# Patient Record
Sex: MALE | Race: WHITE | NOT HISPANIC OR LATINO | Employment: OTHER | URBAN - NONMETROPOLITAN AREA
[De-identification: names, ages, dates, MRNs, and addresses within clinical notes are randomized per-mention and may not be internally consistent; named-entity substitution may affect disease eponyms.]

---

## 2017-01-06 ENCOUNTER — TRANSFERRED RECORDS (OUTPATIENT)
Dept: HEALTH INFORMATION MANAGEMENT | Facility: HOSPITAL | Age: 42
End: 2017-01-06

## 2017-01-17 ENCOUNTER — OFFICE VISIT (OUTPATIENT)
Dept: FAMILY MEDICINE | Facility: OTHER | Age: 42
End: 2017-01-17
Attending: FAMILY MEDICINE
Payer: COMMERCIAL

## 2017-01-17 VITALS
TEMPERATURE: 98.8 F | RESPIRATION RATE: 16 BRPM | WEIGHT: 165 LBS | DIASTOLIC BLOOD PRESSURE: 66 MMHG | HEART RATE: 118 BPM | BODY MASS INDEX: 23.1 KG/M2 | OXYGEN SATURATION: 98 % | HEIGHT: 71 IN | SYSTOLIC BLOOD PRESSURE: 126 MMHG

## 2017-01-17 DIAGNOSIS — Z71.89 ACP (ADVANCE CARE PLANNING): Chronic | ICD-10-CM

## 2017-01-17 DIAGNOSIS — M50.30 DDD (DEGENERATIVE DISC DISEASE), CERVICAL: ICD-10-CM

## 2017-01-17 DIAGNOSIS — M50.90 CERVICAL DISC DISEASE: ICD-10-CM

## 2017-01-17 DIAGNOSIS — J20.9 ACUTE BRONCHITIS, UNSPECIFIED ORGANISM: Primary | ICD-10-CM

## 2017-01-17 PROCEDURE — 99214 OFFICE O/P EST MOD 30 MIN: CPT | Performed by: FAMILY MEDICINE

## 2017-01-17 PROCEDURE — 99212 OFFICE O/P EST SF 10 MIN: CPT

## 2017-01-17 RX ORDER — PREDNISONE 20 MG/1
TABLET ORAL
Qty: 20 TABLET | Refills: 0 | Status: SHIPPED
Start: 2017-01-17 | End: 2017-03-14

## 2017-01-17 RX ORDER — OXYCODONE AND ACETAMINOPHEN 10; 325 MG/1; MG/1
TABLET ORAL
Qty: 120 TABLET | Refills: 0 | Status: SHIPPED | OUTPATIENT
Start: 2017-01-17 | End: 2017-02-14

## 2017-01-17 ASSESSMENT — PAIN SCALES - GENERAL: PAINLEVEL: SEVERE PAIN (7)

## 2017-01-17 NOTE — MR AVS SNAPSHOT
"              After Visit Summary   2017    Micah Rosenthal    MRN: [id number]           Patient Information     Date Of Birth          1975        Visit Information        Provider Department      2017 2:00 PM Dariel Tesfaye MD [facility]        Today's Diagnoses     ACP (advance care planning)    -  1     Acute bronchitis, unspecified organism           Care Instructions    F/u with ongoing concerns.         Follow-ups after your visit        Who to contact     If you have questions or need follow up information about today's clinic visit or your schedule please contact [facility] directly at [phone].  Normal or non-critical lab and imaging results will be communicated to you by [employer], letter or phone within 4 business days after the clinic has received the results. If you do not hear from us within 7 days, please contact the clinic through [employer] or phone. If you have a critical or abnormal lab result, we will notify you by phone as soon as possible.  Submit refill requests through Gather or call your pharmacy and they will forward the refill request to us. Please allow 3 business days for your refill to be completed.          Additional Information About Your Visit        MyChart Information     Gather lets you send messages to your doctor, view your test results, renew your prescriptions, schedule appointments and more. To sign up, go to [email or web address] . Click on \"Log in\" on the left side of the screen, which will take you to the Welcome page. Then click on \"Sign up Now\" on the right side of the page.     You will be asked to enter the access code listed below, as well as some personal information. Please follow the directions to create your username and password.     Your access code is: [email or web address]  Expires: 2017  8:31 AM     Your access code will  in 90 days. If you need help or a new code, please call your [facility] or " "[phone].        Care EveryWhere ID     This is your Care EveryWhere ID. This could be used by other organizations to access your [place] medical records  [id number]        Your Vitals Were     Pulse Temperature Respirations Height BMI (Body Mass Index) Pulse Oximetry    118 98.8  F (37.1  C) (Tympanic) 16 5' 10.75\" (1.797 m) 23.18 kg/m2 98%       Blood Pressure from Last 3 Encounters:   01/17/17 126/66   10/31/16 98/60   03/30/16 88/72    Weight from Last 3 Encounters:   01/17/17 165 lb (74.844 kg)   10/31/16 168 lb (76.204 kg)   03/30/16 175 lb (79.379 kg)              Today, you had the following     No orders found for display         Today's Medication Changes          These changes are accurate as of: 1/17/17  2:40 PM.  If you have any questions, ask your nurse or doctor.               Start taking these medicines.        Dose/Directions    amoxicillin-clavulanate 875-125 MG per tablet   Commonly known as:  AUGMENTIN   Used for:  Acute bronchitis, unspecified organism   Started by:  Dariel Tesfaye MD        Dose:  1 tablet   Take 1 tablet by mouth 2 times daily   Quantity:  20 tablet   Refills:  0            Where to get your medicines      These medications were sent to [facility] - [address]  [address] [place] [address]     Phone:  [phone]    - amoxicillin-clavulanate 875-125 MG per tablet  - predniSONE 20 MG tablet             Primary Care Provider Office Phone # Fax #    Dariel Tesfaye [phone]       [facility] [address]  [facility] [address]        Thank you!     Thank you for choosing [facility]  for your care. Our goal is always to provide you with excellent care. Hearing back from our patients is one way we can continue to improve our services. Please take a few minutes to complete the written survey that you may receive in the mail after your visit with us. Thank you!             Your Updated " Medication List - Protect others around you: Learn how to safely use, store and throw away your medicines at www.disposemymeds.org.          This list is accurate as of: 1/17/17  2:40 PM.  Always use your most recent med list.                   Brand Name Dispense Instructions for use    albuterol 108 (90 BASE) MCG/ACT Inhaler    PROAIR HFA/PROVENTIL HFA/VENTOLIN HFA    1 Inhaler    Inhale 2 puffs into the lungs every 6 hours as needed for shortness of breath / dyspnea       amoxicillin-clavulanate 875-125 MG per tablet    AUGMENTIN    20 tablet    Take 1 tablet by mouth 2 times daily       cyclobenzaprine 10 MG tablet    FLEXERIL    90 tablet    Take 0.5-1 tablets (5-10 mg) by mouth 3 times daily as needed for muscle spasms       oxyCODONE-acetaminophen  MG per tablet    PERCOCET    120 tablet    TAKE 1 TABLET BY MOUTH EVERY 6 HOURS AS NEEDED FOR MODERATE TO SEVEREPAIN       predniSONE 20 MG tablet    DELTASONE    20 tablet    Take 3 tabs (60 mg) orally daily for 3 days, 2 tabs (40 mg) orally daily for 3 days, 1 tab (20 mg) orally daily for 3 days, then 1/2 tab (10 mg) orally for 3 days

## 2017-01-17 NOTE — PROGRESS NOTES
Micah CARTER Galo    January 17, 2017    Chief Complaint   Patient presents with     Cold Symptoms     cough for 2 days and congestion for a week - has a history of bronchitis      *_* Health Care Directive *_*     declined info       SUBJECTIVE:  Here for bronchitis.  Just getting it.  Continues tobacco and he knows he should quit.  We discussed and he wants meds on hand.      Past Medical History   Diagnosis Date     Cervical disc disease        Past Surgical History   Procedure Laterality Date     Other surgical history  03/2013     Cervical Fusion     Ir caudal epidural injection single  12/2012     [place] teeth extracted       Back surgery       fusion of C5 and C6       Current Outpatient Prescriptions   Medication Sig Dispense Refill     predniSONE (DELTASONE) 20 MG tablet Take 3 tabs (60 mg) orally daily for 3 days, 2 tabs (40 mg) orally daily for 3 days, 1 tab (20 mg) orally daily for 3 days, then 1/2 tab (10 mg) orally for 3 days 20 tablet 0     amoxicillin-clavulanate (AUGMENTIN) 875-125 MG per tablet Take 1 tablet by mouth 2 times daily 20 tablet 0     oxyCODONE-acetaminophen (PERCOCET)  MG per tablet TAKE 1 TABLET BY MOUTH EVERY 6 HOURS AS NEEDED FOR MODERATE TO SEVEREPAIN 120 tablet 0     cyclobenzaprine (FLEXERIL) 10 MG tablet Take 0.5-1 tablets (5-10 mg) by mouth 3 times daily as needed for muscle spasms 90 tablet 1     albuterol (PROAIR HFA, PROVENTIL HFA, VENTOLIN HFA) 108 (90 BASE) MCG/ACT inhaler Inhale 2 puffs into the lungs every 6 hours as needed for shortness of breath / dyspnea 1 Inhaler 1       Allergies   Allergen Reactions     Cranberry      Strawberry      Doxycycline Nausea and Vomiting     Zithromax [Azithromycin Dihydrate] Rash       Family History   Problem Relation Age of Onset     CANCER Maternal Uncle      throat ca     CANCER Maternal Uncle      lung ca     Cardiovascular Maternal Grandfather      Cardiovascular Paternal Grandfather      Unknown/Adopted Father        Social  History     Social History     Marital Status:      Spouse Name: N/A     Number of Children: N/A     Years of Education: N/A     Occupational History           Full-time      Social History Main Topics     Smoking status: Current Every Day Smoker -- 1.00 packs/day for 15 years     Types: Cigarettes     Smokeless tobacco: Never Used      Comment: Tried to Quit: Yes; Passive Exposure: Yes; Longest Tobacco free: 8 years     Alcohol Use: No     Drug Use: No     Sexual Activity:     Partners: Female     Other Topics Concern      Service No     Blood Transfusions Yes     Caffeine Concern Yes     Soda, up to 5 cans per day      Occupational Exposure No     Hobby Hazards No     Sleep Concern Yes     Sleeps only 3-4 hours every night     Stress Concern No     Weight Concern No     Special Diet No     Back Care Yes     [facility]     Seat Belt Yes     Parent/Sibling W/ Cabg, Mi Or Angioplasty Before 65f 55m? No     Social History Narrative       5 point ROS negative except as noted above in HPI, including Gen., Resp., CV, GI &  system review.     OBJECTIVE:  B/P: 126/66, T: 98.8, P: 118, R: 16    GENERAL APPEARANCE: Alert, no acute distress  CV: regular rate and rhythm, no murmur, rub or gallop  RESP: lungs clear to auscultation bilaterally with end exp wheeze.    ABDOMEN: normal bowel sounds, soft, nontender, no hepatosplenomegaly or other masses  SKIN: no suspicious lesions or rashes to visualized skin  NEURO: Alert, oriented x 3, speech and mentation normal    ASSESSMENT and PLAN:      (J20.9) Acute bronchitis, unspecified organism  Comment: early.   Plan: predniSONE (DELTASONE) 20 MG tablet,         amoxicillin-clavulanate (AUGMENTIN) 875-125 MG         per tablet        meds on hand as sent above.  If he needs them, which he often does, he will take.  Only slight wheeze today and he might not end up needing them.        (M50.30) DDD (degenerative disc disease), cervical  Comment:  chronic pain discussed.    Plan: oxyCODONE-acetaminophen (PERCOCET)  MG         per tablet        He would like to go to [facility] long acting oxycodone.  I am not terribly comfortable with this.  We talked about long term goals for the neck.  I am constantly talking to him about being off opiates; he just can't do it.  Wife present for conversation.  Continue same for now.      25 minutes spent with patient over 50% in counseling regarding chronic pain mgmt, meds, pitfalls, opiates, etc.  Also the bronchitis.

## 2017-02-14 DIAGNOSIS — M50.30 DDD (DEGENERATIVE DISC DISEASE), CERVICAL: ICD-10-CM

## 2017-02-14 RX ORDER — OXYCODONE AND ACETAMINOPHEN 10; 325 MG/1; MG/1
TABLET ORAL
Qty: 120 TABLET | Refills: 0 | Status: SHIPPED | OUTPATIENT
Start: 2017-02-20 | End: 2017-03-14

## 2017-02-14 NOTE — TELEPHONE ENCOUNTER
oxyCODONE-acetaminophen (PERCOCET)  MG per tablet    Last Written Prescription Date: 01/21/2017 (fill date per pharmacy)  Last Fill Quantity: 120,  # refills: 0   Last Office Visit with [facility], [facility] or [facility] prescribing provider: 01/17/2017

## 2017-03-14 ENCOUNTER — OFFICE VISIT (OUTPATIENT)
Dept: FAMILY MEDICINE | Facility: OTHER | Age: 42
End: 2017-03-14
Attending: FAMILY MEDICINE
Payer: COMMERCIAL

## 2017-03-14 VITALS
BODY MASS INDEX: 22.79 KG/M2 | HEART RATE: 135 BPM | RESPIRATION RATE: 22 BRPM | SYSTOLIC BLOOD PRESSURE: 124 MMHG | WEIGHT: 162.8 LBS | DIASTOLIC BLOOD PRESSURE: 70 MMHG | HEIGHT: 71 IN | TEMPERATURE: 98.5 F | OXYGEN SATURATION: 98 %

## 2017-03-14 DIAGNOSIS — M50.30 DDD (DEGENERATIVE DISC DISEASE), CERVICAL: ICD-10-CM

## 2017-03-14 DIAGNOSIS — M50.90 CERVICAL DISC DISEASE: ICD-10-CM

## 2017-03-14 DIAGNOSIS — Z02.89 PAIN MANAGEMENT CONTRACT SIGNED: Primary | ICD-10-CM

## 2017-03-14 PROCEDURE — 99212 OFFICE O/P EST SF 10 MIN: CPT

## 2017-03-14 PROCEDURE — 99213 OFFICE O/P EST LOW 20 MIN: CPT | Performed by: FAMILY MEDICINE

## 2017-03-14 RX ORDER — OXYCODONE AND ACETAMINOPHEN 10; 325 MG/1; MG/1
TABLET ORAL
Qty: 120 TABLET | Refills: 0 | Status: SHIPPED | OUTPATIENT
Start: 2017-03-14 | End: 2017-04-11

## 2017-03-14 ASSESSMENT — PAIN SCALES - GENERAL: PAINLEVEL: SEVERE PAIN (7)

## 2017-03-14 NOTE — NURSING NOTE
"Chief Complaint   Patient presents with     RECHECK     brochoitis     Pain     fu medications for neck pain       Initial /70 (BP Location: Right arm, Patient Position: Chair, Cuff Size: Adult Regular)  Pulse 135  Resp 22  Ht 5' 10.75\" (1.797 m)  Wt 162 lb 12.8 oz (73.8 kg)  SpO2 98%  BMI 22.87 kg/m2 Estimated body mass index is 22.87 kg/(m^2) as calculated from the following:    Height as of this encounter: 5' 10.75\" (1.797 m).    Weight as of this encounter: 162 lb 12.8 oz (73.8 kg).  Medication Reconciliation: complete   Chief Complaint   Patient presents with     RECHECK     brochoitis     Pain     fu medications for neck pain       Initial /70 (BP Location: Right arm, Patient Position: Chair, Cuff Size: Adult Regular)  Pulse 135  Resp 22  Ht 5' 10.75\" (1.797 m)  Wt 162 lb 12.8 oz (73.8 kg)  SpO2 98%  BMI 22.87 kg/m2 Estimated body mass index is 22.87 kg/(m^2) as calculated from the following:    Height as of this encounter: 5' 10.75\" (1.797 m).    Weight as of this encounter: 162 lb 12.8 oz (73.8 kg).  Medication Reconciliation: complete    Alejandra Pathak      "

## 2017-03-14 NOTE — PROGRESS NOTES
Micah Rosenthal    March 14, 2017    Chief Complaint   Patient presents with     RECHECK     bronchoitis fu     Pain     fu medications for neck pain       SUBJECTIVE:  Here for f/u.  Doing ok with the pills and the pain.  Always in pain.  I always try and encourage attempt at ween.  He is looking at seeing [place] neursurg before we look at that.  No drug use other than the meds.      Past Medical History   Diagnosis Date     Cervical disc disease        Past Surgical History   Procedure Laterality Date     Other surgical history  03/2013     Cervical Fusion     Ir caudal epidural injection single  12/2012     [place] teeth extracted       Back surgery       fusion of C5 and C6       Current Outpatient Prescriptions   Medication Sig Dispense Refill     oxyCODONE-acetaminophen (PERCOCET)  MG per tablet TAKE 1 TABLET BY MOUTH EVERY 6 HOURS AS NEEDED FOR MODERATE TO SEVEREPAIN 120 tablet 0     cyclobenzaprine (FLEXERIL) 10 MG tablet Take 0.5-1 tablets (5-10 mg) by mouth 3 times daily as needed for muscle spasms 90 tablet 1     albuterol (PROAIR HFA, PROVENTIL HFA, VENTOLIN HFA) 108 (90 BASE) MCG/ACT inhaler Inhale 2 puffs into the lungs every 6 hours as needed for shortness of breath / dyspnea 1 Inhaler 1       Allergies   Allergen Reactions     Cranberry      Strawberry      Doxycycline Nausea and Vomiting     Zithromax [Azithromycin Dihydrate] Rash       Family History   Problem Relation Age of Onset     Unknown/Adopted Father      CANCER Maternal Uncle      throat ca     CANCER Maternal Uncle      lung ca     Cardiovascular Maternal Grandfather      Cardiovascular Paternal Grandfather        Social History     Social History     Marital status:      Spouse name: N/A     Number of children: N/A     Years of education: N/A     Occupational History           Full-time      Social History Main Topics     Smoking status: Current Every Day Smoker     Packs/day: 1.00     Years: 15.00     Types: Cigarettes      Smokeless tobacco: Never Used      Comment: Tried to Quit: Yes; Passive Exposure: Yes; Longest Tobacco free: 8 years     Alcohol use No     Drug use: No     Sexual activity: Yes     Partners: Female     Other Topics Concern      Service No     Blood Transfusions Yes     Caffeine Concern Yes     Soda, up to 5 cans per day      Occupational Exposure No     Hobby Hazards No     Sleep Concern Yes     Sleeps only 3-4 hours every night     Stress Concern No     Weight Concern No     Special Diet No     Back Care Yes     [facility]     Seat Belt Yes     Parent/Sibling W/ Cabg, Mi Or Angioplasty Before 65f 55m? No     Social History Narrative       5 point ROS negative except as noted above in HPI, including Gen., Resp., CV, GI &  system review.     OBJECTIVE:  B/P: 124/70, T: 98.5, P: 135, R: 22    GENERAL APPEARANCE: Alert, no acute distress  SKIN: no suspicious lesions or rashes to visualized skin  NEURO: Alert, oriented x 3, speech and mentation normal    ASSESSMENT and PLAN:  (Z02.89) Pain management contract signed  (primary encounter diagnosis)  Comment: doing well.   Plan: no change in cares.  Printed script today.  Follow 3 months.  Overall stable.     (M50.90) Cervical disc disease  Comment: as above.    Plan: as above.      (M50.30) DDD (degenerative disc disease), cervical  Comment: as above.    Plan: oxyCODONE-acetaminophen (PERCOCET)  MG         per tablet        As above.

## 2017-03-14 NOTE — MR AVS SNAPSHOT
"              After Visit Summary   3/14/2017    Micah Rosenthal    MRN: [id number]           Patient Information     Date Of Birth          1975        Visit Information        Provider Department      3/14/2017 11:15 AM Dariel Tesfaye MD [facility]        Today's Diagnoses     Pain management contract signed    -  1    Cervical disc disease        DDD (degenerative disc disease), cervical          Care Instructions    F/u with ongoing concerns.         Follow-ups after your visit        Your next 10 appointments already scheduled     Jun 12, 2017  1:15 PM CDT   (Arrive by 1:00 PM)   SHORT with Dariel Tesfaye MD   [facility] ([facility])    [address]  [facility] [address]   [phone]              Who to contact     If you have questions or need follow up information about today's clinic visit or your schedule please contact [facility] directly at [phone].  Normal or non-critical lab and imaging results will be communicated to you by MyChart, letter or phone within 4 business days after the clinic has received the results. If you do not hear from us within 7 days, please contact the clinic through [employer] or phone. If you have a critical or abnormal lab result, we will notify you by phone as soon as possible.  Submit refill requests through [employer] or call your pharmacy and they will forward the refill request to us. Please allow 3 business days for your refill to be completed.          Additional Information About Your Visit        [employer] Information     [employer] lets you send messages to your doctor, view your test results, renew your prescriptions, schedule appointments and more. To sign up, go to [email or web address] . Click on \"Log in\" on the left side of the screen, which will take you to the Welcome page. Then click on \"Sign up Now\" on the right side of the page.     You will be asked to enter the access code listed below, as well " "as some personal information. Please follow the directions to create your username and password.     Your access code is: [email or web address]  Expires: 2017 12:44 PM     Your access code will  in 90 days. If you need help or a new code, please call your [facility] or [phone].        Care EveryWhere ID     This is your [facility] EveryWhere ID. This could be used by other organizations to access your [place] medical records  [id number]        Your Vitals Were     Pulse Temperature Respirations Height Pulse Oximetry BMI (Body Mass Index)    135 98.5  F (36.9  C) (Tympanic) 22 5' 10.75\" (1.797 m) 98% 22.87 kg/m2       Blood Pressure from Last 3 Encounters:   17 124/70   17 126/66   10/31/16 98/60    Weight from Last 3 Encounters:   17 162 lb 12.8 oz (73.8 kg)   17 165 lb (74.8 kg)   10/31/16 168 lb (76.2 kg)              Today, you had the following     No orders found for display         Today's Medication Changes          These changes are accurate as of: 3/14/17 12:44 PM.  If you have any questions, ask your nurse or doctor.               These medicines have changed or have updated prescriptions.        Dose/Directions    oxyCODONE-acetaminophen  MG per tablet   Commonly known as:  PERCOCET   This may have changed:  See the new instructions.   Used for:  DDD (degenerative disc disease), cervical   Changed by:  Dariel Tesfaye MD        TAKE 1 TABLET BY MOUTH EVERY 6 HOURS AS NEEDED FOR MODERATE TO SEVEREPAIN   Quantity:  120 tablet   Refills:  0            Where to get your medicines      Some of these will need a paper prescription and others can be bought over the counter.  Ask your nurse if you have questions.     Bring a paper prescription for each of these medications     oxyCODONE-acetaminophen  MG per tablet                Primary Care Provider Office Phone # Fax #    Dariel Tesfaye [phone]       [address] [place]  [facility] [address]        Thank you!     Thank you for choosing [facility]  for your care. Our goal is always to provide you with excellent care. Hearing back from our patients is one way we can continue to improve our services. Please take a few minutes to complete the written survey that you may receive in the mail after your visit with us. Thank you!             Your Updated Medication List - Protect others around you: Learn how to safely use, store and throw away your medicines at www.disposemymeds.org.          This list is accurate as of: 3/14/17 12:44 PM.  Always use your most recent med list.                   Brand Name Dispense Instructions for use    albuterol 108 (90 BASE) MCG/ACT Inhaler    PROAIR HFA/PROVENTIL HFA/VENTOLIN HFA    1 Inhaler    Inhale 2 puffs into the lungs every 6 hours as needed for shortness of breath / dyspnea       cyclobenzaprine 10 MG tablet    FLEXERIL    90 tablet    Take 0.5-1 tablets (5-10 mg) by mouth 3 times daily as needed for muscle spasms       oxyCODONE-acetaminophen  MG per tablet    PERCOCET    120 tablet    TAKE 1 TABLET BY MOUTH EVERY 6 HOURS AS NEEDED FOR MODERATE TO SEVEREPAIN

## 2017-04-11 DIAGNOSIS — M50.30 DDD (DEGENERATIVE DISC DISEASE), CERVICAL: ICD-10-CM

## 2017-04-12 RX ORDER — OXYCODONE AND ACETAMINOPHEN 10; 325 MG/1; MG/1
TABLET ORAL
Qty: 120 TABLET | Refills: 0 | Status: SHIPPED | OUTPATIENT
Start: 2017-04-12 | End: 2017-05-08

## 2017-05-08 DIAGNOSIS — M50.30 DDD (DEGENERATIVE DISC DISEASE), CERVICAL: ICD-10-CM

## 2017-05-10 RX ORDER — OXYCODONE AND ACETAMINOPHEN 10; 325 MG/1; MG/1
TABLET ORAL
Qty: 120 TABLET | Refills: 0 | Status: SHIPPED | OUTPATIENT
Start: 2017-05-10 | End: 2017-06-06

## 2017-05-10 NOTE — TELEPHONE ENCOUNTER
percocet      Last Written Prescription Date: 4/12/17  Last Fill Quantity: 120,  # refills: 0   Last Office Visit with [facility], [facility] or [facility] prescribing provider: 3/14/17                                         Next 5 appointments (look out 90 days)     Jun 12, 2017  1:15 PM CDT   (Arrive by 1:00 PM)   SHORT with Dariel Tesfaye MD   [facility] ([facility])    [address] [place] [address] [facility] [address]   [phone]

## 2017-06-06 ENCOUNTER — OFFICE VISIT (OUTPATIENT)
Dept: FAMILY MEDICINE | Facility: OTHER | Age: 42
End: 2017-06-06
Attending: FAMILY MEDICINE
Payer: COMMERCIAL

## 2017-06-06 VITALS
BODY MASS INDEX: 22.61 KG/M2 | RESPIRATION RATE: 17 BRPM | DIASTOLIC BLOOD PRESSURE: 70 MMHG | SYSTOLIC BLOOD PRESSURE: 121 MMHG | WEIGHT: 161 LBS | OXYGEN SATURATION: 98 % | TEMPERATURE: 96.5 F | HEART RATE: 99 BPM

## 2017-06-06 DIAGNOSIS — M50.30 DDD (DEGENERATIVE DISC DISEASE), CERVICAL: ICD-10-CM

## 2017-06-06 DIAGNOSIS — M50.90 CERVICAL DISC DISEASE: Primary | ICD-10-CM

## 2017-06-06 PROCEDURE — 99212 OFFICE O/P EST SF 10 MIN: CPT

## 2017-06-06 PROCEDURE — 99213 OFFICE O/P EST LOW 20 MIN: CPT | Performed by: FAMILY MEDICINE

## 2017-06-06 RX ORDER — CYCLOBENZAPRINE HCL 10 MG
5-10 TABLET ORAL 3 TIMES DAILY PRN
Qty: 90 TABLET | Refills: 1 | Status: SHIPPED | OUTPATIENT
Start: 2017-06-06 | End: 2017-08-04

## 2017-06-06 RX ORDER — OXYCODONE AND ACETAMINOPHEN 10; 325 MG/1; MG/1
TABLET ORAL
Qty: 120 TABLET | Refills: 0 | Status: SHIPPED | OUTPATIENT
Start: 2017-06-06 | End: 2017-06-29

## 2017-06-06 ASSESSMENT — PAIN SCALES - GENERAL: PAINLEVEL: SEVERE PAIN (6)

## 2017-06-06 NOTE — MR AVS SNAPSHOT
"              After Visit Summary   2017    Micah Rosenthal    MRN: [id number]           Patient Information     Date Of Birth          1975        Visit Information        Provider Department      2017 11:15 AM Dariel Tesfaye MD [facility]        Today's Diagnoses     Cervical disc disease    -  1    DDD (degenerative disc disease), cervical          Care Instructions    F/u with ongoing concerns.            Follow-ups after your visit        Who to contact     If you have questions or need follow up information about today's clinic visit or your schedule please contact [facility] directly at [phone].  Normal or non-critical lab and imaging results will be communicated to you by Idea Villagehart, letter or phone within 4 business days after the clinic has received the results. If you do not hear from us within 7 days, please contact the clinic through Idea Villagehart or phone. If you have a critical or abnormal lab result, we will notify you by phone as soon as possible.  Submit refill requests through Myngle or call your pharmacy and they will forward the refill request to us. Please allow 3 business days for your refill to be completed.          Additional Information About Your Visit        MyChart Information     Myngle lets you send messages to your doctor, view your test results, renew your prescriptions, schedule appointments and more. To sign up, go to [email or web address] . Click on \"Log in\" on the left side of the screen, which will take you to the Welcome page. Then click on \"Sign up Now\" on the right side of the page.     You will be asked to enter the access code listed below, as well as some personal information. Please follow the directions to create your username and password.     Your access code is: [email or web address]  Expires: 2017 12:44 PM     Your access code will  in 90 days. If you need help or a new code, please call your [facility] or " [phone].        Care EveryWhere ID     This is your Care EveryWhere ID. This could be used by other organizations to access your [place] medical records  [id number]        Your Vitals Were     Pulse Temperature Respirations Pulse Oximetry BMI (Body Mass Index)       99 96.5  F (35.8  C) 17 98% 22.61 kg/m2        Blood Pressure from Last 3 Encounters:   06/06/17 121/70   03/14/17 124/70   01/17/17 126/66    Weight from Last 3 Encounters:   06/06/17 161 lb (73 kg)   03/14/17 162 lb 12.8 oz (73.8 kg)   01/17/17 165 lb (74.8 kg)              Today, you had the following     No orders found for display         Today's Medication Changes          These changes are accurate as of: 6/6/17  1:01 PM.  If you have any questions, ask your nurse or doctor.               These medicines have changed or have updated prescriptions.        Dose/Directions    oxyCODONE-acetaminophen  MG per tablet   Commonly known as:  PERCOCET   This may have changed:  See the new instructions.   Used for:  DDD (degenerative disc disease), cervical   Changed by:  Dariel Tesfaye MD        TAKE 1 TABLET BY MOUTH EVERY 6 HOURS AS NEEDED FOR MODERATE TO SEVEREPAIN   Quantity:  120 tablet   Refills:  0            Where to get your medicines      These medications were sent to [facility] - [address]  [address] [place] [address]     Phone:  [phone]     cyclobenzaprine 10 MG tablet         Some of these will need a paper prescription and others can be bought over the counter.  Ask your nurse if you have questions.     Bring a paper prescription for each of these medications     oxyCODONE-acetaminophen  MG per tablet                Primary Care Provider Office Phone # Fax #    Dariel Tesfaye [phone]       [address] [address]        Thank you!     Thank you for choosing [facility]  for your care. Our goal is always  to provide you with excellent care. Hearing back from our patients is one way we can continue to improve our services. Please take a few minutes to complete the written survey that you may receive in the mail after your visit with us. Thank you!             Your Updated Medication List - Protect others around you: Learn how to safely use, store and throw away your medicines at www.disposemymeds.org.          This list is accurate as of: 6/6/17  1:01 PM.  Always use your most recent med list.                   Brand Name Dispense Instructions for use    albuterol 108 (90 BASE) MCG/ACT Inhaler    PROAIR HFA/PROVENTIL HFA/VENTOLIN HFA    1 Inhaler    Inhale 2 puffs into the lungs every 6 hours as needed for shortness of breath / dyspnea       cyclobenzaprine 10 MG tablet    FLEXERIL    90 tablet    Take 0.5-1 tablets (5-10 mg) by mouth 3 times daily as needed for muscle spasms       oxyCODONE-acetaminophen  MG per tablet    PERCOCET    120 tablet    TAKE 1 TABLET BY MOUTH EVERY 6 HOURS AS NEEDED FOR MODERATE TO SEVEREPAIN

## 2017-06-06 NOTE — NURSING NOTE
"Chief Complaint   Patient presents with     Pain     3 month follow up, cervical disc disease       Initial /70  Pulse 99  Temp 96.5  F (35.8  C)  Resp 17  Wt 161 lb (73 kg)  SpO2 98%  BMI 22.61 kg/m2 Estimated body mass index is 22.61 kg/(m^2) as calculated from the following:    Height as of 3/14/17: 5' 10.75\" (1.797 m).    Weight as of this encounter: 161 lb (73 kg).  Medication Reconciliation: complete  "

## 2017-06-06 NOTE — PROGRESS NOTES
Micah EMMA Rosenthal    June 6, 2017    Chief Complaint   Patient presents with     Pain     3 month follow up, cervical disc disease       SUBJECTIVE:  Here for f/u.  Doing ok with the medicines.  No side effects.  They make it livable, but he still has a lot of pain.  Planning to go to the [employer].      Past Medical History:   Diagnosis Date     Cervical disc disease        Past Surgical History:   Procedure Laterality Date     BACK SURGERY      fusion of C5 and C6     IR CAUDAL EPIDURAL INJECTION SINGLE  12/2012     OTHER SURGICAL HISTORY  03/2013    Cervical Fusion     wisdom teeth extracted         Current Outpatient Prescriptions   Medication Sig Dispense Refill     oxyCODONE-acetaminophen (PERCOCET)  MG per tablet TAKE 1 TABLET BY MOUTH EVERY 6 HOURS AS NEEDED FOR MODERATE TO SEVEREPAIN 120 tablet 0     cyclobenzaprine (FLEXERIL) 10 MG tablet Take 0.5-1 tablets (5-10 mg) by mouth 3 times daily as needed for muscle spasms 90 tablet 1     albuterol (PROAIR HFA, PROVENTIL HFA, VENTOLIN HFA) 108 (90 BASE) MCG/ACT inhaler Inhale 2 puffs into the lungs every 6 hours as needed for shortness of breath / dyspnea 1 Inhaler 1       Allergies   Allergen Reactions     Cranberry      Strawberry      Doxycycline Nausea and Vomiting     Zithromax [Azithromycin Dihydrate] Rash       Family History   Problem Relation Age of Onset     Unknown/Adopted Father      CANCER Maternal Uncle      throat ca     CANCER Maternal Uncle      lung ca     Cardiovascular Maternal Grandfather      Cardiovascular Paternal Grandfather        Social History     Social History     Marital status:      Spouse name: N/A     Number of children: N/A     Years of education: N/A     Occupational History           Full-time      Social History Main Topics     Smoking status: Current Every Day Smoker     Packs/day: 1.00     Years: 15.00     Types: Cigarettes     Smokeless tobacco: Never Used      Comment: Tried to Quit: Yes; Passive Exposure:  Yes; Longest Tobacco free: 8 years     Alcohol use No     Drug use: No     Sexual activity: Yes     Partners: Female     Other Topics Concern      Service No     Blood Transfusions Yes     Caffeine Concern Yes     Soda, up to 5 cans per day      Occupational Exposure No     Hobby Hazards No     Sleep Concern Yes     Sleeps only 3-4 hours every night     Stress Concern No     Weight Concern No     Special Diet No     Back Care Yes     [facility]     Seat Belt Yes     Parent/Sibling W/ Cabg, Mi Or Angioplasty Before 65f 55m? No     Social History Narrative       5 point ROS negative except as noted above in HPI, including Gen., Resp., CV, GI &  system review.     OBJECTIVE:  B/P: 121/70, T: 96.5, P: 99, R: 17    GENERAL APPEARANCE: Alert, no acute distress  MSK:  Won't really rotate head at all.  Stable .    SKIN: no suspicious lesions or rashes to visualized skin  NEURO: Alert, oriented x 3, speech and mentation normal    ASSESSMENT and PLAN:  (M50.90) Cervical disc disease  (primary encounter diagnosis)  Comment: ongoing, stable.   Plan: cyclobenzaprine (FLEXERIL) 10 MG tablet        No change in cares.   Continue same.  Fair results with the pain medicines.  Still has pain.  No real side effects.  He has been much more reliable with no requests for early fills in the last several months, along with an appropriate UDS.      (M50.30) DDD (degenerative disc disease), cervical  Comment: as above.   Plan: oxyCODONE-acetaminophen (PERCOCET)  MG         per tablet        As above.

## 2017-06-29 DIAGNOSIS — M50.30 DDD (DEGENERATIVE DISC DISEASE), CERVICAL: ICD-10-CM

## 2017-06-30 RX ORDER — OXYCODONE AND ACETAMINOPHEN 10; 325 MG/1; MG/1
TABLET ORAL
Qty: 120 TABLET | Refills: 0 | Status: SHIPPED | OUTPATIENT
Start: 2017-07-06 | End: 2017-07-31

## 2017-06-30 NOTE — TELEPHONE ENCOUNTER
Percocet      Last Written Prescription Date:  6/6/17  Last Fill Quantity: 120,   # refills: 0  Last Office Visit with [facility] or  Health prescribing provider: 6/6/17  Future Office visit:       Routing refill request to provider for review/approval because:  Drug not on the [facility] or  Health refill protocol or controlled substance

## 2017-07-31 DIAGNOSIS — M50.30 DDD (DEGENERATIVE DISC DISEASE), CERVICAL: ICD-10-CM

## 2017-08-02 RX ORDER — OXYCODONE AND ACETAMINOPHEN 10; 325 MG/1; MG/1
TABLET ORAL
Qty: 120 TABLET | Refills: 0 | Status: SHIPPED | OUTPATIENT
Start: 2017-08-02 | End: 2017-08-28

## 2017-08-02 NOTE — TELEPHONE ENCOUNTER
Percocet      Last Written Prescription Date: 7/6/17  Last Fill Quantity: 120,  # refills: 0   Last Office Visit with [facility], [facility] or [facility] prescribing provider: 6/6/17

## 2017-08-04 DIAGNOSIS — M50.90 CERVICAL DISC DISEASE: ICD-10-CM

## 2017-08-04 RX ORDER — CYCLOBENZAPRINE HCL 10 MG
TABLET ORAL
Qty: 90 TABLET | Refills: 0 | Status: SHIPPED | OUTPATIENT
Start: 2017-08-04 | End: 2017-11-22

## 2017-08-04 NOTE — TELEPHONE ENCOUNTER
Flexeril      Last Written Prescription Date: 6/6/17  Last Fill Quantity: 90,  # refills: 1   Last Office Visit with [facility] or [facility] prescribing provider: 6/6/17

## 2017-08-08 ENCOUNTER — TELEPHONE (OUTPATIENT)
Dept: FAMILY MEDICINE | Facility: OTHER | Age: 42
End: 2017-08-08

## 2017-08-08 DIAGNOSIS — G89.29 OTHER CHRONIC PAIN: Primary | ICD-10-CM

## 2017-08-09 NOTE — TELEPHONE ENCOUNTER
Pt did not come in today for a UDS. I called the pt and notified this will have to be done tomorrow am right away or his meds will be stopped. Pt stated he was not able to come due to flat tire and lack of someone to watch his kids.

## 2017-08-10 DIAGNOSIS — G89.29 OTHER CHRONIC PAIN: ICD-10-CM

## 2017-08-10 PROCEDURE — 80307 DRUG TEST PRSMV CHEM ANLYZR: CPT | Mod: ZL | Performed by: FAMILY MEDICINE

## 2017-08-10 PROCEDURE — 99000 SPECIMEN HANDLING OFFICE-LAB: CPT | Mod: ZL | Performed by: FAMILY MEDICINE

## 2017-08-17 LAB — PAIN DRUG SCR UR W RPTD MEDS: NORMAL

## 2017-08-28 ENCOUNTER — TELEPHONE (OUTPATIENT)
Dept: FAMILY MEDICINE | Facility: OTHER | Age: 42
End: 2017-08-28

## 2017-08-28 ENCOUNTER — OFFICE VISIT (OUTPATIENT)
Dept: FAMILY MEDICINE | Facility: OTHER | Age: 42
End: 2017-08-28
Attending: FAMILY MEDICINE
Payer: COMMERCIAL

## 2017-08-28 VITALS
WEIGHT: 169 LBS | RESPIRATION RATE: 16 BRPM | HEIGHT: 71 IN | DIASTOLIC BLOOD PRESSURE: 65 MMHG | TEMPERATURE: 97.9 F | OXYGEN SATURATION: 99 % | SYSTOLIC BLOOD PRESSURE: 118 MMHG | BODY MASS INDEX: 23.66 KG/M2 | HEART RATE: 102 BPM

## 2017-08-28 DIAGNOSIS — M50.30 DDD (DEGENERATIVE DISC DISEASE), CERVICAL: ICD-10-CM

## 2017-08-28 DIAGNOSIS — J40 BRONCHITIS: Primary | ICD-10-CM

## 2017-08-28 DIAGNOSIS — M50.90 CERVICAL DISC DISEASE: ICD-10-CM

## 2017-08-28 PROCEDURE — 99212 OFFICE O/P EST SF 10 MIN: CPT

## 2017-08-28 PROCEDURE — 99214 OFFICE O/P EST MOD 30 MIN: CPT | Performed by: FAMILY MEDICINE

## 2017-08-28 RX ORDER — ALBUTEROL SULFATE 90 UG/1
2 AEROSOL, METERED RESPIRATORY (INHALATION) EVERY 6 HOURS PRN
Qty: 1 INHALER | Refills: 1 | Status: SHIPPED | OUTPATIENT
Start: 2017-08-28 | End: 2020-07-23

## 2017-08-28 RX ORDER — PREDNISONE 20 MG/1
TABLET ORAL
Qty: 20 TABLET | Refills: 0 | Status: SHIPPED | OUTPATIENT
Start: 2017-08-28 | End: 2017-09-25

## 2017-08-28 RX ORDER — DOXYCYCLINE 100 MG/1
100 CAPSULE ORAL 2 TIMES DAILY
Qty: 28 CAPSULE | Refills: 0 | Status: SHIPPED | OUTPATIENT
Start: 2017-08-28 | End: 2017-09-25

## 2017-08-28 RX ORDER — OXYCODONE AND ACETAMINOPHEN 10; 325 MG/1; MG/1
TABLET ORAL
Qty: 120 TABLET | Refills: 0 | Status: SHIPPED | OUTPATIENT
Start: 2017-08-28 | End: 2017-09-25

## 2017-08-28 ASSESSMENT — PAIN SCALES - GENERAL: PAINLEVEL: SEVERE PAIN (7)

## 2017-08-28 NOTE — LETTER
RANGE [place]berta    08/28/17    Patient: Micah Rosenthal  YOB: 1975  Medical Record Number: [id number]                                                                  Controlled Substance Agreement  I understand that my care provider has prescribed controlled substances (narcotics, tranquilizers, and/or stimulants) to help manage my condition(s).  I am taking this medicine to help me function or work.  I know that this is strong medicine.  It could have serious side effects and even cause a dependency on the drug.  If I stop these medicines suddenly, I could have severe withdrawal symptoms.    The risks, benefits, and side effects of these medication(s) were explained to me.  I agree that:  1. I will take part in other treatments as advised by my provider.  This may be psychiatry or counseling, physical therapy, behavioral therapy, group treatment, or a referral to a pain clinic.  I will reduce or stop my medicine when my provider tells me to do so.   2. I will take my medicines as prescribed.  I will not change the dose or schedule unless my provider tells me to.  There will be no refills if I  run out early.   I may be contacted at any time without warning and asked to complete a drug test or pill count.   3. I will keep all my appointments at the clinic.  If I miss appointments or fail to follow instructions, my provider may stop my medicine.  4. I will not ask other providers to prescribe controlled substances. And I will not accept controlled substances from other people. If I need another prescribed controlled substance for a new reason, I will notify my provider within one business day.  5. If I enroll in the [place] Medical Marijuana program, I will tell my provider.  I will also sign an agreement to share my medical records with my provider.  6. I will use one pharmacy to fill all of my controlled substance prescriptions.  If my prescription is mailed to my pharmacy, it may take 5 to 7 days for  my medicine to be ready.  7. I understand that my provider, clinic care team, and pharmacy can track controlled substance prescriptions from other providers through a central database (prescription monitoring program).  8. I will bring in my list of medications (or my medicine bottles) each time I come to the clinic.  REV-  04/2016                                                                                                                                            Page 1 of 2      [facility]    08/28/17    Patient: Micah Rosenthal  YOB: 1975  Medical Record Number: [id number]    9. Refills of controlled substances will be made only during office hours.  It is up to me to make sure that I do not run out of my medicines on weekends or holidays.    10. I am responsible for my prescriptions.  If the medicine is lost or stolen, it will not be replaced.   I also agree not to share these medicines with anyone.  11. I agree to not use ANY illegal or recreational drugs.  This includes marijuana, cocaine, bath salts or other drugs.  I agree not to use alcohol unless my provider says I may.  I agree to give urine samples whenever asked.  If I fail to give a urine sample, the provider may stop my medicine.     12. I will tell my nurse or provider right away if I become pregnant or have a new medical problem treated outside of [facility].  13. I understand that this medicine can affect my thinking and judgment.  It may be unsafe for me to drive, use machinery and do dangerous tasks.  I will not do any of these things until I know how the medicine affects me.  If my dose changes, I will wait to see how it affects me.  I will contact my provider if I have concerns about medicine side effects.  I understand that if I do not follow any of the conditions above, my prescriptions or treatment may be stopped.    I agree that my provider, clinic care team, and pharmacy may work with any city, state or federal law  enforcement agency that investigates the misuse, sale, or other diversion of my controlled medicine. I will allow my provider to discuss my care with or share a copy of this agreement with any other treating provider, pharmacy or emergency room where I receive care.  I agree to give up (waive) any right of privacy or confidentiality with respect to these authorizations.   I have read this agreement and have asked questions about anything I did not understand.   ___________________________________    ___________________________  Patient Signature                                                           Date and Time  ___________________________________     ____________________________  Witness                                                                            Date and Time  ___________________________________  Dariel Tesfaye MD  REV-  04/2016                                                                                                                                                                 Page 2 of 2

## 2017-08-28 NOTE — MR AVS SNAPSHOT
"              After Visit Summary   8/28/2017    Micah Rosenthal    MRN: [id number]           Patient Information     Date Of Birth          1975        Visit Information        Provider Department      8/28/2017 2:15 PM Dariel Tesfaye MD [facility]        Today's Diagnoses     Bronchitis    -  1    Cervical disc disease        DDD (degenerative disc disease), cervical          Care Instructions    F/u with ongoing concerns.           Follow-ups after your visit        Your next 10 appointments already scheduled     Sep 25, 2017  9:45 AM CDT   (Arrive by 9:30 AM)   SHORT with Dariel Tesfaye MD   [facility] ([facility] )    [address]  [facility] [address]   [phone]              Who to contact     If you have questions or need follow up information about today's clinic visit or your schedule please contact [facility] directly at [phone].  Normal or non-critical lab and imaging results will be communicated to you by MyChart, letter or phone within 4 business days after the clinic has received the results. If you do not hear from us within 7 days, please contact the clinic through Kickplayhart or phone. If you have a critical or abnormal lab result, we will notify you by phone as soon as possible.  Submit refill requests through WealthyLife or call your pharmacy and they will forward the refill request to us. Please allow 3 business days for your refill to be completed.          Additional Information About Your Visit        KickplayharPet Ready Information     WealthyLife lets you send messages to your doctor, view your test results, renew your prescriptions, schedule appointments and more. To sign up, go to [email or web address] . Click on \"Log in\" on the left side of the screen, which will take you to the Welcome page. Then click on \"Sign up Now\" on the right side of the page.     You will be asked to enter the access code listed below, as well as " "some personal information. Please follow the directions to create your username and password.     Your access code is: [email or web address]  Expires: 2017  3:36 PM     Your access code will  in 90 days. If you need help or a new code, please call your [place] clinic or [phone].        Care EveryWhere ID     This is your Care EveryWhere ID. This could be used by other organizations to access your [place] medical records  [id number]        Your Vitals Were     Pulse Temperature Respirations Height Pulse Oximetry BMI (Body Mass Index)    102 97.9  F (36.6  C) (Tympanic) 16 5' 10.75\" (1.797 m) 99% 23.74 kg/m2       Blood Pressure from Last 3 Encounters:   17 118/65   17 121/70   17 124/70    Weight from Last 3 Encounters:   17 169 lb (76.7 kg)   17 161 lb (73 kg)   17 162 lb 12.8 oz (73.8 kg)              Today, you had the following     No orders found for display         Today's Medication Changes          These changes are accurate as of: 17  3:36 PM.  If you have any questions, ask your nurse or doctor.               Start taking these medicines.        Dose/Directions    amoxicillin-clavulanate 875-125 MG per tablet   Commonly known as:  AUGMENTIN   Used for:  Bronchitis   Started by:  Dariel Tesfaye MD        Dose:  1 tablet   Take 1 tablet by mouth 2 times daily   Quantity:  20 tablet   Refills:  0       doxycycline 100 MG capsule   Commonly known as:  VIBRAMYCIN   Used for:  Bronchitis   Started by:  Dariel Tesfaye MD        Dose:  100 mg   Take 1 capsule (100 mg) by mouth 2 times daily   Quantity:  28 capsule   Refills:  0       predniSONE 20 MG tablet   Commonly known as:  DELTASONE   Used for:  Bronchitis   Started by:  Dariel Tesfaye MD        Take 3 tabs (60 mg) by mouth daily x 3 days, 2 tabs (40 mg) daily x 3 days, 1 tab (20 mg) daily x 3 days, then 1/2 tab (10 mg) x 3 days.   Quantity:  20 tablet   Refills:  0         These medicines have " changed or have updated prescriptions.        Dose/Directions    oxyCODONE-acetaminophen  MG per tablet   Commonly known as:  PERCOCET   This may have changed:  See the new instructions.   Used for:  DDD (degenerative disc disease), cervical   Changed by:  Dariel Tesfaye MD        TAKE 1 TABLET BY MOUTH EVERY 6 HOURS AS NEEDED FOR MODERATE TO SEVEREPAIN   Quantity:  120 tablet   Refills:  0            Where to get your medicines      These medications were sent to [facility] [place] - [address]  [address] [facility] [place] [address]     Phone:  [phone]     albuterol 108 (90 BASE) MCG/ACT Inhaler    amoxicillin-clavulanate 875-125 MG per tablet    doxycycline 100 MG capsule    predniSONE 20 MG tablet         Some of these will need a paper prescription and others can be bought over the counter.  Ask your nurse if you have questions.     Bring a paper prescription for each of these medications     oxyCODONE-acetaminophen  MG per tablet                Primary Care Provider Office Phone # Fax #    Dariel Tesfaye -[phone]       [facility] [address]  [facility] [address]        Equal Access to Services     [facility] AH: Hadii aad ku hadasho Soomaali, waaxda luqadaha, qaybta kaalmada adeegyada, waxay idiin hayaan adeeg placidoaraousmane wright . So [facility] [phone].    ATENCIÓN: Si habla español, tiene a samaniego disposición servicios gratuitos de asistencia lingüística. Llame al [phone].    We comply with applicable federal civil rights laws and [place] laws. We do not discriminate on the basis of race, color, national origin, age, disability sex, sexual orientation or gender identity.            Thank you!     Thank you for choosing [facility]  for your care. Our goal is always to provide you with excellent care. Hearing back from our patients is one way we can continue to improve our services. Please take a few minutes to complete  the written survey that you may receive in the mail after your visit with us. Thank you!             Your Updated Medication List - Protect others around you: Learn how to safely use, store and throw away your medicines at www.eZWayemBrightQubeeds.org.          This list is accurate as of: 8/28/17  3:36 PM.  Always use your most recent med list.                   Brand Name Dispense Instructions for use Diagnosis    albuterol 108 (90 BASE) MCG/ACT Inhaler    PROAIR HFA/PROVENTIL HFA/VENTOLIN HFA    1 Inhaler    Inhale 2 puffs into the lungs every 6 hours as needed for shortness of breath / dyspnea    Bronchitis       amoxicillin-clavulanate 875-125 MG per tablet    AUGMENTIN    20 tablet    Take 1 tablet by mouth 2 times daily    Bronchitis       cyclobenzaprine 10 MG tablet    FLEXERIL    90 tablet    TAKE 1/2 TO 1 TABLET BY MOUTH 3 TIMES DAILY AS NEEDED FOR MUSCLE SPASMS    Cervical disc disease       doxycycline 100 MG capsule    VIBRAMYCIN    28 capsule    Take 1 capsule (100 mg) by mouth 2 times daily    Bronchitis       oxyCODONE-acetaminophen  MG per tablet    PERCOCET    120 tablet    TAKE 1 TABLET BY MOUTH EVERY 6 HOURS AS NEEDED FOR MODERATE TO SEVEREPAIN    DDD (degenerative disc disease), cervical       predniSONE 20 MG tablet    DELTASONE    20 tablet    Take 3 tabs (60 mg) by mouth daily x 3 days, 2 tabs (40 mg) daily x 3 days, 1 tab (20 mg) daily x 3 days, then 1/2 tab (10 mg) x 3 days.    Bronchitis

## 2017-08-28 NOTE — TELEPHONE ENCOUNTER
Fax received from [place] [facility] pharmacy they have Doxycycline listed as an allergy for Micah. Do you want to continue with this order or change to something else?

## 2017-08-28 NOTE — NURSING NOTE
"Chief Complaint   Patient presents with     URI     Pt has a cough, wheezing, intermittent SOB and chest congestion for 2-3 days. Pt has not had a fever.       Initial /65 (BP Location: Right arm, Patient Position: Chair, Cuff Size: Adult Regular)  Pulse 102  Temp 97.9  F (36.6  C) (Tympanic)  Resp 16  Ht 5' 10.75\" (1.797 m)  Wt 169 lb (76.7 kg)  SpO2 99%  BMI 23.74 kg/m2 Estimated body mass index is 23.74 kg/(m^2) as calculated from the following:    Height as of this encounter: 5' 10.75\" (1.797 m).    Weight as of this encounter: 169 lb (76.7 kg).  Medication Reconciliation: complete   Charis Bernal    "

## 2017-08-28 NOTE — PROGRESS NOTES
Micah Rosenthal    August 28, 2017    Chief Complaint   Patient presents with     URI     Pt has a cough, wheezing, intermittent SOB and chest congestion for 2-3 days. Pt has not had a fever.       SUBJECTIVE:  Here for bronchitis.  About a week.  Continues to smoke.  Wants to get off the opiates.  We talked a long time.      Past Medical History:   Diagnosis Date     Cervical disc disease        Past Surgical History:   Procedure Laterality Date     BACK SURGERY      fusion of C5 and C6     IR CAUDAL EPIDURAL INJECTION SINGLE  12/2012     OTHER SURGICAL HISTORY  03/2013    Cervical Fusion     wisdom teeth extracted         Current Outpatient Prescriptions   Medication Sig Dispense Refill     albuterol (PROAIR HFA/PROVENTIL HFA/VENTOLIN HFA) 108 (90 BASE) MCG/ACT Inhaler Inhale 2 puffs into the lungs every 6 hours as needed for shortness of breath / dyspnea 1 Inhaler 1     predniSONE (DELTASONE) 20 MG tablet Take 3 tabs (60 mg) by mouth daily x 3 days, 2 tabs (40 mg) daily x 3 days, 1 tab (20 mg) daily x 3 days, then 1/2 tab (10 mg) x 3 days. 20 tablet 0     doxycycline (VIBRAMYCIN) 100 MG capsule Take 1 capsule (100 mg) by mouth 2 times daily 28 capsule 0     oxyCODONE-acetaminophen (PERCOCET)  MG per tablet TAKE 1 TABLET BY MOUTH EVERY 6 HOURS AS NEEDED FOR MODERATE TO SEVEREPAIN 120 tablet 0     cyclobenzaprine (FLEXERIL) 10 MG tablet TAKE 1/2 TO 1 TABLET BY MOUTH 3 TIMES DAILY AS NEEDED FOR MUSCLE SPASMS 90 tablet 0     [DISCONTINUED] albuterol (PROAIR HFA, PROVENTIL HFA, VENTOLIN HFA) 108 (90 BASE) MCG/ACT inhaler Inhale 2 puffs into the lungs every 6 hours as needed for shortness of breath / dyspnea 1 Inhaler 1       Allergies   Allergen Reactions     Cranberry      Strawberry      Doxycycline Nausea and Vomiting     Zithromax [Azithromycin Dihydrate] Rash       Family History   Problem Relation Age of Onset     Unknown/Adopted Father      CANCER Maternal Uncle      throat ca     CANCER Maternal Uncle       lung ca     Cardiovascular Maternal Grandfather      Cardiovascular Paternal Grandfather        Social History     Social History     Marital status:      Spouse name: N/A     Number of children: N/A     Years of education: N/A     Occupational History           Full-time      Social History Main Topics     Smoking status: Current Every Day Smoker     Packs/day: 1.00     Years: 15.00     Types: Cigarettes     Smokeless tobacco: Never Used      Comment: Tried to Quit: Yes; Passive Exposure: Yes; Longest Tobacco free: 8 years     Alcohol use No     Drug use: No     Sexual activity: Yes     Partners: Female     Other Topics Concern      Service No     Blood Transfusions Yes     Caffeine Concern Yes     Soda, up to 5 cans per day      Occupational Exposure No     Hobby Hazards No     Sleep Concern Yes     Sleeps only 3-4 hours every night     Stress Concern No     Weight Concern No     Special Diet No     Back Care Yes     [facility]     Seat Belt Yes     Parent/Sibling W/ Cabg, Mi Or Angioplasty Before 65f 55m? No     Social History Narrative       5 point ROS negative except as noted above in HPI, including Gen., Resp., CV, GI &  system review.     OBJECTIVE:  B/P: 118/65, T: 97.9, P: 102, R: 16    GENERAL APPEARANCE: Alert, no acute distress  CV: regular rate and rhythm, no murmur, rub or gallop  RESP: lungs clear to auscultation bilaterally with diffuse expiratory wheeze.    ABDOMEN: normal bowel sounds, soft, nontender, no hepatosplenomegaly or other masses  SKIN: no suspicious lesions or rashes to visualized skin  NEURO: Alert, oriented x 3, speech and mentation normal    ASSESSMENT and PLAN:  (J40) Bronchitis  (primary encounter diagnosis)  Comment: in a tobacco smoker.   Plan: albuterol (PROAIR HFA/PROVENTIL HFA/VENTOLIN         HFA) 108 (90 BASE) MCG/ACT Inhaler, predniSONE         (DELTASONE) 20 MG tablet, doxycycline         (VIBRAMYCIN) 100 MG capsule        Reviewed.   Steroid, inhaler, and doxy.     (M50.90) Cervical disc disease  Comment: wanting to ween the opiates.   Plan: we talked a long time about this.  Going to self ween this month if able, and continue to push the issue and see him monthly and slowly ween over the next 4-8 months.      (M50.30) DDD (degenerative disc disease), cervical  Comment: as above  Plan: oxyCODONE-acetaminophen (PERCOCET)  MG         per tablet         As above.     25 minutes spent with patient and girlfriend.  Over 50% in counseling about opiate ween and discussion there.

## 2017-08-28 NOTE — TELEPHONE ENCOUNTER
Change to augmentin and thanks.  He had doxy in Jan 2016 and I believe he tolerated but let's change to augmentin which I know he can tolerate.  Thanks.me

## 2017-09-25 ENCOUNTER — OFFICE VISIT (OUTPATIENT)
Dept: FAMILY MEDICINE | Facility: OTHER | Age: 42
End: 2017-09-25
Attending: FAMILY MEDICINE
Payer: COMMERCIAL

## 2017-09-25 VITALS
WEIGHT: 171 LBS | TEMPERATURE: 98.3 F | DIASTOLIC BLOOD PRESSURE: 60 MMHG | BODY MASS INDEX: 23.94 KG/M2 | HEIGHT: 71 IN | SYSTOLIC BLOOD PRESSURE: 116 MMHG | HEART RATE: 76 BPM

## 2017-09-25 DIAGNOSIS — M50.90 CERVICAL DISC DISEASE: ICD-10-CM

## 2017-09-25 DIAGNOSIS — Z72.0 TOBACCO ABUSE: ICD-10-CM

## 2017-09-25 DIAGNOSIS — G89.29 OTHER CHRONIC PAIN: Primary | ICD-10-CM

## 2017-09-25 DIAGNOSIS — Z71.6 TOBACCO ABUSE COUNSELING: ICD-10-CM

## 2017-09-25 DIAGNOSIS — M50.30 DDD (DEGENERATIVE DISC DISEASE), CERVICAL: ICD-10-CM

## 2017-09-25 PROCEDURE — 99213 OFFICE O/P EST LOW 20 MIN: CPT | Performed by: FAMILY MEDICINE

## 2017-09-25 PROCEDURE — 99212 OFFICE O/P EST SF 10 MIN: CPT

## 2017-09-25 RX ORDER — OXYCODONE AND ACETAMINOPHEN 10; 325 MG/1; MG/1
TABLET ORAL
Qty: 120 TABLET | Refills: 0 | Status: SHIPPED | OUTPATIENT
Start: 2017-09-25 | End: 2017-10-25

## 2017-09-25 ASSESSMENT — ANXIETY QUESTIONNAIRES
GAD7 TOTAL SCORE: 7
4. TROUBLE RELAXING: SEVERAL DAYS
5. BEING SO RESTLESS THAT IT IS HARD TO SIT STILL: SEVERAL DAYS
2. NOT BEING ABLE TO STOP OR CONTROL WORRYING: SEVERAL DAYS
6. BECOMING EASILY ANNOYED OR IRRITABLE: SEVERAL DAYS
1. FEELING NERVOUS, ANXIOUS, OR ON EDGE: SEVERAL DAYS
3. WORRYING TOO MUCH ABOUT DIFFERENT THINGS: SEVERAL DAYS
7. FEELING AFRAID AS IF SOMETHING AWFUL MIGHT HAPPEN: SEVERAL DAYS

## 2017-09-25 ASSESSMENT — PAIN SCALES - GENERAL: PAINLEVEL: SEVERE PAIN (6)

## 2017-09-25 ASSESSMENT — PATIENT HEALTH QUESTIONNAIRE - PHQ9: SUM OF ALL RESPONSES TO PHQ QUESTIONS 1-9: 9

## 2017-09-25 NOTE — PROGRESS NOTES
Micah Rosenthal    September 25, 2017    Chief Complaint   Patient presents with     Recheck Medication     Discuss decreasing percocet       SUBJECTIVE:  Here for f/u.  Had a lot going on last month.  We talked about the nature of these meds and getting off them.  We agreed on 1-2 months per pill.  See below.  He is doing ok.      Past Medical History:   Diagnosis Date     Cervical disc disease        Past Surgical History:   Procedure Laterality Date     BACK SURGERY      fusion of C5 and C6     IR CAUDAL EPIDURAL INJECTION SINGLE  12/2012     OTHER SURGICAL HISTORY  03/2013    Cervical Fusion     wisdom teeth extracted         Current Outpatient Prescriptions   Medication Sig Dispense Refill     oxyCODONE-acetaminophen (PERCOCET)  MG per tablet TAKE 1 TABLET BY MOUTH EVERY 6 HOURS AS NEEDED FOR MODERATE TO SEVEREPAIN 120 tablet 0     albuterol (PROAIR HFA/PROVENTIL HFA/VENTOLIN HFA) 108 (90 BASE) MCG/ACT Inhaler Inhale 2 puffs into the lungs every 6 hours as needed for shortness of breath / dyspnea 1 Inhaler 1     amoxicillin-clavulanate (AUGMENTIN) 875-125 MG per tablet Take 1 tablet by mouth 2 times daily 20 tablet 0     cyclobenzaprine (FLEXERIL) 10 MG tablet TAKE 1/2 TO 1 TABLET BY MOUTH 3 TIMES DAILY AS NEEDED FOR MUSCLE SPASMS 90 tablet 0       Allergies   Allergen Reactions     Cranberry      Strawberry      Doxycycline Nausea and Vomiting     Zithromax [Azithromycin Dihydrate] Rash       Family History   Problem Relation Age of Onset     Unknown/Adopted Father      CANCER Maternal Uncle      throat ca     CANCER Maternal Uncle      lung ca     Cardiovascular Maternal Grandfather      Cardiovascular Paternal Grandfather        Social History     Social History     Marital status:      Spouse name: N/A     Number of children: N/A     Years of education: N/A     Occupational History           Full-time      Social History Main Topics     Smoking status: Current Every Day Smoker      Packs/day: 1.00     Years: 15.00     Types: Cigarettes     Smokeless tobacco: Never Used      Comment: Tried to Quit: Yes; Passive Exposure: Yes; Longest Tobacco free: 8 years     Alcohol use No     Drug use: No     Sexual activity: Yes     Partners: Female     Other Topics Concern      Service No     Blood Transfusions Yes     Caffeine Concern Yes     Soda, up to 5 cans per day      Occupational Exposure No     Hobby Hazards No     Sleep Concern Yes     Sleeps only 3-4 hours every night     Stress Concern No     Weight Concern No     Special Diet No     Back Care Yes     [facility]     Seat Belt Yes     Parent/Sibling W/ Cabg, Mi Or Angioplasty Before 65f 55m? No     Social History Narrative       5 point ROS negative except as noted above in HPI, including Gen., Resp., CV, GI &  system review.     OBJECTIVE:  B/P: 116/60, T: 98.3, P: 76, R: Data Unavailable    GENERAL APPEARANCE: Alert, no acute distress  SKIN: no suspicious lesions or rashes to visualized skin  NEURO: Alert, oriented x 3, speech and mentation normal    ASSESSMENT and PLAN:  (G89.29) Other chronic pain  (primary encounter diagnosis)  Comment: reviewed.   Plan: going to do another month of the 4/day.   He was unable to self ween and wants one more shot.  We discussed this at some length again today.  Sticking with that 120 pills, and seeing him back in a month.  I gave him Yoli's number as well for additional assistance.      (Z72.0) Tobacco abuse  Comment: ongoing  Plan: Tobacco Cessation - for Health Maintenance        Advise cessation.     (Z71.6) Tobacco abuse counseling  Comment: as above.   Plan: as above.

## 2017-09-25 NOTE — PATIENT INSTRUCTIONS

## 2017-09-25 NOTE — NURSING NOTE
"Chief Complaint   Patient presents with     Recheck Medication     Discuss decreasing percocet       Initial /60  Pulse 76  Temp 98.3  F (36.8  C)  Ht 5' 10.75\" (1.797 m)  Wt 171 lb (77.6 kg)  BMI 24.02 kg/m2 Estimated body mass index is 24.02 kg/(m^2) as calculated from the following:    Height as of this encounter: 5' 10.75\" (1.797 m).    Weight as of this encounter: 171 lb (77.6 kg).  Medication Reconciliation: complete   Ning Murphy    "

## 2017-09-25 NOTE — MR AVS SNAPSHOT
After Visit Summary   9/25/2017    Micah Rosenthal    MRN: [id number]           Patient Information     Date Of Birth          1975        Visit Information        Provider Department      9/25/2017 9:45 AM Dariel Tesfaye MD [facility]        Today's Diagnoses     Other chronic pain    -  1    Tobacco abuse        Tobacco abuse counseling        Cervical disc disease        DDD (degenerative disc disease), cervical          Care Instructions      HOW TO QUIT SMOKING  Smoking is one of the hardest habits to break. About half of all those who have ever smoked have been able to quit, and most of those (about 70%) who still smoke want to quit. Here are some of the best ways to stop smoking.     KEEP TRYING:  It takes most smokers about 8 tries before they are finally able to fully quit. So, the more often you try and fail, the better your chance of quitting the next time! So, don't give up!    GO COLD TURKEY:  Most ex-smokers quit cold turkey. Trying to cut back gradually doesn't seem to work as well, perhaps because it continues the smoking habit. Also, it is possible to fool yourself by inhaling more while smoking fewer cigarettes. This results in the same amount of nicotine in your body!    GET SUPPORT:  Support programs can make an important difference, especially for the heavy smoker. These groups offer lectures, methods to change your behavior and peer support. Call the free national Quitline for more information. 800-QUIT-NOW (500-066-3079). Low-cost or free programs are offered by many hospitals, local chapters of the American Lung Association (680-103-5241) and the American Cancer Society (876-904-1477). Support at home is important too. Non-smokers can help by offering praise and encouragement. If the smoker fails to quit, encourage them to try again!    OVER-THE-COUNTER MEDICINES:  For those who can't quit on their own, Nicotine Replacement Therapy (NRT) may make quitting  much easier. Certain aids such as the nicotine patch, gum and lozenge are available without a prescription. However, it is best to use these under the guidance of your doctor. The skin patch provides a steady supply of nicotine to the body. Nicotine gum and lozenge gives temporary bursts of low levels of nicotine. Both methods take the edge off the craving for cigarettes. WARNING: If you feel symptoms of nicotine overdose, such as nausea, vomiting, dizziness, weakness, or fast heartbeat, stop using these and see your doctor.    PRESCRIPTION MEDICINES:  After evaluating your smoking patterns and prior attempts at quitting, your doctor may offer a prescription medicine such as bupropion (Zyban, Wellbutrin), varenicline (Chantix, Champix), a niocotine inhaler or nasal spray. Each has its unique advantage and side effects which your doctor can review with you.    HEALTH BENEFITS OF QUITTING:  The benefits of quitting start right away and keep improving the longer you go without smokin minutes: blood pressure and pulse return to normal  8 hours: oxygen levels return to normal  2 days: ability to smell and taste begins to improve as damaged nerves start to regrow  2-3 weeks: circulation and lung function improves  1-9 months: decreased cough, congestion and shortness of breath; less tired  1 year: risk of heart attack decreases by half  5 years: risk of lung cancer decreases by half; risk of stroke becomes the same as a non-smoker  For information about how to quit smoking, visit the following links:  National Cancer [place] ,   Clearing the Air, Quit Smoking Today   - an online booklet. http://www.smokefree.gov/pubs/clearing_the_air.pdf  Smokefree.gov http://smokefree.gov/  QuitNet http://www.quitnet.com/    1999-8767 Mannie Alcantara, [address] [place] [address], [place] [address]. All rights reserved. This information is not intended as a substitute for professional medical care. Always follow your healthcare  professional's instructions.    The Benefits of Living Smoke Free  What do you want to gain from quitting? Check off some reasons to quit.  Health Benefits  ___ Reduce my risk of lung cancer, heart disease, chronic lung disease  ___ Have fewer wrinkles and softer skin  ___ Improve my sense of taste and smell  ___ For pregnant women--reduce the risk of having a miscarriage, stillbirth, premature birth, or low-birth-weight baby  Personal Benefits  ___ Feel more in control of my life  ___ Have better-smelling hair, breath, clothes, home, and car  ___ Save time by not having to take smoke breaks, buy cigarettes, or hunt for a light  ___ Have whiter teeth  Family Benefits  ___ Reduce my children s respiratory tract infections  ___ Set a good example for my children  ___ Reduce my family s cancer risk  Financial Benefits  ___ Save hundreds of dollars each year that would be spent on cigarettes  ___ Save money on medical bills  ___ Save on life, health, and car insurance premiums    Those Dollars Add Up!  Cigarettes are expensive, and getting more expensive all the time. Do you realize how much money you are spending on cigarettes per year? What is the average amount you spend on a pack of cigarettes? What is the average number of packs that you smoke per day? Using your answers to these questions, fill in this formula to help you find out:  ($ _____ per pack) ×  ( _____ number of packs per day) × (365 days) =  $ _____ yearly cost of smoking  Besides tobacco, there are other costs, including extra cleaning bills and replacement costs for clothing and furniture; medical expenses for smoking-related illnesses; and higher health, life, and car insurance premiums.    Cigars and Pipes Count Too!  Cigars and pipes are also dangerous. So are smokeless (chewing) tobacco and snuff. All of these products contain nicotine, a highly addictive substance that has harmful effects on your body. Quitting smoking means giving up all tobacco  "products.      [phone] [facility], [address] [place] [address], [place] [address]. All rights reserved. This information is not intended as a substitute for professional medical care. Always follow your healthcare professional's instructions.          Follow-ups after your visit        Your next 10 appointments already scheduled     Oct 25, 2017  9:45 AM CDT   (Arrive by 9:30 AM)   SHORT with Dariel Tesfaye MD   [facility] ([facility] )    [address] [place], [place] [address] [facility] [address]   [phone]              Who to contact     If you have questions or need follow up information about today's clinic visit or your schedule please contact [facility] directly at [phone].  Normal or non-critical lab and imaging results will be communicated to you by MyChart, letter or phone within 4 business days after the clinic has received the results. If you do not hear from us within 7 days, please contact the clinic through MyChart or phone. If you have a critical or abnormal lab result, we will notify you by phone as soon as possible.  Submit refill requests through [facility] or call your pharmacy and they will forward the refill request to us. Please allow 3 business days for your refill to be completed.          Additional Information About Your Visit        Care EveryWhere ID     This is your Care EveryWhere ID. This could be used by other organizations to access your [place] medical records  [id number]        Your Vitals Were     Pulse Temperature Height BMI (Body Mass Index)          76 98.3  F (36.8  C) 5' 10.75\" (1.797 m) 24.02 kg/m2         Blood Pressure from Last 3 Encounters:   09/25/17 116/60   08/28/17 118/65   06/06/17 121/70    Weight from Last 3 Encounters:   09/25/17 171 lb (77.6 kg)   08/28/17 169 lb (76.7 kg)   06/06/17 161 lb (73 kg)              We Performed the Following     Tobacco Cessation - for Health Maintenance          Where to get your " medicines      Some of these will need a paper prescription and others can be bought over the counter.  Ask your nurse if you have questions.     Bring a paper prescription for each of these medications     oxyCODONE-acetaminophen  MG per tablet          Primary Care Provider Office Phone # Fax #    Dariel Tesfaye [phone]       [facility] [address]  [facility] [place] [address]        Equal Access to Services     SKY CHAUDHARI : Hadii aad ku hadasho Soomaali, waaxda luqadaha, qaybta kaalmada adeegyada, waxay idiin hayaan adeeg kharash la'aan . So [facility] [phone].    ATENCIÓN: Si habla español, tiene a samaniego disposición servicios gratuitos de asistencia lingüística. Llame al [phone].    We comply with applicable federal civil rights laws and [place] laws. We do not discriminate on the basis of race, color, national origin, age, disability sex, sexual orientation or gender identity.            Thank you!     Thank you for choosing [facility]  for your care. Our goal is always to provide you with excellent care. Hearing back from our patients is one way we can continue to improve our services. Please take a few minutes to complete the written survey that you may receive in the mail after your visit with us. Thank you!             Your Updated Medication List - Protect others around you: Learn how to safely use, store and throw away your medicines at www.disposemymeds.org.          This list is accurate as of: 9/25/17  9:55 AM.  Always use your most recent med list.                   Brand Name Dispense Instructions for use Diagnosis    albuterol 108 (90 BASE) MCG/ACT Inhaler    PROAIR HFA/PROVENTIL HFA/VENTOLIN HFA    1 Inhaler    Inhale 2 puffs into the lungs every 6 hours as needed for shortness of breath / dyspnea    Bronchitis       amoxicillin-clavulanate 875-125 MG per tablet    AUGMENTIN    20 tablet    Take 1 tablet by mouth 2 times daily    Bronchitis        cyclobenzaprine 10 MG tablet    FLEXERIL    90 tablet    TAKE 1/2 TO 1 TABLET BY MOUTH 3 TIMES DAILY AS NEEDED FOR MUSCLE SPASMS    Cervical disc disease       oxyCODONE-acetaminophen  MG per tablet    PERCOCET    120 tablet    TAKE 1 TABLET BY MOUTH EVERY 6 HOURS AS NEEDED FOR MODERATE TO SEVEREPAIN    DDD (degenerative disc disease), cervical

## 2017-09-26 ASSESSMENT — ANXIETY QUESTIONNAIRES: GAD7 TOTAL SCORE: 7

## 2017-10-25 ENCOUNTER — OFFICE VISIT (OUTPATIENT)
Dept: FAMILY MEDICINE | Facility: OTHER | Age: 42
End: 2017-10-25
Attending: FAMILY MEDICINE
Payer: COMMERCIAL

## 2017-10-25 VITALS
WEIGHT: 170.4 LBS | TEMPERATURE: 99 F | HEART RATE: 115 BPM | RESPIRATION RATE: 16 BRPM | SYSTOLIC BLOOD PRESSURE: 126 MMHG | HEIGHT: 71 IN | OXYGEN SATURATION: 97 % | DIASTOLIC BLOOD PRESSURE: 64 MMHG | BODY MASS INDEX: 23.85 KG/M2

## 2017-10-25 DIAGNOSIS — Z72.0 TOBACCO ABUSE: ICD-10-CM

## 2017-10-25 DIAGNOSIS — M50.30 DDD (DEGENERATIVE DISC DISEASE), CERVICAL: ICD-10-CM

## 2017-10-25 DIAGNOSIS — Z71.6 TOBACCO ABUSE COUNSELING: ICD-10-CM

## 2017-10-25 DIAGNOSIS — Z02.89 PAIN MANAGEMENT CONTRACT SIGNED: ICD-10-CM

## 2017-10-25 DIAGNOSIS — M50.90 CERVICAL DISC DISEASE: Primary | ICD-10-CM

## 2017-10-25 PROCEDURE — 99213 OFFICE O/P EST LOW 20 MIN: CPT | Performed by: FAMILY MEDICINE

## 2017-10-25 PROCEDURE — 99212 OFFICE O/P EST SF 10 MIN: CPT

## 2017-10-25 RX ORDER — OXYCODONE AND ACETAMINOPHEN 10; 325 MG/1; MG/1
TABLET ORAL
Qty: 90 TABLET | Refills: 0 | Status: SHIPPED | OUTPATIENT
Start: 2017-10-25 | End: 2017-11-22

## 2017-10-25 ASSESSMENT — PAIN SCALES - GENERAL: PAINLEVEL: EXTREME PAIN (9)

## 2017-10-25 NOTE — NURSING NOTE
"Chief Complaint   Patient presents with     Pain     Pt is in for a FU on chroni pain/meds.       Initial /64 (BP Location: Right arm, Patient Position: Chair, Cuff Size: Adult Regular)  Pulse 115  Temp 99  F (37.2  C) (Tympanic)  Resp 16  Ht 5' 10.75\" (1.797 m)  Wt 170 lb 6.4 oz (77.3 kg)  SpO2 97%  BMI 23.93 kg/m2 Estimated body mass index is 23.93 kg/(m^2) as calculated from the following:    Height as of this encounter: 5' 10.75\" (1.797 m).    Weight as of this encounter: 170 lb 6.4 oz (77.3 kg).  Medication Reconciliation: complete   Charis Bernal    "

## 2017-10-25 NOTE — MR AVS SNAPSHOT
After Visit Summary   10/25/2017    Micah Rosenthal    MRN: [id number]           Patient Information     Date Of Birth          1975        Visit Information        Provider Department      10/25/2017 9:45 AM Dariel Tesfaye MD [facility]        Today's Diagnoses     Cervical disc disease    -  1    Tobacco abuse        Tobacco abuse counseling        Pain management contract signed        DDD (degenerative disc disease), cervical          Care Instructions      HOW TO QUIT SMOKING  Smoking is one of the hardest habits to break. About half of all those who have ever smoked have been able to quit, and most of those (about 70%) who still smoke want to quit. Here are some of the best ways to stop smoking.     KEEP TRYING:  It takes most smokers about 8 tries before they are finally able to fully quit. So, the more often you try and fail, the better your chance of quitting the next time! So, don't give up!    GO COLD TURKEY:  Most ex-smokers quit cold turkey. Trying to cut back gradually doesn't seem to work as well, perhaps because it continues the smoking habit. Also, it is possible to fool yourself by inhaling more while smoking fewer cigarettes. This results in the same amount of nicotine in your body!    GET SUPPORT:  Support programs can make an important difference, especially for the heavy smoker. These groups offer lectures, methods to change your behavior and peer support. Call the free national Quitline for more information. 800-QUIT-NOW (563-972-7564). Low-cost or free programs are offered by many hospitals, local chapters of the American Lung Association (400-076-0182) and the American Cancer Society (182-388-4247). Support at home is important too. Non-smokers can help by offering praise and encouragement. If the smoker fails to quit, encourage them to try again!    OVER-THE-COUNTER MEDICINES:  For those who can't quit on their own, Nicotine Replacement Therapy (NRT) may  make quitting much easier. Certain aids such as the nicotine patch, gum and lozenge are available without a prescription. However, it is best to use these under the guidance of your doctor. The skin patch provides a steady supply of nicotine to the body. Nicotine gum and lozenge gives temporary bursts of low levels of nicotine. Both methods take the edge off the craving for cigarettes. WARNING: If you feel symptoms of nicotine overdose, such as nausea, vomiting, dizziness, weakness, or fast heartbeat, stop using these and see your doctor.    PRESCRIPTION MEDICINES:  After evaluating your smoking patterns and prior attempts at quitting, your doctor may offer a prescription medicine such as bupropion (Zyban, Wellbutrin), varenicline (Chantix, Champix), a niocotine inhaler or nasal spray. Each has its unique advantage and side effects which your doctor can review with you.    HEALTH BENEFITS OF QUITTING:  The benefits of quitting start right away and keep improving the longer you go without smokin minutes: blood pressure and pulse return to normal  8 hours: oxygen levels return to normal  2 days: ability to smell and taste begins to improve as damaged nerves start to regrow  2-3 weeks: circulation and lung function improves  1-9 months: decreased cough, congestion and shortness of breath; less tired  1 year: risk of heart attack decreases by half  5 years: risk of lung cancer decreases by half; risk of stroke becomes the same as a non-smoker  For information about how to quit smoking, visit the following links:  National Cancer [place] ,   Clearing the Air, Quit Smoking Today   - an online booklet. http://www.smokefree.gov/pubs/clearing_the_air.pdf  Smokefree.gov http://smokefree.gov/  QuitNet http://www.quitnet.com/    1744-6455 [employer], [address] [place] [address], [place] [address]. All rights reserved. This information is not intended as a substitute for professional medical care. Always follow your  healthcare professional's instructions.    The Benefits of Living Smoke Free  What do you want to gain from quitting? Check off some reasons to quit.  Health Benefits  ___ Reduce my risk of lung cancer, heart disease, chronic lung disease  ___ Have fewer wrinkles and softer skin  ___ Improve my sense of taste and smell  ___ For pregnant women--reduce the risk of having a miscarriage, stillbirth, premature birth, or low-birth-weight baby  Personal Benefits  ___ Feel more in control of my life  ___ Have better-smelling hair, breath, clothes, home, and car  ___ Save time by not having to take smoke breaks, buy cigarettes, or hunt for a light  ___ Have whiter teeth  Family Benefits  ___ Reduce my children s respiratory tract infections  ___ Set a good example for my children  ___ Reduce my family s cancer risk  Financial Benefits  ___ Save hundreds of dollars each year that would be spent on cigarettes  ___ Save money on medical bills  ___ Save on life, health, and car insurance premiums    Those Dollars Add Up!  Cigarettes are expensive, and getting more expensive all the time. Do you realize how much money you are spending on cigarettes per year? What is the average amount you spend on a pack of cigarettes? What is the average number of packs that you smoke per day? Using your answers to these questions, fill in this formula to help you find out:  ($ _____ per pack) ×  ( _____ number of packs per day) × (365 days) =  $ _____ yearly cost of smoking  Besides tobacco, there are other costs, including extra cleaning bills and replacement costs for clothing and furniture; medical expenses for smoking-related illnesses; and higher health, life, and car insurance premiums.    Cigars and Pipes Count Too!  Cigars and pipes are also dangerous. So are smokeless (chewing) tobacco and snuff. All of these products contain nicotine, a highly addictive substance that has harmful effects on your body. Quitting smoking means giving up  "all tobacco products.      9969-2221 [facility], [address] [place] [address], [place] [address]. All rights reserved. This information is not intended as a substitute for professional medical care. Always follow your healthcare professional's instructions.          Follow-ups after your visit        Who to contact     If you have questions or need follow up information about today's clinic visit or your schedule please contact [facility] directly at [phone].  Normal or non-critical lab and imaging results will be communicated to you by MyChart, letter or phone within 4 business days after the clinic has received the results. If you do not hear from us within 7 days, please contact the clinic through [employer] or phone. If you have a critical or abnormal lab result, we will notify you by phone as soon as possible.  Submit refill requests through [employer] or call your pharmacy and they will forward the refill request to us. Please allow 3 business days for your refill to be completed.          Additional Information About Your Visit        Care EveryWhere ID     This is your Care EveryWhere ID. This could be used by other organizations to access your [place] medical records  [id number]        Your Vitals Were     Pulse Temperature Respirations Height Pulse Oximetry BMI (Body Mass Index)    115 99  F (37.2  C) (Tympanic) 16 5' 10.75\" (1.797 m) 97% 23.93 kg/m2       Blood Pressure from Last 3 Encounters:   10/25/17 126/64   09/25/17 116/60   08/28/17 118/65    Weight from Last 3 Encounters:   10/25/17 170 lb 6.4 oz (77.3 kg)   09/25/17 171 lb (77.6 kg)   08/28/17 169 lb (76.7 kg)              We Performed the Following     Tobacco Cessation - for Health Maintenance          Today's Medication Changes          These changes are accurate as of: 10/25/17  9:54 AM.  If you have any questions, ask your nurse or doctor.               These medicines have changed or have updated prescriptions.        " Dose/Directions    oxyCODONE-acetaminophen  MG per tablet   Commonly known as:  PERCOCET   This may have changed:  additional instructions   Used for:  DDD (degenerative disc disease), cervical   Changed by:  Dariel Tesfaye MD        TAKE 1 TABLET BY MOUTH EVERY 8 HOURS AS NEEDED FOR MODERATE TO SEVEREPAIN   Quantity:  90 tablet   Refills:  0            Where to get your medicines      Some of these will need a paper prescription and others can be bought over the counter.  Ask your nurse if you have questions.     Bring a paper prescription for each of these medications     oxyCODONE-acetaminophen  MG per tablet                Primary Care Provider Office Phone # Fax #    Dariel Tesfaye [phone]       [facility] [address]  [facility] [address]        Equal Access to Services     VIDAL CHAUDHARI : Hadii marek ku hadasho Soomaali, waaxda luqadaha, qaybta kaalmada adeegyada, waxay idiin hayceasarn jesse wright . So [facility] [phone].    ATENCIÓN: Si habla español, tiene a samaniego disposición servicios gratuitos de asistencia lingüística. [facility] [phone].    We comply with applicable federal civil rights laws and [place] laws. We do not discriminate on the basis of race, color, national origin, age, disability, sex, sexual orientation, or gender identity.            Thank you!     Thank you for choosing [facility]  for your care. Our goal is always to provide you with excellent care. Hearing back from our patients is one way we can continue to improve our services. Please take a few minutes to complete the written survey that you may receive in the mail after your visit with us. Thank you!             Your Updated Medication List - Protect others around you: Learn how to safely use, store and throw away your medicines at www.disposemymeds.org.          This list is accurate as of: 10/25/17  9:54 AM.  Always use your most recent med list.                    Brand Name Dispense Instructions for use Diagnosis    albuterol 108 (90 BASE) MCG/ACT Inhaler    PROAIR HFA/PROVENTIL HFA/VENTOLIN HFA    1 Inhaler    Inhale 2 puffs into the lungs every 6 hours as needed for shortness of breath / dyspnea    Bronchitis       amoxicillin-clavulanate 875-125 MG per tablet    AUGMENTIN    20 tablet    Take 1 tablet by mouth 2 times daily    Bronchitis       cyclobenzaprine 10 MG tablet    FLEXERIL    90 tablet    TAKE 1/2 TO 1 TABLET BY MOUTH 3 TIMES DAILY AS NEEDED FOR MUSCLE SPASMS    Cervical disc disease       oxyCODONE-acetaminophen  MG per tablet    PERCOCET    90 tablet    TAKE 1 TABLET BY MOUTH EVERY 8 HOURS AS NEEDED FOR MODERATE TO SEVEREPAIN    DDD (degenerative disc disease), cervical

## 2017-10-25 NOTE — PATIENT INSTRUCTIONS

## 2017-10-25 NOTE — PROGRESS NOTES
Micah Rosenthal    October 25, 2017    Chief Complaint   Patient presents with     Pain     Pt is in for a FU on chroni pain/meds.       SUBJECTIVE:  Here for f/u.  Still taking the 4/day.  We are going down to 3/day at this point.  He is doing ok with all of this.      Past Medical History:   Diagnosis Date     Cervical disc disease        Past Surgical History:   Procedure Laterality Date     BACK SURGERY      fusion of C5 and C6     IR CAUDAL EPIDURAL INJECTION SINGLE  12/2012     OTHER SURGICAL HISTORY  03/2013    Cervical Fusion     wisdom teeth extracted         Current Outpatient Prescriptions   Medication Sig Dispense Refill     oxyCODONE-acetaminophen (PERCOCET)  MG per tablet TAKE 1 TABLET BY MOUTH EVERY 8 HOURS AS NEEDED FOR MODERATE TO SEVEREPAIN 90 tablet 0     albuterol (PROAIR HFA/PROVENTIL HFA/VENTOLIN HFA) 108 (90 BASE) MCG/ACT Inhaler Inhale 2 puffs into the lungs every 6 hours as needed for shortness of breath / dyspnea 1 Inhaler 1     amoxicillin-clavulanate (AUGMENTIN) 875-125 MG per tablet Take 1 tablet by mouth 2 times daily 20 tablet 0     cyclobenzaprine (FLEXERIL) 10 MG tablet TAKE 1/2 TO 1 TABLET BY MOUTH 3 TIMES DAILY AS NEEDED FOR MUSCLE SPASMS 90 tablet 0       Allergies   Allergen Reactions     Cranberry      Strawberry      Doxycycline Nausea and Vomiting     Zithromax [Azithromycin Dihydrate] Rash       Family History   Problem Relation Age of Onset     Unknown/Adopted Father      CANCER Maternal Uncle      throat ca     CANCER Maternal Uncle      lung ca     Cardiovascular Maternal Grandfather      Cardiovascular Paternal Grandfather        Social History     Social History     Marital status:      Spouse name: N/A     Number of children: N/A     Years of education: N/A     Occupational History           Full-time      Social History Main Topics     Smoking status: Current Every Day Smoker     Packs/day: 1.00     Years: 15.00     Types: Cigarettes     Smokeless  tobacco: Never Used      Comment: Tried to Quit: Yes; Passive Exposure: Yes; Longest Tobacco free: 8 years     Alcohol use No     Drug use: No     Sexual activity: Yes     Partners: Female     Other Topics Concern      Service No     Blood Transfusions Yes     Caffeine Concern Yes     Soda, up to 5 cans per day      Occupational Exposure No     Hobby Hazards No     Sleep Concern Yes     Sleeps only 3-4 hours every night     Stress Concern No     Weight Concern No     Special Diet No     Back Care Yes     [facility]     Seat Belt Yes     Parent/Sibling W/ Cabg, Mi Or Angioplasty Before 65f 55m? No     Social History Narrative       5 point ROS negative except as noted above in HPI, including Gen., Resp., CV, GI &  system review.     OBJECTIVE:  B/P: 126/64, T: 99, P: 115, R: 16    GENERAL APPEARANCE: Alert, no acute distress  Neck:  Pain with any rotation.    SKIN: no suspicious lesions or rashes to visualized skin  NEURO: Alert, oriented x 3, speech and mentation normal    ASSESSMENT and PLAN:  (M50.90) Cervical disc disease  (primary encounter diagnosis)  Comment: doing ok  Plan: down to 3/day at this point.  Ok for this for 2 months.  F/u monthly.      (Z72.0) Tobacco abuse  Comment: discussed.   Plan: Tobacco Cessation - for Health Maintenance        Consider cessation as time goes on.     (Z71.6) Tobacco abuse counseling  Comment: as above.   Plan: as above.     (Z02.89) Pain management contract signed  Comment: doing well.   Plan: no change.  We are weening.      (M50.30) DDD (degenerative disc disease), cervical  Comment: as above.   Plan: oxyCODONE-acetaminophen (PERCOCET)  MG         per tablet        As above.

## 2017-11-22 ENCOUNTER — OFFICE VISIT (OUTPATIENT)
Dept: FAMILY MEDICINE | Facility: OTHER | Age: 42
End: 2017-11-22
Attending: FAMILY MEDICINE
Payer: COMMERCIAL

## 2017-11-22 VITALS
HEIGHT: 71 IN | OXYGEN SATURATION: 98 % | BODY MASS INDEX: 23.94 KG/M2 | WEIGHT: 171 LBS | TEMPERATURE: 98.7 F | HEART RATE: 114 BPM | SYSTOLIC BLOOD PRESSURE: 126 MMHG | DIASTOLIC BLOOD PRESSURE: 72 MMHG

## 2017-11-22 DIAGNOSIS — Z71.6 TOBACCO ABUSE COUNSELING: ICD-10-CM

## 2017-11-22 DIAGNOSIS — M50.30 DDD (DEGENERATIVE DISC DISEASE), CERVICAL: Primary | ICD-10-CM

## 2017-11-22 DIAGNOSIS — M50.90 CERVICAL DISC DISEASE: ICD-10-CM

## 2017-11-22 DIAGNOSIS — Z72.0 TOBACCO ABUSE: ICD-10-CM

## 2017-11-22 PROCEDURE — 99212 OFFICE O/P EST SF 10 MIN: CPT

## 2017-11-22 PROCEDURE — 99213 OFFICE O/P EST LOW 20 MIN: CPT | Performed by: FAMILY MEDICINE

## 2017-11-22 RX ORDER — CYCLOBENZAPRINE HCL 10 MG
TABLET ORAL
Qty: 90 TABLET | Refills: 0 | Status: SHIPPED | OUTPATIENT
Start: 2017-11-22 | End: 2018-01-18

## 2017-11-22 RX ORDER — OXYCODONE AND ACETAMINOPHEN 10; 325 MG/1; MG/1
TABLET ORAL
Qty: 90 TABLET | Refills: 0 | Status: SHIPPED | OUTPATIENT
Start: 2017-11-22 | End: 2017-12-19

## 2017-11-22 ASSESSMENT — PAIN SCALES - GENERAL: PAINLEVEL: EXTREME PAIN (8)

## 2017-11-22 NOTE — PATIENT INSTRUCTIONS

## 2017-11-22 NOTE — MR AVS SNAPSHOT
After Visit Summary   11/22/2017    Micah Rosenthal    MRN: [id number]           Patient Information     Date Of Birth          1975        Visit Information        Provider Department      11/22/2017 10:15 AM Dariel Tesfaye MD [facility]        Today's Diagnoses     DDD (degenerative disc disease), cervical    -  1    Tobacco abuse        Tobacco abuse counseling        Cervical disc disease          Care Instructions      HOW TO QUIT SMOKING  Smoking is one of the hardest habits to break. About half of all those who have ever smoked have been able to quit, and most of those (about 70%) who still smoke want to quit. Here are some of the best ways to stop smoking.     KEEP TRYING:  It takes most smokers about 8 tries before they are finally able to fully quit. So, the more often you try and fail, the better your chance of quitting the next time! So, don't give up!    GO COLD TURKEY:  Most ex-smokers quit cold turkey. Trying to cut back gradually doesn't seem to work as well, perhaps because it continues the smoking habit. Also, it is possible to fool yourself by inhaling more while smoking fewer cigarettes. This results in the same amount of nicotine in your body!    GET SUPPORT:  Support programs can make an important difference, especially for the heavy smoker. These groups offer lectures, methods to change your behavior and peer support. Call the free national Quitline for more information. 800-QUIT-NOW (860-687-6281). Low-cost or free programs are offered by many hospitals, local chapters of the American Lung Association (706-887-4984) and the American Cancer Society (903-506-6885). Support at home is important too. Non-smokers can help by offering praise and encouragement. If the smoker fails to quit, encourage them to try again!    OVER-THE-COUNTER MEDICINES:  For those who can't quit on their own, Nicotine Replacement Therapy (NRT) may make quitting much easier. Certain aids  such as the nicotine patch, gum and lozenge are available without a prescription. However, it is best to use these under the guidance of your doctor. The skin patch provides a steady supply of nicotine to the body. Nicotine gum and lozenge gives temporary bursts of low levels of nicotine. Both methods take the edge off the craving for cigarettes. WARNING: If you feel symptoms of nicotine overdose, such as nausea, vomiting, dizziness, weakness, or fast heartbeat, stop using these and see your doctor.    PRESCRIPTION MEDICINES:  After evaluating your smoking patterns and prior attempts at quitting, your doctor may offer a prescription medicine such as bupropion (Zyban, Wellbutrin), varenicline (Chantix, Champix), a niocotine inhaler or nasal spray. Each has its unique advantage and side effects which your doctor can review with you.    HEALTH BENEFITS OF QUITTING:  The benefits of quitting start right away and keep improving the longer you go without smokin minutes: blood pressure and pulse return to normal  8 hours: oxygen levels return to normal  2 days: ability to smell and taste begins to improve as damaged nerves start to regrow  2-3 weeks: circulation and lung function improves  1-9 months: decreased cough, congestion and shortness of breath; less tired  1 year: risk of heart attack decreases by half  5 years: risk of lung cancer decreases by half; risk of stroke becomes the same as a non-smoker  For information about how to quit smoking, visit the following links:  National Cancer [place] ,   Clearing the Air, Quit Smoking Today   - an online booklet. http://www.smokefree.gov/pubs/clearing_the_air.pdf  Smokefree.gov http://smokefree.gov/  QuitNet http://www.quitnet.com/    4578-8686 Mannie Alcantara, [address] [place] [address], [place] [address]. All rights reserved. This information is not intended as a substitute for professional medical care. Always follow your healthcare professional's  instructions.    The Benefits of Living Smoke Free  What do you want to gain from quitting? Check off some reasons to quit.  Health Benefits  ___ Reduce my risk of lung cancer, heart disease, chronic lung disease  ___ Have fewer wrinkles and softer skin  ___ Improve my sense of taste and smell  ___ For pregnant women--reduce the risk of having a miscarriage, stillbirth, premature birth, or low-birth-weight baby  Personal Benefits  ___ Feel more in control of my life  ___ Have better-smelling hair, breath, clothes, home, and car  ___ Save time by not having to take smoke breaks, buy cigarettes, or hunt for a light  ___ Have whiter teeth  Family Benefits  ___ Reduce my children s respiratory tract infections  ___ Set a good example for my children  ___ Reduce my family s cancer risk  Financial Benefits  ___ Save hundreds of dollars each year that would be spent on cigarettes  ___ Save money on medical bills  ___ Save on life, health, and car insurance premiums    Those Dollars Add Up!  Cigarettes are expensive, and getting more expensive all the time. Do you realize how much money you are spending on cigarettes per year? What is the average amount you spend on a pack of cigarettes? What is the average number of packs that you smoke per day? Using your answers to these questions, fill in this formula to help you find out:  ($ _____ per pack) ×  ( _____ number of packs per day) × (365 days) =  $ _____ yearly cost of smoking  Besides tobacco, there are other costs, including extra cleaning bills and replacement costs for clothing and furniture; medical expenses for smoking-related illnesses; and higher health, life, and car insurance premiums.    Cigars and Pipes Count Too!  Cigars and pipes are also dangerous. So are smokeless (chewing) tobacco and snuff. All of these products contain nicotine, a highly addictive substance that has harmful effects on your body. Quitting smoking means giving up all tobacco products.       "1041-6341 Mann[facility], [address] [place] [address], [place] [address]. All rights reserved. This information is not intended as a substitute for professional medical care. Always follow your healthcare professional's instructions.          Follow-ups after your visit        Your next 10 appointments already scheduled     Dec 20, 2017  9:15 AM CST   (Arrive by 9:00 AM)   SHORT with Dariel Tesfaye MD   [facility]    [address]  [facility] [address]   [phone]              Who to contact     If you have questions or need follow up information about today's clinic visit or your schedule please contact [facility] directly at [phone].  Normal or non-critical lab and imaging results will be communicated to you by MyChart, letter or phone within 4 business days after the clinic has received the results. If you do not hear from us within 7 days, please contact the clinic through MyChart or phone. If you have a critical or abnormal lab result, we will notify you by phone as soon as possible.  Submit refill requests through [employer] or call your pharmacy and they will forward the refill request to us. Please allow 3 business days for your refill to be completed.          Additional Information About Your Visit        Care EveryWhere ID     This is your Care EveryWhere ID. This could be used by other organizations to access your [place] medical records  [id number]        Your Vitals Were     Pulse Temperature Height Pulse Oximetry BMI (Body Mass Index)       114 98.7  F (37.1  C) 5' 10.75\" (1.797 m) 98% 24.02 kg/m2        Blood Pressure from Last 3 Encounters:   11/22/17 126/72   10/25/17 126/64   09/25/17 116/60    Weight from Last 3 Encounters:   11/22/17 171 lb (77.6 kg)   10/25/17 170 lb 6.4 oz (77.3 kg)   09/25/17 171 lb (77.6 kg)              We Performed the Following     Tobacco Cessation - Order to Satisfy Health Maintenance        "   Today's Medication Changes          These changes are accurate as of: 11/22/17 10:34 AM.  If you have any questions, ask your nurse or doctor.               These medicines have changed or have updated prescriptions.        Dose/Directions    cyclobenzaprine 10 MG tablet   Commonly known as:  FLEXERIL   This may have changed:  See the new instructions.   Used for:  Cervical disc disease   Changed by:  Dariel Tesfaye MD        TAKE 1/2 TO 1 TABLET BY MOUTH 3 TIMES DAILY AS NEEDED FOR MUSCLE SPASMS   Quantity:  90 tablet   Refills:  0            Where to get your medicines      These medications [facility] [place] [facility] [address] [facility]  [address] [facility] [address]     Phone:  [phone]     cyclobenzaprine 10 MG tablet         Some of these will need a paper prescription and others can be bought over the counter.  Ask your nurse if you have questions.     Bring a paper prescription for each of these medications     oxyCODONE-acetaminophen  MG per tablet                Primary Care Provider Office Phone # Fax #    Dariel Tesfaye [phone]       [facility] [address]  [facility] [address]        Equal Access to Services     [facility] AH: Hadii aad ku hadasho Soomaali, waaxda luqadaha, qaybta kaalmada adeegyada, waxay idiin hayaan adeeg placidoaraousmane wright . So [facility] [phone].    ATENCIÓN: Si habla español, tiene a samaniego disposición servicios gratuitos de asistencia lingüística. Llame al [phone].    We comply with applicable federal civil rights laws and [place] laws. We do not discriminate on the basis of race, color, national origin, age, disability, sex, sexual orientation, or gender identity.            Thank you!     Thank you for choosing [facility]  for your care. Our goal is always to provide you with excellent care. Hearing back from our patients is one way we can continue to improve our services. Please take  a few minutes to complete the written survey that you may receive in the mail after your visit with us. Thank you!             Your Updated Medication List - Protect others around you: Learn how to safely use, store and throw away your medicines at www.disposemymeds.org.          This list is accurate as of: 11/22/17 10:34 AM.  Always use your most recent med list.                   Brand Name Dispense Instructions for use Diagnosis    albuterol 108 (90 BASE) MCG/ACT Inhaler    PROAIR HFA/PROVENTIL HFA/VENTOLIN HFA    1 Inhaler    Inhale 2 puffs into the lungs every 6 hours as needed for shortness of breath / dyspnea    Bronchitis       cyclobenzaprine 10 MG tablet    FLEXERIL    90 tablet    TAKE 1/2 TO 1 TABLET BY MOUTH 3 TIMES DAILY AS NEEDED FOR MUSCLE SPASMS    Cervical disc disease       oxyCODONE-acetaminophen  MG per tablet    PERCOCET    90 tablet    TAKE 1 TABLET BY MOUTH EVERY 8 HOURS AS NEEDED FOR MODERATE TO SEVEREPAIN    DDD (degenerative disc disease), cervical

## 2017-11-22 NOTE — NURSING NOTE
"Chief Complaint   Patient presents with     Recheck Medication     Pt is here for f/u of oxycodone and needs refill       Initial /72  Pulse 114  Temp 98.7  F (37.1  C)  Ht 5' 10.75\" (1.797 m)  Wt 171 lb (77.6 kg)  SpO2 98%  BMI 24.02 kg/m2 Estimated body mass index is 24.02 kg/(m^2) as calculated from the following:    Height as of this encounter: 5' 10.75\" (1.797 m).    Weight as of this encounter: 171 lb (77.6 kg).  Medication Reconciliation: complete   Ning Murphy    Pt declined the PHQ/YURI forms.  Ning Murphy    "

## 2017-12-19 ENCOUNTER — OFFICE VISIT (OUTPATIENT)
Dept: FAMILY MEDICINE | Facility: OTHER | Age: 42
End: 2017-12-19
Attending: FAMILY MEDICINE
Payer: COMMERCIAL

## 2017-12-19 VITALS
BODY MASS INDEX: 23.66 KG/M2 | HEART RATE: 100 BPM | HEIGHT: 71 IN | DIASTOLIC BLOOD PRESSURE: 66 MMHG | OXYGEN SATURATION: 98 % | SYSTOLIC BLOOD PRESSURE: 120 MMHG | WEIGHT: 169 LBS

## 2017-12-19 DIAGNOSIS — M50.30 DDD (DEGENERATIVE DISC DISEASE), CERVICAL: ICD-10-CM

## 2017-12-19 DIAGNOSIS — M50.90 CERVICAL DISC DISEASE: Primary | ICD-10-CM

## 2017-12-19 DIAGNOSIS — Z71.6 TOBACCO ABUSE COUNSELING: ICD-10-CM

## 2017-12-19 DIAGNOSIS — Z72.0 TOBACCO ABUSE: ICD-10-CM

## 2017-12-19 PROCEDURE — 99212 OFFICE O/P EST SF 10 MIN: CPT

## 2017-12-19 PROCEDURE — 99213 OFFICE O/P EST LOW 20 MIN: CPT | Performed by: FAMILY MEDICINE

## 2017-12-19 RX ORDER — OXYCODONE AND ACETAMINOPHEN 10; 325 MG/1; MG/1
TABLET ORAL
Qty: 90 TABLET | Refills: 0 | Status: SHIPPED | OUTPATIENT
Start: 2017-12-19 | End: 2018-01-18

## 2017-12-19 ASSESSMENT — PAIN SCALES - GENERAL: PAINLEVEL: SEVERE PAIN (7)

## 2017-12-19 NOTE — PROGRESS NOTES
Micah Rosenthal    December 19, 2017    Chief Complaint   Patient presents with     Recheck Medication     Percocet-pt is weaning off, pt states it is going well       SUBJECTIVE:  Here for f/u.  We are working our way down.  He has been on the 3/day for the last 2 months and still having trouble with the lower dosing.  We discussed.  We are going to extend tid one more month.  We discussed and reviewed.      Past Medical History:   Diagnosis Date     Cervical disc disease        Past Surgical History:   Procedure Laterality Date     BACK SURGERY      fusion of C5 and C6     IR CAUDAL EPIDURAL INJECTION SINGLE  12/2012     OTHER SURGICAL HISTORY  03/2013    Cervical Fusion     wisdom teeth extracted         Current Outpatient Prescriptions   Medication Sig Dispense Refill     oxyCODONE-acetaminophen (PERCOCET)  MG per tablet TAKE 1 TABLET BY MOUTH EVERY 8 HOURS AS NEEDED FOR MODERATE TO SEVEREPAIN 90 tablet 0     cyclobenzaprine (FLEXERIL) 10 MG tablet TAKE 1/2 TO 1 TABLET BY MOUTH 3 TIMES DAILY AS NEEDED FOR MUSCLE SPASMS 90 tablet 0     albuterol (PROAIR HFA/PROVENTIL HFA/VENTOLIN HFA) 108 (90 BASE) MCG/ACT Inhaler Inhale 2 puffs into the lungs every 6 hours as needed for shortness of breath / dyspnea 1 Inhaler 1       Allergies   Allergen Reactions     Cranberry      Strawberry      Doxycycline Nausea and Vomiting     Zithromax [Azithromycin Dihydrate] Rash       Family History   Problem Relation Age of Onset     Unknown/Adopted Father      CANCER Maternal Uncle      throat ca     CANCER Maternal Uncle      lung ca     Cardiovascular Maternal Grandfather      Cardiovascular Paternal Grandfather        Social History     Social History     Marital status:      Spouse name: N/A     Number of children: N/A     Years of education: N/A     Occupational History           Full-time      Social History Main Topics     Smoking status: Current Every Day Smoker     Packs/day: 1.00     Years: 15.00      Types: Cigarettes     Smokeless tobacco: Never Used      Comment: Tried to Quit: Yes; Passive Exposure: Yes; Longest Tobacco free: 8 years     Alcohol use No     Drug use: No     Sexual activity: Yes     Partners: Female     Other Topics Concern      Service No     Blood Transfusions Yes     Caffeine Concern Yes     Soda, up to 5 cans per day      Occupational Exposure No     Hobby Hazards No     Sleep Concern Yes     Sleeps only 3-4 hours every night     Stress Concern No     Weight Concern No     Special Diet No     Back Care Yes     [facility]     Seat Belt Yes     Parent/Sibling W/ Cabg, Mi Or Angioplasty Before 65f 55m? No     Social History Narrative       5 point ROS negative except as noted above in HPI, including Gen., Resp., CV, GI &  system review.     OBJECTIVE:  B/P: 120/66, Temperature: Data Unavailable, Pulse: 100, Respirations: Data Unavailable    GENERAL APPEARANCE: Alert, no acute distress  MSK:  Turns head minimally.    SKIN: no suspicious lesions or rashes to visualized skin  NEURO: Alert, oriented x 3, speech and mentation normal    ASSESSMENT and PLAN:  (M50.90) Cervical disc disease  (primary encounter diagnosis)  Comment: reviewed.   Plan: I am ok with another month of tid as we continue to work our way down.  No change in the plan otherwise.  Next month is 60 pills for sure.  He will see me prior.      (Z72.0) Tobacco abuse  Comment: recommend cessation.   Plan: Tobacco Cessation - Order to Satisfy Health         Maintenance        As above.     (Z71.6) Tobacco abuse counseling  Comment: as above.   Plan: as above.     (M50.30) DDD (degenerative disc disease), cervical  Comment: as above.   Plan: oxyCODONE-acetaminophen (PERCOCET)  MG         per tablet        As above.

## 2017-12-19 NOTE — NURSING NOTE
"Chief Complaint   Patient presents with     Recheck Medication     Percocet-pt is weaning off, pt states it is going well       Initial /66  Pulse 100  Ht 5' 10.75\" (1.797 m)  Wt 169 lb (76.7 kg)  SpO2 98%  BMI 23.74 kg/m2 Estimated body mass index is 23.74 kg/(m^2) as calculated from the following:    Height as of this encounter: 5' 10.75\" (1.797 m).    Weight as of this encounter: 169 lb (76.7 kg).  Medication Reconciliation: complete   Ning Murphy    "

## 2017-12-19 NOTE — PATIENT INSTRUCTIONS

## 2017-12-19 NOTE — MR AVS SNAPSHOT
After Visit Summary   12/19/2017    Micah Rosenthal    MRN: [id number]           Patient Information     Date Of Birth          1975        Visit Information        Provider Department      12/19/2017 10:15 AM Dariel Tesfaye MD [facility]        Today's Diagnoses     Cervical disc disease    -  1    Tobacco abuse        Tobacco abuse counseling        DDD (degenerative disc disease), cervical          Care Instructions      HOW TO QUIT SMOKING  Smoking is one of the hardest habits to break. About half of all those who have ever smoked have been able to quit, and most of those (about 70%) who still smoke want to quit. Here are some of the best ways to stop smoking.     KEEP TRYING:  It takes most smokers about 8 tries before they are finally able to fully quit. So, the more often you try and fail, the better your chance of quitting the next time! So, don't give up!    GO COLD TURKEY:  Most ex-smokers quit cold turkey. Trying to cut back gradually doesn't seem to work as well, perhaps because it continues the smoking habit. Also, it is possible to fool yourself by inhaling more while smoking fewer cigarettes. This results in the same amount of nicotine in your body!    GET SUPPORT:  Support programs can make an important difference, especially for the heavy smoker. These groups offer lectures, methods to change your behavior and peer support. Call the free national Quitline for more information. 800-QUIT-NOW (671-010-9617). Low-cost or free programs are offered by many hospitals, local chapters of the American Lung Association (407-759-6542) and the American Cancer Society (210-295-9072). Support at home is important too. Non-smokers can help by offering praise and encouragement. If the smoker fails to quit, encourage them to try again!    OVER-THE-COUNTER MEDICINES:  For those who can't quit on their own, Nicotine Replacement Therapy (NRT) may make quitting much easier. Certain aids  such as the nicotine patch, gum and lozenge are available without a prescription. However, it is best to use these under the guidance of your doctor. The skin patch provides a steady supply of nicotine to the body. Nicotine gum and lozenge gives temporary bursts of low levels of nicotine. Both methods take the edge off the craving for cigarettes. WARNING: If you feel symptoms of nicotine overdose, such as nausea, vomiting, dizziness, weakness, or fast heartbeat, stop using these and see your doctor.    PRESCRIPTION MEDICINES:  After evaluating your smoking patterns and prior attempts at quitting, your doctor may offer a prescription medicine such as bupropion (Zyban, Wellbutrin), varenicline (Chantix, Champix), a niocotine inhaler or nasal spray. Each has its unique advantage and side effects which your doctor can review with you.    HEALTH BENEFITS OF QUITTING:  The benefits of quitting start right away and keep improving the longer you go without smokin minutes: blood pressure and pulse return to normal  8 hours: oxygen levels return to normal  2 days: ability to smell and taste begins to improve as damaged nerves start to regrow  2-3 weeks: circulation and lung function improves  1-9 months: decreased cough, congestion and shortness of breath; less tired  1 year: risk of heart attack decreases by half  5 years: risk of lung cancer decreases by half; risk of stroke becomes the same as a non-smoker  For information about how to quit smoking, visit the following links:  National [employer] ,   Clearing the Air, Quit Smoking Today   - an online booklet. http://www.smokefree.gov/pubs/clearing_the_air.pdf  Smokefree.gov http://smokefree.gov/  QuitNet http://www.quitnet.com/    0362-0725 Mannie [employer], [address] [place] [address], [place] [address]. All rights reserved. This information is not intended as a substitute for professional medical care. Always follow your healthcare professional's  instructions.    The Benefits of Living Smoke Free  What do you want to gain from quitting? Check off some reasons to quit.  Health Benefits  ___ Reduce my risk of lung cancer, heart disease, chronic lung disease  ___ Have fewer wrinkles and softer skin  ___ Improve my sense of taste and smell  ___ For pregnant women--reduce the risk of having a miscarriage, stillbirth, premature birth, or low-birth-weight baby  Personal Benefits  ___ Feel more in control of my life  ___ Have better-smelling hair, breath, clothes, home, and car  ___ Save time by not having to take smoke breaks, buy cigarettes, or hunt for a light  ___ Have whiter teeth  Family Benefits  ___ Reduce my children s respiratory tract infections  ___ Set a good example for my children  ___ Reduce my family s cancer risk  Financial Benefits  ___ Save hundreds of dollars each year that would be spent on cigarettes  ___ Save money on medical bills  ___ Save on life, health, and car insurance premiums    Those Dollars Add Up!  Cigarettes are expensive, and getting more expensive all the time. Do you realize how much money you are spending on cigarettes per year? What is the average amount you spend on a pack of cigarettes? What is the average number of packs that you smoke per day? Using your answers to these questions, fill in this formula to help you find out:  ($ _____ per pack) ×  ( _____ number of packs per day) × (365 days) =  $ _____ yearly cost of smoking  Besides tobacco, there are other costs, including extra cleaning bills and replacement costs for clothing and furniture; medical expenses for smoking-related illnesses; and higher health, life, and car insurance premiums.    Cigars and Pipes Count Too!  Cigars and pipes are also dangerous. So are smokeless (chewing) tobacco and snuff. All of these products contain nicotine, a highly addictive substance that has harmful effects on your body. Quitting smoking means giving up all tobacco products.       "6697-1610 [facility], [address] [place] [address], [place] [address]. All rights reserved. This information is not intended as a substitute for professional medical care. Always follow your healthcare professional's instructions.          Follow-ups after your visit        Your next 10 appointments already scheduled     Jan 18, 2018 10:15 AM CST   (Arrive by 10:00 AM)   SHORT with Dariel Tesfaye MD   [facility] ([facility] )    [address] [facility] [address]   [phone]              Who to contact     If you have questions or need follow up information about today's clinic visit or your schedule please contact [facility] directly at [phone].  Normal or non-critical lab and imaging results will be communicated to you by MyChart, letter or phone within 4 business days after the clinic has received the results. If you do not hear from us within 7 days, please contact the clinic through MyChart or phone. If you have a critical or abnormal lab result, we will notify you by phone as soon as possible.  Submit refill requests through MyWishBoard or call your pharmacy and they will forward the refill request to us. Please allow 3 business days for your refill to be completed.          Additional Information About Your Visit        Care EveryWhere ID     This is your Care EveryWhere ID. This could be used by other organizations to access your [place] medical records  [id number]        Your Vitals Were     Pulse Height Pulse Oximetry BMI (Body Mass Index)          100 5' 10.75\" (1.797 m) 98% 23.74 kg/m2         Blood Pressure from Last 3 Encounters:   12/19/17 120/66   11/22/17 126/72   10/25/17 126/64    Weight from Last 3 Encounters:   12/19/17 169 lb (76.7 kg)   11/22/17 171 lb (77.6 kg)   10/25/17 170 lb 6.4 oz (77.3 kg)              We Performed the Following     Tobacco Cessation - Order to Satisfy Health Maintenance          Where to get your medicines "      Some of these will need a paper prescription and others can be bought over the counter.  Ask your nurse if you have questions.     Bring a paper prescription for each of these medications     oxyCODONE-acetaminophen  MG per tablet          Primary Care Provider Office Phone # Fax #    Dariel Tesfaye [phone]       [facility] [address]  [facility] [address]        Equal Access to Services     VIDAL CHAUDHARI : Hadii aad ku hadasho Soomaali, waaxda luqadaha, qaybta kaalmada adeegyada, waxay idiin hayaan adeeg kharash la'aan ah. So [facility] [phone].    ATENCIÓN: Si habla espjennifer, tiene a samaniego disposición servicios gratuitos de asistencia lingüística. Llame al [phone].    We comply with applicable federal civil rights laws and [place] laws. We do not discriminate on the basis of race, color, national origin, age, disability, sex, sexual orientation, or gender identity.            Thank you!     Thank you for choosing [facility]  for your care. Our goal is always to provide you with excellent care. Hearing back from our patients is one way we can continue to improve our services. Please take a few minutes to complete the written survey that you may receive in the mail after your visit with us. Thank you!             Your Updated Medication List - Protect others around you: Learn how to safely use, store and throw away your medicines at www.disposemymeds.org.          This list is accurate as of: 12/19/17 10:32 AM.  Always use your most recent med list.                   Brand Name Dispense Instructions for use Diagnosis    albuterol 108 (90 BASE) MCG/ACT Inhaler    PROAIR HFA/PROVENTIL HFA/VENTOLIN HFA    1 Inhaler    Inhale 2 puffs into the lungs every 6 hours as needed for shortness of breath / dyspnea    Bronchitis       cyclobenzaprine 10 MG tablet    FLEXERIL    90 tablet    TAKE 1/2 TO 1 TABLET BY MOUTH 3 TIMES DAILY AS NEEDED FOR MUSCLE SPASMS     Cervical disc disease       oxyCODONE-acetaminophen  MG per tablet    PERCOCET    90 tablet    TAKE 1 TABLET BY MOUTH EVERY 8 HOURS AS NEEDED FOR MODERATE TO SEVEREPAIN    DDD (degenerative disc disease), cervical

## 2018-01-18 ENCOUNTER — OFFICE VISIT (OUTPATIENT)
Dept: FAMILY MEDICINE | Facility: OTHER | Age: 43
End: 2018-01-18
Attending: FAMILY MEDICINE
Payer: COMMERCIAL

## 2018-01-18 VITALS
HEART RATE: 97 BPM | WEIGHT: 167 LBS | DIASTOLIC BLOOD PRESSURE: 70 MMHG | SYSTOLIC BLOOD PRESSURE: 116 MMHG | OXYGEN SATURATION: 97 % | TEMPERATURE: 98 F | BODY MASS INDEX: 23.38 KG/M2 | HEIGHT: 71 IN

## 2018-01-18 DIAGNOSIS — Z72.0 TOBACCO ABUSE: ICD-10-CM

## 2018-01-18 DIAGNOSIS — Z71.6 TOBACCO ABUSE COUNSELING: ICD-10-CM

## 2018-01-18 DIAGNOSIS — M50.90 CERVICAL DISC DISEASE: Primary | ICD-10-CM

## 2018-01-18 DIAGNOSIS — Z02.89 PAIN MANAGEMENT CONTRACT SIGNED: ICD-10-CM

## 2018-01-18 DIAGNOSIS — M50.30 DDD (DEGENERATIVE DISC DISEASE), CERVICAL: ICD-10-CM

## 2018-01-18 PROCEDURE — 99213 OFFICE O/P EST LOW 20 MIN: CPT | Performed by: FAMILY MEDICINE

## 2018-01-18 PROCEDURE — G0463 HOSPITAL OUTPT CLINIC VISIT: HCPCS

## 2018-01-18 RX ORDER — OXYCODONE AND ACETAMINOPHEN 10; 325 MG/1; MG/1
TABLET ORAL
Qty: 60 TABLET | Refills: 0 | Status: SHIPPED | OUTPATIENT
Start: 2018-01-18 | End: 2018-02-14

## 2018-01-18 RX ORDER — CYCLOBENZAPRINE HCL 10 MG
TABLET ORAL
Qty: 90 TABLET | Refills: 3 | Status: SHIPPED | OUTPATIENT
Start: 2018-01-18 | End: 2018-06-04

## 2018-01-18 ASSESSMENT — ANXIETY QUESTIONNAIRES
4. TROUBLE RELAXING: SEVERAL DAYS
5. BEING SO RESTLESS THAT IT IS HARD TO SIT STILL: NOT AT ALL
IF YOU CHECKED OFF ANY PROBLEMS ON THIS QUESTIONNAIRE, HOW DIFFICULT HAVE THESE PROBLEMS MADE IT FOR YOU TO DO YOUR WORK, TAKE CARE OF THINGS AT HOME, OR GET ALONG WITH OTHER PEOPLE: SOMEWHAT DIFFICULT
7. FEELING AFRAID AS IF SOMETHING AWFUL MIGHT HAPPEN: NOT AT ALL
2. NOT BEING ABLE TO STOP OR CONTROL WORRYING: SEVERAL DAYS
6. BECOMING EASILY ANNOYED OR IRRITABLE: SEVERAL DAYS
1. FEELING NERVOUS, ANXIOUS, OR ON EDGE: NOT AT ALL
3. WORRYING TOO MUCH ABOUT DIFFERENT THINGS: NOT AT ALL
GAD7 TOTAL SCORE: 3

## 2018-01-18 ASSESSMENT — PATIENT HEALTH QUESTIONNAIRE - PHQ9: SUM OF ALL RESPONSES TO PHQ QUESTIONS 1-9: 7

## 2018-01-18 ASSESSMENT — PAIN SCALES - GENERAL: PAINLEVEL: SEVERE PAIN (6)

## 2018-01-18 NOTE — PATIENT INSTRUCTIONS

## 2018-01-18 NOTE — PROGRESS NOTES
Micah Rosenthal    January 18, 2018    Chief Complaint   Patient presents with     Recheck Medication     Percocet-pt is weaning       SUBJECTIVE:  Here for f/u.  Ready to go down to 60 pills/month.  We reviewed.  He has a lot of pain and is finding ways to deal with it reasonably well.  No new issues otherwise.  We talked again about opiates, etc.      Past Medical History:   Diagnosis Date     Cervical disc disease        Past Surgical History:   Procedure Laterality Date     BACK SURGERY      fusion of C5 and C6     IR CAUDAL EPIDURAL INJECTION SINGLE  12/2012     OTHER SURGICAL HISTORY  03/2013    Cervical Fusion     wisdom teeth extracted         Current Outpatient Prescriptions   Medication Sig Dispense Refill     cyclobenzaprine (FLEXERIL) 10 MG tablet TAKE 1/2 TO 1 TABLET BY MOUTH 3 TIMES DAILY AS NEEDED FOR MUSCLE SPASMS 90 tablet 3     oxyCODONE-acetaminophen (PERCOCET)  MG per tablet TAKE 1 TABLET BY MOUTH twice daily as needed for severe pain 60 tablet 0     albuterol (PROAIR HFA/PROVENTIL HFA/VENTOLIN HFA) 108 (90 BASE) MCG/ACT Inhaler Inhale 2 puffs into the lungs every 6 hours as needed for shortness of breath / dyspnea 1 Inhaler 1       Allergies   Allergen Reactions     Cranberry      Strawberry      Doxycycline Nausea and Vomiting     Zithromax [Azithromycin Dihydrate] Rash       Family History   Problem Relation Age of Onset     Unknown/Adopted Father      CANCER Maternal Uncle      throat ca     CANCER Maternal Uncle      lung ca     Cardiovascular Maternal Grandfather      Cardiovascular Paternal Grandfather        Social History     Social History     Marital status:      Spouse name: N/A     Number of children: N/A     Years of education: N/A     Occupational History           Full-time      Social History Main Topics     Smoking status: Current Every Day Smoker     Packs/day: 1.00     Years: 15.00     Types: Cigarettes     Smokeless tobacco: Never Used      Comment: Tried to  Quit: Yes; Passive Exposure: Yes; Longest Tobacco free: 8 years     Alcohol use No     Drug use: No     Sexual activity: Yes     Partners: Female     Other Topics Concern      Service No     Blood Transfusions Yes     Caffeine Concern Yes     Soda, up to 5 cans per day      Occupational Exposure No     Hobby Hazards No     Sleep Concern Yes     Sleeps only 3-4 hours every night     Stress Concern No     Weight Concern No     Special Diet No     Back Care Yes     [facility]     Seat Belt Yes     Parent/Sibling W/ Cabg, Mi Or Angioplasty Before 65f 55m? No     Social History Narrative       5 point ROS negative except as noted above in HPI, including Gen., Resp., CV, GI &  system review.     OBJECTIVE:  B/P: 116/70, Temperature: 98, Pulse: 97, Respirations: Data Unavailable    GENERAL APPEARANCE: Alert, no acute distress  Gait:  Normal.  Turns head minimally during interview. SKIN: no suspicious lesions or rashes to visualized skin  NEURO: Alert, oriented x 3, speech and mentation normal    ASSESSMENT and PLAN:  (M50.90) Cervical disc disease  (primary encounter diagnosis)  Comment: ongoing, stable.   Plan: Lipid Profile (Chol, Trig, HDL, LDL calc),         Comprehensive metabolic panel (BMP + Alb, Alk         Phos, ALT, AST, Total. Bili, TP),         cyclobenzaprine (FLEXERIL) 10 MG tablet        Reviewed.  Going down to 60/month for the next 2 months then plan to go down to 30/month for 2-3 months as we continue our slow ween.      (Z72.0) Tobacco abuse  Comment: reviewed.   Plan: Tobacco Cessation - Order to Satisfy Health         Maintenance        Recommend cessation after this ween.      (Z71.6) Tobacco abuse counseling  Comment: as above.   Plan: as above.      (Z02.89) Pain management contract signed  Comment: as above.   Plan: Lipid Profile (Chol, Trig, HDL, LDL calc),         Comprehensive metabolic panel (BMP + Alb, Alk         Phos, ALT, AST, Total. Bili, TP)        As above.      (M50.30) DDD (degenerative disc disease), cervical  Comment: as above.   Plan: oxyCODONE-acetaminophen (PERCOCET)  MG         per tablet        As above.

## 2018-01-18 NOTE — NURSING NOTE
"Chief Complaint   Patient presents with     Recheck Medication     Percocet-pt is weaning       Initial /70  Pulse 97  Temp 98  F (36.7  C)  Ht 5' 10.75\" (1.797 m)  Wt 167 lb (75.8 kg)  SpO2 97%  BMI 23.46 kg/m2 Estimated body mass index is 23.46 kg/(m^2) as calculated from the following:    Height as of this encounter: 5' 10.75\" (1.797 m).    Weight as of this encounter: 167 lb (75.8 kg).  Medication Reconciliation: complete   Ning Murphy    "

## 2018-01-18 NOTE — MR AVS SNAPSHOT
After Visit Summary   1/18/2018    Micah Rosenthal    MRN: [id number]           Patient Information     Date Of Birth          1975        Visit Information        Provider Department      1/18/2018 10:15 AM Dariel Tesfaye MD [facility]        Today's Diagnoses     Cervical disc disease    -  1    Tobacco abuse        Tobacco abuse counseling        Pain management contract signed        DDD (degenerative disc disease), cervical          Care Instructions      HOW TO QUIT SMOKING  Smoking is one of the hardest habits to break. About half of all those who have ever smoked have been able to quit, and most of those (about 70%) who still smoke want to quit. Here are some of the best ways to stop smoking.     KEEP TRYING:  It takes most smokers about 8 tries before they are finally able to fully quit. So, the more often you try and fail, the better your chance of quitting the next time! So, don't give up!    GO COLD TURKEY:  Most ex-smokers quit cold turkey. Trying to cut back gradually doesn't seem to work as well, perhaps because it continues the smoking habit. Also, it is possible to fool yourself by inhaling more while smoking fewer cigarettes. This results in the same amount of nicotine in your body!    GET SUPPORT:  Support programs can make an important difference, especially for the heavy smoker. These groups offer lectures, methods to change your behavior and peer support. Call the free national Quitline for more information. 800-QUIT-NOW (080-067-2697). Low-cost or free programs are offered by many hospitals, local chapters of the American Lung Association (617-016-5246) and the American Cancer Society (024-078-3046). Support at home is important too. Non-smokers can help by offering praise and encouragement. If the smoker fails to quit, encourage them to try again!    OVER-THE-COUNTER MEDICINES:  For those who can't quit on their own, Nicotine Replacement Therapy (NRT) may  make quitting much easier. Certain aids such as the nicotine patch, gum and lozenge are available without a prescription. However, it is best to use these under the guidance of your doctor. The skin patch provides a steady supply of nicotine to the body. Nicotine gum and lozenge gives temporary bursts of low levels of nicotine. Both methods take the edge off the craving for cigarettes. WARNING: If you feel symptoms of nicotine overdose, such as nausea, vomiting, dizziness, weakness, or fast heartbeat, stop using these and see your doctor.    PRESCRIPTION MEDICINES:  After evaluating your smoking patterns and prior attempts at quitting, your doctor may offer a prescription medicine such as bupropion (Zyban, Wellbutrin), varenicline (Chantix, Champix), a niocotine inhaler or nasal spray. Each has its unique advantage and side effects which your doctor can review with you.    HEALTH BENEFITS OF QUITTING:  The benefits of quitting start right away and keep improving the longer you go without smokin minutes: blood pressure and pulse return to normal  8 hours: oxygen levels return to normal  2 days: ability to smell and taste begins to improve as damaged nerves start to regrow  2-3 weeks: circulation and lung function improves  1-9 months: decreased cough, congestion and shortness of breath; less tired  1 year: risk of heart attack decreases by half  5 years: risk of lung cancer decreases by half; risk of stroke becomes the same as a non-smoker  For information about how to quit smoking, visit the following links:  National Cancer [place] ,   Clearing the Air, Quit Smoking Today   - an online booklet. http://www.smokefree.gov/pubs/clearing_the_air.pdf  Smokefree.gov http://smokefree.gov/  QuitNet http://www.quitnet.com/    1611-3483 Mannie Alcantara, [address] [place] [address], [place] [address]. All rights reserved. This information is not intended as a substitute for professional medical care. Always follow your  healthcare professional's instructions.    The Benefits of Living Smoke Free  What do you want to gain from quitting? Check off some reasons to quit.  Health Benefits  ___ Reduce my risk of lung cancer, heart disease, chronic lung disease  ___ Have fewer wrinkles and softer skin  ___ Improve my sense of taste and smell  ___ For pregnant women--reduce the risk of having a miscarriage, stillbirth, premature birth, or low-birth-weight baby  Personal Benefits  ___ Feel more in control of my life  ___ Have better-smelling hair, breath, clothes, home, and car  ___ Save time by not having to take smoke breaks, buy cigarettes, or hunt for a light  ___ Have whiter teeth  Family Benefits  ___ Reduce my children s respiratory tract infections  ___ Set a good example for my children  ___ Reduce my family s cancer risk  Financial Benefits  ___ Save hundreds of dollars each year that would be spent on cigarettes  ___ Save money on medical bills  ___ Save on life, health, and car insurance premiums    Those Dollars Add Up!  Cigarettes are expensive, and getting more expensive all the time. Do you realize how much money you are spending on cigarettes per year? What is the average amount you spend on a pack of cigarettes? What is the average number of packs that you smoke per day? Using your answers to these questions, fill in this formula to help you find out:  ($ _____ per pack) ×  ( _____ number of packs per day) × (365 days) =  $ _____ yearly cost of smoking  Besides tobacco, there are other costs, including extra cleaning bills and replacement costs for clothing and furniture; medical expenses for smoking-related illnesses; and higher health, life, and car insurance premiums.    Cigars and Pipes Count Too!  Cigars and pipes are also dangerous. So are smokeless (chewing) tobacco and snuff. All of these products contain nicotine, a highly addictive substance that has harmful effects on your body. Quitting smoking means giving up  "all tobacco products.      4692-9072 [facility], [address] [place] [address], [place] [address]. All rights reserved. This information is not intended as a substitute for professional medical care. Always follow your healthcare professional's instructions.          Follow-ups after your visit        Your next 10 appointments already scheduled     Feb 20, 2018  9:15 AM CST   (Arrive by 9:00 AM)   SHORT with Dariel Tesfaye MD   [facility] ([facility] )    [address] [facility] [address]   [phone]              Future tests that were ordered for you today     Open Future Orders        Priority Expected Expires Ordered    Lipid Profile (Chol, Trig, HDL, LDL calc) Routine  7/18/2018 1/18/2018    Comprehensive metabolic panel (BMP + Alb, Alk Phos, ALT, AST, Total. Bili, TP) Routine  7/18/2018 1/18/2018            Who to contact     If you have questions or need follow up information about today's clinic visit or your schedule please contact [facility] directly at [phone].  Normal or non-critical lab and imaging results will be communicated to you by MyChart, letter or phone within 4 business days after the clinic has received the results. If you do not hear from us within 7 days, please contact the clinic through Getfuguhart or phone. If you have a critical or abnormal lab result, we will notify you by phone as soon as possible.  Submit refill requests through Bulbstorm or call your pharmacy and they will forward the refill request to us. Please allow 3 business days for your refill to be completed.          Additional Information About Your Visit        Bulbstorm Information     Bulbstorm lets you send messages to your doctor, view your test results, renew your prescriptions, schedule appointments and more. To sign up, go to [email or web address] . Click on \"Log in\" on the left side of the screen, which will take you to the Welcome page. Then click on \"Sign up " "Now\" on the right side of the page.     You will be asked to enter the access code listed below, as well as some personal information. Please follow the directions to create your username and password.     Your access code is: [email or web address]  Expires: 2018 12:56 PM     Your access code will  in 90 days. If you need help or a new code, please call your [facility] or [phone].        Care EveryWhere ID     This is your Care EveryWhere ID. This could be used by other organizations to access your [facility] medical records  [id number]        Your Vitals Were     Pulse Temperature Height Pulse Oximetry BMI (Body Mass Index)       97 98  F (36.7  C) 5' 10.75\" (1.797 m) 97% 23.46 kg/m2        Blood Pressure from Last 3 Encounters:   18 116/70   17 120/66   17 126/72    Weight from Last 3 Encounters:   18 167 lb (75.8 kg)   17 169 lb (76.7 kg)   17 171 lb (77.6 kg)              We Performed the Following     Tobacco Cessation - Order to Satisfy Health Maintenance          Today's Medication Changes          These changes are accurate as of: 18 12:56 PM.  If you have any questions, ask your nurse or doctor.               These medicines have changed or have updated prescriptions.        Dose/Directions    oxyCODONE-acetaminophen  MG per tablet   Commonly known as:  PERCOCET   This may have changed:  additional instructions   Used for:  DDD (degenerative disc disease), cervical   Changed by:  Dariel Tesfaye MD        TAKE 1 TABLET BY MOUTH twice daily as needed for severe pain   Quantity:  60 tablet   Refills:  0            Where to get your medicines      These medications were sent to [facility] - [facility] - [address]  [address] [address]     Phone:  [phone]     cyclobenzaprine 10 MG tablet         Some of these will need a paper prescription and others can be bought over the counter.  Ask your nurse if you have " questions.     Bring a paper prescription for each of these medications     oxyCODONE-acetaminophen  MG per tablet                Primary Care Provider Office Phone # Fax #    Dariel Tesfaye [phone]       [facility] [address] [facility] [address]  [facility] [place] [address]        Equal Access to Services     SKY CHAUDHARI : Hadii aad ku hadasho Soomaali, waaxda luqadaha, qaybta kaalmada adeegyada, waxay ediein hayaan adekaiser cumminscristianeousmane lanilson d[place]. So [facility] [phone].    ATENCIÓN: Si habla español, tiene a samaniego disposición servicios gratuitos de asistencia lingüística. [facility] [phone].    We comply with applicable federal civil rights laws and [place] laws. We do not discriminate on the basis of race, color, national origin, age, disability, sex, sexual orientation, or gender identity.            Thank you!     Thank you for choosing [facility]  for your care. Our goal is always to provide you with excellent care. Hearing back from our patients is one way we can continue to improve our services. Please take a few minutes to complete the written survey that you may receive in the mail after your visit with us. Thank you!             Your Updated Medication List - Protect others around you: Learn how to safely use, store and throw away your medicines at www.disposemymeds.org.          This list is accurate as of: 1/18/18 12:56 PM.  Always use your most recent med list.                   Brand Name Dispense Instructions for use Diagnosis    albuterol 108 (90 BASE) MCG/ACT Inhaler    PROAIR HFA/PROVENTIL HFA/VENTOLIN HFA    1 Inhaler    Inhale 2 puffs into the lungs every 6 hours as needed for shortness of breath / dyspnea    Bronchitis       cyclobenzaprine 10 MG tablet    FLEXERIL    90 tablet    TAKE 1/2 TO 1 TABLET BY MOUTH 3 TIMES DAILY AS NEEDED FOR MUSCLE SPASMS    Cervical disc disease       oxyCODONE-acetaminophen  MG per tablet    PERCOCET    60 tablet    TAKE 1 TABLET BY  MOUTH twice daily as needed for severe pain    DDD (degenerative disc disease), cervical

## 2018-01-19 ASSESSMENT — ANXIETY QUESTIONNAIRES: GAD7 TOTAL SCORE: 3

## 2018-02-14 ENCOUNTER — OFFICE VISIT (OUTPATIENT)
Dept: FAMILY MEDICINE | Facility: OTHER | Age: 43
End: 2018-02-14
Attending: FAMILY MEDICINE
Payer: COMMERCIAL

## 2018-02-14 VITALS
HEART RATE: 127 BPM | OXYGEN SATURATION: 97 % | TEMPERATURE: 98.9 F | SYSTOLIC BLOOD PRESSURE: 114 MMHG | DIASTOLIC BLOOD PRESSURE: 74 MMHG | RESPIRATION RATE: 16 BRPM | BODY MASS INDEX: 23.37 KG/M2 | WEIGHT: 166.4 LBS

## 2018-02-14 DIAGNOSIS — Z72.0 TOBACCO ABUSE: ICD-10-CM

## 2018-02-14 DIAGNOSIS — M50.90 CERVICAL DISC DISEASE: Primary | ICD-10-CM

## 2018-02-14 DIAGNOSIS — Z71.6 TOBACCO ABUSE COUNSELING: ICD-10-CM

## 2018-02-14 DIAGNOSIS — Z02.89 PAIN MANAGEMENT CONTRACT SIGNED: ICD-10-CM

## 2018-02-14 DIAGNOSIS — M50.30 DDD (DEGENERATIVE DISC DISEASE), CERVICAL: ICD-10-CM

## 2018-02-14 PROCEDURE — G0463 HOSPITAL OUTPT CLINIC VISIT: HCPCS

## 2018-02-14 PROCEDURE — 99213 OFFICE O/P EST LOW 20 MIN: CPT | Performed by: FAMILY MEDICINE

## 2018-02-14 RX ORDER — OXYCODONE AND ACETAMINOPHEN 10; 325 MG/1; MG/1
TABLET ORAL
Qty: 60 TABLET | Refills: 0 | Status: SHIPPED | OUTPATIENT
Start: 2018-02-14 | End: 2018-03-14

## 2018-02-14 ASSESSMENT — PAIN SCALES - GENERAL: PAINLEVEL: WORST PAIN (10)

## 2018-02-14 NOTE — PROGRESS NOTES
Micah Rosenthal    February 14, 2018    Chief Complaint   Patient presents with     Neck Pain     Pt is in for a FU on his chronic neck pain and pain medication.     Forms     Pt declined PHQ9 and GAD7.       SUBJECTIVE:  Here for f/u.  Lots of pain.  Some mild w/d sx as well with the dose decrease.  We continue to see him monthly and encourage.  He has some depressive sx and anger but he is ok and declines other interventions.     Past Medical History:   Diagnosis Date     Cervical disc disease        Past Surgical History:   Procedure Laterality Date     BACK SURGERY      fusion of C5 and C6     IR CAUDAL EPIDURAL INJECTION SINGLE  12/2012     OTHER SURGICAL HISTORY  03/2013    Cervical Fusion     wisdom teeth extracted         Current Outpatient Prescriptions   Medication Sig Dispense Refill     oxyCODONE-acetaminophen (PERCOCET)  MG per tablet TAKE 1 TABLET BY MOUTH twice daily as needed for severe pain 60 tablet 0     cyclobenzaprine (FLEXERIL) 10 MG tablet TAKE 1/2 TO 1 TABLET BY MOUTH 3 TIMES DAILY AS NEEDED FOR MUSCLE SPASMS 90 tablet 3     albuterol (PROAIR HFA/PROVENTIL HFA/VENTOLIN HFA) 108 (90 BASE) MCG/ACT Inhaler Inhale 2 puffs into the lungs every 6 hours as needed for shortness of breath / dyspnea 1 Inhaler 1       Allergies   Allergen Reactions     Cranberry      Strawberry      Doxycycline Nausea and Vomiting     Zithromax [Azithromycin Dihydrate] Rash       Family History   Problem Relation Age of Onset     Unknown/Adopted Father      CANCER Maternal Uncle      throat ca     CANCER Maternal Uncle      lung ca     Cardiovascular Maternal Grandfather      Cardiovascular Paternal Grandfather        Social History     Social History     Marital status:      Spouse name: N/A     Number of children: N/A     Years of education: N/A     Occupational History           Full-time      Social History Main Topics     Smoking status: Current Every Day Smoker     Packs/day: 1.00     Years:  15.00     Types: Cigarettes     Smokeless tobacco: Never Used      Comment: Tried to Quit: Yes; Passive Exposure: Yes; Longest Tobacco free: 8 years     Alcohol use No     Drug use: No     Sexual activity: Yes     Partners: Female     Other Topics Concern      Service No     Blood Transfusions Yes     Caffeine Concern Yes     Soda, up to 5 cans per day      Occupational Exposure No     Hobby Hazards No     Sleep Concern Yes     Sleeps only 3-4 hours every night     Stress Concern No     Weight Concern No     Special Diet No     Back Care Yes     [facility]     Seat Belt Yes     Parent/Sibling W/ Cabg, Mi Or Angioplasty Before 65f 55m? No     Social History Narrative       5 point ROS negative except as noted above in HPI, including Gen., Resp., CV, GI &  system review.     OBJECTIVE:  B/P: 114/74, Temperature: 98.9, Pulse: 127, Respirations: 16    GENERAL APPEARANCE: Alert, no acute distress  MSK:  Moves gingerly and seems to be in pain today.    SKIN: no suspicious lesions or rashes to visualized skin  NEURO: Alert, oriented x 3, speech and mentation normal    ASSESSMENT and PLAN:  (M50.90) Cervical disc disease  (primary encounter diagnosis)  Comment: doing well.   Plan: update med.  1 pill twice daily for 1-2 more months.      (Z02.89) Pain management contract signed  Comment: reviewed.   Plan: as above.     (Z72.0) Tobacco abuse  Comment: ongoing  Plan: Tobacco Cessation - Order to Satisfy Health         Maintenance        I didn't really advise cessation as we are doing this.     (Z71.6) Tobacco abuse counseling  Comment: as aboe.   Plan: as above.     (M50.30) DDD (degenerative disc disease), cervical  Comment: as above.   Plan: oxyCODONE-acetaminophen (PERCOCET)  MG         per tablet        As above.

## 2018-02-14 NOTE — MR AVS SNAPSHOT
After Visit Summary   2/14/2018    Micah Rosenthal    MRN: [id number]           Patient Information     Date Of Birth          1975        Visit Information        Provider Department      2/14/2018 9:30 AM Dariel Tesfaye MD [facility]        Today's Diagnoses     Cervical disc disease    -  1    Pain management contract signed        Tobacco abuse        Tobacco abuse counseling        DDD (degenerative disc disease), cervical          Care Instructions      HOW TO QUIT SMOKING  Smoking is one of the hardest habits to break. About half of all those who have ever smoked have been able to quit, and most of those (about 70%) who still smoke want to quit. Here are some of the best ways to stop smoking.     KEEP TRYING:  It takes most smokers about 8 tries before they are finally able to fully quit. So, the more often you try and fail, the better your chance of quitting the next time! So, don't give up!    GO COLD TURKEY:  Most ex-smokers quit cold turkey. Trying to cut back gradually doesn't seem to work as well, perhaps because it continues the smoking habit. Also, it is possible to fool yourself by inhaling more while smoking fewer cigarettes. This results in the same amount of nicotine in your body!    GET SUPPORT:  Support programs can make an important difference, especially for the heavy smoker. These groups offer lectures, methods to change your behavior and peer support. Call the free national Quitline for more information. 800-QUIT-NOW (235-488-2716). Low-cost or free programs are offered by many hospitals, local chapters of the American Lung Association (480-134-1165) and the American Cancer Society (047-671-0315). Support at home is important too. Non-smokers can help by offering praise and encouragement. If the smoker fails to quit, encourage them to try again!    OVER-THE-COUNTER MEDICINES:  For those who can't quit on their own, Nicotine Replacement Therapy (NRT) may make  quitting much easier. Certain aids such as the nicotine patch, gum and lozenge are available without a prescription. However, it is best to use these under the guidance of your doctor. The skin patch provides a steady supply of nicotine to the body. Nicotine gum and lozenge gives temporary bursts of low levels of nicotine. Both methods take the edge off the craving for cigarettes. WARNING: If you feel symptoms of nicotine overdose, such as nausea, vomiting, dizziness, weakness, or fast heartbeat, stop using these and see your doctor.    PRESCRIPTION MEDICINES:  After evaluating your smoking patterns and prior attempts at quitting, your doctor may offer a prescription medicine such as bupropion (Zyban, Wellbutrin), varenicline (Chantix, Champix), a niocotine inhaler or nasal spray. Each has its unique advantage and side effects which your doctor can review with you.    HEALTH BENEFITS OF QUITTING:  The benefits of quitting start right away and keep improving the longer you go without smokin minutes: blood pressure and pulse return to normal  8 hours: oxygen levels return to normal  2 days: ability to smell and taste begins to improve as damaged nerves start to regrow  2-3 weeks: circulation and lung function improves  1-9 months: decreased cough, congestion and shortness of breath; less tired  1 year: risk of heart attack decreases by half  5 years: risk of lung cancer decreases by half; risk of stroke becomes the same as a non-smoker  For information about how to quit smoking, visit the following links:  National Cancer [employer] ,   Clearing the Air, Quit Smoking Today   - an online booklet. http://www.smokefree.gov/pubs/clearing_the_air.pdf  Smokefree.gov http://smokefree.gov/  QuitNet http://www.quitnet.com/    7411-9964 Mannie Alcantara, [address] [place] [address], [place] [address]. All rights reserved. This information is not intended as a substitute for professional medical care. Always follow your  healthcare professional's instructions.    The Benefits of Living Smoke Free  What do you want to gain from quitting? Check off some reasons to quit.  Health Benefits  ___ Reduce my risk of lung cancer, heart disease, chronic lung disease  ___ Have fewer wrinkles and softer skin  ___ Improve my sense of taste and smell  ___ For pregnant women--reduce the risk of having a miscarriage, stillbirth, premature birth, or low-birth-weight baby  Personal Benefits  ___ Feel more in control of my life  ___ Have better-smelling hair, breath, clothes, home, and car  ___ Save time by not having to take smoke breaks, buy cigarettes, or hunt for a light  ___ Have whiter teeth  Family Benefits  ___ Reduce my children s respiratory tract infections  ___ Set a good example for my children  ___ Reduce my family s cancer risk  Financial Benefits  ___ Save hundreds of dollars each year that would be spent on cigarettes  ___ Save money on medical bills  ___ Save on life, health, and car insurance premiums    Those Dollars Add Up!  Cigarettes are expensive, and getting more expensive all the time. Do you realize how much money you are spending on cigarettes per year? What is the average amount you spend on a pack of cigarettes? What is the average number of packs that you smoke per day? Using your answers to these questions, fill in this formula to help you find out:  ($ _____ per pack) ×  ( _____ number of packs per day) × (365 days) =  $ _____ yearly cost of smoking  Besides tobacco, there are other costs, including extra cleaning bills and replacement costs for clothing and furniture; medical expenses for smoking-related illnesses; and higher health, life, and car insurance premiums.    Cigars and Pipes Count Too!  Cigars and pipes are also dangerous. So are smokeless (chewing) tobacco and snuff. All of these products contain nicotine, a highly addictive substance that has harmful effects on your body. Quitting smoking means giving up  "all tobacco products.      9480-1297 [facility] [facility], [address] [place] [address], [place] [address]. All rights reserved. This information is not intended as a substitute for professional medical care. Always follow your healthcare professional's instructions.          Follow-ups after your visit        Your next 10 appointments already scheduled     Mar 14, 2018  9:15 AM CDT   (Arrive by 9:00 AM)   SHORT with Dariel Tesfaye MD   [facility] ([facility] )    [address] [place] [address] [address]  [facility] [place] [address]   [phone]              Who to contact     If you have questions or need follow up information about today's clinic visit or your schedule please contact [facility] directly at [phone].  Normal or non-critical lab and imaging results will be communicated to you by MyChart, letter or phone within 4 business days after the clinic has received the results. If you do not hear from us within 7 days, please contact the clinic through MyChart or phone. If you have a critical or abnormal lab result, we will notify you by phone as soon as possible.  Submit refill requests through [employer] or call your pharmacy and they will forward the refill request to us. Please allow 3 business days for your refill to be completed.          Additional Information About Your Visit        [employer] Information     [employer] lets you send messages to your doctor, view your test results, renew your prescriptions, schedule appointments and more. To sign up, go to [email or web address] . Click on \"Log in\" on the left side of the screen, which will take you to the Welcome page. Then click on \"Sign up Now\" on the right side of the page.     You will be asked to enter the access code listed below, as well as some personal information. Please follow the directions to create your username and password.     Your access code is: [email or web address]  Expires: 2018 12:56 PM     Your access code will  in " 90 days. If you need help or a new code, please call your [place] clinic or [phone].        Care EveryWhere ID     This is your Care EveryWhere ID. This could be used by other organizations to access your [place] medical records  [id number]        Your Vitals Were     Pulse Temperature Respirations Pulse Oximetry BMI (Body Mass Index)       127 98.9  F (37.2  C) (Tympanic) 16 97% 23.37 kg/m2        Blood Pressure from Last 3 Encounters:   02/14/18 114/74   01/18/18 116/70   12/19/17 120/66    Weight from Last 3 Encounters:   02/14/18 166 lb 6.4 oz (75.5 kg)   01/18/18 167 lb (75.8 kg)   12/19/17 169 lb (76.7 kg)              We Performed the Following     Tobacco Cessation - Order to Satisfy Health Maintenance          Where to get your medicines      Some of these will need a paper prescription and others can be bought over the counter.  Ask your nurse if you have questions.     Bring a paper prescription for each of these medications     oxyCODONE-acetaminophen  MG per tablet          Primary Care Provider Office Phone # Fax #    Dariel Tesfaye [phone]       [address] [place] [id number]        Equal Access to Services     SKY CHAUDHARI : Hadii marek montes hadasho Soomaali, waaxda luqadaha, qaybta kaalmada adeegyada, rocky [place]. So [facility] [phone].    ATENCIÓN: Si habla español, tiene a samaniego disposición servicios gratuitos de asistencia lingüística. Llame al [phone].    We comply with applicable federal civil rights laws and [place] laws. We do not discriminate on the basis of race, color, national origin, age, disability, sex, sexual orientation, or gender identity.            Thank you!     Thank you for choosing [facility]  for your care. Our goal is always to provide you with excellent care. Hearing back from our patients is one way we can continue to improve our services. Please take a few minutes to complete the  written survey that you may receive in the mail after your visit with us. Thank you!             Your Updated Medication List - Protect others around you: Learn how to safely use, store and throw away your medicines at www.disposemLV Sensorseds.org.          This list is accurate as of 2/14/18 10:47 AM.  Always use your most recent med list.                   Brand Name Dispense Instructions for use Diagnosis    albuterol 108 (90 BASE) MCG/ACT Inhaler    PROAIR HFA/PROVENTIL HFA/VENTOLIN HFA    1 Inhaler    Inhale 2 puffs into the lungs every 6 hours as needed for shortness of breath / dyspnea    Bronchitis       cyclobenzaprine 10 MG tablet    FLEXERIL    90 tablet    TAKE 1/2 TO 1 TABLET BY MOUTH 3 TIMES DAILY AS NEEDED FOR MUSCLE SPASMS    Cervical disc disease       oxyCODONE-acetaminophen  MG per tablet    PERCOCET    60 tablet    TAKE 1 TABLET BY MOUTH twice daily as needed for severe pain    DDD (degenerative disc disease), cervical

## 2018-02-14 NOTE — NURSING NOTE
"Chief Complaint   Patient presents with     Neck Pain     Pt is in for a FU on his chronic neck pain and pain medication.     Forms     Pt declined PHQ9 and GAD7.       Initial /74 (BP Location: Right arm, Patient Position: Chair, Cuff Size: Adult Regular)  Pulse 127  Temp 98.9  F (37.2  C) (Tympanic)  Resp 16  Wt 166 lb 6.4 oz (75.5 kg)  SpO2 97%  BMI 23.37 kg/m2 Estimated body mass index is 23.37 kg/(m^2) as calculated from the following:    Height as of 1/18/18: 5' 10.75\" (1.797 m).    Weight as of this encounter: 166 lb 6.4 oz (75.5 kg).  Medication Reconciliation: complete   Charis Bernal    "

## 2018-02-22 ENCOUNTER — OFFICE VISIT (OUTPATIENT)
Dept: FAMILY MEDICINE | Facility: OTHER | Age: 43
End: 2018-02-22
Attending: FAMILY MEDICINE
Payer: COMMERCIAL

## 2018-02-22 VITALS
OXYGEN SATURATION: 98 % | WEIGHT: 165 LBS | HEART RATE: 116 BPM | HEIGHT: 71 IN | SYSTOLIC BLOOD PRESSURE: 124 MMHG | BODY MASS INDEX: 23.1 KG/M2 | TEMPERATURE: 96.2 F | DIASTOLIC BLOOD PRESSURE: 80 MMHG

## 2018-02-22 DIAGNOSIS — Z72.0 TOBACCO ABUSE: ICD-10-CM

## 2018-02-22 DIAGNOSIS — K08.89 PAIN, DENTAL: Primary | ICD-10-CM

## 2018-02-22 DIAGNOSIS — Z71.6 TOBACCO ABUSE COUNSELING: ICD-10-CM

## 2018-02-22 PROCEDURE — G0463 HOSPITAL OUTPT CLINIC VISIT: HCPCS

## 2018-02-22 PROCEDURE — 99213 OFFICE O/P EST LOW 20 MIN: CPT | Performed by: FAMILY MEDICINE

## 2018-02-22 RX ORDER — CLINDAMYCIN HCL 300 MG
300 CAPSULE ORAL 3 TIMES DAILY
Qty: 30 CAPSULE | Refills: 0 | Status: SHIPPED | OUTPATIENT
Start: 2018-02-22 | End: 2018-03-14

## 2018-02-22 RX ORDER — GABAPENTIN 100 MG/1
100 CAPSULE ORAL 3 TIMES DAILY
Qty: 30 CAPSULE | Refills: 0 | Status: SHIPPED | OUTPATIENT
Start: 2018-02-22 | End: 2018-03-14

## 2018-02-22 RX ORDER — INDOMETHACIN 25 MG/1
25 CAPSULE ORAL
Qty: 42 CAPSULE | Refills: 1 | Status: SHIPPED | OUTPATIENT
Start: 2018-02-22 | End: 2018-03-14

## 2018-02-22 ASSESSMENT — ANXIETY QUESTIONNAIRES
2. NOT BEING ABLE TO STOP OR CONTROL WORRYING: NOT AT ALL
6. BECOMING EASILY ANNOYED OR IRRITABLE: SEVERAL DAYS
4. TROUBLE RELAXING: SEVERAL DAYS
3. WORRYING TOO MUCH ABOUT DIFFERENT THINGS: NOT AT ALL
GAD7 TOTAL SCORE: 2
5. BEING SO RESTLESS THAT IT IS HARD TO SIT STILL: NOT AT ALL
1. FEELING NERVOUS, ANXIOUS, OR ON EDGE: NOT AT ALL
7. FEELING AFRAID AS IF SOMETHING AWFUL MIGHT HAPPEN: NOT AT ALL

## 2018-02-22 ASSESSMENT — PAIN SCALES - GENERAL: PAINLEVEL: SEVERE PAIN (7)

## 2018-02-22 NOTE — PATIENT INSTRUCTIONS

## 2018-02-22 NOTE — PROGRESS NOTES
Micah Rosenthal    February 22, 2018    Chief Complaint   Patient presents with     Dental Pain     left bottom tooth pain x 2 days, pt states he broke part of it on Tuesday       SUBJECTIVE:  Here for broken and painful tooth.  Going through ween of opiates as well and with the pain it is even harder.  We discussed options.  He is working with dentist as well but tough to get in and the like.      Past Medical History:   Diagnosis Date     Cervical disc disease        Past Surgical History:   Procedure Laterality Date     BACK SURGERY      fusion of C5 and C6     IR CAUDAL EPIDURAL INJECTION SINGLE  12/2012     OTHER SURGICAL HISTORY  03/2013    Cervical Fusion     wisdom teeth extracted         Current Outpatient Prescriptions   Medication Sig Dispense Refill     gabapentin (NEURONTIN) 100 MG capsule Take 1 capsule (100 mg) by mouth 3 times daily 30 capsule 0     indomethacin (INDOCIN) 25 MG capsule Take 1 capsule (25 mg) by mouth 3 times daily (with meals) 42 capsule 1     oxyCODONE-acetaminophen (PERCOCET)  MG per tablet TAKE 1 TABLET BY MOUTH twice daily as needed for severe pain 60 tablet 0     cyclobenzaprine (FLEXERIL) 10 MG tablet TAKE 1/2 TO 1 TABLET BY MOUTH 3 TIMES DAILY AS NEEDED FOR MUSCLE SPASMS 90 tablet 3     albuterol (PROAIR HFA/PROVENTIL HFA/VENTOLIN HFA) 108 (90 BASE) MCG/ACT Inhaler Inhale 2 puffs into the lungs every 6 hours as needed for shortness of breath / dyspnea 1 Inhaler 1       Allergies   Allergen Reactions     Cranberry      Strawberry      Doxycycline Nausea and Vomiting     Zithromax [Azithromycin Dihydrate] Rash       Family History   Problem Relation Age of Onset     Unknown/Adopted Father      CANCER Maternal Uncle      throat ca     CANCER Maternal Uncle      lung ca     Cardiovascular Maternal Grandfather      Cardiovascular Paternal Grandfather        Social History     Social History     Marital status:      Spouse name: N/A     Number of children: N/A     Years  of education: N/A     Occupational History           Full-time      Social History Main Topics     Smoking status: Current Every Day Smoker     Packs/day: 1.00     Years: 15.00     Types: Cigarettes     Smokeless tobacco: Never Used      Comment: Tried to Quit: Yes; Passive Exposure: Yes; Longest Tobacco free: 8 years     Alcohol use No     Drug use: No     Sexual activity: Yes     Partners: Female     Other Topics Concern      Service No     Blood Transfusions Yes     Caffeine Concern Yes     Soda, up to 5 cans per day      Occupational Exposure No     Hobby Hazards No     Sleep Concern Yes     Sleeps only 3-4 hours every night     Stress Concern No     Weight Concern No     Special Diet No     Back Care Yes     [facility]     Seat Belt Yes     Parent/Sibling W/ Cabg, Mi Or Angioplasty Before 65f 55m? No     Social History Narrative       5 point ROS negative except as noted above in HPI, including Gen., Resp., CV, GI &  system review.     OBJECTIVE:  B/P: 124/80, Temperature: 96.2, Pulse: 116, Respirations: Data Unavailable    GENERAL APPEARANCE: Alert, no acute distress  Mouth:  Tooth broken in the left molar lower.  Poor dentition.  SKIN: no suspicious lesions or rashes to visualized skin  NEURO: Alert, oriented x 3, speech and mentation normal    ASSESSMENT and PLAN:  (K08.89) Pain, dental  (primary encounter diagnosis)  Comment: reviewed.   Plan: gabapentin (NEURONTIN) 100 MG capsule,         indomethacin (INDOCIN) 25 MG capsule        I did not want to use any additional opiates during this ween.  He is struggling with this.  neurontin trial with indomethacin instead of ibu to see if it works better.   clinda for possible infection though it looks to be more of a decay it certainly might help.       (Z72.0) Tobacco abuse  Comment: reviewed   Plan: Tobacco Cessation - Order to Satisfy Health         Maintenance        Recommend cessation    (Z71.6) Tobacco abuse  counseling  Comment: as above.   Plan: as above.

## 2018-02-22 NOTE — NURSING NOTE
"Chief Complaint   Patient presents with     Dental Pain     left bottom tooth pain x 2 days, pt states he broke part of it on Tuesday       Initial /80  Pulse 116  Temp 96.2  F (35.7  C)  Ht 5' 10.75\" (1.797 m)  Wt 165 lb (74.8 kg)  SpO2 98%  BMI 23.18 kg/m2 Estimated body mass index is 23.18 kg/(m^2) as calculated from the following:    Height as of this encounter: 5' 10.75\" (1.797 m).    Weight as of this encounter: 165 lb (74.8 kg).  Medication Reconciliation: complete   Ning Murphy    "

## 2018-02-22 NOTE — MR AVS SNAPSHOT
After Visit Summary   2/22/2018    Micah Rosenthal    MRN: [id number]           Patient Information     Date Of Birth          1975        Visit Information        Provider Department      2/22/2018 11:00 AM Dariel Tesfaye MD [facility]        Today's Diagnoses     Pain, dental    -  1    Tobacco abuse        Tobacco abuse counseling          Care Instructions      HOW TO QUIT SMOKING  Smoking is one of the hardest habits to break. About half of all those who have ever smoked have been able to quit, and most of those (about 70%) who still smoke want to quit. Here are some of the best ways to stop smoking.     KEEP TRYING:  It takes most smokers about 8 tries before they are finally able to fully quit. So, the more often you try and fail, the better your chance of quitting the next time! So, don't give up!    GO COLD TURKEY:  Most ex-smokers quit cold turkey. Trying to cut back gradually doesn't seem to work as well, perhaps because it continues the smoking habit. Also, it is possible to fool yourself by inhaling more while smoking fewer cigarettes. This results in the same amount of nicotine in your body!    GET SUPPORT:  Support programs can make an important difference, especially for the heavy smoker. These groups offer lectures, methods to change your behavior and peer support. Call the free national Quitline for more information. 800-QUIT-NOW (142-874-4768). Low-cost or free programs are offered by many hospitals, local chapters of the American Lung Association (022-347-1505) and the American Cancer Society (104-517-8149). Support at home is important too. Non-smokers can help by offering praise and encouragement. If the smoker fails to quit, encourage them to try again!    OVER-THE-COUNTER MEDICINES:  For those who can't quit on their own, Nicotine Replacement Therapy (NRT) may make quitting much easier. Certain aids such as the nicotine patch, gum and lozenge are available  without a prescription. However, it is best to use these under the guidance of your doctor. The skin patch provides a steady supply of nicotine to the body. Nicotine gum and lozenge gives temporary bursts of low levels of nicotine. Both methods take the edge off the craving for cigarettes. WARNING: If you feel symptoms of nicotine overdose, such as nausea, vomiting, dizziness, weakness, or fast heartbeat, stop using these and see your doctor.    PRESCRIPTION MEDICINES:  After evaluating your smoking patterns and prior attempts at quitting, your doctor may offer a prescription medicine such as bupropion (Zyban, Wellbutrin), varenicline (Chantix, Champix), a niocotine inhaler or nasal spray. Each has its unique advantage and side effects which your doctor can review with you.    HEALTH BENEFITS OF QUITTING:  The benefits of quitting start right away and keep improving the longer you go without smokin minutes: blood pressure and pulse return to normal  8 hours: oxygen levels return to normal  2 days: ability to smell and taste begins to improve as damaged nerves start to regrow  2-3 weeks: circulation and lung function improves  1-9 months: decreased cough, congestion and shortness of breath; less tired  1 year: risk of heart attack decreases by half  5 years: risk of lung cancer decreases by half; risk of stroke becomes the same as a non-smoker  For information about how to quit smoking, visit the following links:  National Cancer [place] ,   Clearing the Air, Quit Smoking Today   - an online booklet. http://www.smokefree.gov/pubs/clearing_the_air.pdf  Smokefree.gov http://smokefree.gov/  QuitNet http://www.quitnet.com/    1322-6118 Mannie Alcantara, [address] [place] [address], [place] [address]. All rights reserved. This information is not intended as a substitute for professional medical care. Always follow your healthcare professional's instructions.    The Benefits of Living Smoke Free  What do you want to gain  from quitting? Check off some reasons to quit.  Health Benefits  ___ Reduce my risk of lung cancer, heart disease, chronic lung disease  ___ Have fewer wrinkles and softer skin  ___ Improve my sense of taste and smell  ___ For pregnant women--reduce the risk of having a miscarriage, stillbirth, premature birth, or low-birth-weight baby  Personal Benefits  ___ Feel more in control of my life  ___ Have better-smelling hair, breath, clothes, home, and car  ___ Save time by not having to take smoke breaks, buy cigarettes, or hunt for a light  ___ Have whiter teeth  Family Benefits  ___ Reduce my children s respiratory tract infections  ___ Set a good example for my children  ___ Reduce my family s cancer risk  Financial Benefits  ___ Save hundreds of dollars each year that would be spent on cigarettes  ___ Save money on medical bills  ___ Save on life, health, and car insurance premiums    Those Dollars Add Up!  Cigarettes are expensive, and getting more expensive all the time. Do you realize how much money you are spending on cigarettes per year? What is the average amount you spend on a pack of cigarettes? What is the average number of packs that you smoke per day? Using your answers to these questions, fill in this formula to help you find out:  ($ _____ per pack) ×  ( _____ number of packs per day) × (365 days) =  $ _____ yearly cost of smoking  Besides tobacco, there are other costs, including extra cleaning bills and replacement costs for clothing and furniture; medical expenses for smoking-related illnesses; and higher health, life, and car insurance premiums.    Cigars and Pipes Count Too!  Cigars and pipes are also dangerous. So are smokeless (chewing) tobacco and snuff. All of these products contain nicotine, a highly addictive substance that has harmful effects on your body. Quitting smoking means giving up all tobacco products.      [phone] Mannie Alcantara, [address] [place] [address], [place] [address]. All  "rights reserved. This information is not intended as a substitute for professional medical care. Always follow your healthcare professional's instructions.          Follow-ups after your visit        Your next 10 appointments already scheduled     Mar 14, 2018  9:15 AM CDT   (Arrive by 9:00 AM)   SHORT with Dariel Tesfaye MD   [facility] ([facility] )    [address] [place] [address]   [phone]              Who to contact     If you have questions or need follow up information about today's clinic visit or your schedule please contact [facility] directly at [phone].  Normal or non-critical lab and imaging results will be communicated to you by MyChart, letter or phone within 4 business days after the clinic has received the results. If you do not hear from us within 7 days, please contact the clinic through MyChart or phone. If you have a critical or abnormal lab result, we will notify you by phone as soon as possible.  Submit refill requests through Service Seeking or call your pharmacy and they will forward the refill request to us. Please allow 3 business days for your refill to be completed.          Additional Information About Your Visit        Care EveryWhere ID     This is your Care EveryWhere ID. This could be used by other organizations to access your [facility] medical records  [id number]        Your Vitals Were     Pulse Temperature Height Pulse Oximetry BMI (Body Mass Index)       116 96.2  F (35.7  C) 5' 10.75\" (1.797 m) 98% 23.18 kg/m2        Blood Pressure from Last 3 Encounters:   02/22/18 124/80   02/14/18 114/74   01/18/18 116/70    Weight from Last 3 Encounters:   02/22/18 165 lb (74.8 kg)   02/14/18 166 lb 6.4 oz (75.5 kg)   01/18/18 167 lb (75.8 kg)              We Performed the Following     Tobacco Cessation - Order to Satisfy Health Maintenance          Today's Medication Changes          These changes are accurate as of 2/22/18 " 12:54 PM.  If you have any questions, ask your nurse or doctor.               Start taking these medicines.        Dose/Directions    clindamycin 300 MG capsule   Commonly known as:  CLEOCIN   Used for:  Pain, dental   Started by:  Dariel Tesfaye MD        Dose:  300 mg   Take 1 capsule (300 mg) by mouth 3 times daily   Quantity:  30 capsule   Refills:  0       gabapentin 100 MG capsule   Commonly known as:  NEURONTIN   Used for:  Pain, dental   Started by:  Dariel Tesfaye MD        Dose:  100 mg   Take 1 capsule (100 mg) by mouth 3 times daily   Quantity:  30 capsule   Refills:  0       indomethacin 25 MG capsule   Commonly known as:  INDOCIN   Used for:  Pain, dental   Started by:  Dariel Tesfaye MD        Dose:  25 mg   Take 1 capsule (25 mg) by mouth 3 times daily (with meals)   Quantity:  42 capsule   Refills:  1            Where to get your medicines      These medications were sent to [facility] [place] - [address]  [address] [place] [address]     Phone:  [phone]     clindamycin 300 MG capsule    gabapentin 100 MG capsule    indomethacin 25 MG capsule                Primary Care Provider Office Phone # Fax #    Dariel Tesfaye [phone]       [address] [place] [address]        Equal Access to Services     SKY CHAUDHARI AH: Hadii marek montes hadasho Soomaali, waaxda luqadaha, qaybta kaalmada adeegyada, waxay angel davenport. So [facility] [phone].    ATENCIÓN: Si habla español, tiene a samaniego disposición servicios gratuitos de asistencia lingüística. Llame al [phone].    We comply with applicable federal civil rights laws and [place] laws. We do not discriminate on the basis of race, color, national origin, age, disability, sex, sexual orientation, or gender identity.            Thank you!     Thank you for choosing [facility]  for your care. Our goal is always to provide you with excellent care.  Hearing back from our patients is one way we can continue to improve our services. Please take a few minutes to complete the written survey that you may receive in the mail after your visit with us. Thank you!             Your Updated Medication List - Protect others around you: Learn how to safely use, store and throw away your medicines at www.disposemymeds.org.          This list is accurate as of 2/22/18 12:54 PM.  Always use your most recent med list.                   Brand Name Dispense Instructions for use Diagnosis    albuterol 108 (90 BASE) MCG/ACT Inhaler    PROAIR HFA/PROVENTIL HFA/VENTOLIN HFA    1 Inhaler    Inhale 2 puffs into the lungs every 6 hours as needed for shortness of breath / dyspnea    Bronchitis       clindamycin 300 MG capsule    CLEOCIN    30 capsule    Take 1 capsule (300 mg) by mouth 3 times daily    Pain, dental       cyclobenzaprine 10 MG tablet    FLEXERIL    90 tablet    TAKE 1/2 TO 1 TABLET BY MOUTH 3 TIMES DAILY AS NEEDED FOR MUSCLE SPASMS    Cervical disc disease       gabapentin 100 MG capsule    NEURONTIN    30 capsule    Take 1 capsule (100 mg) by mouth 3 times daily    Pain, dental       indomethacin 25 MG capsule    INDOCIN    42 capsule    Take 1 capsule (25 mg) by mouth 3 times daily (with meals)    Pain, dental       oxyCODONE-acetaminophen  MG per tablet    PERCOCET    60 tablet    TAKE 1 TABLET BY MOUTH twice daily as needed for severe pain    DDD (degenerative disc disease), cervical

## 2018-02-23 ASSESSMENT — PATIENT HEALTH QUESTIONNAIRE - PHQ9: SUM OF ALL RESPONSES TO PHQ QUESTIONS 1-9: 2

## 2018-02-23 ASSESSMENT — ANXIETY QUESTIONNAIRES: GAD7 TOTAL SCORE: 2

## 2018-03-14 ENCOUNTER — OFFICE VISIT (OUTPATIENT)
Dept: FAMILY MEDICINE | Facility: OTHER | Age: 43
End: 2018-03-14
Attending: FAMILY MEDICINE
Payer: COMMERCIAL

## 2018-03-14 VITALS
HEART RATE: 114 BPM | WEIGHT: 171.13 LBS | BODY MASS INDEX: 24.04 KG/M2 | OXYGEN SATURATION: 100 % | TEMPERATURE: 96.8 F | SYSTOLIC BLOOD PRESSURE: 114 MMHG | DIASTOLIC BLOOD PRESSURE: 68 MMHG

## 2018-03-14 DIAGNOSIS — K08.89 PAIN, DENTAL: ICD-10-CM

## 2018-03-14 DIAGNOSIS — M50.30 DDD (DEGENERATIVE DISC DISEASE), CERVICAL: Primary | ICD-10-CM

## 2018-03-14 PROCEDURE — 99213 OFFICE O/P EST LOW 20 MIN: CPT | Performed by: FAMILY MEDICINE

## 2018-03-14 PROCEDURE — G0463 HOSPITAL OUTPT CLINIC VISIT: HCPCS

## 2018-03-14 RX ORDER — GABAPENTIN 300 MG/1
300 CAPSULE ORAL 3 TIMES DAILY
Qty: 90 CAPSULE | Refills: 1 | Status: SHIPPED | OUTPATIENT
Start: 2018-03-14 | End: 2018-06-04

## 2018-03-14 RX ORDER — OXYCODONE AND ACETAMINOPHEN 10; 325 MG/1; MG/1
TABLET ORAL
Qty: 60 TABLET | Refills: 0 | Status: SHIPPED | OUTPATIENT
Start: 2018-03-14 | End: 2018-04-11

## 2018-03-14 ASSESSMENT — PAIN SCALES - GENERAL: PAINLEVEL: SEVERE PAIN (7)

## 2018-03-14 NOTE — PROGRESS NOTES
Micah EMMA Rosenthal    March 14, 2018    Chief Complaint   Patient presents with     Neck Pain     pain is a 7. same as normal. no changes from last visit.       SUBJECTIVE:  Here for f/u.  Doing well.  neurontin helped the tooth pain.  He is doing well with the opiate ween.  Last month of bid and next month transition to daily    Past Medical History:   Diagnosis Date     Cervical disc disease        Past Surgical History:   Procedure Laterality Date     BACK SURGERY      fusion of C5 and C6     IR CAUDAL EPIDURAL INJECTION SINGLE  12/2012     OTHER SURGICAL HISTORY  03/2013    Cervical Fusion     wisdom teeth extracted         Current Outpatient Prescriptions   Medication Sig Dispense Refill     gabapentin (NEURONTIN) 300 MG capsule Take 1 capsule (300 mg) by mouth 3 times daily 90 capsule 1     oxyCODONE-acetaminophen (PERCOCET)  MG per tablet TAKE 1 TABLET BY MOUTH twice daily as needed for severe pain 60 tablet 0     cyclobenzaprine (FLEXERIL) 10 MG tablet TAKE 1/2 TO 1 TABLET BY MOUTH 3 TIMES DAILY AS NEEDED FOR MUSCLE SPASMS 90 tablet 3     albuterol (PROAIR HFA/PROVENTIL HFA/VENTOLIN HFA) 108 (90 BASE) MCG/ACT Inhaler Inhale 2 puffs into the lungs every 6 hours as needed for shortness of breath / dyspnea 1 Inhaler 1     [DISCONTINUED] gabapentin (NEURONTIN) 100 MG capsule Take 1 capsule (100 mg) by mouth 3 times daily 30 capsule 0       Allergies   Allergen Reactions     Cranberry      Strawberry      Doxycycline Nausea and Vomiting     Zithromax [Azithromycin Dihydrate] Rash       Family History   Problem Relation Age of Onset     Unknown/Adopted Father      CANCER Maternal Uncle      throat ca     CANCER Maternal Uncle      lung ca     Cardiovascular Maternal Grandfather      Cardiovascular Paternal Grandfather        Social History     Social History     Marital status:      Spouse name: N/A     Number of children: N/A     Years of education: N/A     Occupational History           Full-time       Social History Main Topics     Smoking status: Current Every Day Smoker     Packs/day: 1.00     Years: 15.00     Types: Cigarettes     Smokeless tobacco: Never Used      Comment: Tried to Quit: Yes; Passive Exposure: Yes; Longest Tobacco free: 8 years     Alcohol use No     Drug use: No     Sexual activity: Yes     Partners: Female     Other Topics Concern      Service No     Blood Transfusions Yes     Caffeine Concern Yes     Soda, up to 5 cans per day      Occupational Exposure No     Hobby Hazards No     Sleep Concern Yes     Sleeps only 3-4 hours every night     Stress Concern No     Weight Concern No     Special Diet No     Back Care Yes     [facility]     Seat Belt Yes     Parent/Sibling W/ Cabg, Mi Or Angioplasty Before 65f 55m? No     Social History Narrative       5 point ROS negative except as noted above in HPI, including Gen., Resp., CV, GI &  system review.     OBJECTIVE:  B/P: 114/68, Temperature: 96.8, Pulse: 114, Respirations: Data Unavailable    GENERAL APPEARANCE: Alert, no acute distress  SKIN: no suspicious lesions or rashes to visualized skin  NEURO: Alert, oriented x 3, speech and mentation normal    ASSESSMENT and PLAN:  (M50.30) DDD (degenerative disc disease), cervical  (primary encounter diagnosis)  Comment: stable.   Plan: oxyCODONE-acetaminophen (PERCOCET)  MG         per tablet        Doing ok with the med ween.  Down to 2/day for the last month.  Next month, 1/day or 30 pills for the next 3 months.  He is on board.      (K08.89) Pain, dental  Comment: improved.   Plan: gabapentin (NEURONTIN) 300 MG capsule        The gabapentin works. He finished the clinda.

## 2018-03-14 NOTE — MR AVS SNAPSHOT
After Visit Summary   3/14/2018    Micah Rosenthal    MRN: [id number]           Patient Information     Date Of Birth          1975        Visit Information        Provider Department      3/14/2018 9:15 AM Dariel Tesfaye MD [facility]        Today's Diagnoses     DDD (degenerative disc disease), cervical    -  1    Pain, dental          Care Instructions    F/u with ongoing concerns.           Follow-ups after your visit        Your next 10 appointments already scheduled     Apr 16, 2018  9:15 AM CDT   (Arrive by 9:00 AM)   SHORT with Dariel Tesfaye MD   [facility] ([facility] )    [address]  [facility] [place] [address]   [phone]              Who to contact     If you have questions or need follow up information about today's clinic visit or your schedule please contact [facility] directly at [phone].  Normal or non-critical lab and imaging results will be communicated to you by MyChart, letter or phone within 4 business days after the clinic has received the results. If you do not hear from us within 7 days, please contact the clinic through MyChart or phone. If you have a critical or abnormal lab result, we will notify you by phone as soon as possible.  Submit refill requests through PeopleAdmin or call your pharmacy and they will forward the refill request to us. Please allow 3 business days for your refill to be completed.          Additional Information About Your Visit        Care EveryWhere ID     This is your Care EveryWhere ID. This could be used by other organizations to access your [facility] medical records  [id number]        Your Vitals Were     Pulse Temperature Pulse Oximetry BMI (Body Mass Index)          114 96.8  F (36  C) (Tympanic) 100% 24.04 kg/m2         Blood Pressure from Last 3 Encounters:   03/14/18 114/68   02/22/18 124/80   02/14/18 114/74    Weight from Last 3 Encounters:   03/14/18 171 lb 2 oz  (77.6 kg)   02/22/18 165 lb (74.8 kg)   02/14/18 166 lb 6.4 oz (75.5 kg)              Today, you had the following     No orders found for display         Today's Medication Changes          These changes are accurate as of 3/14/18  9:24 AM.  If you have any questions, ask your nurse or doctor.               These medicines have changed or have updated prescriptions.        Dose/Directions    gabapentin 300 MG capsule   Commonly known as:  NEURONTIN   This may have changed:    - medication strength  - how much to take   Used for:  Pain, dental   Changed by:  Dariel Tesfaye MD        Dose:  300 mg   Take 1 capsule (300 mg) by mouth 3 times daily   Quantity:  90 capsule   Refills:  1            Where to get your medicines      These medications were sent to [facility] [place]  [address]  [address] [place] [address]     Phone:  [phone]     gabapentin 300 MG capsule         Some of these will need a paper prescription and others can be bought over the counter.  Ask your nurse if you have questions.     Bring a paper prescription for each of these medications     oxyCODONE-acetaminophen  MG per tablet                Primary Care Provider Office Phone # Fax #    Dariel Tesfaye [phone]       [address] [place] [address]        Equal Access to Services     SKY CHAUDHARI AH: Hadii marek ku hadasho Soomaali, waaxda luqadaha, qaybta kaalmada adeegyada, waxay idiin hayceasarn jesse davenport. So [facility] [phone].    ATENCIÓN: Si habla español, tiene a samaniego disposición servicios gratuitos de asistencia lingüística. Llame al [phone].    We comply with applicable federal civil rights laws and [place] laws. We do not discriminate on the basis of race, color, national origin, age, disability, sex, sexual orientation, or gender identity.            Thank you!     Thank you for choosing [facility]  for your care. Our goal is always to  provide you with excellent care. Hearing back from our patients is one way we can continue to improve our services. Please take a few minutes to complete the written survey that you may receive in the mail after your visit with us. Thank you!             Your Updated Medication List - Protect others around you: Learn how to safely use, store and throw away your medicines at www.disposemymeds.org.          This list is accurate as of 3/14/18  9:24 AM.  Always use your most recent med list.                   Brand Name Dispense Instructions for use Diagnosis    albuterol 108 (90 BASE) MCG/ACT Inhaler    PROAIR HFA/PROVENTIL HFA/VENTOLIN HFA    1 Inhaler    Inhale 2 puffs into the lungs every 6 hours as needed for shortness of breath / dyspnea    Bronchitis       cyclobenzaprine 10 MG tablet    FLEXERIL    90 tablet    TAKE 1/2 TO 1 TABLET BY MOUTH 3 TIMES DAILY AS NEEDED FOR MUSCLE SPASMS    Cervical disc disease       gabapentin 300 MG capsule    NEURONTIN    90 capsule    Take 1 capsule (300 mg) by mouth 3 times daily    Pain, dental       oxyCODONE-acetaminophen  MG per tablet    PERCOCET    60 tablet    TAKE 1 TABLET BY MOUTH twice daily as needed for severe pain    DDD (degenerative disc disease), cervical

## 2018-03-14 NOTE — NURSING NOTE
"Chief Complaint   Patient presents with     Neck Pain     pain is a 7. same as normal. no changes from last visit.       Initial /68  Pulse 114  Temp 96.8  F (36  C) (Tympanic)  Wt 171 lb 2 oz (77.6 kg)  SpO2 100%  BMI 24.04 kg/m2 Estimated body mass index is 24.04 kg/(m^2) as calculated from the following:    Height as of 2/22/18: 5' 10.75\" (1.797 m).    Weight as of this encounter: 171 lb 2 oz (77.6 kg).  Medication Reconciliation: complete     Maritza Castillo    "

## 2018-04-11 ENCOUNTER — OFFICE VISIT (OUTPATIENT)
Dept: FAMILY MEDICINE | Facility: OTHER | Age: 43
End: 2018-04-11
Attending: FAMILY MEDICINE
Payer: COMMERCIAL

## 2018-04-11 ENCOUNTER — TELEPHONE (OUTPATIENT)
Dept: FAMILY MEDICINE | Facility: OTHER | Age: 43
End: 2018-04-11

## 2018-04-11 VITALS
WEIGHT: 171 LBS | BODY MASS INDEX: 23.94 KG/M2 | HEIGHT: 71 IN | HEART RATE: 109 BPM | OXYGEN SATURATION: 98 % | DIASTOLIC BLOOD PRESSURE: 68 MMHG | SYSTOLIC BLOOD PRESSURE: 104 MMHG | TEMPERATURE: 98.2 F

## 2018-04-11 DIAGNOSIS — Z72.0 TOBACCO ABUSE: ICD-10-CM

## 2018-04-11 DIAGNOSIS — M50.30 DDD (DEGENERATIVE DISC DISEASE), CERVICAL: Primary | ICD-10-CM

## 2018-04-11 DIAGNOSIS — Z71.6 TOBACCO ABUSE COUNSELING: ICD-10-CM

## 2018-04-11 DIAGNOSIS — Z02.89 PAIN MANAGEMENT CONTRACT SIGNED: ICD-10-CM

## 2018-04-11 PROCEDURE — 99213 OFFICE O/P EST LOW 20 MIN: CPT | Performed by: FAMILY MEDICINE

## 2018-04-11 PROCEDURE — G0463 HOSPITAL OUTPT CLINIC VISIT: HCPCS

## 2018-04-11 RX ORDER — OXYCODONE AND ACETAMINOPHEN 10; 325 MG/1; MG/1
TABLET ORAL
Qty: 30 TABLET | Refills: 0 | Status: SHIPPED | OUTPATIENT
Start: 2018-04-11 | End: 2018-05-09

## 2018-04-11 ASSESSMENT — PAIN SCALES - GENERAL: PAINLEVEL: EXTREME PAIN (8)

## 2018-04-11 NOTE — MR AVS SNAPSHOT
After Visit Summary   4/11/2018    Micah Rosenthal    MRN: [id number]           Patient Information     Date Of Birth          1975        Visit Information        Provider Department      4/11/2018 2:45 PM Dariel Tesfaye MD [facility]        Today's Diagnoses     DDD (degenerative disc disease), cervical    -  1    Tobacco abuse        Tobacco abuse counseling        Pain management contract signed          Care Instructions      HOW TO QUIT SMOKING  Smoking is one of the hardest habits to break. About half of all those who have ever smoked have been able to quit, and most of those (about 70%) who still smoke want to quit. Here are some of the best ways to stop smoking.     KEEP TRYING:  It takes most smokers about 8 tries before they are finally able to fully quit. So, the more often you try and fail, the better your chance of quitting the next time! So, don't give up!    GO COLD TURKEY:  Most ex-smokers quit cold turkey. Trying to cut back gradually doesn't seem to work as well, perhaps because it continues the smoking habit. Also, it is possible to fool yourself by inhaling more while smoking fewer cigarettes. This results in the same amount of nicotine in your body!    GET SUPPORT:  Support programs can make an important difference, especially for the heavy smoker. These groups offer lectures, methods to change your behavior and peer support. Call the free national Quitline for more information. 800-QUIT-NOW (821-472-8948). Low-cost or free programs are offered by many hospitals, local chapters of the American Lung Association (222-725-7472) and the American Cancer Society (247-206-2801). Support at home is important too. Non-smokers can help by offering praise and encouragement. If the smoker fails to quit, encourage them to try again!    OVER-THE-COUNTER MEDICINES:  For those who can't quit on their own, Nicotine Replacement Therapy (NRT) may make quitting much easier.  Certain aids such as the nicotine patch, gum and lozenge are available without a prescription. However, it is best to use these under the guidance of your doctor. The skin patch provides a steady supply of nicotine to the body. Nicotine gum and lozenge gives temporary bursts of low levels of nicotine. Both methods take the edge off the craving for cigarettes. WARNING: If you feel symptoms of nicotine overdose, such as nausea, vomiting, dizziness, weakness, or fast heartbeat, stop using these and see your doctor.    PRESCRIPTION MEDICINES:  After evaluating your smoking patterns and prior attempts at quitting, your doctor may offer a prescription medicine such as bupropion (Zyban, Wellbutrin), varenicline (Chantix, Champix), a niocotine inhaler or nasal spray. Each has its unique advantage and side effects which your doctor can review with you.    HEALTH BENEFITS OF QUITTING:  The benefits of quitting start right away and keep improving the longer you go without smokin minutes: blood pressure and pulse return to normal  8 hours: oxygen levels return to normal  2 days: ability to smell and taste begins to improve as damaged nerves start to regrow  2-3 weeks: circulation and lung function improves  1-9 months: decreased cough, congestion and shortness of breath; less tired  1 year: risk of heart attack decreases by half  5 years: risk of lung cancer decreases by half; risk of stroke becomes the same as a non-smoker  For information about how to quit smoking, visit the following links:  National Cancer [place] ,   Clearing the Air, Quit Smoking Today   - an online booklet. http://www.smokefree.gov/pubs/clearing_the_air.pdf  Smokefree.gov http://smokefree.gov/  QuitNet http://www.quitnet.com/    9683-1547 Mannie Alcantara, [address] [place] [address], [place] [address]. All rights reserved. This information is not intended as a substitute for professional medical care. Always follow your healthcare professional's  instructions.    The Benefits of Living Smoke Free  What do you want to gain from quitting? Check off some reasons to quit.  Health Benefits  ___ Reduce my risk of lung cancer, heart disease, chronic lung disease  ___ Have fewer wrinkles and softer skin  ___ Improve my sense of taste and smell  ___ For pregnant women--reduce the risk of having a miscarriage, stillbirth, premature birth, or low-birth-weight baby  Personal Benefits  ___ Feel more in control of my life  ___ Have better-smelling hair, breath, clothes, home, and car  ___ Save time by not having to take smoke breaks, buy cigarettes, or hunt for a light  ___ Have whiter teeth  Family Benefits  ___ Reduce my children s respiratory tract infections  ___ Set a good example for my children  ___ Reduce my family s cancer risk  Financial Benefits  ___ Save hundreds of dollars each year that would be spent on cigarettes  ___ Save money on medical bills  ___ Save on life, health, and car insurance premiums    Those Dollars Add Up!  Cigarettes are expensive, and getting more expensive all the time. Do you realize how much money you are spending on cigarettes per year? What is the average amount you spend on a pack of cigarettes? What is the average number of packs that you smoke per day? Using your answers to these questions, fill in this formula to help you find out:  ($ _____ per pack) ×  ( _____ number of packs per day) × (365 days) =  $ _____ yearly cost of smoking  Besides tobacco, there are other costs, including extra cleaning bills and replacement costs for clothing and furniture; medical expenses for smoking-related illnesses; and higher health, life, and car insurance premiums.    Cigars and Pipes Count Too!  Cigars and pipes are also dangerous. So are smokeless (chewing) tobacco and snuff. All of these products contain nicotine, a highly addictive substance that has harmful effects on your body. Quitting smoking means giving up all tobacco products.       "1184-9380 Mannie [facility] [address], [address] [place] [address], [place] [address]. All rights reserved. This information is not intended as a substitute for professional medical care. Always follow your healthcare professional's instructions.          Follow-ups after your visit        Your next 10 appointments already scheduled     Apr 16, 2018  9:15 AM CDT   (Arrive by 9:00 AM)   SHORT with Dariel Tesfaye MD   [facility] ([facility] )    [address] [facility] [address]   [phone]            May 09, 2018  9:15 AM CDT   (Arrive by 9:00 AM)   SHORT with Dariel Tesfaye MD   [facility] ([facility] )    [address] [place]  [facility] [address]   [phone]              Who to contact     If you have questions or need follow up information about today's clinic visit or your schedule please contact [facility] directly at [phone].  Normal or non-critical lab and imaging results will be communicated to you by MyChart, letter or phone within 4 business days after the clinic has received the results. If you do not hear from us within 7 days, please contact the clinic through MyChart or phone. If you have a critical or abnormal lab result, we will notify you by phone as soon as possible.  Submit refill requests through [employer] or call your pharmacy and they will forward the refill request to us. Please allow 3 business days for your refill to be completed.          Additional Information About Your Visit        Care EveryWhere ID     This is your Care EveryWhere ID. This could be used by other organizations to access your [facility] medical records  [id number]        Your Vitals Were     Pulse Temperature Height Pulse Oximetry BMI (Body Mass Index)       109 98.2  F (36.8  C) 5' 10.75\" (1.797 m) 98% 24.02 kg/m2        Blood Pressure from Last 3 Encounters:   04/11/18 104/68   03/14/18 114/68   02/22/18 124/80    Weight from Last 3 " Encounters:   04/11/18 171 lb (77.6 kg)   03/14/18 171 lb 2 oz (77.6 kg)   02/22/18 165 lb (74.8 kg)              We Performed the Following     Tobacco Cessation - Order to Satisfy Health Maintenance          Today's Medication Changes          These changes are accurate as of 4/11/18  3:14 PM.  If you have any questions, ask your nurse or doctor.               These medicines have changed or have updated prescriptions.        Dose/Directions    oxyCODONE-acetaminophen  MG per tablet   Commonly known as:  PERCOCET   This may have changed:  additional instructions   Used for:  DDD (degenerative disc disease), cervical   Changed by:  Dariel Tesfaye MD        TAKE 1 TABLET BY MOUTH daily as needed for severe pain   Quantity:  30 tablet   Refills:  0            Where to get your medicines      Some of these will need a paper prescription and others can be bought over the counter.  Ask your nurse if you have questions.     Bring a paper prescription for each of these medications     oxyCODONE-acetaminophen  MG per tablet                Primary Care Provider Office Phone # Fax #    Dariel Tesfaye [phone]       [address] [place] [address]        Equal Access to Services     [facility] AH: Hadii marek montes hadasho Soomaali, waaxda luqadaha, qaybta kaalmada adeegyada, rocky wright . So [facility] [phone].    ATENCIÓN: Si habla español, tiene a samaniego disposición servicios gratuitos de asistencia lingüística. Ll[facility] [phone].    We comply with applicable federal civil rights laws and [place] laws. We do not discriminate on the basis of race, color, national origin, age, disability, sex, sexual orientation, or gender identity.            Thank you!     Thank you for choosing [facility]  for your care. Our goal is always to provide you with excellent care. Hearing back from our patients is one way we can continue to improve our  services. Please take a few minutes to complete the written survey that you may receive in the mail after your visit with us. Thank you!             Your Updated Medication List - Protect others around you: Learn how to safely use, store and throw away your medicines at www.disposemymeds.org.          This list is accurate as of 4/11/18  3:14 PM.  Always use your most recent med list.                   Brand Name Dispense Instructions for use Diagnosis    albuterol 108 (90 Base) MCG/ACT Inhaler    PROAIR HFA/PROVENTIL HFA/VENTOLIN HFA    1 Inhaler    Inhale 2 puffs into the lungs every 6 hours as needed for shortness of breath / dyspnea    Bronchitis       cyclobenzaprine 10 MG tablet    FLEXERIL    90 tablet    TAKE 1/2 TO 1 TABLET BY MOUTH 3 TIMES DAILY AS NEEDED FOR MUSCLE SPASMS    Cervical disc disease       gabapentin 300 MG capsule    NEURONTIN    90 capsule    Take 1 capsule (300 mg) by mouth 3 times daily    Pain, dental       oxyCODONE-acetaminophen  MG per tablet    PERCOCET    30 tablet    TAKE 1 TABLET BY MOUTH daily as needed for severe pain    DDD (degenerative disc disease), cervical

## 2018-04-11 NOTE — PATIENT INSTRUCTIONS

## 2018-04-11 NOTE — TELEPHONE ENCOUNTER
7:33 AM    Reason for Call: OVERBOOK    Patient is having the following symptoms: Med follow up (pt stated he will be out of meds on Friday)    The patient is requesting an appointment for today or towmorrow with Dr Tesfaye.    Was an appointment offered for this call? No, pt has appt on 04/16/18 and first available is after that point  If yes : Appointment type              Date    Preferred method for responding to this message: Telephone Call  What is your phone number ?  [phone]    If we cannot reach you directly, may we leave a detailed response at the number you provided? Yes    Can this message wait until your PCP/provider returns, if unavailable today? Not applicable    Tessa Martin

## 2018-04-11 NOTE — PROGRESS NOTES
Micah CARTER Galo    April 11, 2018    Chief Complaint   Patient presents with     Recheck Medication     Percocet-pt will need a refill.  Pt is weaning off.       SUBJECTIVE:  Here for f/u.  Pain is increased with the lower doses of the meds for sure.  He is dedicated to getting off of them.  No withdrawal.  He is trying very hard.  We discussed and reviewed.  Down to 1 pill/day for no more than 3 months at this point, with self ween down to zero during this time discussed at some length.     Past Medical History:   Diagnosis Date     Cervical disc disease        Past Surgical History:   Procedure Laterality Date     BACK SURGERY      fusion of C5 and C6     IR CAUDAL EPIDURAL INJECTION SINGLE  12/2012     OTHER SURGICAL HISTORY  03/2013    Cervical Fusion     wisdom teeth extracted         Current Outpatient Prescriptions   Medication Sig Dispense Refill     oxyCODONE-acetaminophen (PERCOCET)  MG per tablet TAKE 1 TABLET BY MOUTH daily as needed for severe pain 30 tablet 0     gabapentin (NEURONTIN) 300 MG capsule Take 1 capsule (300 mg) by mouth 3 times daily 90 capsule 1     cyclobenzaprine (FLEXERIL) 10 MG tablet TAKE 1/2 TO 1 TABLET BY MOUTH 3 TIMES DAILY AS NEEDED FOR MUSCLE SPASMS 90 tablet 3     albuterol (PROAIR HFA/PROVENTIL HFA/VENTOLIN HFA) 108 (90 BASE) MCG/ACT Inhaler Inhale 2 puffs into the lungs every 6 hours as needed for shortness of breath / dyspnea 1 Inhaler 1       Allergies   Allergen Reactions     Cranberry      Strawberry      Doxycycline Nausea and Vomiting     Zithromax [Azithromycin Dihydrate] Rash       Family History   Problem Relation Age of Onset     Unknown/Adopted Father      CANCER Maternal Uncle      throat ca     CANCER Maternal Uncle      lung ca     Cardiovascular Maternal Grandfather      Cardiovascular Paternal Grandfather        Social History     Social History     Marital status:      Spouse name: N/A     Number of children: N/A     Years of education: N/A      Occupational History           Full-time      Social History Main Topics     Smoking status: Current Every Day Smoker     Packs/day: 1.00     Years: 15.00     Types: Cigarettes     Smokeless tobacco: Never Used      Comment: Tried to Quit: Yes; Passive Exposure: Yes; Longest Tobacco free: 8 years     Alcohol use No     Drug use: No     Sexual activity: Yes     Partners: Female     Other Topics Concern      Service No     Blood Transfusions Yes     Caffeine Concern Yes     Soda, up to 5 cans per day      Occupational Exposure No     Hobby Hazards No     Sleep Concern Yes     Sleeps only 3-4 hours every night     Stress Concern No     Weight Concern No     Special Diet No     Back Care Yes     [facility]     Seat Belt Yes     Parent/Sibling W/ Cabg, Mi Or Angioplasty Before 65f 55m? No     Social History Narrative       5 point ROS negative except as noted above in HPI, including Gen., Resp., CV, GI &  system review.     OBJECTIVE:  B/P: 104/68, Temperature: 98.2, Pulse: 109, Respirations: Data Unavailable    GENERAL APPEARANCE: Alert, no acute distress  Gait normal.  Turns head slowly and in pain.   SKIN: no suspicious lesions or rashes to visualized skin  NEURO: Alert, oriented x 3, speech and mentation normal    ASSESSMENT and PLAN:  (M50.30) DDD (degenerative disc disease), cervical  (primary encounter diagnosis)  Comment: ongoing  Plan: oxyCODONE-acetaminophen (PERCOCET)  MG         per tablet        Ween going ok.  It is tough.  Down to 10 mg/day.  I told him to split it as needed and he is allowed 1/day total.  Set up for 1 month f/u.  I am willing to do this for 3 months total at the one/day, which would put us 2 more refills of this.      (Z72.0) Tobacco abuse  Comment: ongoing  Plan: Tobacco Cessation - Order to Satisfy Health         Maintenance        Advise cessation.     (Z71.6) Tobacco abuse counseling  Comment: as above.   Plan: as above.     (Z02.89) Pain  management contract signed  Comment: reviewed.   Plan: no change.  3 months of the ween to go.  He is doing ok with this.

## 2018-04-11 NOTE — TELEPHONE ENCOUNTER
I called the pt we do not have any appt's available today for this type of appt. Pt has an appt scheduled. I called the pt and reminded him that he needs to plan ahead for med FU's Dr Tesfaye has discussed this with him in the past. We cannot keep letting him use appt's that are reserved for pt's that are ill/injured only. I advised he will not be able to scheduled same day again for med FU's.

## 2018-04-11 NOTE — NURSING NOTE
"Chief Complaint   Patient presents with     Recheck Medication     Percocet-pt will need a refill.  Pt is weaning off.       Initial /68  Pulse 109  Temp 98.2  F (36.8  C)  Ht 5' 10.75\" (1.797 m)  Wt 171 lb (77.6 kg)  SpO2 98%  BMI 24.02 kg/m2 Estimated body mass index is 24.02 kg/(m^2) as calculated from the following:    Height as of this encounter: 5' 10.75\" (1.797 m).    Weight as of this encounter: 171 lb (77.6 kg).  Medication Reconciliation: complete   Ning Murphy    "

## 2018-05-09 ENCOUNTER — OFFICE VISIT (OUTPATIENT)
Dept: FAMILY MEDICINE | Facility: OTHER | Age: 43
End: 2018-05-09
Attending: FAMILY MEDICINE
Payer: COMMERCIAL

## 2018-05-09 VITALS
OXYGEN SATURATION: 99 % | SYSTOLIC BLOOD PRESSURE: 122 MMHG | TEMPERATURE: 97.8 F | HEART RATE: 100 BPM | WEIGHT: 176 LBS | DIASTOLIC BLOOD PRESSURE: 68 MMHG | HEIGHT: 71 IN | BODY MASS INDEX: 24.64 KG/M2

## 2018-05-09 DIAGNOSIS — M50.30 DDD (DEGENERATIVE DISC DISEASE), CERVICAL: ICD-10-CM

## 2018-05-09 DIAGNOSIS — Z72.0 TOBACCO ABUSE: ICD-10-CM

## 2018-05-09 DIAGNOSIS — Z71.6 TOBACCO ABUSE COUNSELING: ICD-10-CM

## 2018-05-09 PROCEDURE — 99213 OFFICE O/P EST LOW 20 MIN: CPT | Performed by: FAMILY MEDICINE

## 2018-05-09 PROCEDURE — G0463 HOSPITAL OUTPT CLINIC VISIT: HCPCS

## 2018-05-09 RX ORDER — OXYCODONE AND ACETAMINOPHEN 10; 325 MG/1; MG/1
TABLET ORAL
Qty: 30 TABLET | Refills: 0 | Status: SHIPPED | OUTPATIENT
Start: 2018-05-09 | End: 2018-06-04

## 2018-05-09 ASSESSMENT — PAIN SCALES - GENERAL: PAINLEVEL: EXTREME PAIN (9)

## 2018-05-09 NOTE — NURSING NOTE
"Chief Complaint   Patient presents with     Neck Pain       Initial /68  Pulse 100  Temp 97.8  F (36.6  C)  Ht 5' 10.75\" (1.797 m)  Wt 176 lb (79.8 kg)  SpO2 99%  BMI 24.72 kg/m2 Estimated body mass index is 24.72 kg/(m^2) as calculated from the following:    Height as of this encounter: 5' 10.75\" (1.797 m).    Weight as of this encounter: 176 lb (79.8 kg).  Medication Reconciliation: complete    Ning Murphy LPN  "

## 2018-05-09 NOTE — PATIENT INSTRUCTIONS

## 2018-05-09 NOTE — MR AVS SNAPSHOT
After Visit Summary   5/9/2018    Micah Rosenthal    MRN: [id number]           Patient Information     Date Of Birth          1975        Visit Information        Provider Department      5/9/2018 9:15 AM Dariel Tesfaye MD [facility]        Today's Diagnoses     Tobacco abuse        Tobacco abuse counseling        DDD (degenerative disc disease), cervical          Care Instructions      HOW TO QUIT SMOKING  Smoking is one of the hardest habits to break. About half of all those who have ever smoked have been able to quit, and most of those (about 70%) who still smoke want to quit. Here are some of the best ways to stop smoking.     KEEP TRYING:  It takes most smokers about 8 tries before they are finally able to fully quit. So, the more often you try and fail, the better your chance of quitting the next time! So, don't give up!    GO COLD TURKEY:  Most ex-smokers quit cold turkey. Trying to cut back gradually doesn't seem to work as well, perhaps because it continues the smoking habit. Also, it is possible to fool yourself by inhaling more while smoking fewer cigarettes. This results in the same amount of nicotine in your body!    GET SUPPORT:  Support programs can make an important difference, especially for the heavy smoker. These groups offer lectures, methods to change your behavior and peer support. Call the free national Quitline for more information. 800-QUIT-NOW (276-941-3923). Low-cost or free programs are offered by many hospitals, local chapters of the American Lung Association (586-691-2199) and the American Cancer Society (303-852-8793). Support at home is important too. Non-smokers can help by offering praise and encouragement. If the smoker fails to quit, encourage them to try again!    OVER-THE-COUNTER MEDICINES:  For those who can't quit on their own, Nicotine Replacement Therapy (NRT) may make quitting much easier. Certain aids such as the nicotine patch, gum and  lozenge are available without a prescription. However, it is best to use these under the guidance of your doctor. The skin patch provides a steady supply of nicotine to the body. Nicotine gum and lozenge gives temporary bursts of low levels of nicotine. Both methods take the edge off the craving for cigarettes. WARNING: If you feel symptoms of nicotine overdose, such as nausea, vomiting, dizziness, weakness, or fast heartbeat, stop using these and see your doctor.    PRESCRIPTION MEDICINES:  After evaluating your smoking patterns and prior attempts at quitting, your doctor may offer a prescription medicine such as bupropion (Zyban, Wellbutrin), varenicline (Chantix, Champix), a niocotine inhaler or nasal spray. Each has its unique advantage and side effects which your doctor can review with you.    HEALTH BENEFITS OF QUITTING:  The benefits of quitting start right away and keep improving the longer you go without smokin minutes: blood pressure and pulse return to normal  8 hours: oxygen levels return to normal  2 days: ability to smell and taste begins to improve as damaged nerves start to regrow  2-3 weeks: circulation and lung function improves  1-9 months: decreased cough, congestion and shortness of breath; less tired  1 year: risk of heart attack decreases by half  5 years: risk of lung cancer decreases by half; risk of stroke becomes the same as a non-smoker  For information about how to quit smoking, visit the following links:  National Cancer [place] ,   Clearing the Air, Quit Smoking Today   - an online booklet. http://www.smokefree.gov/pubs/clearing_the_air.pdf  Smokefree.gov http://smokefree.gov/  QuitNet http://www.quitnet.com/    6886-1015 Mannie Alcantara, [address] [place] [address], [place] [address]. All rights reserved. This information is not intended as a substitute for professional medical care. Always follow your healthcare professional's instructions.    The Benefits of Living Smoke  Free  What do you want to gain from quitting? Check off some reasons to quit.  Health Benefits  ___ Reduce my risk of lung cancer, heart disease, chronic lung disease  ___ Have fewer wrinkles and softer skin  ___ Improve my sense of taste and smell  ___ For pregnant women--reduce the risk of having a miscarriage, stillbirth, premature birth, or low-birth-weight baby  Personal Benefits  ___ Feel more in control of my life  ___ Have better-smelling hair, breath, clothes, home, and car  ___ Save time by not having to take smoke breaks, buy cigarettes, or hunt for a light  ___ Have whiter teeth  Family Benefits  ___ Reduce my children s respiratory tract infections  ___ Set a good example for my children  ___ Reduce my family s cancer risk  Financial Benefits  ___ Save hundreds of dollars each year that would be spent on cigarettes  ___ Save money on medical bills  ___ Save on life, health, and car insurance premiums    Those Dollars Add Up!  Cigarettes are expensive, and getting more expensive all the time. Do you realize how much money you are spending on cigarettes per year? What is the average amount you spend on a pack of cigarettes? What is the average number of packs that you smoke per day? Using your answers to these questions, fill in this formula to help you find out:  ($ _____ per pack) ×  ( _____ number of packs per day) × (365 days) =  $ _____ yearly cost of smoking  Besides tobacco, there are other costs, including extra cleaning bills and replacement costs for clothing and furniture; medical expenses for smoking-related illnesses; and higher health, life, and car insurance premiums.    Cigars and Pipes Count Too!  Cigars and pipes are also dangerous. So are smokeless (chewing) tobacco and snuff. All of these products contain nicotine, a highly addictive substance that has harmful effects on your body. Quitting smoking means giving up all tobacco products.      [phone] Mannie Alcantara, [address] [place] [address]  "Road, [place] [address]. All rights reserved. This information is not intended as a substitute for professional medical care. Always follow your healthcare professional's instructions.          Follow-ups after your visit        Your next 10 appointments already scheduled     Jun 04, 2018  9:15 AM CDT   (Arrive by 9:00 AM)   SHORT with Dariel Tesfaye MD   [facility] ([facility] )    [address] [place] [address]   [phone]              Who to contact     If you have questions or need follow up information about today's clinic visit or your schedule please contact [facility] directly at [phone].  Normal or non-critical lab and imaging results will be communicated to you by MyChart, letter or phone within 4 business days after the clinic has received the results. If you do not hear from us within 7 days, please contact the clinic through MyChart or phone. If you have a critical or abnormal lab result, we will notify you by phone as soon as possible.  Submit refill requests through [email or web address] or call your pharmacy and they will forward the refill request to us. Please allow 3 business days for your refill to be completed.          Additional Information About Your Visit        Care EveryWhere ID     This is your Care EveryWhere ID. This could be used by other organizations to access your [place] medical records  [id number]        Your Vitals Were     Pulse Temperature Height Pulse Oximetry BMI (Body Mass Index)       100 97.8  F (36.6  C) 5' 10.75\" (1.797 m) 99% 24.72 kg/m2        Blood Pressure from Last 3 Encounters:   05/09/18 122/68   04/11/18 104/68   03/14/18 114/68    Weight from Last 3 Encounters:   05/09/18 176 lb (79.8 kg)   04/11/18 171 lb (77.6 kg)   03/14/18 171 lb 2 oz (77.6 kg)              We Performed the Following     Tobacco Cessation - Order to Satisfy Health Maintenance          Where to get your medicines      Some of these " will need a paper prescription and others can be bought over the counter.  Ask your nurse if you have questions.     Bring a paper prescription for each of these medications     oxyCODONE-acetaminophen  MG per tablet         Information about OPIOIDS     PRESCRIPTION OPIOIDS: WHAT YOU NEED TO KNOW   You have a prescription for an opioid (narcotic) pain medicine. Opioids can cause addiction. If you have a history of chemical dependency of any type, you are at a higher risk of becoming addicted to opioids. Only take this medicine after all other options have been tried. Take it for as short a time and as few doses as possible.     Do not:    Drive. If you drive while taking these medicines, you could be arrested for driving under the influence (DUI).    Operate heavy machinery    Do any other dangerous activities while taking these medicines.     Drink any alcohol while taking these medicines.      Take with any other medicines that contain acetaminophen. Read all labels carefully. Look for the word  acetaminophen  or  Tylenol.  Ask your pharmacist if you have questions or are unsure.    Store your pills in a secure place, locked if possible. We will not replace any lost or stolen medicine. If you don t finish your medicine, please throw away (dispose) as directed by your pharmacist. The [place] Pollution Control Agency has more information about safe disposal: https://www.pca.[facility].mn.us/living-green/managing-unwanted-medications    All opioids tend to cause constipation. Drink plenty of water and eat foods that have a lot of fiber, such as fruits, vegetables, prune juice, apple juice and high-fiber cereal. Take a laxative (Miralax, milk of magnesia, Colace, Senna) if you don t move your bowels at least every other day.          Primary Care Provider Office Phone # Fax #    Dariel Tesfaye [phone]       [address] [place] [address]        Equal Access to Services     SKY CHAUDHARI  AH: Hadii marek villanuevadavisnadiya Eden, waaxda luqadaha, qaybta kaleda worthy, rocky angel frederickraheem cumminscristianeousmane davenport. So [facility] [phone].    ATENCIÓN: Si habla español, tiene a samaniego disposición servicios gratuitos de asistencia lingüística. Llame al [phone].    We comply with applicable federal civil rights laws and [place] laws. We do not discriminate on the basis of race, color, national origin, age, disability, sex, sexual orientation, or gender identity.            Thank you!     Thank you for choosing [facility]  for your care. Our goal is always to provide you with excellent care. Hearing back from our patients is one way we can continue to improve our services. Please take a few minutes to complete the written survey that you may receive in the mail after your visit with us. Thank you!             Your Updated Medication List - Protect others around you: Learn how to safely use, store and throw away your medicines at www.disposemymeds.org.          This list is accurate as of 5/9/18  9:54 AM.  Always use your most recent med list.                   Brand Name Dispense Instructions for use Diagnosis    albuterol 108 (90 Base) MCG/ACT Inhaler    PROAIR HFA/PROVENTIL HFA/VENTOLIN HFA    1 Inhaler    Inhale 2 puffs into the lungs every 6 hours as needed for shortness of breath / dyspnea    Bronchitis       cyclobenzaprine 10 MG tablet    FLEXERIL    90 tablet    TAKE 1/2 TO 1 TABLET BY MOUTH 3 TIMES DAILY AS NEEDED FOR MUSCLE SPASMS    Cervical disc disease       gabapentin 300 MG capsule    NEURONTIN    90 capsule    Take 1 capsule (300 mg) by mouth 3 times daily    Pain, dental       oxyCODONE-acetaminophen  MG per tablet    PERCOCET    30 tablet    TAKE 1 TABLET BY MOUTH daily as needed for severe pain    DDD (degenerative disc disease), cervical

## 2018-06-04 ENCOUNTER — OFFICE VISIT (OUTPATIENT)
Dept: FAMILY MEDICINE | Facility: OTHER | Age: 43
End: 2018-06-04
Attending: FAMILY MEDICINE
Payer: COMMERCIAL

## 2018-06-04 VITALS
TEMPERATURE: 98.1 F | OXYGEN SATURATION: 97 % | WEIGHT: 170 LBS | BODY MASS INDEX: 23.8 KG/M2 | SYSTOLIC BLOOD PRESSURE: 118 MMHG | HEART RATE: 113 BPM | HEIGHT: 71 IN | DIASTOLIC BLOOD PRESSURE: 70 MMHG

## 2018-06-04 DIAGNOSIS — Z72.0 TOBACCO ABUSE: ICD-10-CM

## 2018-06-04 DIAGNOSIS — Z02.89 PAIN MANAGEMENT CONTRACT SIGNED: ICD-10-CM

## 2018-06-04 DIAGNOSIS — K08.89 PAIN, DENTAL: ICD-10-CM

## 2018-06-04 DIAGNOSIS — M50.90 CERVICAL DISC DISEASE: ICD-10-CM

## 2018-06-04 DIAGNOSIS — Z71.6 TOBACCO ABUSE COUNSELING: ICD-10-CM

## 2018-06-04 DIAGNOSIS — M50.30 DDD (DEGENERATIVE DISC DISEASE), CERVICAL: ICD-10-CM

## 2018-06-04 PROCEDURE — G0463 HOSPITAL OUTPT CLINIC VISIT: HCPCS

## 2018-06-04 PROCEDURE — 99213 OFFICE O/P EST LOW 20 MIN: CPT | Performed by: FAMILY MEDICINE

## 2018-06-04 RX ORDER — CYCLOBENZAPRINE HCL 10 MG
TABLET ORAL
Qty: 90 TABLET | Refills: 3 | Status: SHIPPED | OUTPATIENT
Start: 2018-06-04 | End: 2018-10-17

## 2018-06-04 RX ORDER — GABAPENTIN 300 MG/1
600 CAPSULE ORAL 3 TIMES DAILY
Qty: 90 CAPSULE | Refills: 1 | Status: SHIPPED | OUTPATIENT
Start: 2018-06-04 | End: 2018-07-23

## 2018-06-04 RX ORDER — OXYCODONE AND ACETAMINOPHEN 10; 325 MG/1; MG/1
TABLET ORAL
Qty: 30 TABLET | Refills: 0 | Status: SHIPPED | OUTPATIENT
Start: 2018-06-04 | End: 2020-02-10

## 2018-06-04 RX ORDER — GABAPENTIN 300 MG/1
300 CAPSULE ORAL 3 TIMES DAILY
Qty: 90 CAPSULE | Refills: 1 | Status: SHIPPED | OUTPATIENT
Start: 2018-06-04 | End: 2018-06-04

## 2018-06-04 ASSESSMENT — PAIN SCALES - GENERAL: PAINLEVEL: EXTREME PAIN (8)

## 2018-06-04 NOTE — MR AVS SNAPSHOT
After Visit Summary   6/4/2018    Micah Rosenthal    MRN: [id number]           Patient Information     Date Of Birth          1975        Visit Information        Provider Department      6/4/2018 9:15 AM Dariel Tesfaye MD [facility]        Today's Diagnoses     Tobacco abuse        Tobacco abuse counseling        Cervical disc disease        Pain management contract signed        Pain, dental        DDD (degenerative disc disease), cervical          Care Instructions      HOW TO QUIT SMOKING  Smoking is one of the hardest habits to break. About half of all those who have ever smoked have been able to quit, and most of those (about 70%) who still smoke want to quit. Here are some of the best ways to stop smoking.     KEEP TRYING:  It takes most smokers about 8 tries before they are finally able to fully quit. So, the more often you try and fail, the better your chance of quitting the next time! So, don't give up!    GO COLD TURKEY:  Most ex-smokers quit cold turkey. Trying to cut back gradually doesn't seem to work as well, perhaps because it continues the smoking habit. Also, it is possible to fool yourself by inhaling more while smoking fewer cigarettes. This results in the same amount of nicotine in your body!    GET SUPPORT:  Support programs can make an important difference, especially for the heavy smoker. These groups offer lectures, methods to change your behavior and peer support. Call the free national Quitline for more information. 800-QUIT-NOW (687-815-6445). Low-cost or free programs are offered by many hospitals, local chapters of the American Lung Association (042-396-5997) and the American Cancer Society (367-213-0923). Support at home is important too. Non-smokers can help by offering praise and encouragement. If the smoker fails to quit, encourage them to try again!    OVER-THE-COUNTER MEDICINES:  For those who can't quit on their own, Nicotine Replacement Therapy  (NRT) may make quitting much easier. Certain aids such as the nicotine patch, gum and lozenge are available without a prescription. However, it is best to use these under the guidance of your doctor. The skin patch provides a steady supply of nicotine to the body. Nicotine gum and lozenge gives temporary bursts of low levels of nicotine. Both methods take the edge off the craving for cigarettes. WARNING: If you feel symptoms of nicotine overdose, such as nausea, vomiting, dizziness, weakness, or fast heartbeat, stop using these and see your doctor.    PRESCRIPTION MEDICINES:  After evaluating your smoking patterns and prior attempts at quitting, your doctor may offer a prescription medicine such as bupropion (Zyban, Wellbutrin), varenicline (Chantix, Champix), a niocotine inhaler or nasal spray. Each has its unique advantage and side effects which your doctor can review with you.    HEALTH BENEFITS OF QUITTING:  The benefits of quitting start right away and keep improving the longer you go without smokin minutes: blood pressure and pulse return to normal  8 hours: oxygen levels return to normal  2 days: ability to smell and taste begins to improve as damaged nerves start to regrow  2-3 weeks: circulation and lung function improves  1-9 months: decreased cough, congestion and shortness of breath; less tired  1 year: risk of heart attack decreases by half  5 years: risk of lung cancer decreases by half; risk of stroke becomes the same as a non-smoker  For information about how to quit smoking, visit the following links:  National Cancer [place] ,   Clearing the Air, Quit Smoking Today   - an online booklet. http://www.smokefree.gov/pubs/clearing_the_air.pdf  Smokefree.gov http://smokefree.gov/  QuitNet http://www.quitnet.com/    5210-1509 Mannie Alcantara, [address] [place] [address], [place] [address]. All rights reserved. This information is not intended as a substitute for professional medical care. Always follow  your healthcare professional's instructions.    The Benefits of Living Smoke Free  What do you want to gain from quitting? Check off some reasons to quit.  Health Benefits  ___ Reduce my risk of lung cancer, heart disease, chronic lung disease  ___ Have fewer wrinkles and softer skin  ___ Improve my sense of taste and smell  ___ For pregnant women--reduce the risk of having a miscarriage, stillbirth, premature birth, or low-birth-weight baby  Personal Benefits  ___ Feel more in control of my life  ___ Have better-smelling hair, breath, clothes, home, and car  ___ Save time by not having to take smoke breaks, buy cigarettes, or hunt for a light  ___ Have whiter teeth  Family Benefits  ___ Reduce my children s respiratory tract infections  ___ Set a good example for my children  ___ Reduce my family s cancer risk  Financial Benefits  ___ Save hundreds of dollars each year that would be spent on cigarettes  ___ Save money on medical bills  ___ Save on life, health, and car insurance premiums    Those Dollars Add Up!  Cigarettes are expensive, and getting more expensive all the time. Do you realize how much money you are spending on cigarettes per year? What is the average amount you spend on a pack of cigarettes? What is the average number of packs that you smoke per day? Using your answers to these questions, fill in this formula to help you find out:  ($ _____ per pack) ×  ( _____ number of packs per day) × (365 days) =  $ _____ yearly cost of smoking  Besides tobacco, there are other costs, including extra cleaning bills and replacement costs for clothing and furniture; medical expenses for smoking-related illnesses; and higher health, life, and car insurance premiums.    Cigars and Pipes Count Too!  Cigars and pipes are also dangerous. So are smokeless (chewing) tobacco and snuff. All of these products contain nicotine, a highly addictive substance that has harmful effects on your body. Quitting smoking means  "giving up all tobacco products.      4270-1393 Mannie Alcantara, [address] [place] [address], [place] [address]. All rights reserved. This information is not intended as a substitute for professional medical care. Always follow your healthcare professional's instructions.          Follow-ups after your visit        Who to contact     If you have questions or need follow up information about today's clinic visit or your schedule please contact [facility] directly at [phone].  Normal or non-critical lab and imaging results will be communicated to you by MyChart, letter or phone within 4 business days after the clinic has received the results. If you do not hear from us within 7 days, please contact the clinic through MyChart or phone. If you have a critical or abnormal lab result, we will notify you by phone as soon as possible.  Submit refill requests through [employer] or call your pharmacy and they will forward the refill request to us. Please allow 3 business days for your refill to be completed.          Additional Information About Your Visit        Care EveryWhere ID     This is your Care EveryWhere ID. This could be used by other organizations to access your [place] medical records  [id number]        Your Vitals Were     Pulse Temperature Height Pulse Oximetry BMI (Body Mass Index)       113 98.1  F (36.7  C) 5' 10.75\" (1.797 m) 97% 23.88 kg/m2        Blood Pressure from Last 3 Encounters:   06/04/18 118/70   05/09/18 122/68   04/11/18 104/68    Weight from Last 3 Encounters:   06/04/18 170 lb (77.1 kg)   05/09/18 176 lb (79.8 kg)   04/11/18 171 lb (77.6 kg)              We Performed the Following     Comprehensive metabolic panel (BMP + Alb, Alk Phos, ALT, AST, Total. Bili, TP)     Lipid Profile (Chol, Trig, HDL, LDL calc)     Tobacco Cessation - Order to Satisfy Health Maintenance          Today's Medication Changes          These changes are accurate as of 6/4/18  9:26 AM.  If you have any " questions, ask your nurse or doctor.               Start taking these medicines.        Dose/Directions    gabapentin 300 MG capsule   Commonly known as:  NEURONTIN   Used for:  Pain, dental   Started by:  Dariel Tesfaye MD        Dose:  600 mg   Take 2 capsules (600 mg) by mouth 3 times daily   Quantity:  90 capsule   Refills:  1            Where to get your medicines      These medications were sent to [facility] [place] - [address]  [address] [place] [address]     Phone:  [phone]     cyclobenzaprine 10 MG tablet    gabapentin 300 MG capsule         Some of these will need a paper prescription and others can be bought over the counter.  Ask your nurse if you have questions.     Bring a paper prescription for each of these medications     oxyCODONE-acetaminophen  MG per tablet               Information about OPIOIDS     PRESCRIPTION OPIOIDS: WHAT YOU NEED TO KNOW   You have a prescription for an opioid (narcotic) pain medicine. Opioids can cause addiction. If you have a history of chemical dependency of any type, you are at a higher risk of becoming addicted to opioids. Only take this medicine after all other options have been tried. Take it for as short a time and as few doses as possible.     Do not:    Drive. If you drive while taking these medicines, you could be arrested for driving under the influence (DUI).    Operate heavy machinery    Do any other dangerous activities while taking these medicines.     Drink any alcohol while taking these medicines.      Take with any other medicines that contain acetaminophen. Read all labels carefully. Look for the word  acetaminophen  or  Tylenol.  Ask your pharmacist if you have questions or are unsure.    Store your pills in a secure place, locked if possible. We will not replace any lost or stolen medicine. If you don t finish your medicine, please throw away (dispose) as directed by your pharmacist. The [place]  Pollution Control Agency has more information about safe disposal: https://www.pca.[facility].us/living-green/managing-unwanted-medications    All opioids tend to cause constipation. Drink plenty of water and eat foods that have a lot of fiber, such as fruits, vegetables, prune juice, apple juice and high-fiber cereal. Take a laxative (Miralax, milk of magnesia, Colace, Senna) if you don t move your bowels at least every other day.          Primary Care Provider Office Phone # Fax #    Dariel Tesfaye [phone]       [address] [place] [id number]        Equal Access to Services     SKY CHAUDHARI : Hadii marek montes hadasho Sok[place]ina, waaxda luqadaha, qaybta kaalmada adekaiseryakenzie, rocky wright . So [facility] [phone].    ATENCIÓN: Si habla español, tiene a samaniego disposición servicios gratuitos de asistencia lingüística. Llame al [phone].    We comply with applicable federal civil rights laws and [place] laws. We do not discriminate on the basis of race, color, national origin, age, disability, sex, sexual orientation, or gender identity.            Thank you!     Thank you for choosing [facility]  for your care. Our goal is always to provide you with excellent care. Hearing back from our patients is one way we can continue to improve our services. Please take a few minutes to complete the written survey that you may receive in the mail after your visit with us. Thank you!             Your Updated Medication List - Protect others around you: Learn how to safely use, store and throw away your medicines at www.disposemymeds.org.          This list is accurate as of 6/4/18  9:26 AM.  Always use your most recent med list.                   Brand Name Dispense Instructions for use Diagnosis    albuterol 108 (90 Base) MCG/ACT Inhaler    PROAIR HFA/PROVENTIL HFA/VENTOLIN HFA    1 Inhaler    Inhale 2 puffs into the lungs every 6 hours as needed for shortness of  breath / dyspnea    Bronchitis       cyclobenzaprine 10 MG tablet    FLEXERIL    90 tablet    TAKE 1/2 TO 1 TABLET BY MOUTH 3 TIMES DAILY AS NEEDED FOR MUSCLE SPASMS    Cervical disc disease       gabapentin 300 MG capsule    NEURONTIN    90 capsule    Take 2 capsules (600 mg) by mouth 3 times daily    Pain, dental       oxyCODONE-acetaminophen  MG per tablet    PERCOCET    30 tablet    TAKE 1 TABLET BY MOUTH daily as needed for severe pain    DDD (degenerative disc disease), cervical

## 2018-06-04 NOTE — PROGRESS NOTES
SUBJECTIVE:                                                    Micah Rosenthal is a 43 year old male who presents to clinic today for the following health issues:    Musculoskeletal problem/pain      Duration: years    Description  Location: neck    Intensity:  moderate    Accompanying signs and symptoms: numbness    History  Previous similar problem: YES  Previous evaluation:  x-ray    Precipitating or alleviating factors:  Trauma or overuse: no   Aggravating factors include: overuse    Therapies tried and outcome: percocet, gabapentin, flexeril          Problem list and histories reviewed & adjusted, as indicated.  Additional history: as documented    Patient Active Problem List   Diagnosis     Cervical disc disease     Pain management contract signed     Positive urine drug screen     ACP (advance care planning)     Past Surgical History:   Procedure Laterality Date     BACK SURGERY      fusion of C5 and C6     IR CAUDAL EPIDURAL INJECTION SINGLE  12/2012     OTHER SURGICAL HISTORY  03/2013    Cervical Fusion     wisdom teeth extracted         Social History   Substance Use Topics     Smoking status: Former Smoker     Packs/day: 1.50     Years: 20.00     Types: Cigarettes     Quit date: 1/1/1998     Smokeless tobacco: Never Used      Comment: Tried to Quit: Yes; Passive Exposure: Yes; Longest Tobacco free: 8 years     Alcohol use No     Family History   Problem Relation Age of Onset     Unknown/Adopted Father      CANCER Maternal Uncle      throat ca     CANCER Maternal Uncle      lung ca     Cardiovascular Maternal Grandfather      Cardiovascular Paternal Grandfather          Current Outpatient Prescriptions   Medication Sig Dispense Refill     albuterol (PROAIR HFA/PROVENTIL HFA/VENTOLIN HFA) 108 (90 BASE) MCG/ACT Inhaler Inhale 2 puffs into the lungs every 6 hours as needed for shortness of breath / dyspnea 1 Inhaler 1     cyclobenzaprine (FLEXERIL) 10 MG tablet TAKE 1/2 TO 1 TABLET BY MOUTH 3 TIMES DAILY AS  "NEEDED FOR MUSCLE SPASMS 90 tablet 3     gabapentin (NEURONTIN) 300 MG capsule Take 2 capsules (600 mg) by mouth 3 times daily 90 capsule 1     oxyCODONE-acetaminophen (PERCOCET)  MG per tablet TAKE 1 TABLET BY MOUTH daily as needed for severe pain 30 tablet 0     [DISCONTINUED] gabapentin (NEURONTIN) 300 MG capsule Take 1 capsule (300 mg) by mouth 3 times daily 90 capsule 1     [DISCONTINUED] gabapentin (NEURONTIN) 300 MG capsule Take 1 capsule (300 mg) by mouth 3 times daily 90 capsule 1     Allergies   Allergen Reactions     Cranberry      Strawberry      Doxycycline Nausea and Vomiting     Zithromax [Azithromycin Dihydrate] Rash       ROS:  Constitutional, HEENT, cardiovascular, pulmonary, gi and gu systems are negative, except as otherwise noted.    OBJECTIVE:                                                    /70  Pulse 113  Temp 98.1  F (36.7  C)  Ht 5' 10.75\" (1.797 m)  Wt 170 lb (77.1 kg)  SpO2 97%  BMI 23.88 kg/m2  Body mass index is 23.88 kg/(m^2).  GENERAL APPEARANCE: Alert, no acute distress  MSK:  moven neck minimally with conversation.    SKIN: no suspicious lesions or rashes to visualized skin  NEURO: Alert, oriented x 3, speech and mentation normal           ASSESSMENT/PLAN:                                                    1. Tobacco abuse  Recommend cessation.   - Tobacco Cessation - Order to Satisfy Health Maintenance    2. Tobacco abuse counseling  As above.     3. Cervical disc disease  Ongoing chronic pain.  This is the last month of the medication.  He is going to ween slowly over the month and be off after this month.  I told him this.    - Lipid Profile (Chol, Trig, HDL, LDL calc)  - Comprehensive metabolic panel (BMP + Alb, Alk Phos, ALT, AST, Total. Bili, TP)  - cyclobenzaprine (FLEXERIL) 10 MG tablet; TAKE 1/2 TO 1 TABLET BY MOUTH 3 TIMES DAILY AS NEEDED FOR MUSCLE SPASMS  Dispense: 90 tablet; Refill: 3    4. Pain management contract signed  As above.   - Lipid Profile " (Chol, Trig, HDL, LDL calc)  - Comprehensive metabolic panel (BMP + Alb, Alk Phos, ALT, AST, Total. Bili, TP)    5. Pain, dental  As above.  We doubled the gabapentin today as it helps some.  Warned about sedating side effects.    - gabapentin (NEURONTIN) 300 MG capsule; Take 2 capsules (600 mg) by mouth 3 times daily  Dispense: 90 capsule; Refill: 1    6. DDD (degenerative disc disease), cervical    - oxyCODONE-acetaminophen (PERCOCET)  MG per tablet; TAKE 1 TABLET BY MOUTH daily as needed for severe pain  Dispense: 30 tablet; Refill: 0          Dariel Tesfaye MD  [facility]

## 2018-06-04 NOTE — PATIENT INSTRUCTIONS

## 2018-06-04 NOTE — NURSING NOTE
"Chief Complaint   Patient presents with     Recheck Medication       Initial /70  Pulse 113  Temp 98.1  F (36.7  C)  Ht 5' 10.75\" (1.797 m)  Wt 170 lb (77.1 kg)  SpO2 97%  BMI 23.88 kg/m2 Estimated body mass index is 23.88 kg/(m^2) as calculated from the following:    Height as of this encounter: 5' 10.75\" (1.797 m).    Weight as of this encounter: 170 lb (77.1 kg).  Medication Reconciliation: complete    Ning Murphy LPN  "

## 2018-07-23 DIAGNOSIS — K08.89 PAIN, DENTAL: ICD-10-CM

## 2018-07-25 RX ORDER — GABAPENTIN 300 MG/1
CAPSULE ORAL
Qty: 180 CAPSULE | Refills: 0 | Status: SHIPPED | OUTPATIENT
Start: 2018-07-25 | End: 2020-02-20

## 2018-07-25 NOTE — TELEPHONE ENCOUNTER
Gabapentin      Last Written Prescription Date:  6.4.18  Last Fill Quantity: #180,   # refills: 0  Last Office Visit: 6.4.18  Future Office visit:       Routing refill request to provider for review/approval because:  Drug not on the [facility], P or [facility] refill protocol or controlled substance

## 2018-10-17 DIAGNOSIS — M50.90 CERVICAL DISC DISEASE: ICD-10-CM

## 2018-10-17 RX ORDER — CYCLOBENZAPRINE HCL 10 MG
TABLET ORAL
Qty: 90 TABLET | Refills: 0 | Status: SHIPPED | OUTPATIENT
Start: 2018-10-17 | End: 2018-11-21

## 2018-10-17 NOTE — TELEPHONE ENCOUNTER
Flexeril       Last Written Prescription Date:  6/4/2018  Last Fill Quantity: 90,   # refills: 3  Last Office Visit: 6/4/2018  Future Office visit:       Routing refill request to provider for review/approval because:  Drug not on the [facility], P or [facility] refill protocol or controlled substance

## 2018-11-21 DIAGNOSIS — M50.90 CERVICAL DISC DISEASE: ICD-10-CM

## 2018-11-21 RX ORDER — CYCLOBENZAPRINE HCL 10 MG
TABLET ORAL
Qty: 90 TABLET | Refills: 0 | Status: SHIPPED | OUTPATIENT
Start: 2018-11-21 | End: 2018-12-31

## 2018-11-21 NOTE — TELEPHONE ENCOUNTER
cyclobenzaprine (FLEXERIL) 10 MG tablet    Last Written Prescription Date:  06/04/2018  Last Fill Quantity: 90,   # refills: 0  Last Office Visit: 06/04/2018  Future Office visit:       Routing refill request to provider for review/approval because:

## 2018-11-21 NOTE — TELEPHONE ENCOUNTER
cyclobenzaprine (FLEXERIL) 10 MG tablet      Last Written Prescription Date:  10/17/18  Last Fill Quantity: 90,   # refills: 0  Last Office Visit: 6/4/18  Future Office visit:       Routing refill request to provider for review/approval because:  Drug not on the [facility], [facility] or [facility] refill protocol or controlled substance. Disregard below, incorrect.

## 2018-12-31 DIAGNOSIS — M50.90 CERVICAL DISC DISEASE: ICD-10-CM

## 2018-12-31 NOTE — TELEPHONE ENCOUNTER
cyclobenzaprine(Flexeril)      Last Written Prescription Date:  11/21/18  Last Fill Quantity: 90,   # refills: 0  Last Office Visit: 6/4/18

## 2019-01-02 RX ORDER — CYCLOBENZAPRINE HCL 10 MG
TABLET ORAL
Qty: 90 TABLET | Refills: 0 | Status: SHIPPED | OUTPATIENT
Start: 2019-01-02 | End: 2019-02-13

## 2019-01-02 RX ORDER — CYCLOBENZAPRINE HCL 10 MG
TABLET ORAL
Qty: 90 TABLET | Refills: 0 | Status: SHIPPED | OUTPATIENT
Start: 2019-01-02 | End: 2019-01-02

## 2019-02-13 DIAGNOSIS — M50.90 CERVICAL DISC DISEASE: ICD-10-CM

## 2019-02-13 RX ORDER — CYCLOBENZAPRINE HCL 10 MG
TABLET ORAL
Qty: 90 TABLET | Refills: 0 | Status: SHIPPED | OUTPATIENT
Start: 2019-02-13 | End: 2019-03-25

## 2019-02-13 NOTE — TELEPHONE ENCOUNTER
cyclobenzaprine (FLEXERIL) 10 MG tablet  Last Written Prescription Date:  1/2/19  Last Fill Quantity: 90,   # refills: 0  Last Office Visit: 6/4/18  Future Office visit:       Routing refill request to provider for review/approval because:  Drug not on the [facility], P or [facility] refill protocol or controlled substance

## 2019-03-25 DIAGNOSIS — M50.90 CERVICAL DISC DISEASE: ICD-10-CM

## 2019-03-25 NOTE — TELEPHONE ENCOUNTER
cyclobenzaprine (FLEXERIL) 10 MG tablet   Last Written Prescription Date:  02/13/2019  Last Fill Quantity: 90,   # refills: 0  Last Office Visit: 06/04/2018  Future Office visit:       Routing refill request to provider for review/approval because:

## 2019-03-26 RX ORDER — CYCLOBENZAPRINE HCL 10 MG
TABLET ORAL
Qty: 90 TABLET | Refills: 0 | Status: SHIPPED | OUTPATIENT
Start: 2019-03-26 | End: 2019-05-14

## 2019-05-13 DIAGNOSIS — M50.90 CERVICAL DISC DISEASE: ICD-10-CM

## 2019-05-14 RX ORDER — CYCLOBENZAPRINE HCL 10 MG
TABLET ORAL
Qty: 90 TABLET | Refills: 0 | Status: SHIPPED | OUTPATIENT
Start: 2019-05-14 | End: 2019-07-25

## 2019-05-14 RX ORDER — CYCLOBENZAPRINE HCL 10 MG
TABLET ORAL
Qty: 90 TABLET | Refills: 0 | OUTPATIENT
Start: 2019-05-14

## 2019-05-14 NOTE — TELEPHONE ENCOUNTER
cyclobenzaprine (FLEXERIL) 10 MG tablet      Last Written Prescription Date:  3-26-19  Last Fill Quantity: 90,   # refills: 0  Last Office Visit: 6-4-18  Future Office visit:       Routing refill request to provider for review/approval because:  Drug not on the [facility], P or [facility] refill protocol or controlled substance

## 2020-02-10 ENCOUNTER — OFFICE VISIT (OUTPATIENT)
Dept: FAMILY MEDICINE | Facility: OTHER | Age: 45
End: 2020-02-10
Attending: FAMILY MEDICINE
Payer: COMMERCIAL

## 2020-02-10 ENCOUNTER — ANCILLARY PROCEDURE (OUTPATIENT)
Dept: GENERAL RADIOLOGY | Facility: OTHER | Age: 45
End: 2020-02-10
Attending: FAMILY MEDICINE
Payer: COMMERCIAL

## 2020-02-10 VITALS
BODY MASS INDEX: 24.62 KG/M2 | DIASTOLIC BLOOD PRESSURE: 78 MMHG | HEART RATE: 98 BPM | WEIGHT: 172 LBS | OXYGEN SATURATION: 98 % | HEIGHT: 70 IN | SYSTOLIC BLOOD PRESSURE: 126 MMHG

## 2020-02-10 DIAGNOSIS — F34.1 DYSTHYMIA: ICD-10-CM

## 2020-02-10 DIAGNOSIS — M50.90 CERVICAL DISC DISEASE: Primary | ICD-10-CM

## 2020-02-10 DIAGNOSIS — M50.90 CERVICAL DISC DISEASE: ICD-10-CM

## 2020-02-10 PROCEDURE — 72050 X-RAY EXAM NECK SPINE 4/5VWS: CPT | Mod: TC,FY

## 2020-02-10 PROCEDURE — 99214 OFFICE O/P EST MOD 30 MIN: CPT | Performed by: FAMILY MEDICINE

## 2020-02-10 PROCEDURE — G0463 HOSPITAL OUTPT CLINIC VISIT: HCPCS

## 2020-02-10 RX ORDER — PREDNISONE 20 MG/1
20 TABLET ORAL 2 TIMES DAILY
Qty: 10 TABLET | Refills: 0 | Status: SHIPPED | OUTPATIENT
Start: 2020-02-10 | End: 2020-02-20

## 2020-02-10 ASSESSMENT — MIFFLIN-ST. JEOR: SCORE: 1676.44

## 2020-02-10 ASSESSMENT — PAIN SCALES - GENERAL: PAINLEVEL: SEVERE PAIN (7)

## 2020-02-10 NOTE — PROGRESS NOTES
Subjective     Micah Rosenthal is a 44 year old male who presents to clinic today for the following health issues:    HPI   Musculoskeletal problem/pain      Duration: 10 months    Description  Location: right side of neck    Intensity:  moderate    Accompanying signs and symptoms: radiation of pain to right arm/hand, numbness and tingling    History  Previous similar problem: no   Previous evaluation:  none    Precipitating or alleviating factors:  Trauma or overuse: no   Aggravating factors include: none    Therapies tried and outcome: heat and Ibuprofen    PROBLEMS TO ADD ON...    Patient Active Problem List   Diagnosis     Cervical disc disease     Pain management contract signed     Positive urine drug screen     ACP (advance care planning)     Past Surgical History:   Procedure Laterality Date     BACK SURGERY      fusion of C5 and C6     IR CAUDAL EPIDURAL INJECTION SINGLE  2012     OTHER SURGICAL HISTORY  2013    Cervical Fusion     wisdom teeth extracted         Social History     Tobacco Use     Smoking status: Former Smoker     Packs/day: 1.50     Years: 20.00     Pack years: 30.00     Types: Cigarettes     Last attempt to quit: 1998     Years since quittin.1     Smokeless tobacco: Never Used     Tobacco comment: Tried to Quit: Yes; Passive Exposure: Yes; Longest Tobacco free: 8 years   Substance Use Topics     Alcohol use: No     Alcohol/week: 0.0 standard drinks     Family History   Problem Relation Age of Onset     Unknown/Adopted Father      Cancer Maternal Uncle         throat ca     Cancer Maternal Uncle         lung ca     Cardiovascular Maternal Grandfather      Cardiovascular Paternal Grandfather          Current Outpatient Medications   Medication Sig Dispense Refill     albuterol (PROAIR HFA/PROVENTIL HFA/VENTOLIN HFA) 108 (90 BASE) MCG/ACT Inhaler Inhale 2 puffs into the lungs every 6 hours as needed for shortness of breath / dyspnea 1 Inhaler 1     cyclobenzaprine (FLEXERIL) 10 MG  "tablet TAKE 1/2 TO 1 TABLET BY MOUTH 3 TIMES DAILY AS NEEDED FOR MUSCLE SPASMS 90 tablet 0     gabapentin (NEURONTIN) 300 MG capsule TAKE 2 CAPSULES BY MOUTH 3 TIMES DAILY 180 capsule 0     predniSONE (DELTASONE) 20 MG tablet Take 1 tablet (20 mg) by mouth 2 times daily for 5 days 10 tablet 0     Allergies   Allergen Reactions     Cranberry      Strawberry      Doxycycline Nausea and Vomiting     Zithromax [Azithromycin Dihydrate] Rash       PROBLEMS TO ADD ON...  Reviewed and updated as needed this visit by Provider         Review of Systems   ROS COMP: Constitutional, HEENT, cardiovascular, pulmonary, gi and gu systems are negative, except as otherwise noted.      Objective    /78   Pulse 98   Ht 1.778 m (5' 10\")   Wt 78 kg (172 lb)   SpO2 98%   BMI 24.68 kg/m    Body mass index is 24.68 kg/m .  Physical Exam   GENERAL: healthy, alert and no distress  ABDOMEN: soft, nontender, no hepatosplenomegaly, no masses and bowel sounds normal  MS: no gross musculoskeletal defects noted, no edema  SKIN: no suspicious lesions or rashes  NEURO: Normal strength and tone, mentation intact and speech normal    Diagnostic Test Results:  Labs reviewed in Epic        Assessment & Plan       ICD-10-CM    1. Cervical disc disease M50.90 XR CERVICAL SPINE G/E 4 VIEWS (Clinic Performed)     CT Cervical Spine w Contrast     predniSONE (DELTASONE) 20 MG tablet       (F34.1) Dysthymia  Comment: reviewed options at some length.   Plan: MENTAL HEALTH REFERRAL  - Adult; Assessments         and Testing, Outpatient Treatment; MH/CD         Assessment Center - Assess & Treat; Mental         Health Evaluation - determine appropriate level        of care and admit to program; [facility]:         [phone]; We will ...        He would like to see locally.  We set up.  For some reason I had to put in the [facility] to sign the order, but he is having this addressed locally.  Talked about medicines, etc and going to defer to psych " on that one.             Reviewed.  Now right sided with concern for recurrence of radiculopathy.  Going with prednisone burst, xray update and CT cervical update.  F/u pending results.  If it's not the neck we have to get an EMG of the arm.      No follow-ups on file.    Dariel Tesfaye MD  [facility]

## 2020-02-17 ENCOUNTER — HOSPITAL ENCOUNTER (OUTPATIENT)
Dept: CT IMAGING | Facility: HOSPITAL | Age: 45
Discharge: HOME | End: 2020-02-17
Attending: FAMILY MEDICINE | Admitting: FAMILY MEDICINE
Payer: COMMERCIAL

## 2020-02-17 DIAGNOSIS — M50.90 CERVICAL DISC DISEASE: ICD-10-CM

## 2020-02-17 PROCEDURE — 72125 CT NECK SPINE W/O DYE: CPT | Mod: TC

## 2020-02-19 NOTE — PROGRESS NOTES
Subjective     Micah Rosenthal is a 44 year old male who presents to clinic today for the following health issues:    HPI   results      Duration: follow up from 2-17-20    Description (location/character/radiation): spinal CT    Intensity:  moderate    Accompanying signs and symptoms: Patient is here to discuss CT results.    History (similar episodes/previous evaluation): None    Precipitating or alleviating factors: None    Therapies tried and outcome: None     PROBLEMS TO ADD ON...    Patient Active Problem List   Diagnosis     Cervical disc disease     Pain management contract signed     Positive urine drug screen     ACP (advance care planning)     Past Surgical History:   Procedure Laterality Date     BACK SURGERY      fusion of C5 and C6     IR CAUDAL EPIDURAL INJECTION SINGLE  12/2012     OTHER SURGICAL HISTORY  03/2013    Cervical Fusion     wisdom teeth extracted         Social History     Tobacco Use     Smoking status: Current Every Day Smoker     Packs/day: 1.50     Years: 20.00     Pack years: 30.00     Types: Cigarettes     Start date: 1/1/1988     Smokeless tobacco: Never Used   Substance Use Topics     Alcohol use: No     Alcohol/week: 0.0 standard drinks     Family History   Problem Relation Age of Onset     Unknown/Adopted Father      Cancer Maternal Uncle         throat ca     Cancer Maternal Uncle         lung ca     Cardiovascular Maternal Grandfather      Cardiovascular Paternal Grandfather          Current Outpatient Medications   Medication Sig Dispense Refill     albuterol (PROAIR HFA/PROVENTIL HFA/VENTOLIN HFA) 108 (90 BASE) MCG/ACT Inhaler Inhale 2 puffs into the lungs every 6 hours as needed for shortness of breath / dyspnea 1 Inhaler 1     cyclobenzaprine (FLEXERIL) 10 MG tablet TAKE 1/2 TO 1 TABLET BY MOUTH 3 TIMES DAILY AS NEEDED FOR MUSCLE SPASMS 90 tablet 0     methylPREDNISolone 4 MG PO tablet therapy pack Follow Package Directions 21 tablet 0     Allergies   Allergen Reactions      Cranberry      Strawberry      Doxycycline Nausea and Vomiting     Zithromax [Azithromycin Dihydrate] Rash       PROBLEMS TO ADD ON...  Reviewed and updated as needed this visit by Provider         Review of Systems   ROS COMP: Constitutional, HEENT, cardiovascular, pulmonary, gi and gu systems are negative, except as otherwise noted.      Objective    There were no vitals taken for this visit.  There is no height or weight on file to calculate BMI.  Physical Exam   GENERAL: healthy, alert and no distress  MS: no gross musculoskeletal defects noted, no edema  SKIN: no suspicious lesions or rashes    Diagnostic Test Results:  Labs reviewed in Epic        Assessment & Plan       ICD-10-CM    1. Cervical disc disease M50.90 methylPREDNISolone 4 MG PO tablet therapy pack     PHYSICAL THERAPY REFERRAL     NEUROSURGERY REFERRAL      review.  Going to ask about suboxone.  He has a long history of opiate issues.  Severe pain as well.  Asking Dr. Martinez to see.  Reviewed CT in detail.  Solumedrol, flexeril, PT, and asking Dr. Jaime to consider suboxone tx at their request.      Tobacco Cessation:   reports that he has been smoking cigarettes. He started smoking about 32 years ago. He has a 30.00 pack-year smoking history. He has never used smokeless tobacco.              No follow-ups on file.    Dariel Tesfaye MD  [facility]

## 2020-02-20 ENCOUNTER — PATIENT OUTREACH (OUTPATIENT)
Dept: CARE COORDINATION | Facility: OTHER | Age: 45
End: 2020-02-20

## 2020-02-20 ENCOUNTER — OFFICE VISIT (OUTPATIENT)
Dept: FAMILY MEDICINE | Facility: OTHER | Age: 45
End: 2020-02-20
Attending: FAMILY MEDICINE
Payer: COMMERCIAL

## 2020-02-20 VITALS
DIASTOLIC BLOOD PRESSURE: 68 MMHG | HEART RATE: 102 BPM | BODY MASS INDEX: 25.15 KG/M2 | SYSTOLIC BLOOD PRESSURE: 114 MMHG | OXYGEN SATURATION: 99 % | WEIGHT: 175.31 LBS

## 2020-02-20 DIAGNOSIS — M50.90 CERVICAL DISC DISEASE: Primary | ICD-10-CM

## 2020-02-20 PROCEDURE — 99213 OFFICE O/P EST LOW 20 MIN: CPT | Performed by: FAMILY MEDICINE

## 2020-02-20 PROCEDURE — G0463 HOSPITAL OUTPT CLINIC VISIT: HCPCS

## 2020-02-20 RX ORDER — METHYLPREDNISOLONE 4 MG
TABLET, DOSE PACK ORAL
Qty: 21 TABLET | Refills: 0 | Status: SHIPPED | OUTPATIENT
Start: 2020-02-20 | End: 2020-02-21

## 2020-02-20 ASSESSMENT — PAIN SCALES - GENERAL: PAINLEVEL: EXTREME PAIN (8)

## 2020-02-20 NOTE — PROGRESS NOTES
Clinic Care Coordination Contact  Care Team Conversations    Received phone call from pt's SO this date inquiring about our Suboxone program.  Pt was in the background answering questions.  Has been using opioids for over 8 years - has had a cervical fusion in the past.  Using between 8-10 Percocets/Lortabs per day.  Uses orally/snorting.  Buying opioids off the street.  Does use THC.  Appt made for Suboxone induction 2.21.20, arrive at 10:25AM.  Informed SO that Micah will go to lab first for a urine drug screen and blood work and then CC will meet with him to go more in depth about our Suboxone program.  All questions answered.      Yoli Kowalski, RN-BSN  Clinic Care Coordinator  [phone] opt. #3

## 2020-02-20 NOTE — PROGRESS NOTES
Initial Suboxone Visit         HPI        Micah  is a 44 year old male is here to discuss initiation on Buprenorphine/Naloxone for opioid use disorder.    Suboxone Induction    Self Description: When/How did your opioid use disorder begin? Started around 31 - neck pain.  Was prescribed opioids.  Then used up until last year.  Weaned himself off the Percocet.  Then started buying off the street.    Current Narcotic Use and Abuse History:   Which opioid(s) are you currently using? Percocets/Lortabs 8-10 per day.   Estimated total dose (mg if pills, grams if heroin) per day : 80-100mg per day  Preferred route(s) of administration: PO/Snorting  When did you last use? Yesterday  Have you ever tried to quit on your own? YES-   If yes, longest period of continuous abstinence: 6 days    Substance/addiction/Psychiatric/social history:  Past Psychiatric History: No  Other drug use: Prescription Drugs, Amphetamines and THC   Other addictive behaviors No (sex, gambling, internet, shopping, TV etc)    Previous Addiction Treatment:   Previous Inpatient Treatment: no  Length of treatment: NA  Was it completed: NO  Length of Sobriety After:N/A  Previous Outpatient Treatment:   no  Currently in Treatment: -no   If so, where: NA    Family History  Does anyone in your family have a history of chemical dependency? YES- ETOH - siblings, uncle, grandpa  Does anyone in your family have a history of mental health condition? YES- sister is bipolar    Diagnostic Criteria for Opioid Use Disorder   (Must meet 2 criteria in 12 month period)     1. Opioids are often taken in larger amounts or over a longer period of time than intended. YES-   2. There is a persistent desire or unsuccessful efforts to cut down or control opioid use.YES-     3. A great deal of time is spent in activities necessary to obtain the opioid, use the opioid, or recover from its effects. YES-     4. Craving, or a strong desire to use opioids. YES-    5. Recurrent opioid  use resulting in failure to fulfill major role obligations at work, school or home. YES-     6. Continued opioid use despite having persistent or recurrent social or interpersonal problems caused or exacerbated by the effects of opioids. YES-    7. Important social, occupational or recreational activities are given up or reduced because of opioid use. YES-     8. Recurrent opioid use in situations in which it is physically hazardous YES-    9. Continued use despite knowledge of having a persistent or recurrent physical or psychological problem that is likely to have been caused or exacerbated by opioids. YES-    10. Tolerance, as defined by either of the following:  (a) a need for markedly increased amounts of opioids to achieve intoxication or desired effect YES-   (b) markedly diminished effect with continued use of the same amount of an opioid YES-   11. *Withdrawal, as manifested by either of the following:  (a) the characteristic opioid withdrawal syndrome YES-   (b) the same (or a closely related) substance are taken to relieve or avoid withdrawal symptoms YES-     Severity: Mild: 2-3 symptoms, Moderate: 4-5 symptoms. Severe: 6 or more symptoms.   Diagnosis:Patient endorses the above for the listed characteristics thus meeting criteria for the diagnosis of Opiod Use Disorder.   Does patient meet criteria for any other substance use disorder? YES- cannabis use disordered    Problem, Medication and Allergy Lists were reviewed and updated if needed.  reviewed and are current..    Patient is an established patient of this clinic..    [place]  (prescription monitoring program) reviewed? Yes. Details: 2.21.20, as expected.          Review of Systems:     Review of Systems  CONSTITUTIONAL: NEGATIVE for fever, chills, change in weight  INTEGUMENTARY/SKIN: NEGATIVE for worrisome rashes, moles or lesions  EYES: NEGATIVE for vision changes or irritation  ENT/MOUTH: NEGATIVE for ear, mouth and throat problems  RESP:  "NEGATIVE for significant cough or SOB  CV: NEGATIVE for chest pain, palpitations or peripheral edema  GI: NEGATIVE for nausea, abdominal pain, heartburn, or change in bowel habits  : NEGATIVE for frequency, dysuria, or hematuria  MUSCULOSKELETAL: NEGATIVE for significant arthralgias or myalgia  NEURO: NEGATIVE for weakness, dizziness or paresthesias  ENDOCRINE: NEGATIVE for temperature intolerance, skin/hair changes  PSYCHIATRIC: NEGATIVE for changes in mood or affect          Physical Exam:     Vitals:    02/21/20 1059   BP: 112/72   BP Location: Left arm   Patient Position: Sitting   Cuff Size: Adult Regular   Pulse: 98   Temp: 99  F (37.2  C)   TempSrc: Tympanic   SpO2: 98%   Weight: 79.8 kg (176 lb)   Height: 1.778 m (5' 10\")     Body mass index is 25.25 kg/m .  Vitals were reviewed and were normal  Vitals were reviewed   Physical Exam  GENERAL APPEARANCE: alert, comfortable appearing and fatigued  EYES: pupils dialated  HENT: TM's normal, mouth without ulcers or lesions, nose no rhinorrhea  NECK: no adenopathy, thyromegaly or masses  RESP: lungs clear to auscultation - no rales, rhonchi or wheezes and no resp distress  CV: regular rates and rhythm, normal S1 S2,no murmur   ABDOMEN: soft, nontender, without hepatosplenomegaly or masses  MS: extremities normal- no gross deformities noted, gait normal, peripheral pulses normal and no edema  SKIN: no rashes, no jaundice, no obvious masses.   NEURO: Normal strength and tone, sensory exam grossly normal, no tremor  MENTAL STATUS EXAM:  Appearance/Behavior:No apparent distress  Speech: Normal  Mood/Affect: normal affect  Insight: Adequate        Results:      Results from the last 24 hours  Results for orders placed or performed in visit on 02/21/20 (from the past 24 hour(s))   Basic metabolic panel   Result Value Ref Range    Sodium 135 133 - 144 mmol/L    Potassium 3.6 3.4 - 5.3 mmol/L    Chloride 105 94 - 109 mmol/L    Carbon Dioxide 26 20 - 32 mmol/L    Anion " Gap 4 3 - 14 mmol/L    Glucose 140 (H) 70 - 99 mg/dL    Urea Nitrogen 8 7 - 30 mg/dL    Creatinine 0.76 0.66 - 1.25 mg/dL    GFR Estimate >90 >60 mL/min/[1.73_m2]    GFR Estimate If Black >90 >60 mL/min/[1.73_m2]    Calcium 8.8 8.5 - 10.1 mg/dL   CBC with platelets   Result Value Ref Range    WBC 8.4 4.0 - 11.0 10e9/L    RBC Count 5.06 4.4 - 5.9 10e12/L    Hemoglobin 14.9 13.3 - 17.7 g/dL    Hematocrit 44.6 40.0 - 53.0 %    MCV 88 78 - 100 fl    MCH 29.4 26.5 - 33.0 pg    MCHC 33.4 31.5 - 36.5 g/dL    RDW 12.1 10.0 - 15.0 %    Platelet Count 469 (H) 150 - 450 10e9/L   Hepatic panel   Result Value Ref Range    Bilirubin Direct 0.1 0.0 - 0.2 mg/dL    Bilirubin Total 0.4 0.2 - 1.3 mg/dL    Albumin 4.4 3.4 - 5.0 g/dL    Protein Total 7.8 6.8 - 8.8 g/dL    Alkaline Phosphatase 121 40 - 150 U/L    ALT 31 0 - 70 U/L    AST 17 0 - 45 U/L   Urine Drugs of Abuse Screen Panel 13   Result Value Ref Range    Cannabinoids (23-wbq-8-carboxy-9-THC) Detected, Abnormal Result (A) NDET^Not Detected ng/mL    Phencyclidine (Phencyclidine) Not Detected NDET^Not Detected ng/mL    Cocaine (Benzoylecgonine) Not Detected NDET^Not Detected ng/mL    Methamphetamine (d-Methamphetamine) Detected, Abnormal Result (A) NDET^Not Detected ng/mL    Opiates (Morphine) Detected, Abnormal Result (A) NDET^Not Detected ng/mL    Amphetamine (d-Amphetamine) Detected, Abnormal Result (A) NDET^Not Detected ng/mL    Benzodiazepines (Nordiazepam) Not Detected NDET^Not Detected ng/mL    Tricyclic Antidepressants (Desipramine) Not Detected NDET^Not Detected ng/mL    Methadone (Methadone) Not Detected NDET^Not Detected ng/mL    Barbiturates (Butalbital) Not Detected NDET^Not Detected ng/mL    Oxycodone (Oxycodone) Not Detected NDET^Not Detected ng/mL    Propoxyphene (Norpropoxyphene) Not Detected NDET^Not Detected ng/mL    Buprenorphine (Buprenorphine) Not Detected NDET^Not Detected ng/mL      -Comprehensive rapid urine drug screen obtained today: Yes    Assessment  and Plan       Was naloxone nasal spray prescribed? Yes.  Clinical Opiate Withdrawal Scale  Resting Pulse Rate: Record Beats per Minute  Measured after patient is sitting or lying for one minute  0 = pulse rate 80 or below      2 = pulse rate 101-120  1 = pulse rate              4 = pulse rate greater than 120          1   Sweating: Over Past 1/2 Hour not Accounted for by Room Temperature or Patient Activity   0 = no report of chills or flushing  1 = subjective report of chills or flushing  2 = flushed or observable moistness on face  3 = beads of sweat on brow or face   4 = sweat streaming off face          1   Restlessness Observation During Assessment  0 = able to sit still, 1 = reports difficulty sitting still, but is able to do so    3 = frequent shifting or extraneous movements of legs/arms     5 = Unable to sit still for more than a few seconds          1   Pupil Size  0 = pupils pinned or normal size for room light 1 = pupils possibly larger than normal for room light  2 = pupils moderately dilated     5 = pupils so dilated that only the rim of the iris is visible       1   Bone or Joint Aches if Patient was Having Pain Previously,only the Additional Component Attributed to Opiate Withdrawal is Scored  0 = not present  1 = mild diffuse discomfort    2 = patient reports severe diffuse aching of joints/muscles   4 = patient is rubbing joints or muscles and is unable to sit still because of discomfort           1   Runny Nose or Tearing Not Accounted for by Cold Symptoms or Allergies  0 = not present  1 = nasal stuffiness or unusually moist eyes       2 = nose running or tearing      4 = nose constantly running or tears streaming down cheeks          1   GI Upset: Over Last 1/2 Hour  0 = no GI symptoms                                3 = vomiting or diarrhea  1 = stomach cramps                                5 = multiple episodes of diarrhea or vomiting  2 = nausea or loose stool           3   Tremor  Observation of Outstretched Hands  0 = no tremor                                                2 = slight tremor observable  1 = tremor can be felt, but not observed        4 = gross tremor or muscle twitching            0   Yawning Observation During Assessment  0 = no yawning   2 = yawning three or more times during assessment  1 = yawning once or twice during assessment   4 = yawning several times/minute            0   Anxiety or Irritability  0 = none   2 = patient obviously irritable/anxious  1 = patient reports increasing irritability or anxiousness   4 = patient so irritable or anxious that participation in the assessment is difficult           1   Gooseflesh Skin  0 = skin is smooth   5 = prominent piloerection  3 = piloerection of skin can be felt or hairs standing up on arms       0   Score: 5-12 = Mild     13-24 = Moderate  25-36 = Moderately Severe     More than 36 = Severe Withdrawal           Total       10       SOWS/COWS Score 10    1. Opioid use disorder (H)  We are going to start him on this program. Has tried this on the street and has helped. He is in a program in court now. We are going to be seeing him back in a week.   - Basic metabolic panel  - CBC with platelets  - Hepatic panel  - Hepatitis B Surface Antibody  - Hepatitis B surface antigen  - Hepatitis C Screen Reflex to HCV RNA Quant and Genotype  - HIV Antigen Antibody Combo  - Urine Drugs of Abuse Screen Panel 13    Follow up Plan  Stage 1(1 week): Please make a clinic appointment for follow up with me (BLANCHE GAONA) or another Suboxone provider in 1 week for suboxone follow up.        Complex medical decision making required.   Reviewed  Clinic suboxone  rules, no phone refills, must submit to urine/blood screening, no etoh, no other drug use, no other meds from other clinics, must be on time for appointments, must plan ahead for refills. NO EARLY REFILLS. Must bring in documentation for any ED visits where controlled substances  were given. These were handed to the patient.  Also discussed normal course of suboxone clinic including visit frequency, dosing changes, prior authorization for insurance, urine testing.       Options for treatment and follow-up care were reviewed with the patient. Micah engaged in the decision making process and verbalized understanding of the options discussed and agreed with the final plan.    ELODIA Barrow

## 2020-02-20 NOTE — LETTER
My Depression Action Plan  Name: Micah Rosenthal   Date of Birth 1975  Date: 2/19/2020    My doctor: Dariel Tesfaye   My clinic: [address] [place] [facility] [address]  [phone]          GREEN    ZONE   Good Control    What it looks like:     Things are going generally well. You have normal ups and downs. You may even feel depressed from time to time, but bad moods usually last less than a day.   What you need to do:  1. Continue to care for yourself (see self care plan)  2. Check your depression survival kit and update it as needed  3. Follow your physician s recommendations including any medication.  4. Do not stop taking medication unless you consult with your physician first.           YELLOW         ZONE Getting Worse    What it looks like:     Depression is starting to interfere with your life.     It may be hard to get out of bed; you may be starting to isolate yourself from others.    Symptoms of depression are starting to last most all day and this has happened for several days.     You may have suicidal thoughts but they are not constant.   What you need to do:     1. Call your care team. Your response to treatment will improve if you keep your care team informed of your progress. Yellow periods are signs an adjustment may need to be made.     2. Continue your self-care.  Just get dressed and ready for the day.  Don't give yourself time to talk yourself out of it.    3. Talk to someone in your support network.    4. Open up your Depression Self-Care Plan/Wellness Kit.           RED    ZONE Medical Alert - Get Help    What it looks like:     Depression is seriously interfering with your life.     You may experience these or other symptoms: You can t get out of bed most days, can t work or engage in other necessary activities, you have trouble taking care of basic hygiene, or basic responsibilities, thoughts of suicide or death that will not go away,  self-injurious behavior.     What you need to do:  1. Call your care team and request a same-day appointment. If they are not available (weekends or after hours) call your local crisis line, emergency room or 911.            Depression Self-Care Plan / Wellness Kit    Self-Care for Depression  Here s the deal. Your body and mind are really not as separate as most people think.  What you do and think affects how you feel and how you feel influences what you do and think. This means if you do things that people who feel good do, it will help you feel better.  Sometimes this is all it takes.  There is also a place for medication and therapy depending on how severe your depression is, so be sure to consult with your medical provider and/ or Behavioral Health Consultant if your symptoms are worsening or not improving.     In order to better manage my stress, I will:    Exercise  Get some form of exercise, every day. This will help reduce pain and release endorphins, the  feel good  chemicals in your brain. This is almost as good as taking antidepressants!  This is not the same as joining a gym and then never going! (they count on that by the way ) It can be as simple as just going for a walk or doing some gardening, anything that will get you moving.      Hygiene   Maintain good hygiene (get out of bed in the morning, make your bed, brush your teeth, take a shower, and get dressed like you were going to work, even if you are unemployed).  If your clothes don't fit try to get ones that do.    Diet  Strive to eat foods that are good for me, drink plenty of water, and avoid excessive sugar, caffeine, alcohol, and other mood-altering substances.  Some foods that are helpful in depression are: complex carbohydrates, B vitamins, flaxseed, fish or fish oil, fresh fruits and vegetables.    Psychotherapy  Agree to participate in Individual Therapy (if recommended).    Medication  If prescribed medications, I agree to take them.   Missing doses can result in serious side effects.  I understand that drinking alcohol, or other illicit drug use, may cause potential side effects.  I will not stop my medication abruptly without first discussing it with my provider.    Staying Connected With Others  Stay in touch with my friends, family members, and my primary care provider/team.    Use your imagination  Be creative.  We all have a creative side; it doesn t matter if it s oil painting, sand castles, or mud pies! This will also kick up the endorphins.    Witness Beauty  (AKA stop and smell the roses) Take a look outside, even in mid-winter. Notice colors, textures. Watch the squirrels and birds.     Service to others  Be of service to others.  There is always someone else in need.  By helping others we can  get out of ourselves  and remember the really important things.  This also provides opportunities for practicing all the other parts of the program.    Humor  Laugh and be silly!  Adjust your TV habits for less news and crime-drama and more comedy.    Control your stress  Try breathing deep, massage therapy, biofeedback, and meditation. Find time to relax each day.     Crisis Text Line  http://www.crisistextline.org    The Crisis Text Line serves anyone, in any type of crisis, providing access to free, 24/7 support and information via the medium people already use and trust:    Here's how it works:  1.  Text 773-226 from anywhere in the [place], anytime, about any type of crisis.  2.  A live, trained Crisis Counselor receives the text and responds quickly.  3.  The volunteer Crisis Counselor will help you move from a 'hot moment to a cool moment'.    My support system    Clinic Contact:  Phone number:    Contact 1:  Phone number:    Contact 2:  Phone number:    Taoist/:  Phone number:    Therapist:  Phone number:    Local crisis center:    Phone number:    Other community support:  Phone number:

## 2020-02-20 NOTE — NURSING NOTE
"Chief Complaint   Patient presents with     Musculoskeletal Problem       Initial /68   Pulse 102   Wt 79.5 kg (175 lb 5 oz)   SpO2 99%   BMI 25.15 kg/m   Estimated body mass index is 25.15 kg/m  as calculated from the following:    Height as of 2/10/20: 1.778 m (5' 10\").    Weight as of this encounter: 79.5 kg (175 lb 5 oz).  Medication Reconciliation: complete  Maritza Castillo MA    "

## 2020-02-21 ENCOUNTER — PATIENT OUTREACH (OUTPATIENT)
Dept: CARE COORDINATION | Facility: OTHER | Age: 45
End: 2020-02-21

## 2020-02-21 ENCOUNTER — APPOINTMENT (OUTPATIENT)
Dept: LAB | Facility: OTHER | Age: 45
End: 2020-02-21
Attending: PHYSICIAN ASSISTANT
Payer: COMMERCIAL

## 2020-02-21 ENCOUNTER — OFFICE VISIT (OUTPATIENT)
Dept: FAMILY MEDICINE | Facility: OTHER | Age: 45
End: 2020-02-21
Attending: PHYSICIAN ASSISTANT
Payer: COMMERCIAL

## 2020-02-21 ENCOUNTER — TELEPHONE (OUTPATIENT)
Dept: FAMILY MEDICINE | Facility: OTHER | Age: 45
End: 2020-02-21

## 2020-02-21 VITALS
SYSTOLIC BLOOD PRESSURE: 112 MMHG | HEART RATE: 98 BPM | HEIGHT: 70 IN | OXYGEN SATURATION: 98 % | TEMPERATURE: 99 F | BODY MASS INDEX: 25.2 KG/M2 | DIASTOLIC BLOOD PRESSURE: 72 MMHG | WEIGHT: 176 LBS

## 2020-02-21 DIAGNOSIS — F11.90 OPIOID USE DISORDER: Primary | ICD-10-CM

## 2020-02-21 LAB
ALBUMIN SERPL-MCNC: 4.4 G/DL (ref 3.4–5)
ALP SERPL-CCNC: 121 U/L (ref 40–150)
ALT SERPL W P-5'-P-CCNC: 31 U/L (ref 0–70)
AMPHETAMINES UR QL: ABNORMAL NG/ML
ANION GAP SERPL CALCULATED.3IONS-SCNC: 4 MMOL/L (ref 3–14)
AST SERPL W P-5'-P-CCNC: 17 U/L (ref 0–45)
BARBITURATES UR QL SCN: NOT DETECTED NG/ML
BENZODIAZ UR QL SCN: NOT DETECTED NG/ML
BILIRUB DIRECT SERPL-MCNC: 0.1 MG/DL (ref 0–0.2)
BILIRUB SERPL-MCNC: 0.4 MG/DL (ref 0.2–1.3)
BUN SERPL-MCNC: 8 MG/DL (ref 7–30)
BUPRENORPHINE UR QL: NOT DETECTED NG/ML
CALCIUM SERPL-MCNC: 8.8 MG/DL (ref 8.5–10.1)
CANNABINOIDS UR QL: ABNORMAL NG/ML
CHLORIDE SERPL-SCNC: 105 MMOL/L (ref 94–109)
CO2 SERPL-SCNC: 26 MMOL/L (ref 20–32)
COCAINE UR QL SCN: NOT DETECTED NG/ML
CREAT SERPL-MCNC: 0.76 MG/DL (ref 0.66–1.25)
D-METHAMPHET UR QL: ABNORMAL NG/ML
ERYTHROCYTE [DISTWIDTH] IN BLOOD BY AUTOMATED COUNT: 12.1 % (ref 10–15)
GFR SERPL CREATININE-BSD FRML MDRD: >90 ML/MIN/{1.73_M2}
GLUCOSE SERPL-MCNC: 140 MG/DL (ref 70–99)
HCT VFR BLD AUTO: 44.6 % (ref 40–53)
HGB BLD-MCNC: 14.9 G/DL (ref 13.3–17.7)
MCH RBC QN AUTO: 29.4 PG (ref 26.5–33)
MCHC RBC AUTO-ENTMCNC: 33.4 G/DL (ref 31.5–36.5)
MCV RBC AUTO: 88 FL (ref 78–100)
METHADONE UR QL SCN: NOT DETECTED NG/ML
OPIATES UR QL SCN: ABNORMAL NG/ML
OXYCODONE UR QL SCN: NOT DETECTED NG/ML
PCP UR QL SCN: NOT DETECTED NG/ML
PLATELET # BLD AUTO: 469 10E9/L (ref 150–450)
POTASSIUM SERPL-SCNC: 3.6 MMOL/L (ref 3.4–5.3)
PROPOXYPH UR QL: NOT DETECTED NG/ML
PROT SERPL-MCNC: 7.8 G/DL (ref 6.8–8.8)
RBC # BLD AUTO: 5.06 10E12/L (ref 4.4–5.9)
SODIUM SERPL-SCNC: 135 MMOL/L (ref 133–144)
TRICYCLICS UR QL SCN: NOT DETECTED NG/ML
WBC # BLD AUTO: 8.4 10E9/L (ref 4–11)

## 2020-02-21 PROCEDURE — G0463 HOSPITAL OUTPT CLINIC VISIT: HCPCS

## 2020-02-21 PROCEDURE — 86803 HEPATITIS C AB TEST: CPT | Mod: ZL | Performed by: PHYSICIAN ASSISTANT

## 2020-02-21 PROCEDURE — 86706 HEP B SURFACE ANTIBODY: CPT | Mod: ZL | Performed by: PHYSICIAN ASSISTANT

## 2020-02-21 PROCEDURE — 80048 BASIC METABOLIC PNL TOTAL CA: CPT | Mod: ZL | Performed by: PHYSICIAN ASSISTANT

## 2020-02-21 PROCEDURE — 80306 DRUG TEST PRSMV INSTRMNT: CPT | Mod: ZL | Performed by: PHYSICIAN ASSISTANT

## 2020-02-21 PROCEDURE — 87389 HIV-1 AG W/HIV-1&-2 AB AG IA: CPT | Mod: ZL | Performed by: PHYSICIAN ASSISTANT

## 2020-02-21 PROCEDURE — 99214 OFFICE O/P EST MOD 30 MIN: CPT | Performed by: PHYSICIAN ASSISTANT

## 2020-02-21 PROCEDURE — 80076 HEPATIC FUNCTION PANEL: CPT | Mod: ZL | Performed by: PHYSICIAN ASSISTANT

## 2020-02-21 PROCEDURE — 85027 COMPLETE CBC AUTOMATED: CPT | Mod: ZL | Performed by: PHYSICIAN ASSISTANT

## 2020-02-21 PROCEDURE — 36415 COLL VENOUS BLD VENIPUNCTURE: CPT | Mod: ZL | Performed by: PHYSICIAN ASSISTANT

## 2020-02-21 PROCEDURE — 87340 HEPATITIS B SURFACE AG IA: CPT | Mod: ZL | Performed by: PHYSICIAN ASSISTANT

## 2020-02-21 RX ORDER — BUPRENORPHINE AND NALOXONE 4; 1 MG/1; MG/1
1 FILM, SOLUBLE BUCCAL; SUBLINGUAL 2 TIMES DAILY
Qty: 15 EACH | Refills: 0 | Status: SHIPPED | OUTPATIENT
Start: 2020-02-21 | End: 2020-02-24

## 2020-02-21 ASSESSMENT — MIFFLIN-ST. JEOR: SCORE: 1694.58

## 2020-02-21 ASSESSMENT — PAIN SCALES - GENERAL: PAINLEVEL: EXTREME PAIN (9)

## 2020-02-21 ASSESSMENT — ACTIVITIES OF DAILY LIVING (ADL): DEPENDENT_IADLS:: INDEPENDENT

## 2020-02-21 NOTE — TELEPHONE ENCOUNTER
PA request received from [employer] for Buprenorphine naloxone 4-1MG film. Submitted request to [facility]. Waiting for response.

## 2020-02-21 NOTE — LETTER
Informed  Consent  and  Agreement  for  Buprenorphine/Naloxone Treatment  Of Opioid Dependence   Micah Rosenthal  [id number]         February 21, 2020        Buprenorphine/naloxone can control symptoms of withdrawal and can decrease cravings for other opioids,  We recommend not trying to stop this medication on your own, most people who stop on their own have a relapse of their drug use.      This  agreement  provides important information  on the potential benefits and  risks of opioid medications and shows that both  you and your provider agree on a care plan so  that opioid medications are used in a way that is  safe and effective in treating your withdrawal symptoms and opioid dependence.  This agreement is reviewed and signed by all patients in our practice who  receive Buprenorphine/Naloxone (Suboxone/Subutex).           Expected  Benefits or  Goals of Buprenorphine Treatment     Decreased craving     Improved  ability to engage  in work, social, recreational  and/or physical activities     Improved  quality of life           Potential Risks or Side Effects of Buprenorphine Treatment    Overdose Taking more than the prescribed amount of medication or using with alcohol or other drugs can cause you to stop breathing, resulting in coma, brain damage, or even death.    Risk of overdose to other family members, children are at high risk of being harmed by buprenorphine    Physical side effects May include mood changes, drowsiness, nausea, constipation, urination difficulties, depressed breathing, itching, bone thinning and sexual difficulties, such as lowering male hormone in men and stopping menstrual periods in women.    Withdrawal Buprenorphine/naloxone can bring on severe withdrawal if taken with other opioids.   Physical dependence Suddenly stopping buprenorphine may lead to withdrawal symptoms including abdominal cramping, pain, diarrhea, sweating, anxiety, irritability and aching.   Hyperalgesia The use of  opioids can increase the experience of pain. If this happens, it may require change or discontinuation of medication.    Sleep apnea  Sleep apnea (periods of not breathing while asleep) may be caused or worsened by opioids.   Risk to unborn child Risks to unborn children may include: physical dependence at birth, possible alterations in pain perception, possible increased risk for addiction later in life, among others. Tell your provider if you are or intend to become pregnant.  Do NOT stop buprenorphine if you become pregnant without discussing with your provider.    Victimization There is a risk that you or someone in your household may be the victim of theft, deceit, assault or abuse by people trying to take your medications to misuse them.         Responsibilities  in Buprenorphine/naloxone Therapy  of Opioid Addiction   Your provider's responsibilities:   Listening carefully to your concerns, treating you with care and with due respect, and making clinical decisions based on what he/she believes is in your best interest.    Your responsibilities:   In order to maximize the potential benefits of buprenorphine and to minimize the potential risks, it is important that you accept the following responsibilities.    We are a primary care clinic, not an addiction clinic.    In signing this agreement, you agree to:  1. Use your buprenorphine medications as prescribed for the purpose of treating opioid addiction/opioid use disorder.  2. BE HONEST! We will work with you to help with your opioid dependence if you are upfront with us.  3. Be on time for appointments. Frequent late or missed appointments will result in termination from our program.  4. Be respectful and considerate to all staff and providers at our clinic.  Rude or aggressive behavior to staff including providers, nursing staff,  and any others will result in termination from the program.  5. Avoid excessive alcohol or other dangerous recreational  drug use, especially Benzodiazepines (like Xanax/alprozolam, Ativan/lorazepam, Valium/diazepam) while being prescribed buprenorphine. Evidence of this occurring may result in referral for consultation or higher level of care and possible termination from our program.  6. Not share, sell, trade or in any way provide your medications to others.  Evidence of this occurring may result in termination from our buprenorphine program.  7. Not receive controlled substances from other clinics without discussion with your buprenorphine physician.  Evidence of this occurring may result in termination from our buprenorphine program.  8. Starting or increasing dosage may result in difficulty concentrating; so we would recommend having a  to bring you to appointments where this may happen   9. If opioids are prescribed unexpectedly by another office (for example due to an accident or dental procedure), inform this office within 24 hours and bring documentation of the visit.  10. Have urine/blood drug tests on a random basis and as requested by your provider. Be ready for this at each appointment.  11. Bring your medication to the clinic when requested.  12. Store your medication in a secure location away from children.  13. Participate in other chemical dependency treatments or other forms of care agreed to with your provider.  You are expected to keep these appointments and follow your care plan. Buprenorphine alone is not enough to overcome addiction/dependence; you are required to have a care plan beyond just Buprenorphine.  14. Permit this practice to communicate with other care providers and/or your significant  others as needed to assure buprenorphine is being used appropriately and is beneficial to  your health and well-being.  15.  Plan ahead for refills. You are responsible for planning your appointments to get refills on time. If you cannot schedule with your primary provider, it is your responsibility to schedule  with another buprenorphine provider.  If you are restricted to just on provider then arrangements must be made in advance if you need refills when your provider is not available.  16. No phone refills - you must be seen in clinic by one of our physicians that prescribe buprenorphine.  17. Agree to purchase all buprenorphine/naloxone, and any other medications related to my treatment from one pharmacy.   18. No early refills. If you lose your meds, run out early, or have your meds stolen, we will not give early refills. We CAN, however, help with symptoms of withdrawal. Call us if you have any trouble, and we will try to help. We recommend a system to help lock up meds.  19.  I understand that my clinic can track controlled substance prescriptions from other providers through a central database (prescription monitoring program).  20  To tell my clinic right away if I become pregnant or have a new medical problem treated at another clinic.    Your medications may be continued if they assist you in maintaining sobriety, help you engage in valued activities, and/or enhance your quality of life and you follow the above responsibilities.  Your medications may be discontinued if your goals for treatment are not met, if you experience negative effects from using them, or if you do not follow this agreement.    I have reviewed this document and have been given the opportunity to have any questions  answered. I understand the possible benefits and risks of buprenorphine medications and I accept the  responsibilities described above.    __________________________________        ____________________________________  Patient     Date          ELODIA Barrow                     Date

## 2020-02-21 NOTE — PROGRESS NOTES
Clinic Care Coordination Contact  Care Team Conversations    CC attended office visit with pt and Florence Sneed this date.  Please refer to Florence's note for plan of care.  CC went over Suboxone program in detail.  Informed consent and treatment agreement signed.  Signed electronic POLLY form and form to communicate to his SO, Huong Jackson.  All questions answered.  Encouraged him/SO to call/text CC with any questions/concerns/problems.  Verbalized understanding.    Yoli Kowalski RN-BSN  Clinic Care Coordinator  [phone] opt. #3

## 2020-02-21 NOTE — NURSING NOTE
"Chief Complaint   Patient presents with     Clinic Care Coordination - Face To Face       Initial /72 (BP Location: Left arm, Patient Position: Sitting, Cuff Size: Adult Regular)   Pulse 98   Temp 99  F (37.2  C) (Tympanic)   Ht 1.778 m (5' 10\")   Wt 79.8 kg (176 lb)   SpO2 98%   BMI 25.25 kg/m   Estimated body mass index is 25.25 kg/m  as calculated from the following:    Height as of this encounter: 1.778 m (5' 10\").    Weight as of this encounter: 79.8 kg (176 lb).  Medication Reconciliation: complete  Trista Brown LPN  "

## 2020-02-24 DIAGNOSIS — F11.90 OPIOID USE DISORDER: ICD-10-CM

## 2020-02-24 LAB
HBV SURFACE AB SERPL IA-ACNC: 0 M[IU]/ML
HBV SURFACE AG SERPL QL IA: NONREACTIVE
HCV AB SERPL QL IA: NONREACTIVE
HIV 1+2 AB+HIV1 P24 AG SERPL QL IA: NONREACTIVE

## 2020-02-24 RX ORDER — BUPRENORPHINE AND NALOXONE 8; 2 MG/1; MG/1
1 FILM, SOLUBLE BUCCAL; SUBLINGUAL 2 TIMES DAILY
Qty: 10 EACH | Refills: 0 | Status: SHIPPED | OUTPATIENT
Start: 2020-02-24 | End: 2020-02-28

## 2020-02-24 NOTE — TELEPHONE ENCOUNTER
PA NOT NEEDED - Received phone call from Elodia at [facility] [phone] stating 8-2MG films BID are on formulary and will not need a PA, however Elodia will contact [facility] as the pharmacy will need an override since patient received the 4-1MG films recently. No documentation to scan to Epic as this was taken care of over the phone.

## 2020-02-24 NOTE — TELEPHONE ENCOUNTER
PA REQUEST WITHDRAWN - Spoke with Elodia at [facility], said patient had paid claim for 8-2MG films and was cutting them in half. PA not needed for 4-1MG films. Pharmacy advised by [facility]. Documentation scanned to [employer].

## 2020-02-24 NOTE — TELEPHONE ENCOUNTER
Pt's SO, Huong, notified.     Yoli Kowalski, RN-BSN  Clinic Care Coordinator  [phone] opt. #3

## 2020-02-24 NOTE — TELEPHONE ENCOUNTER
Received text from pt's SO over the weekend stating that Micah took his first dose of 4/1mg on Friday.  Took another one later in the evening and then another full 8/2mg in the PM.  Feels as though 8/2mg BID is a good dose.  Had no cravings or urges to go seek out opioids.  8/2mg BID pended.  Please review and sign if appropriate.  Does have f/u appt on 2.28.20.    Yoli Kowalski RN-BSN  Clinic Care Coordinator  [phone] opt. #3

## 2020-02-25 ENCOUNTER — PATIENT OUTREACH (OUTPATIENT)
Dept: CARE COORDINATION | Facility: OTHER | Age: 45
End: 2020-02-25

## 2020-02-25 NOTE — PROGRESS NOTES
Dr. Tesfaye, recently started pt on Suboxone and he is doing well.  Florence is out today - would it be ok for Micah to take the Gabapentin for tooth pain?  He has the 300mg capsules at home.

## 2020-02-25 NOTE — TELEPHONE ENCOUNTER
It is fine with me as long as it is fine with you guys.  Should be ok.  Thanks.  Dariel Tesfaye MD

## 2020-02-25 NOTE — PROGRESS NOTES
Clinic Care Coordination Contact  Care Team Conversations    Received text from JANAK Borja, and she is wondering what Micah can do for dental pain.  In the past, he would use pain meds or gabapentin.  Informed her he can alternate tylenol and ibuprofen.  Wondering if he does have some Gabapentin at home, would that be ok for him to use?  Last script was in 2018 for Gabapentin 300mg capsules.  Please advise.      Thank you,  Yoli Kowalski RN-BSN  Clinic Care Coordinator  [phone] opt. #3

## 2020-02-26 NOTE — PROGRESS NOTES
Follow-up Buprenorphine Visit           HPI       Micah Rosenthal is here for f/u on buprenophine/naloxone (Suboxone) therapy for opioid use disorder.  Suboxone Follow Up    Micah is currently taking 8/2 mg of Buprenorphine/Naloxone 2 times daily.     Status since last visit:    Since last visit patient has been: doing well.     Intensity:     There has been: no craving.      Suboxone Dose: adequate.    Progression of Symptoms:     Cues to use and relapse None    Recovery program has been: active.   Accompanying Signs & Symptoms:    Side Effects: none.    Sobriety:     Status: no use since last visit.   THC use.    Drug Screen: obtained.    Precipitating factors:    Triggers have been: mild. Dental pain was a trigger  Alleviating factors:    Contact with sponsor has been: no sponsor.     Family and support system has been: helpful.   Other Therapies Tried :     Patient has been going to recovery meetings:not at all.      Other medical concerns: No    Any ED visits? No       History   Smoking Status     Current Every Day Smoker     Packs/day: 1.50     Years: 20.00     Types: Cigarettes     Start date: 1/1/1988   Smokeless Tobacco     Never Used       Problem, Medication and Allergy Lists were reviewed and updated if needed.  reviewed and are current..    Pharmacy Database reviewed? Yes, 2.28.20, as expected.    Patient is an established patient of this clinic..         Review of Systems:   Review of Systems  CONSTITUTIONAL: NEGATIVE for fever, chills, change in weight  INTEGUMENTARY/SKIN: NEGATIVE for worrisome rashes, moles or lesions  EYES: NEGATIVE for vision changes or irritation  ENT/MOUTH: NEGATIVE for ear, mouth and throat problems  RESP: NEGATIVE for significant cough or SOB  CV: NEGATIVE for chest pain, palpitations or peripheral edema  GI: NEGATIVE for nausea, abdominal pain, heartburn, or change in bowel habits  : NEGATIVE for frequency, dysuria, or hematuria  MUSCULOSKELETAL: NEGATIVE for significant  arthralgias or myalgia  NEURO: NEGATIVE for weakness, dizziness or paresthesias  ENDOCRINE: NEGATIVE for temperature intolerance, skin/hair changes  PSYCHIATRIC: NEGATIVE for changes in mood or affect          Physical Exam:     Vitals:    02/28/20 0929   BP: 98/60   BP Location: Left arm   Patient Position: Sitting   Cuff Size: Adult Regular   Pulse: 99   Resp: 20   Temp: 97.2  F (36.2  C)   SpO2: 97%   Weight: 79.8 kg (176 lb)     Body mass index is 25.25 kg/m .  Vitals were reviewed   Physical Exam  GENERAL APPEARANCE: alert, comfortable appearing  EYES: Eyes grossly normal to inspection  HENT:  nose no rhinorrhea  RESP: lungs clear to auscultation - no rales, rhonchi or wheezes and no resp distress  CV: regular rates and rhythm, normal S1 S2,no murmur   ABDOMEN:soft, non-tender, non-distended, no hepatosplenomegaly or masses  SKIN: no rashes, no jaundice, no obvious masses.   NEURO:  no tremor  MENTAL STATUS EXAM:  Appearance/Behavior:No apparent distress  Speech: Normal  Mood/Affect: normal affect  Insight: Adequate      Results:    Results from the last 24 hours  Results for orders placed or performed in visit on 02/28/20 (from the past 24 hour(s))   Urine Drugs of Abuse Screen (Tox13)   Result Value Ref Range    Cannabinoids (85-brc-0-carboxy-9-THC) Detected, Abnormal Result (A) NDET^Not Detected ng/mL    Phencyclidine (Phencyclidine) Not Detected NDET^Not Detected ng/mL    Cocaine (Benzoylecgonine) Not Detected NDET^Not Detected ng/mL    Methamphetamine (d-Methamphetamine) Detected, Abnormal Result (A) NDET^Not Detected ng/mL    Opiates (Morphine) Not Detected NDET^Not Detected ng/mL    Amphetamine (d-Amphetamine) Detected, Abnormal Result (A) NDET^Not Detected ng/mL    Benzodiazepines (Nordiazepam) Not Detected NDET^Not Detected ng/mL    Tricyclic Antidepressants (Desipramine) Not Detected NDET^Not Detected ng/mL    Methadone (Methadone) Not Detected NDET^Not Detected ng/mL    Barbiturates (Butalbital) Not  Detected NDET^Not Detected ng/mL    Oxycodone (Oxycodone) Not Detected NDET^Not Detected ng/mL    Propoxyphene (Norpropoxyphene) Not Detected NDET^Not Detected ng/mL    Buprenorphine (Buprenorphine) Detected, Abnormal Result (A) NDET^Not Detected ng/mL       Assessment and Plan     Was naloxone nasal spray prescribed? No Details: has some at home.  1. Opioid use disorder (H)  He has been sober for 8 days.  Had severe dental pain and use gabapentin. Did the trick. We are going to give him Amoxil  And he will be given a refill of Suboxone. And we will have him back here in 2 weeks.   - Urine Drugs of Abuse Screen (Tox13)    Dosage will not need adjustment today.  Continue at 8/2 mg  2 time(s) a day of  buprenorphine/naloxone (Suboxone).     Follow up Plan  Stage 2 (2 weeks): Please make a clinic appointment for follow up with me (BLANCHE GAONA) or another Suboxone provider in 2 weeks for suboxone follow up.    Greater than 25  minutes was spent with this patient, over half of which was on counseling and coordination of care which includes importance of recovery activities,which may include  attendance at NA/AA meetings, sober support network, and the importance of not isolating, being open, honest, and willing to change, possible triggers and how to avoid them, and managing cravings.    There are no discontinued medications.    Options for treatment and follow-up care were reviewed with the patient. Micah engaged in the decision making process and verbalized understanding of the options discussed and agreed with the final plan.    ELODIA Barrow

## 2020-02-28 ENCOUNTER — PATIENT OUTREACH (OUTPATIENT)
Dept: CARE COORDINATION | Facility: OTHER | Age: 45
End: 2020-02-28

## 2020-02-28 ENCOUNTER — OFFICE VISIT (OUTPATIENT)
Dept: FAMILY MEDICINE | Facility: OTHER | Age: 45
End: 2020-02-28
Attending: PHYSICIAN ASSISTANT
Payer: COMMERCIAL

## 2020-02-28 ENCOUNTER — APPOINTMENT (OUTPATIENT)
Dept: LAB | Facility: OTHER | Age: 45
End: 2020-02-28
Attending: PHYSICIAN ASSISTANT
Payer: COMMERCIAL

## 2020-02-28 VITALS
DIASTOLIC BLOOD PRESSURE: 60 MMHG | BODY MASS INDEX: 25.25 KG/M2 | WEIGHT: 176 LBS | TEMPERATURE: 97.2 F | HEART RATE: 99 BPM | OXYGEN SATURATION: 97 % | RESPIRATION RATE: 20 BRPM | SYSTOLIC BLOOD PRESSURE: 98 MMHG

## 2020-02-28 DIAGNOSIS — F11.90 OPIOID USE DISORDER: Primary | ICD-10-CM

## 2020-02-28 DIAGNOSIS — K08.89 PAIN, DENTAL: ICD-10-CM

## 2020-02-28 LAB
AMPHETAMINES UR QL: ABNORMAL NG/ML
BARBITURATES UR QL SCN: NOT DETECTED NG/ML
BENZODIAZ UR QL SCN: NOT DETECTED NG/ML
BUPRENORPHINE UR QL: ABNORMAL NG/ML
CANNABINOIDS UR QL: ABNORMAL NG/ML
COCAINE UR QL SCN: NOT DETECTED NG/ML
D-METHAMPHET UR QL: ABNORMAL NG/ML
METHADONE UR QL SCN: NOT DETECTED NG/ML
OPIATES UR QL SCN: NOT DETECTED NG/ML
OXYCODONE UR QL SCN: NOT DETECTED NG/ML
PCP UR QL SCN: NOT DETECTED NG/ML
PROPOXYPH UR QL: NOT DETECTED NG/ML
TRICYCLICS UR QL SCN: NOT DETECTED NG/ML

## 2020-02-28 PROCEDURE — 99213 OFFICE O/P EST LOW 20 MIN: CPT | Performed by: PHYSICIAN ASSISTANT

## 2020-02-28 PROCEDURE — 80306 DRUG TEST PRSMV INSTRMNT: CPT | Mod: ZL | Performed by: PHYSICIAN ASSISTANT

## 2020-02-28 PROCEDURE — G0463 HOSPITAL OUTPT CLINIC VISIT: HCPCS

## 2020-02-28 RX ORDER — BUPRENORPHINE AND NALOXONE 8; 2 MG/1; MG/1
1 FILM, SOLUBLE BUCCAL; SUBLINGUAL 2 TIMES DAILY
Qty: 30 EACH | Refills: 0 | Status: SHIPPED | OUTPATIENT
Start: 2020-02-28 | End: 2020-03-13

## 2020-02-28 RX ORDER — AMOXICILLIN 500 MG/1
500 CAPSULE ORAL 3 TIMES DAILY
Qty: 30 CAPSULE | Refills: 0 | Status: SHIPPED | OUTPATIENT
Start: 2020-02-28 | End: 2020-03-13

## 2020-02-28 ASSESSMENT — PAIN SCALES - GENERAL: PAINLEVEL: SEVERE PAIN (7)

## 2020-02-28 ASSESSMENT — ACTIVITIES OF DAILY LIVING (ADL): DEPENDENT_IADLS:: INDEPENDENT

## 2020-02-28 NOTE — PATIENT INSTRUCTIONS
Thank you for choosing [facility].   I have office hours 8:00 am to 4:30 pm on Monday's, Wednesday's, Thursday's and Friday's. My nurse and I are out of the office every Tuesday.    Following your visit, when your labs and diagnostic testing have returned, I will review then and you will be contacted by my nurse.  If you are on My Chart, you can also view results there.    For refills, notify your pharmacy regarding what you need and the pharmacy will generate a refill request. Do not call my nurse as she is unable to process refill request. Please plan ahead and allow 3-5 days for refill requests.    You will generally receive a reminder call the day prior to your appointment.  If you cannot attend your appointment, please cancel your appointment with as much notice as possible.  If there is a pattern of failure to present for your appointments, I cannot provide consistent, meaningful, ongoing care for you. It is very important to me that you come in for your care, so we can best assist you with your health care needs.    IMPORTANT:  Please note that it is my standard of practice to NOT participate in prescribing ongoing requested Narcotic Analgesic therapy, and/or participate in the prescribing of other controlled substances.  My nurse and I am happy to assist you with the process of referral for alternative pain management as needed, and other treatment modalities including but not limited to:  Physical Therapy, Physical Medicine and Rehab, Counseling, Chiropractic Care, Orthopedic Care, and non-narcotic medication management.     In the event that you need to be seen for emergent concerns and I am out of office,  please see one of my colleagues for acute concerns.  You may also present to  or ER.  I appreciate the opportunity to serve you and look forward to supporting your healthcare needs in the future. Please contact me with any questions or concerns that you may  have.    Sincerely,      Florence Sneed RN, PA-C

## 2020-02-28 NOTE — PROGRESS NOTES
Clinic Care Coordination Contact  Care Team Conversations    CC attended office visit with pt and Florence Sneed this date.  Please refer to Florence's note for plan of care.  Doing great.  All questions answered.  Encouraged him to call/text CC with any questions/concerns/problems.  Verbalized understanding.    Yoli Kowalski RN-BSN  Clinic Care Coordinator  [phone] opt. #3

## 2020-02-28 NOTE — NURSING NOTE
"No chief complaint on file.      Initial BP 98/60 (BP Location: Left arm, Patient Position: Sitting, Cuff Size: Adult Regular)   Pulse 99   Temp 97.2  F (36.2  C)   Resp 20   Wt 79.8 kg (176 lb)   SpO2 97%   BMI 25.25 kg/m   Estimated body mass index is 25.25 kg/m  as calculated from the following:    Height as of 2/21/20: 1.778 m (5' 10\").    Weight as of this encounter: 79.8 kg (176 lb).  Medication Reconciliation: complete  Zack Eastman LPN    "

## 2020-03-09 NOTE — PROGRESS NOTES
"Follow-up Buprenorphine Visit           HPI       Micah Rosenthal is here for f/u on buprenophine/naloxone (Suboxone) therapy for opioid use disorder.  Suboxone Follow Up    Micah is currently taking 8/2 mg of Buprenorphine/Naloxone 2 times daily.     Status since last visit:    Since last visit patient has been: doing well.     Intensity:     There has been: no craving.      Suboxone Dose: adequate.    Progression of Symptoms:     Cues to use and relapse Admits to meth use last week - took a couple hits.    Recovery program has been: solid.   Accompanying Signs & Symptoms:    Side Effects: none.    Sobriety:     Status: patient has relapsed.  Meth use - no opioid use at all!    Drug Screen: obtained.    Precipitating factors:    Triggers have been: mild. Tried to \"help a friend\" recently - got picked up.  Alleviating factors:    Contact with sponsor has been: no sponsor.     Family and support system has been: helpful. SO is very, very supportive  Other Therapies Tried :     Patient has been going to recovery meetings:not at all.      Other medical concerns: No    Any ED visits? No       History   Smoking Status     Current Every Day Smoker     Packs/day: 1.50     Years: 20.00     Types: Cigarettes     Start date: 1/1/1988   Smokeless Tobacco     Never Used       Problem, Medication and Allergy Lists were reviewed and updated if needed.  reviewed and are current..    Pharmacy Database reviewed? Yes, 3.13.20, as expected.    Patient is an established patient of this clinic..         Review of Systems:   Review of Systems  CONSTITUTIONAL: NEGATIVE for fever, chills, change in weight  INTEGUMENTARY/SKIN: NEGATIVE for worrisome rashes, moles or lesions  EYES: NEGATIVE for vision changes or irritation  ENT/MOUTH: NEGATIVE for ear, mouth and throat problems  RESP: NEGATIVE for significant cough or SOB  CV: NEGATIVE for chest pain, palpitations or peripheral edema  GI: NEGATIVE for nausea, abdominal pain, heartburn, or " "change in bowel habits  : NEGATIVE for frequency, dysuria, or hematuria  MUSCULOSKELETAL: NEGATIVE for significant arthralgias or myalgia  NEURO: NEGATIVE for weakness, dizziness or paresthesias  ENDOCRINE: NEGATIVE for temperature intolerance, skin/hair changes  PSYCHIATRIC: NEGATIVE for changes in mood or affect          Physical Exam:     Vitals:    03/13/20 0915   BP: 100/64   BP Location: Right arm   Patient Position: Sitting   Cuff Size: Adult Regular   Pulse: 78   Temp: 97  F (36.1  C)   TempSrc: Tympanic   SpO2: 97%   Weight: 79.8 kg (176 lb)   Height: 1.778 m (5' 10\")     Body mass index is 25.25 kg/m .  Vitals were reviewed and were normal  Vitals were reviewed   Physical Exam  GENERAL APPEARANCE: alert, comfortable appearing  EYES: Eyes grossly normal to inspection  HENT:  nose no rhinorrhea  RESP: lungs clear to auscultation - no rales, rhonchi or wheezes and no resp distress  CV: regular rates and rhythm, normal S1 S2,no murmur   ABDOMEN:soft, non-tender, non-distended, no hepatosplenomegaly or masses  SKIN: no rashes, no jaundice, no obvious masses.   NEURO:  no tremor  MENTAL STATUS EXAM:  Appearance/Behavior:No apparent distress  Speech: Normal  Mood/Affect: normal affect  Insight: Adequate      Results:    Results from the last 24 hours  Results for orders placed or performed in visit on 03/13/20 (from the past 24 hour(s))   Urine Drugs of Abuse Screen (Tox13)   Result Value Ref Range    Cannabinoids (30-zhg-8-carboxy-9-THC) Detected, Abnormal Result (A) NDET^Not Detected ng/mL    Phencyclidine (Phencyclidine) Detected, Abnormal Result (A) NDET^Not Detected ng/mL    Cocaine (Benzoylecgonine) Not Detected NDET^Not Detected ng/mL    Methamphetamine (d-Methamphetamine) Detected, Abnormal Result (A) NDET^Not Detected ng/mL    Opiates (Morphine) Not Detected NDET^Not Detected ng/mL    Amphetamine (d-Amphetamine) Detected, Abnormal Result (A) NDET^Not Detected ng/mL    Benzodiazepines (Nordiazepam) Not " Detected NDET^Not Detected ng/mL    Tricyclic Antidepressants (Desipramine) Not Detected NDET^Not Detected ng/mL    Methadone (Methadone) Not Detected NDET^Not Detected ng/mL    Barbiturates (Butalbital) Not Detected NDET^Not Detected ng/mL    Oxycodone (Oxycodone) Not Detected NDET^Not Detected ng/mL    Propoxyphene (Norpropoxyphene) Not Detected NDET^Not Detected ng/mL    Buprenorphine (Buprenorphine) Detected, Abnormal Result (A) NDET^Not Detected ng/mL       Assessment and Plan     Was naloxone nasal spray prescribed? No Details: has some at home.  1. Opioid use disorder (H)  We had a bump in the road.  He took two hits last week. Was caught with pills to get to a friend who was withdrawing. Recommended ER for those people can get on Suboxone there. He is going to be working on getting  A rule 25. Has never been to treatment. He is going to e seeing back in 3 weeks.   - Urine Drugs of Abuse Screen (Tox13)    Dosage will not need adjustment today.  Continue at 8/2 mg  2 time(s) a day of  buprenorphine/naloxone (Suboxone).     Follow up Plan  Stage 3 (3 weeks): Please make a clinic appointment for follow up with me (BLANCHE GAONA) or another Suboxone provider in 3 weeks for suboxone follow up.    Greater than 30 minutes was spent with this patient, over half of which was on counseling and coordination of care which includes importance of recovery activities,which may include  attendance at NA/AA meetings, sober support network, and the importance of not isolating, being open, honest, and willing to change, possible triggers and how to avoid them, and managing cravings.    Medications Discontinued During This Encounter   Medication Reason     amoxicillin (AMOXIL) 500 MG capsule Therapy completed     buprenorphine HCl-naloxone HCl (SUBOXONE) 8-2 MG per film Reorder       Options for treatment and follow-up care were reviewed with the patient. Micah engaged in the decision making process and verbalized  understanding of the options discussed and agreed with the final plan.    Florence Sneed PA-C  I saw and examined this patient.  I have read through the note and made appropriate changes and agree with the RN Care Coordinator's assessment and plan.

## 2020-03-13 ENCOUNTER — PATIENT OUTREACH (OUTPATIENT)
Dept: CARE COORDINATION | Facility: OTHER | Age: 45
End: 2020-03-13

## 2020-03-13 ENCOUNTER — OFFICE VISIT (OUTPATIENT)
Dept: FAMILY MEDICINE | Facility: OTHER | Age: 45
End: 2020-03-13
Attending: PHYSICIAN ASSISTANT
Payer: COMMERCIAL

## 2020-03-13 ENCOUNTER — APPOINTMENT (OUTPATIENT)
Dept: LAB | Facility: OTHER | Age: 45
End: 2020-03-13
Attending: PHYSICIAN ASSISTANT
Payer: COMMERCIAL

## 2020-03-13 VITALS
OXYGEN SATURATION: 97 % | SYSTOLIC BLOOD PRESSURE: 100 MMHG | BODY MASS INDEX: 25.2 KG/M2 | WEIGHT: 176 LBS | TEMPERATURE: 97 F | HEIGHT: 70 IN | HEART RATE: 78 BPM | DIASTOLIC BLOOD PRESSURE: 64 MMHG

## 2020-03-13 DIAGNOSIS — F11.90 OPIOID USE DISORDER: Primary | ICD-10-CM

## 2020-03-13 DIAGNOSIS — M54.2 CERVICALGIA: ICD-10-CM

## 2020-03-13 LAB
AMPHETAMINES UR QL: ABNORMAL NG/ML
BARBITURATES UR QL SCN: NOT DETECTED NG/ML
BENZODIAZ UR QL SCN: NOT DETECTED NG/ML
BUPRENORPHINE UR QL: ABNORMAL NG/ML
CANNABINOIDS UR QL: ABNORMAL NG/ML
COCAINE UR QL SCN: NOT DETECTED NG/ML
D-METHAMPHET UR QL: ABNORMAL NG/ML
METHADONE UR QL SCN: NOT DETECTED NG/ML
OPIATES UR QL SCN: NOT DETECTED NG/ML
OXYCODONE UR QL SCN: NOT DETECTED NG/ML
PCP UR QL SCN: ABNORMAL NG/ML
PROPOXYPH UR QL: NOT DETECTED NG/ML
TRICYCLICS UR QL SCN: NOT DETECTED NG/ML

## 2020-03-13 PROCEDURE — 99000 SPECIMEN HANDLING OFFICE-LAB: CPT | Performed by: PHYSICIAN ASSISTANT

## 2020-03-13 PROCEDURE — 80306 DRUG TEST PRSMV INSTRMNT: CPT | Mod: ZL | Performed by: PHYSICIAN ASSISTANT

## 2020-03-13 PROCEDURE — G0463 HOSPITAL OUTPT CLINIC VISIT: HCPCS

## 2020-03-13 PROCEDURE — 99214 OFFICE O/P EST MOD 30 MIN: CPT | Performed by: PHYSICIAN ASSISTANT

## 2020-03-13 PROCEDURE — 80307 DRUG TEST PRSMV CHEM ANLYZR: CPT | Mod: ZL | Performed by: PHYSICIAN ASSISTANT

## 2020-03-13 RX ORDER — BUPRENORPHINE AND NALOXONE 8; 2 MG/1; MG/1
1 FILM, SOLUBLE BUCCAL; SUBLINGUAL 2 TIMES DAILY
Qty: 42 EACH | Refills: 0 | Status: SHIPPED | OUTPATIENT
Start: 2020-03-13 | End: 2020-04-03

## 2020-03-13 ASSESSMENT — ANXIETY QUESTIONNAIRES
3. WORRYING TOO MUCH ABOUT DIFFERENT THINGS: NOT AT ALL
7. FEELING AFRAID AS IF SOMETHING AWFUL MIGHT HAPPEN: NOT AT ALL
6. BECOMING EASILY ANNOYED OR IRRITABLE: NOT AT ALL
2. NOT BEING ABLE TO STOP OR CONTROL WORRYING: NOT AT ALL
GAD7 TOTAL SCORE: 0
1. FEELING NERVOUS, ANXIOUS, OR ON EDGE: NOT AT ALL
5. BEING SO RESTLESS THAT IT IS HARD TO SIT STILL: NOT AT ALL
4. TROUBLE RELAXING: NOT AT ALL

## 2020-03-13 ASSESSMENT — PATIENT HEALTH QUESTIONNAIRE - PHQ9: SUM OF ALL RESPONSES TO PHQ QUESTIONS 1-9: 0

## 2020-03-13 ASSESSMENT — MIFFLIN-ST. JEOR: SCORE: 1689.58

## 2020-03-13 ASSESSMENT — PAIN SCALES - GENERAL: PAINLEVEL: EXTREME PAIN (9)

## 2020-03-13 ASSESSMENT — ACTIVITIES OF DAILY LIVING (ADL): DEPENDENT_IADLS:: INDEPENDENT

## 2020-03-13 NOTE — NURSING NOTE
"Chief Complaint   Patient presents with     Clinic Care Coordination - Follow-up       Initial /64 (BP Location: Right arm, Patient Position: Sitting, Cuff Size: Adult Regular)   Pulse 78   Temp 97  F (36.1  C) (Tympanic)   Ht 1.778 m (5' 10\")   Wt 79.8 kg (176 lb)   SpO2 97%   BMI 25.25 kg/m   Estimated body mass index is 25.25 kg/m  as calculated from the following:    Height as of this encounter: 1.778 m (5' 10\").    Weight as of this encounter: 79.8 kg (176 lb).  Medication Reconciliation: complete  Trista Brown LPN  "

## 2020-03-13 NOTE — PROGRESS NOTES
Clinic Care Coordination Contact  Care Team Conversations    CC attended office visit with pt and Florence Sneed this date.  Please refer to Florence's note for plan of care.  All questions answered.  Encouraged him to call/text CC with any questions/concerns/problems.  Verbalized understanding.    Yoli Kowalski RN-BSN  Clinic Care Coordinator  [phone] opt. #3

## 2020-03-14 ASSESSMENT — ANXIETY QUESTIONNAIRES: GAD7 TOTAL SCORE: 0

## 2020-03-17 DIAGNOSIS — K08.89 PAIN, DENTAL: ICD-10-CM

## 2020-03-17 DIAGNOSIS — M50.90 CERVICAL DISC DISEASE: Primary | ICD-10-CM

## 2020-03-17 RX ORDER — GABAPENTIN 300 MG/1
CAPSULE ORAL
Qty: 180 CAPSULE | Refills: 0 | Status: SHIPPED | OUTPATIENT
Start: 2020-03-17 | End: 2020-05-07

## 2020-03-17 NOTE — TELEPHONE ENCOUNTER
Dr. Tesfaye, pt is having an increase in neck pain and is currently waiting to get an appt at [facility] and [facility].  Currently on Suboxone - opioids and benzos contraindicated.  Willing to try Voltaren gel.  Would you approve of this?  Med is pended.  Please advise.  Thank you.

## 2020-03-18 ENCOUNTER — TELEPHONE (OUTPATIENT)
Dept: FAMILY MEDICINE | Facility: OTHER | Age: 45
End: 2020-03-18

## 2020-03-18 DIAGNOSIS — M50.90 CERVICAL DISC DISEASE: Primary | ICD-10-CM

## 2020-03-18 NOTE — TELEPHONE ENCOUNTER
Forward to Yoli pardo.  She had suggested the gel specifically.  I am fine with any of the oral alternatives.  Thanks.  Dariel Tesfaye MD

## 2020-03-18 NOTE — TELEPHONE ENCOUNTER
"DENIAL received from [facility] for Voltaren gel. Preferred meds listed below. \"If patient cannot take a preferred alternative, provider must provide clinical documentation contraindicating preferred meds.\" Pharmacy advised. Documentation scanned to Epic.      "

## 2020-03-18 NOTE — TELEPHONE ENCOUNTER
PA request received from [facility] for Voltaren 1% gel. Submitted request through [facility]. Waiting for response from [facility].

## 2020-03-19 ENCOUNTER — DOCUMENTATION ONLY (OUTPATIENT)
Dept: BEHAVIORAL HEALTH | Facility: OTHER | Age: 45
End: 2020-03-19

## 2020-03-19 ENCOUNTER — PATIENT OUTREACH (OUTPATIENT)
Dept: CARE COORDINATION | Facility: OTHER | Age: 45
End: 2020-03-19

## 2020-03-19 ENCOUNTER — APPOINTMENT (OUTPATIENT)
Dept: FAMILY MEDICINE | Facility: OTHER | Age: 45
End: 2020-03-19
Attending: FAMILY MEDICINE
Payer: COMMERCIAL

## 2020-03-19 NOTE — PROGRESS NOTES
Clinic Care Coordination Contact  Care Team Conversations    CC met with pt and GILDA Garcia, this date for his Rule 25.  Once completed, will update pt,  All questions answered.  Encouraged him to call/text CC with any questions/concerns/problems.  Verbalized understanding.    Yoli Kowalski, RN-BSN  Clinic Care Coordinator  [phone] opt. #3

## 2020-03-19 NOTE — PROGRESS NOTES
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 easier to talk with others, most friends use, afraid of withdrawal, self medication, as well as to cope with stress, anxiety, depression and family problems.Pt is willing to stop all use except prescribed drugs/medications. Pt states that the most helpful things for him will be suboxone , family, girls,and significant other.                           DIMENSION V - Relapse, Continued Use, and Continued Problem Potential   1A. In what ways have you tried to control, cut-down or quit your use? If you have had periods of sobriety, how did you accomplish that? What was helpful? What happened to prevent you from continuing your sobriety? (DSM)     Suboxone (MAT PROGRAM)- Pt reports that this has helped him stop all use.    1B. What were the circumstances of your most recent relapse with mood altering chemicals?    Pain    2. Have you experienced cravings? If yes, ask follow up questions to determine if the person recognizes triggers and if the person has had any success in dealing with them.     The patient denied having any current cravings to use mood altering chemicals.    3. Have you been treated for alcohol/other drug abuse/dependence? No    4. Support group participation: Have you/do you attend support group meetings to  reduce/stop your alcohol/drug use? How recently? What was your experience? Are you willing to restart? If the person has not participated, is he or she willing?     Pt plans on going to an NA meeting soon.    5. What would assist you in staying sober/straight?     Outpatient treatment as well as sober supports.    Dimension V Ratings   Relapse/Continued Use/Continued problem potential - The placing authority must use the criteria in Dimension V to determine a client s relapse, continued use, and continued problem potential.   RISK DESCRIPTIONS - Severity rating: 3 Client has poor recognition and understanding of relapse and recidivism issues and displays moderately high vulnerability for further substance use or mental health problems. Client has few coping skills and rarely applies coping skills.    REASONS SEVERITY WAS ASSIGNED - (What information did the person provide that indicates his or her understanding of relapse issues? What about the person s experience indicates how prone he or she is to relapse? What coping skills does the person have that decrease relapse potential?)      Pt states that the Suboxone (MAT PROGRAM)- Pt reports that this has helped him stop all use. Pt states that his last relapse had to do with pain. The patient denied having any current cravings to use mood altering chemicals. Pt plans on going to an NA meeting soon. Pt states that MAT/Outpatient treatment as well as sober supports will be of the most help to him in staying sober/straight. Pt has few coping skills which are rarly applied in order to keep him from relapse ans well as recognizing triggers.                 DIMENSION VI - Recovery Environment   1. Are you employed/attending school? Tell me about that.     Not employed or attending school at this time.    2A. Describe a typical day; evening for you. Work, school, social, leisure, volunteer, spiritual practices. Include time spent obtaining, using, recovering from drugs or  alcohol. 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    Please describe what leisure activities have been associated with your substance abuse:     N/A    2B. How often do you spend more time than you planned using or use more than you planned? (DSM)     N/A at this time.    3. How important is using to your social connections? Do many of your family or friends use?     N/A at this time    4A. Are you currently in a significant relationship?     Yes.  4B. How long? 10   years        Please describe your significant other's use of mood altering chemicals? none    4C. Sexual Orientation:     Heterosexual    5A. Who do you live with?      Girlfriend and children    5B. Tell me about their alcohol/drug use and mental health issues.     N/A    5C. Are you concerned for your safety there? No    5D. Are you concerned about the safety of anyone else who lives with you? Yes    6A. Do you have children who live with you?     Yes.  (Ask follow-up questions to determine the person's relationship and responsibility, both legal and care giving; and what arrangements are made for supervision for the children when the person is not available.) 2 ages 16-6 two daughters.    6B. Do you have children who do not live with you?     Yes.  (Ask follow-up questions to determine the person's relationship and responsibility, both legal and care giving; and what arrangements are made for supervision for the children when the person is not available.) a daughter that is grown    7A. Who supports you in making changes in your alcohol or drug use? What are they willing to do to support you? Who is upset or angry about you making changes in your alcohol or drug use? How big a problem is this for you?      Girlfriend, children and family as well as friends and neighbors.    7B. This table is provided to record information about the person s relationships and available support It is not necessary to ask each item; only to get a comprehensive picture of their  support system.  How often can you count on the following people when you need someone?   Partner / Spouse Always supportive   Parent(s)/Aunt(s)/Uncle(s)/Grandparents Always supportive   Sibling(s)/Cousin(s) Always supportive   Child(reshma) Always supportive   Other relative(s) Always supportive   Friend(s)/neighbor(s) Always supportive   Child(reshma) s father(s)/mother(s) Always supportive   Support group member(s) Always supportive   Community of [place] members The patient denied having any current involvement with community [place] members.   /counselor/therapist/healer Always supportive   Other (specify) No     8A. What is your current living situation?     Pt lives with girlfriend and children.    8B. What is your long term plan for where you will be living?     Pt states that he will continue to stay with his girlfriend and his/her children.    8C. Tell me about your living environment/neighborhood? Ask enough follow up questions to determine safety, criminal activity, availability of alcohol and drugs, supportive or antagonistic to the person making changes.      Pt states that his neighborhood is nice.    9. Criminal justice history: Gather current/recent history and any significant history related to substance use--Arrests? Convictions? Circumstances? Alcohol or drug involvement? Sentences? Still on probation or parole? Expectations of the court? Current court order? Any sex offenses - lifetime? What level? (DSM)    See attached. No criminal sex charges on Pts record.     10. What obstacles exist to participating in treatment? (Time off work, childcare, funding, transportation, pending [facility] time, living situation)     The patient denied having any obstacles for participating in substance abuse treatment.    Dimension VI Ratings   Recovery environment - The placing authority must use the criteria in Dimension VI to determine a client s recovery environment.   RISK DESCRIPTIONS - Severity rating: 3  Client is not engaged in structured, meaningful activity and the client's peers, family, significant other, and living environment are unsupportive, or there is significant criminal justice system involvement.    REASONS SEVERITY WAS ASSIGNED - (What support does the person have for making changes? What structure/stability does the person have in his or her daily life that will increase the likelihood that changes can be sustained? What problems exist in the person s environment that will jeopardize getting/staying clean and sober?)     Pt is not employed or attending school at this time. 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 Pt has been in a relationship with the mother of two of his daughters for ten years. Pt currently lives with his daughters who ar 7y/o and 15y/o. Pt also has a daughter who is grown. Pt states that he will continue to stay with his girlfriend and his/her children.  Pt states that his neighborhood is nice. No criminal sex charges on Pts record.   The patient denied having any obstacles for participating in substance abuse treatment.    No criminal sex charges on Pts record.                  Client Choice/Exceptions   Would you like services specific to language, age, gender, culture, Confucianism preference, race, ethnicity, sexual orientation or disability?  No    What particular treatment choices and options would you like to have? None    Do you have a preference for a particular treatment program? None    Criteria for Diagnosis     Criteria for Diagnosis  DSM-5 Criteria for Substance Use Disorder  Instructions: Determine whether the client currently meets the criteria for Substance Use Disorder using the diagnostic criteria in the DSM-V pp.481-589. Current means during the most recent 12 months outside a facility that controls access to substances    Category of Substance Severity (ICD-10 Code / DSM 5 Code)     Alcohol Use Disorder The patient does not meet the criteria for an Alcohol use  disorder.   Cannabis Use Disorder Severe   (F12.20) (304.30)   Hallucinogen Use Disorder The patient does not meet the criteria for a Hallucinogen use disorder.   Inhalant Use Disorder The patient does not meet the criteria for an Inhalant use disorder.   Opioid Use Disorder The patient does not currently meet the criteria for an Opioid use disorder, but has a history of severe/Pt is in sustained recovery w/MAT.   Sedative, Hypnotic, or Anxiolytic Use Disorder The patient does not meet the criteria for a Sedative/Hypnotic use disorder.   Stimulant Related Disorder The patient does not currently meet the criteria for a Stimulant use disorder, but has a history of Severe-Pt is in sustained recovery.   Tobacco Use Disorder Severe   (F17.200) (305.1)    Other (or unknown) Substance Use Disorder The patient does not meet the criteria for a Other (or unknown) Substance use disorder.       Collateral Contact Summary   Number of contacts made: 1    Contact with referring person:  Yes    If court related records were reviewed, summarize here: No court records had been reviewed at the time of this documentation.    Rule 25 Assessment Summary and Plan   Summary    Referred By: Self  Transported By: Self  Admission Date: N/A  Tested Positive For: Suboxone  Last Date of Use:2 weeks for Marijuana/over a month for stimulants and opioids.    Assessors Recommendation:    Enter and complete Phase I of outpatient treatment. Follow all recomendations    1.Obtain  A full DA. Follow all recommendations.    2.Obtain a MHP (Mental Health Professional) see one  time per week for a minimum of six weeks after Treatment. Follow all recommendations     3. Remain law abiding.    4. Remain free from all mood altering substances unless prescribed by a physician. Follow all recommendations.    5. Remain in close contact with your MAT team. Follow all recommendations.      Collateral Contacts     Name:       Relationship:       Phone Number:      Releases:    No     Collateral Contacts     Name:       Relationship:       Phone Number:       Releases:    No         A problematic pattern of alcohol/drug use leading to clinically significant impairment or distress, as manifested by at least two of the following, occurring within a 12-month period:    1.) Alcohol/drug is often taken in larger amounts or over a longer period than was intended.  3.) A great deal of time is spent in activities necessary to obtain alcohol, use alcohol, or recover from its effects.  4.) Craving, or a strong desire or urge to use alcohol/drug  9.) Alcohol/drug use is continued despite knowledge of having a persistent or recurrent physical or psychological problem that is likely to have been caused or exacerbated by alcohol.  10.) Tolerance, as defined by either of the following:     and A need for markedly increased amounts of alcohol/drug to achieve intoxication or desired effect.  11.) Withdrawal, as manifested by either of the following: The characteristic withdrawal syndrome for alcohol/drug (refer to Criteria A and B of the criteria set for alcohol/drug withdrawal).  [id number]     In early remission:  opiod disorder and stimulant related disorder ar both in sustained remission because of MAT.      HPI      ROS      Physical Exam

## 2020-03-20 LAB — PAIN DRUG SCR UR W RPTD MEDS: NORMAL

## 2020-04-03 ENCOUNTER — VIRTUAL VISIT (OUTPATIENT)
Dept: FAMILY MEDICINE | Facility: OTHER | Age: 45
End: 2020-04-03
Attending: PHYSICIAN ASSISTANT
Payer: COMMERCIAL

## 2020-04-03 ENCOUNTER — PATIENT OUTREACH (OUTPATIENT)
Dept: CARE COORDINATION | Facility: OTHER | Age: 45
End: 2020-04-03

## 2020-04-03 ENCOUNTER — TELEPHONE (OUTPATIENT)
Dept: FAMILY MEDICINE | Facility: OTHER | Age: 45
End: 2020-04-03

## 2020-04-03 VITALS — BODY MASS INDEX: 25.05 KG/M2 | WEIGHT: 175 LBS | HEIGHT: 70 IN

## 2020-04-03 DIAGNOSIS — F11.90 OPIOID USE DISORDER: ICD-10-CM

## 2020-04-03 PROCEDURE — 99442 ZZC PHYSICIAN TELEPHONE EVALUATION 11-20 MIN: CPT | Performed by: PHYSICIAN ASSISTANT

## 2020-04-03 RX ORDER — BUPRENORPHINE AND NALOXONE 8; 2 MG/1; MG/1
1 FILM, SOLUBLE BUCCAL; SUBLINGUAL 2 TIMES DAILY
Qty: 56 EACH | Refills: 0 | Status: SHIPPED | OUTPATIENT
Start: 2020-04-03 | End: 2020-04-27

## 2020-04-03 ASSESSMENT — MIFFLIN-ST. JEOR: SCORE: 1685.04

## 2020-04-03 ASSESSMENT — PAIN SCALES - GENERAL: PAINLEVEL: EXTREME PAIN (8)

## 2020-04-03 ASSESSMENT — ACTIVITIES OF DAILY LIVING (ADL): DEPENDENT_IADLS:: INDEPENDENT

## 2020-04-03 NOTE — PROGRESS NOTES
"Subjective     Micah Rosenthal is a 45 year old male who is being evaluated via a billable telephone visit.      The patient has been notified of following:     \"This telephone visit will be conducted via a call between you and your physician/provider. We have found that certain health care needs can be provided without the need for a physical exam.  This service lets us provide the care you need with a short phone conversation.  If a prescription is necessary we can send it directly to your pharmacy.  If lab work is needed we can place an order for that and you can then stop by our lab to have the test done at a later time.    If during the course of the call the physician/provider feels a telephone visit is not appropriate, you will not be charged for this service.\"     Patient has given verbal consent for Telephone visit?  Yes    Follow-up Buprenorphine Visit           HPI       Micah Rosenthal is here for f/u on buprenophine/naloxone (Suboxone) therapy for opioid use disorder.  Suboxone Follow Up    Micah is currently taking 8/2 mg of Buprenorphine/Naloxone 2 times daily.     Status since last visit:    Since last visit patient has been: doing well.     Intensity:     There has been: no craving.      Suboxone Dose: adequate.    Progression of Symptoms:     Cues to use and relapse none.     Recovery program has been: solid.   Accompanying Signs & Symptoms:    Side Effects: none.    Sobriety:     Status: sober     Drug Screen: Unable to do this date due to Covid 19.    Precipitating factors:    Triggers have been: none. Hasn't relapsed.   Alleviating factors:    Contact with sponsor has been: no sponsor.     Family and support system has been: helpful. SO is very, very supportive  Other Therapies Tried :     Patient has been going to recovery meetings:not at all.      Other medical concerns: No    Any ED visits? No       History   Smoking Status     Current Every Day Smoker     Packs/day: 1.50     Years: 20.00     " Types: Cigarettes     Start date: 1/1/1988   Smokeless Tobacco     Never Used       Problem, Medication and Allergy Lists were reviewed and updated if needed.  reviewed and are current..    Pharmacy Database reviewed? Yes, 4.3.20, as expected.    Patient is an established patient of this clinic..         Review of Systems:   Review of Systems  CONSTITUTIONAL: NEGATIVE for fever, chills, change in weight  INTEGUMENTARY/SKIN: NEGATIVE for worrisome rashes, moles or   PSYCHIATRIC: NEGATIVE for changes in mood or affect       Assessment/Plan:  1. Opioid use disorder (H)  He is doing very well.  No concerns. His wife is laid off. He has no use at all in any form.  Has chronic pain in his neck. We will continue on gabapentin.   - buprenorphine HCl-naloxone HCl (SUBOXONE) 8-2 MG per film; Place 1 Film under the tongue 2 times daily  Dispense: 56 each; Refill: 0    No follow-ups on file.      Phone call duration:  11 minutes    ELODIA Barrow

## 2020-04-03 NOTE — TELEPHONE ENCOUNTER
Clinic Care Coordination Contact  Care Team Conversations    CC sat in on telephone visit with pt and Florence Sneed this date.  Please refer to Florence's note for plan of care.   All questions answered.  Encouraged him to call/text CC with any questions/concerns/problems.  Verbalized understanding.    Yoli Kowalski RN-BSN  Clinic Care Coordinator  [phone] opt. #3

## 2020-04-03 NOTE — TELEPHONE ENCOUNTER
PA request received from [employer] [facility] for Suboxone 8-2MG films. Submitted request through [facility]. Waiting for response from [facility].

## 2020-04-03 NOTE — NURSING NOTE
"Chief Complaint   Patient presents with     opioid use disorder       Initial Ht 1.778 m (5' 10\")   Wt 79.4 kg (175 lb)   BMI 25.11 kg/m   Estimated body mass index is 25.11 kg/m  as calculated from the following:    Height as of this encounter: 1.778 m (5' 10\").    Weight as of this encounter: 79.4 kg (175 lb).   Patient unable to do vitals at home  Medication Reconciliation: complete  Sherita Kingsley LPN  "

## 2020-04-07 NOTE — TELEPHONE ENCOUNTER
PA NOT NEEDED - Per Amber at [facility] PA is not needed, pharmacy attempted to fill too soon. Patient able to fill as of 4/5/20. Pharmacy advised. Documentation scanned to Epic.

## 2020-04-15 ENCOUNTER — VIRTUAL VISIT (OUTPATIENT)
Dept: FAMILY MEDICINE | Facility: OTHER | Age: 45
End: 2020-04-15
Attending: PHYSICIAN ASSISTANT
Payer: COMMERCIAL

## 2020-04-15 DIAGNOSIS — R73.09 ELEVATED GLUCOSE: Primary | ICD-10-CM

## 2020-04-15 DIAGNOSIS — M79.89 LEG SWELLING: ICD-10-CM

## 2020-04-15 PROCEDURE — 99442 ZZC PHYSICIAN TELEPHONE EVALUATION 11-20 MIN: CPT | Performed by: PHYSICIAN ASSISTANT

## 2020-04-15 RX ORDER — FUROSEMIDE 20 MG
20 TABLET ORAL DAILY
Qty: 30 TABLET | Refills: 1 | Status: SHIPPED | OUTPATIENT
Start: 2020-04-15 | End: 2020-08-20

## 2020-04-15 ASSESSMENT — ENCOUNTER SYMPTOMS
ABDOMINAL DISTENTION: 0
ACTIVITY CHANGE: 1
NECK STIFFNESS: 1
POLYDIPSIA: 0
NECK PAIN: 1
SLEEP DISTURBANCE: 1
APNEA: 0
COUGH: 0
SHORTNESS OF BREATH: 0
CHEST TIGHTNESS: 0
POLYPHAGIA: 0

## 2020-04-15 ASSESSMENT — ANXIETY QUESTIONNAIRES
7. FEELING AFRAID AS IF SOMETHING AWFUL MIGHT HAPPEN: NOT AT ALL
2. NOT BEING ABLE TO STOP OR CONTROL WORRYING: NOT AT ALL
1. FEELING NERVOUS, ANXIOUS, OR ON EDGE: NOT AT ALL
5. BEING SO RESTLESS THAT IT IS HARD TO SIT STILL: NOT AT ALL
3. WORRYING TOO MUCH ABOUT DIFFERENT THINGS: NOT AT ALL
GAD7 TOTAL SCORE: 0
6. BECOMING EASILY ANNOYED OR IRRITABLE: NOT AT ALL
4. TROUBLE RELAXING: NOT AT ALL

## 2020-04-15 ASSESSMENT — PATIENT HEALTH QUESTIONNAIRE - PHQ9: SUM OF ALL RESPONSES TO PHQ QUESTIONS 1-9: 0

## 2020-04-15 NOTE — PROGRESS NOTES
"Micah Rosenthal is a 45 year old male who is being evaluated via a billable telephone visit.      The patient has been notified of following:     \"This telephone visit will be conducted via a call between you and your physician/provider. We have found that certain health care needs can be provided without the need for a physical exam.  This service lets us provide the care you need with a short phone conversation.  If a prescription is necessary we can send it directly to your pharmacy.  If lab work is needed we can place an order for that and you can then stop by our lab to have the test done at a later time.    Telephone visits are billed at different rates depending on your insurance coverage. During this emergency period, for some insurers they may be billed the same as an in-person visit.  Please reach out to your insurance provider with any questions.    If during the course of the call the physician/provider feels a telephone visit is not appropriate, you will not be charged for this service.\"    Patient has given verbal consent for Telephone visit?  Yes    How would you like to obtain your AVS? None    Subjective     Micah Rosenthal is a 45 year old male who presents to clinic today for the following health issues:    Bilateral Foot/ankle Edema      Duration: started about 1 week ago    Description (location/character/radiation): bilateral foot and ankle edema    Intensity:  moderate    Accompanying signs and symptoms: none    History (similar episodes/previous evaluation): None    Precipitating or alleviating factors: None    Therapies tried and outcome: has problems sleeping, sometimes sleeps in chair and; uses katlyn socks and they are working.               Patient Active Problem List   Diagnosis     Cervical disc disease     Pain management contract signed     Positive urine drug screen     ACP (advance care planning)     Opioid use disorder (H)     Past Surgical History:   Procedure Laterality Date     BACK " SURGERY      fusion of C5 and C6     IR CAUDAL EPIDURAL INJECTION SINGLE  12/2012     OTHER SURGICAL HISTORY  03/2013    Cervical Fusion     wisdom teeth extracted         Social History     Tobacco Use     Smoking status: Current Every Day Smoker     Packs/day: 1.50     Years: 20.00     Pack years: 30.00     Types: Cigarettes     Start date: 1/1/1988     Smokeless tobacco: Never Used   Substance Use Topics     Alcohol use: No     Alcohol/week: 0.0 standard drinks     Family History   Problem Relation Age of Onset     Unknown/Adopted Father      Cancer Maternal Uncle         throat ca     Cancer Maternal Uncle         lung ca     Cardiovascular Maternal Grandfather      Cardiovascular Paternal Grandfather          Current Outpatient Medications   Medication Sig Dispense Refill     albuterol (PROAIR HFA/PROVENTIL HFA/VENTOLIN HFA) 108 (90 BASE) MCG/ACT Inhaler Inhale 2 puffs into the lungs every 6 hours as needed for shortness of breath / dyspnea 1 Inhaler 1     buprenorphine HCl-naloxone HCl (SUBOXONE) 8-2 MG per film Place 1 Film under the tongue 2 times daily 56 each 0     cyclobenzaprine (FLEXERIL) 10 MG tablet TAKE 1/2 TO 1 TABLET BY MOUTH 3 TIMES DAILY AS NEEDED FOR MUSCLE SPASMS 90 tablet 0     diclofenac (VOLTAREN) 50 MG EC tablet Take 1 tablet (50 mg) by mouth 2 times daily 60 tablet 1     gabapentin (NEURONTIN) 300 MG capsule TAKE 2 CAPSULES BY MOUTH 3 TIMES DAILY 180 capsule 0     naloxone 4 MG/0.1ML NA nasal spray Spray 1 spray (4 mg) into one nostril alternating nostrils as needed for opioid reversal every 2-3 minutes until assistance arrives 0.2 mL 0     Allergies   Allergen Reactions     Benzodiazepines      On Suboxone - contraindicated.     Cranberry      Strawberry      Doxycycline Nausea and Vomiting     Zithromax [Azithromycin Dihydrate] Rash     Recent Labs   Lab Test 02/21/20  1037 02/09/15  1634   ALT 31 20   CR 0.76 0.68   GFRESTIMATED >90 >90  Non African American GFR Calc     GFRESTBLACK >90  >90   GFR Calc     POTASSIUM 3.6 3.3*   TSH  --  0.88      BP Readings from Last 3 Encounters:   03/13/20 100/64   02/28/20 98/60   02/21/20 112/72    Wt Readings from Last 3 Encounters:   04/03/20 79.4 kg (175 lb)   03/13/20 79.8 kg (176 lb)   02/28/20 79.8 kg (176 lb)                    Reviewed and updated as needed this visit by Provider         Review of Systems   Constitutional: Positive for activity change.   Respiratory: Negative for apnea, cough, chest tightness and shortness of breath.    Cardiovascular: Positive for peripheral edema. Negative for chest pain.   Gastrointestinal: Negative for abdominal distention.   Endocrine: Negative for polydipsia, polyphagia and polyuria.   Musculoskeletal: Positive for neck pain and neck stiffness.   Psychiatric/Behavioral: Positive for sleep disturbance.             Objective   Reported vitals:  There were no vitals taken for this visit.   healthy, alert and no distress  PSYCH: Alert and oriented times 3; coherent speech, normal   rate and volume, able to articulate logical thoughts, able   to abstract reason, no tangential thoughts, no hallucinations   or delusions  His affect is normal  RESP: No cough, no audible wheezing, able to talk in full sentences  Remainder of exam unable to be completed due to telephone visits    Diagnostic Test Results:  Labs reviewed in Epic        Assessment/Plan:  1. Elevated glucose  His glucose is running high and his last labs were 140.  Drinking a 12 pack of [place] a day.  Drinks day and night. Smoking non stop.  Discussion on both these habits as being life shortening. He is aware of this.  Plus his legs could be having some PVD.  We will check labs and make changes and send to his primary.   - Hemoglobin A1c; Future  - Comprehensive metabolic panel; Future  - CBC with platelets differential; Future    2. Leg swelling  Add in low dose lasix. Places salt on everything. l egs are down at night as he is up in a chair  all night due to neck pain.  Will try to get legs elevated. His ALLY hose are helping.  Feeling better than yesterday.  If having to use the lasix daily we will check K level and add in if needed.   Again heavy smoking can lead to more PVD in younger ages.  Combined with elevated glucose risk are discussed.   - furosemide (LASIX) 20 MG tablet; Take 1 tablet (20 mg) by mouth daily  Dispense: 30 tablet; Refill: 1  - Comprehensive metabolic panel; Future  - Albumin Random Urine Quantitative with Creat Ratio; Future  - TSH with free T4 reflex; Future    No follow-ups on file.      Phone call duration:  13  minutes    ELODIA Barrow

## 2020-04-16 ASSESSMENT — ANXIETY QUESTIONNAIRES: GAD7 TOTAL SCORE: 0

## 2020-04-27 ENCOUNTER — PATIENT OUTREACH (OUTPATIENT)
Dept: CARE COORDINATION | Facility: OTHER | Age: 45
End: 2020-04-27

## 2020-04-27 ENCOUNTER — VIRTUAL VISIT (OUTPATIENT)
Dept: FAMILY MEDICINE | Facility: OTHER | Age: 45
End: 2020-04-27
Attending: PHYSICIAN ASSISTANT
Payer: COMMERCIAL

## 2020-04-27 DIAGNOSIS — F11.90 OPIOID USE DISORDER: ICD-10-CM

## 2020-04-27 PROCEDURE — 99213 OFFICE O/P EST LOW 20 MIN: CPT | Mod: 95 | Performed by: PHYSICIAN ASSISTANT

## 2020-04-27 RX ORDER — BUPRENORPHINE AND NALOXONE 8; 2 MG/1; MG/1
1 FILM, SOLUBLE BUCCAL; SUBLINGUAL 2 TIMES DAILY
Qty: 60 EACH | Refills: 0 | Status: SHIPPED | OUTPATIENT
Start: 2020-04-27 | End: 2020-05-28

## 2020-04-27 ASSESSMENT — PAIN SCALES - GENERAL: PAINLEVEL: SEVERE PAIN (6)

## 2020-04-27 ASSESSMENT — ACTIVITIES OF DAILY LIVING (ADL): DEPENDENT_IADLS:: INDEPENDENT

## 2020-04-27 NOTE — PROGRESS NOTES
"Clinic Care Coordination Contact  Care Team Conversations    CC \"attended\" telephone visit with pt this date.  Please refer to Florence's note for plan of care.  All questions answered.  Encouraged him to call/text CC with any questions/concerns/problems.  Verbalized understanding.    Yoli Kowalski RN-BSN  Clinic Care Coordinator  [phone] opt. #[phone]            "

## 2020-04-27 NOTE — PROGRESS NOTES
"Micah Rosenthal is a 45 year old male who is being evaluated via a billable telephone visit.      The patient has been notified of following:     \"This telephone visit will be conducted via a call between you and your physician/provider. We have found that certain health care needs can be provided without the need for a physical exam.  This service lets us provide the care you need with a short phone conversation.  If a prescription is necessary we can send it directly to your pharmacy.  If lab work is needed we can place an order for that and you can then stop by our lab to have the test done at a later time.    Telephone visits are billed at different rates depending on your insurance coverage. During this emergency period, for some insurers they may be billed the same as an in-person visit.  Please reach out to your insurance provider with any questions.    If during the course of the call the physician/provider feels a telephone visit is not appropriate, you will not be charged for this service.\"    Patient has given verbal consent for Telephone visit?  Yes    How would you like to obtain your AVS? Mail a copy    Subjective     Micah Rosenthal is a 45 year old male who presents to clinic today for the following health issues:    HPI  Bilateral foot/ankle edema       Duration: around 2-3 weeks.    Description (location/character/radiation): Reports his BLE has resolved.    Intensity:  Resolved    Accompanying signs and symptoms: NA    History (similar episodes/previous evaluation): None    Precipitating or alleviating factors: ALLY socks, decreasing salt intake, decreasing Mt. Dew intake    Therapies tried and outcome: ALLY socks helped.  Elevation has helped as well.      Follow-up Buprenorphine Visit    Micah is currently taking 8/2 mg of Buprenorphine/Naloxone 2 times daily.     Status since last visit:    Since last visit patient has been: doing well.   Started medical cannabis 2 weeks ago.  Doing well with this.  "   Intensity:     There has been: no craving.      Suboxone Dose: adequate.    Progression of Symptoms:     Cues to use and relapse None    Recovery program has been: solid.   Accompanying Signs & Symptoms:    Side Effects: none.    Sobriety:     Status: no use since last visit.      Drug Screen:   Unable to obtain due to Covid 19  Precipitating factors:    Triggers have been: non-existent.   Alleviating factors:    Contact with sponsor has been: no sponsor.     Family and support system has been: helpful.   Other Therapies Tried :     Patient has been going to recovery meetings:not at all.      Other medical concerns: No    Any ED visits? No       History   Smoking Status     Current Every Day Smoker     Packs/day: 1.50     Years: 20.00     Types: Cigarettes     Start date: 1/1/1988   Smokeless Tobacco     Never Used       Problem, Medication and Allergy Lists were reviewed and updated if needed.  reviewed and are current..    Pharmacy Database reviewed? Yes, 4.27.20, as expected.    Patient is an established patient of this clinic..    Assessment and Plan     Was naloxone nasal spray prescribed? No Details: has some at home.  1. Opioid use disorder (H)  He is doing just fantastic. His leg edema resolved he did finally sleep in a bed that may have helped him.   He is feeling no craving and is on medical marijuana program.   - buprenorphine HCl-naloxone HCl (SUBOXONE) 8-2 MG per film; Place 1 Film under the tongue 2 times daily  Dispense: 60 each; Refill: 0    Dosage will not need adjustment today.  Continue at 8/2 mg  2 time(s) a day of  buprenorphine/naloxone (Suboxone).     Follow up Plan  Stage 3 (4 weeks): Please make a clinic appointment for follow up with me (BLANCHE GAONA) or another Suboxone provider in 1 month for suboxone follow up.    Greater than 12 minutes was spent with this patient, over half of which was on counseling and coordination of care which includes importance of recovery activities,which  may include  attendance at NA/AA meetings, sober support network, and the importance of not isolating, being open, honest, and willing to change, possible triggers and how to avoid them, and managing cravings.    There are no discontinued medications.    Options for treatment and follow-up care were reviewed with the patient. Micah engaged in the decision making process and verbalized understanding of the options discussed and agreed with the final plan.      Reviewed and updated as needed this visit by Provider    Phone call duration:  12 minutes    ELODIA Barrow    I saw and examined this patient.  I have read through the note and made appropriate changes and agree with the RN Care Coordinator's assessment and plan.

## 2020-05-07 DIAGNOSIS — K08.89 PAIN, DENTAL: ICD-10-CM

## 2020-05-07 RX ORDER — GABAPENTIN 300 MG/1
CAPSULE ORAL
Qty: 180 CAPSULE | Refills: 0 | Status: SHIPPED | OUTPATIENT
Start: 2020-05-07 | End: 2020-09-09

## 2020-05-07 NOTE — TELEPHONE ENCOUNTER
Gabapentin      Last Written Prescription Date:  3.17.20  Last Fill Quantity: #180,   # refills: 0  Last Office Visit: 4.27.20  Future Office visit:       Routing refill request to provider for review/approval because:  Drug not on the [facility], P or [facility] refill protocol or controlled substance

## 2020-05-28 DIAGNOSIS — F11.90 OPIOID USE DISORDER: ICD-10-CM

## 2020-05-28 RX ORDER — BUPRENORPHINE AND NALOXONE 8; 2 MG/1; MG/1
1 FILM, SOLUBLE BUCCAL; SUBLINGUAL 2 TIMES DAILY
Qty: 60 EACH | Refills: 0 | Status: SHIPPED | OUTPATIENT
Start: 2020-05-28 | End: 2020-06-25

## 2020-05-28 NOTE — TELEPHONE ENCOUNTER
Suboxone - missed call yesterday - was out mowin lawn      Last Written Prescription Date:  4.27.20  Last Fill Quantity: #60,   # refills: 0  Last Office Visit: 4.27.20  Future Office visit:       Routing refill request to provider for review/approval because:  Drug not on the [facility], P or [facility] refill protocol or controlled substance

## 2020-06-03 ENCOUNTER — VIRTUAL VISIT (OUTPATIENT)
Dept: FAMILY MEDICINE | Facility: OTHER | Age: 45
End: 2020-06-03
Attending: PHYSICIAN ASSISTANT
Payer: COMMERCIAL

## 2020-06-03 ENCOUNTER — PATIENT OUTREACH (OUTPATIENT)
Dept: CARE COORDINATION | Facility: OTHER | Age: 45
End: 2020-06-03

## 2020-06-03 DIAGNOSIS — F11.90 OPIOID USE DISORDER: Primary | ICD-10-CM

## 2020-06-03 PROCEDURE — 99442 ZZC PHYSICIAN TELEPHONE EVALUATION 11-20 MIN: CPT | Mod: 95 | Performed by: PHYSICIAN ASSISTANT

## 2020-06-03 ASSESSMENT — ACTIVITIES OF DAILY LIVING (ADL): DEPENDENT_IADLS:: INDEPENDENT

## 2020-06-03 NOTE — PROGRESS NOTES
"Clinic Care Coordination Contact  Care Team Conversations    CC \"attended\" telephone visit with pt and Florence Sneed this date.  Please refer to Florence's note for plan of care.  All questions answered.  Encouraged him to call/text CC with any questions/concerns/problems.  Verbalized understanding.    Yoli Kowalski RN-BSN  Clinic Care Coordinator  [phone] opt. #[phone]            "

## 2020-06-03 NOTE — PROGRESS NOTES
"Micah Rosenthal is a 45 year old male who is being evaluated via a billable telephone visit.      The patient has been notified of following:     \"This telephone visit will be conducted via a call between you and your physician/provider. We have found that certain health care needs can be provided without the need for a physical exam.  This service lets us provide the care you need with a short phone conversation.  If a prescription is necessary we can send it directly to your pharmacy.  If lab work is needed we can place an order for that and you can then stop by our lab to have the test done at a later time.    Telephone visits are billed at different rates depending on your insurance coverage. During this emergency period, for some insurers they may be billed the same as an in-person visit.  Please reach out to your insurance provider with any questions.    If during the course of the call the physician/provider feels a telephone visit is not appropriate, you will not be charged for this service.\"    Patient has given verbal consent for Telephone visit?  Yes    What phone number would you like to be contacted at? [phone]    How would you like to obtain your AVS? Declines avs    Follow-up Buprenorphine Visit           HPI       Micah Rosenthal is here for f/u on buprenophine/naloxone (Suboxone) therapy for opioid use disorder.  Recheck Medication      Micah is currently taking 8/2 mg of Buprenorphine/Naloxone 2 times daily.     Status since last visit:    Since last visit patient has been: doing well.     Intensity:     There has been: no craving.      Suboxone Dose: adequate.  And not having any break through.   Progression of Symptoms:     Cues to use and relapse none    Recovery program has been: solid.   Accompanying Signs & Symptoms:    Side Effects: none.    Sobriety:     Status: no use since last visit.      Drug Screen:   Unable to obtain due to Covid 19  Precipitating factors:    Triggers have been: " non-existent.   Alleviating factors:    Contact with sponsor has been: regular.     Family and support system has been: helpful.   Other Therapies Tried :     Patient has been going to recovery meetings:regularly.      Other medical concerns: No    Any ED visits? No       History   Smoking Status     Current Every Day Smoker     Packs/day: 1.50     Years: 20.00     Types: Cigarettes     Start date: 1/1/1988   Smokeless Tobacco     Never Used       Problem, Medication and Allergy Lists were reviewed and updated if needed.  reviewed and are current..    Pharmacy Database reviewed? Yes, 6.3.20, as expected.    Patient is an established patient of this clinic..      Assessment and Plan     Was naloxone nasal spray prescribed? Yes Details: has this at home.  1. Opioid use disorder (H)  He is doing great on medical THC for chronic pain.  He is feeling it is controlled. Is on Suboxone. He is not craving anything.     Dosage will not need adjustment today.  Continue at 8 mg  2 time(s) a day of  buprenorphine/naloxone (Suboxone).     Follow up Plan  appointment was made/     Greater than 15  minutes was spent with this patient, over half of which was on counseling and coordination of care which includes importance of recovery activities,which may include  attendance at NA/AA meetings, sober support network, and the importance of not isolating, being open, honest, and willing to change, possible triggers and how to avoid them, and managing cravings.    There are no discontinued medications.    Options for treatment and follow-up care were reviewed with the patient. Micah engaged in the decision making process and verbalized understanding of the options discussed and agreed with the final plan.    Phone call duration:  15  minutes    ELODIA Barrow

## 2020-06-24 NOTE — PROGRESS NOTES
Follow-up Buprenorphine Visit           HPI       Micah Rosenthal is here for f/u on buprenophine/naloxone (Suboxone) therapy for opioid use disorder.  Clinic Care Coordination - Follow-up      Micah is currently taking 8/2 mg of Buprenorphine/Naloxone 2 times daily.     Status since last visit:    Since last visit patient has been: doing well.     Intensity:     There has been: no craving.      Suboxone Dose: adequate.    Progression of Symptoms:     Cues to use and relapse None    Recovery program has been: solid.   Accompanying Signs & Symptoms:    Side Effects: none.    Sobriety:     Status: no use since last visit.      Drug Screen: obtained.    Precipitating factors:    Triggers have been: non-existent.   Alleviating factors:    Contact with sponsor has been: no sponsor.     Family and support system has been: helpful.   Other Therapies Tried :     Patient has been going to recovery meetings:not at all.      Other medical concerns: No    Any ED visits? No       History   Smoking Status     Current Every Day Smoker     Packs/day: 1.50     Years: 20.00     Types: Cigarettes     Start date: 1/1/1988   Smokeless Tobacco     Never Used       Problem, Medication and Allergy Lists were reviewed and updated if needed.  reviewed and are current..    Pharmacy Database reviewed? Yes, 6.25.20, as expected.    Patient is an established patient of this clinic..         Review of Systems:   Review of Systems  CONSTITUTIONAL: NEGATIVE for fever, chills, change in weight  INTEGUMENTARY/SKIN: NEGATIVE for worrisome rashes, moles or lesions  EYES: NEGATIVE for vision changes or irritation  ENT/MOUTH: NEGATIVE for ear, mouth and throat problems  RESP: NEGATIVE for significant cough or SOB  CV: NEGATIVE for chest pain, palpitations or peripheral edema  GI: NEGATIVE for nausea, abdominal pain, heartburn, or change in bowel habits  : NEGATIVE for frequency, dysuria, or hematuria  MUSCULOSKELETAL: NEGATIVE for significant arthralgias  "or myalgia  NEURO: NEGATIVE for weakness, dizziness or paresthesias  ENDOCRINE: NEGATIVE for temperature intolerance, skin/hair changes  PSYCHIATRIC: NEGATIVE for changes in mood or affect          Physical Exam:     Vitals:    06/25/20 1001   BP: 108/64   BP Location: Right arm   Patient Position: Sitting   Cuff Size: Adult Regular   Pulse: 75   Temp: 97.1  F (36.2  C)   TempSrc: Tympanic   SpO2: 98%   Weight: 84.5 kg (186 lb 3.2 oz)   Height: 1.778 m (5' 10\")     Body mass index is 26.72 kg/m .  Vitals were reviewed   Physical Exam  GENERAL APPEARANCE: alert, comfortable appearing  EYES: Eyes grossly normal to inspection  HENT:  nose no rhinorrhea  RESP: lungs clear to auscultation - no rales, rhonchi or wheezes and no resp distress  CV: regular rates and rhythm, normal S1 S2,no murmur   ABDOMEN:soft, non-tender, non-distended, no hepatosplenomegaly or masses  SKIN: no rashes, no jaundice, no obvious masses.   NEURO:  no tremor  MENTAL STATUS EXAM:  Appearance/Behavior:No apparent distress  Speech: Normal  Mood/Affect: normal affect  Insight: Adequate      Results:    Results from the last 24 hours  Results for orders placed or performed in visit on 06/25/20 (from the past 24 hour(s))   Urine Drugs of Abuse Screen (Tox13)   Result Value Ref Range    Cannabinoids (97-cgw-6-carboxy-9-THC) Detected, Abnormal Result (A) NDET^Not Detected ng/mL    Phencyclidine (Phencyclidine) Not Detected NDET^Not Detected ng/mL    Cocaine (Benzoylecgonine) Not Detected NDET^Not Detected ng/mL    Methamphetamine (d-Methamphetamine) Not Detected NDET^Not Detected ng/mL    Opiates (Morphine) Not Detected NDET^Not Detected ng/mL    Amphetamine (d-Amphetamine) Not Detected NDET^Not Detected ng/mL    Benzodiazepines (Nordiazepam) Not Detected NDET^Not Detected ng/mL    Tricyclic Antidepressants (Desipramine) Not Detected NDET^Not Detected ng/mL    Methadone (Methadone) Not Detected NDET^Not Detected ng/mL    Barbiturates (Butalbital) Not " Detected NDET^Not Detected ng/mL    Oxycodone (Oxycodone) Not Detected NDET^Not Detected ng/mL    Propoxyphene (Norpropoxyphene) Not Detected NDET^Not Detected ng/mL    Buprenorphine (Buprenorphine) Detected, Abnormal Result (A) NDET^Not Detected ng/mL       Assessment and Plan     Was naloxone nasal spray prescribed? No Details: has some at home.  1. Opioid use disorder (H)  He is doing great. He is on medical THC helps his pain and helping his mood and anxiety. No use of any opoid like drugs.  He is working through the courts on a  Charge of possession of opoid's. He is feeling over all good. Home life is very stable. .   - Urine Drugs of Abuse Screen (Tox13)    Dosage will not need adjustment today.  Continue at 8/2 mg  2 time(s) a day of  buprenorphine/naloxone (Suboxone).     Follow up Plan  Stage 3 (4 weeks): Please make a clinic appointment for follow up with me (BLANCHE GAONA) or another Suboxone provider in 1 month for suboxone follow up.    Greater than 15  minutes was spent with this patient, over half of which was on counseling and coordination of care which includes importance of recovery activities,which may include  attendance at NA/AA meetings, sober support network, and the importance of not isolating, being open, honest, and willing to change, possible triggers and how to avoid them, and managing cravings.    There are no discontinued medications.    Options for treatment and follow-up care were reviewed with the patient. Micah engaged in the decision making process and verbalized understanding of the options discussed and agreed with the final plan.    ELODIA Barrow

## 2020-06-25 ENCOUNTER — OFFICE VISIT (OUTPATIENT)
Dept: FAMILY MEDICINE | Facility: OTHER | Age: 45
End: 2020-06-25
Attending: PHYSICIAN ASSISTANT
Payer: COMMERCIAL

## 2020-06-25 ENCOUNTER — PATIENT OUTREACH (OUTPATIENT)
Dept: CARE COORDINATION | Facility: OTHER | Age: 45
End: 2020-06-25

## 2020-06-25 VITALS
HEART RATE: 75 BPM | BODY MASS INDEX: 26.66 KG/M2 | OXYGEN SATURATION: 98 % | SYSTOLIC BLOOD PRESSURE: 108 MMHG | WEIGHT: 186.2 LBS | HEIGHT: 70 IN | DIASTOLIC BLOOD PRESSURE: 64 MMHG | TEMPERATURE: 97.1 F

## 2020-06-25 DIAGNOSIS — F11.90 OPIOID USE DISORDER: Primary | ICD-10-CM

## 2020-06-25 LAB
AMPHETAMINES UR QL: NOT DETECTED NG/ML
BARBITURATES UR QL SCN: NOT DETECTED NG/ML
BENZODIAZ UR QL SCN: NOT DETECTED NG/ML
BUPRENORPHINE UR QL: ABNORMAL NG/ML
CANNABINOIDS UR QL: ABNORMAL NG/ML
COCAINE UR QL SCN: NOT DETECTED NG/ML
D-METHAMPHET UR QL: NOT DETECTED NG/ML
METHADONE UR QL SCN: NOT DETECTED NG/ML
OPIATES UR QL SCN: NOT DETECTED NG/ML
OXYCODONE UR QL SCN: NOT DETECTED NG/ML
PCP UR QL SCN: NOT DETECTED NG/ML
PROPOXYPH UR QL: NOT DETECTED NG/ML
TRICYCLICS UR QL SCN: NOT DETECTED NG/ML

## 2020-06-25 PROCEDURE — 99213 OFFICE O/P EST LOW 20 MIN: CPT | Performed by: PHYSICIAN ASSISTANT

## 2020-06-25 PROCEDURE — G0463 HOSPITAL OUTPT CLINIC VISIT: HCPCS

## 2020-06-25 PROCEDURE — 80306 DRUG TEST PRSMV INSTRMNT: CPT | Mod: ZL | Performed by: PHYSICIAN ASSISTANT

## 2020-06-25 RX ORDER — BUPRENORPHINE AND NALOXONE 8; 2 MG/1; MG/1
1 FILM, SOLUBLE BUCCAL; SUBLINGUAL 2 TIMES DAILY
Qty: 60 EACH | Refills: 0 | Status: SHIPPED | OUTPATIENT
Start: 2020-06-25 | End: 2020-07-23

## 2020-06-25 ASSESSMENT — MIFFLIN-ST. JEOR: SCORE: 1735.85

## 2020-06-25 ASSESSMENT — ACTIVITIES OF DAILY LIVING (ADL): DEPENDENT_IADLS:: INDEPENDENT

## 2020-06-25 NOTE — NURSING NOTE
"Chief Complaint   Patient presents with     Clinic Care Coordination - Follow-up       Initial /64 (BP Location: Right arm, Patient Position: Sitting, Cuff Size: Adult Regular)   Pulse 75   Temp 97.1  F (36.2  C) (Tympanic)   Ht 1.778 m (5' 10\")   Wt 84.5 kg (186 lb 3.2 oz)   SpO2 98%   BMI 26.72 kg/m   Estimated body mass index is 26.72 kg/m  as calculated from the following:    Height as of this encounter: 1.778 m (5' 10\").    Weight as of this encounter: 84.5 kg (186 lb 3.2 oz).  Medication Reconciliation: complete  Trista Brown LPN  "

## 2020-06-25 NOTE — PROGRESS NOTES
Clinic Care Coordination Contact  Care Team Conversations    CC met face to face with pt this date.  Please refer to Florence's note for plan of care.  All questions answered.  Encouraged him to call/text CC with any questions/concerns/problems.  Verbalized understanding.    Yoli Kowalski RN-BSN  Clinic Care Coordinator  [phone] opt. #[phone]

## 2020-07-22 NOTE — PROGRESS NOTES
"Micah Rosnethal is a 45 year old male who is being evaluated via a billable telephone visit.      The patient has been notified of following:     \"This telephone visit will be conducted via a call between you and your physician/provider. We have found that certain health care needs can be provided without the need for a physical exam.  This service lets us provide the care you need with a short phone conversation.  If a prescription is necessary we can send it directly to your pharmacy.  If lab work is needed we can place an order for that and you can then stop by our lab to have the test done at a later time.    Telephone visits are billed at different rates depending on your insurance coverage. During this emergency period, for some insurers they may be billed the same as an in-person visit.  Please reach out to your insurance provider with any questions.    If during the course of the call the physician/provider feels a telephone visit is not appropriate, you will not be charged for this service.\"    Patient has given verbal consent for Telephone visit?  Yes    What phone number would you like to be contacted at? [phone]    How would you like to obtain your AVS? Mail a copy    Follow-up Buprenorphine Visit           HPI       Micah Rosenthal is here for f/u on buprenophine/naloxone (Suboxone) therapy for opioid use disorder.  No chief complaint on file.      Micah is currently taking 8/2 mg of Buprenorphine/Naloxone 2 times daily.     Status since last visit:    Since last visit patient has been: doing well.     Intensity:     There has been: no craving.      Suboxone Dose: adequate.    Progression of Symptoms:     Cues to use and relapse     Recovery program has been: solid.   Accompanying Signs & Symptoms:    Side Effects: present.    Sobriety:     Status: no use since last visit.      Drug Screen:   Unable to obtain due to Covid 19  Precipitating factors:    Triggers have been: mild.   Alleviating " factors:    Contact with sponsor has been: no sponsor.     Family and support system has been: helpful.   Other Therapies Tried :     Patient has been going to recovery meetings:not at all.      Other medical concerns: No    Any ED visits? No       History   Smoking Status     Current Every Day Smoker     Packs/day: 1.50     Years: 20.00     Types: Cigarettes     Start date: 1/1/1988   Smokeless Tobacco     Never Used       Problem, Medication and Allergy Lists were reviewed and updated if needed.  reviewed and are current..    Pharmacy Database reviewed? Yes, 7.23.20, as expected.    Patient is an established patient of this clinic..    Assessment and Plan     Was naloxone nasal spray prescribed? Yes.  1. Opioid use disorder (H)  Doing very well.  Congested and coughing.  No temp tired fatigued  - buprenorphine HCl-naloxone HCl (SUBOXONE) 8-2 MG per film; Place 1 Film under the tongue 2 times daily  Dispense: 60 each; Refill: 0    2. Bronchitis  Get covid wife working in [employer].    - amoxicillin (AMOXIL) 500 MG capsule; Take 1 capsule (500 mg) by mouth 3 times daily  Dispense: 30 capsule; Refill: 1  - Symptomatic COVID-19 Virus (Coronavirus) by PCR; Future  - albuterol (PROAIR HFA/PROVENTIL HFA/VENTOLIN HFA) 108 (90 Base) MCG/ACT inhaler; Inhale 2 puffs into the lungs every 6 hours as needed for shortness of breath / dyspnea  Dispense: 1 Inhaler; Refill: 1    Dosage will not need adjustment today.  Continue at 8 mg  2 time(s) a day of  buprenorphine/naloxone (Suboxone).     Follow up Plan  Stage 3 (4 weeks): Please make a clinic appointment for follow up with me (BLANCHE GAONA) or another Suboxone provider in 1 month for suboxone follow up.    Greater than 11 minutes was spent with this patient, over half of which was on counseling and coordination of care which includes importance of recovery activities,which may include  attendance at NA/AA meetings, sober support network, and the importance of not  isolating, being open, honest, and willing to change, possible triggers and how to avoid them, and managing cravings.    There are no discontinued medications.    Options for treatment and follow-up care were reviewed with the patient. Micah engaged in the decision making process and verbalized understanding of the options discussed and agreed with the final plan.      Phone call duration:  11 minutes    ELODIA Barrow

## 2020-07-23 ENCOUNTER — VIRTUAL VISIT (OUTPATIENT)
Dept: FAMILY MEDICINE | Facility: OTHER | Age: 45
End: 2020-07-23
Attending: PHYSICIAN ASSISTANT
Payer: COMMERCIAL

## 2020-07-23 DIAGNOSIS — J40 BRONCHITIS: ICD-10-CM

## 2020-07-23 DIAGNOSIS — R05.9 COUGH: Primary | ICD-10-CM

## 2020-07-23 DIAGNOSIS — F11.90 OPIOID USE DISORDER: Primary | ICD-10-CM

## 2020-07-23 PROCEDURE — U0003 INFECTIOUS AGENT DETECTION BY NUCLEIC ACID (DNA OR RNA); SEVERE ACUTE RESPIRATORY SYNDROME CORONAVIRUS 2 (SARS-COV-2) (CORONAVIRUS DISEASE [COVID-19]), AMPLIFIED PROBE TECHNIQUE, MAKING USE OF HIGH THROUGHPUT TECHNOLOGIES AS DESCRIBED BY CMS-2020-01-R: HCPCS | Mod: ZL | Performed by: PHYSICIAN ASSISTANT

## 2020-07-23 PROCEDURE — 99442 ZZC PHYSICIAN TELEPHONE EVALUATION 11-20 MIN: CPT | Performed by: PHYSICIAN ASSISTANT

## 2020-07-23 RX ORDER — ALBUTEROL SULFATE 90 UG/1
2 AEROSOL, METERED RESPIRATORY (INHALATION) EVERY 6 HOURS PRN
Qty: 1 INHALER | Refills: 1 | Status: SHIPPED | OUTPATIENT
Start: 2020-07-23

## 2020-07-23 RX ORDER — BUPRENORPHINE AND NALOXONE 8; 2 MG/1; MG/1
1 FILM, SOLUBLE BUCCAL; SUBLINGUAL 2 TIMES DAILY
Qty: 60 EACH | Refills: 0 | Status: SHIPPED | OUTPATIENT
Start: 2020-07-23 | End: 2020-08-21

## 2020-07-23 RX ORDER — AMOXICILLIN 500 MG/1
500 CAPSULE ORAL 3 TIMES DAILY
Qty: 30 CAPSULE | Refills: 1 | Status: SHIPPED | OUTPATIENT
Start: 2020-07-23 | End: 2020-08-20

## 2020-07-23 NOTE — NURSING NOTE
"Chief Complaint   Patient presents with     Clinic Care Coordination - Follow-up       Initial There were no vitals taken for this visit. Estimated body mass index is 26.72 kg/m  as calculated from the following:    Height as of 6/25/20: 1.778 m (5' 10\").    Weight as of 6/25/20: 84.5 kg (186 lb 3.2 oz).  Medication Reconciliation: complete  Trista Brown LPN  "

## 2020-07-25 LAB
SARS-COV-2 RNA SPEC QL NAA+PROBE: NOT DETECTED
SPECIMEN SOURCE: NORMAL

## 2020-08-19 NOTE — PROGRESS NOTES
Follow-up Buprenorphine Visit           HPI       Micah Rosenthal is here for f/u on buprenophine/naloxone (Suboxone) therapy for opioid use disorder.  No chief complaint on file.      Micah is currently taking 8/2 mg of Buprenorphine/Naloxone  2 times  daily.     Status since last visit:    Since last visit patient has been: doing well.     Intensity:     There has been: no craving.      Suboxone Dose: adequate.    Progression of Symptoms:     Cues to use and relapse none        Recovery program has been: solid.   Accompanying Signs & Symptoms:    Side Effects: none.    Sobriety:     Status: no use since last visit.      Drug Screen: obtained.    Precipitating factors:    Triggers have been: non-existent.   Alleviating factors:    Contact with sponsor has been: regular.     Family and support system has been: helpful.   Other Therapies Tried :     Patient has been going to recovery meetings:regularly.      Other medical concerns:  YES small lymph node on his left neck and choking.    Any ED visits? No       History   Smoking Status     Current Every Day Smoker     Packs/day: 1.50     Years: 20.00     Types: Cigarettes     Start date: 1/1/1988   Smokeless Tobacco     Never Used       Problem, Medication and Allergy Lists were reviewed and updated if needed.  reviewed and are current..    Pharmacy Database reviewed? Yes, 8.20.20, as expected.    Patient is an established patient of this clinic..         Review of Systems:   Review of Systems  CONSTITUTIONAL: NEGATIVE for fever, chills, change in weight  INTEGUMENTARY/SKIN: NEGATIVE for worrisome rashes, moles or lesions  EYES: NEGATIVE for vision changes or irritation  ENT/MOUTH: NEGATIVE for ear, mouth and throat problems  RESP: NEGATIVE for significant cough or SOB  CV: NEGATIVE for chest pain, palpitations or peripheral edema  GI: NEGATIVE for nausea, abdominal pain, heartburn, or change in bowel habits  : NEGATIVE for frequency, dysuria, or  hematuria  MUSCULOSKELETAL: NEGATIVE for significant arthralgias or myalgia  NEURO: NEGATIVE for weakness, dizziness or paresthesias  ENDOCRINE: NEGATIVE for temperature intolerance, skin/hair changes  PSYCHIATRIC: NEGATIVE for changes in mood or affect          Physical Exam:   There were no vitals filed for this visit.  There is no height or weight on file to calculate BMI.  Vitals were reviewed and were normal  Vitals were reviewed   Physical Exam  GENERAL APPEARANCE: alert, comfortable appearing  EYES: Eyes grossly normal to inspection  HENT:  nose no rhinorrhea  RESP: lungs clear to auscultation - no rales, rhonchi or wheezes and no resp distress  CV: regular rates and rhythm, normal S1 S2,no murmur   ABDOMEN:soft, non-tender, non-distended, no hepatosplenomegaly or masses  SKIN: no rashes, no jaundice, no obvious masses.   NEURO:  no tremor  MENTAL STATUS EXAM:  Appearance/Behavior:No apparent distress  Speech: Normal  Mood/Affect: normal affect  Insight: Adequate      Results:    Results from the last 24 hoursNo results found for this or any previous visit (from the past 24 hour(s)).    Assessment and Plan     Was naloxone nasal spray prescribed? Yes.  1. Opioid use disorder (H)  He is doing really well. He is not used in 6 months tomorrow.   - Urine Drugs of Abuse Screen (Tox13)    2. Choking, initial encounter  After neck surgery he has been choking. He is going to see ENT and see if anything is in his upper airway region.  He has lost 20 lbs are this has gotten worse.   - OTOLARYNGOLOGY REFERRAL    3. Enlarged lymph node  Left neck small mobile. We will watch.       Dosage will need adjustment today.  Continue at 8 mg  8 time(s) a day of  buprenorphine/naloxone (Suboxone).     Follow up Plan  Stage 3 (4 weeks): Please make a clinic appointment for follow up with me (BLANCHE GAONA) or another Suboxone provider in 1 month for suboxone follow up.    Greater than 25  minutes was spent with this patient,  over half of which was on counseling and coordination of care which includes importance of recovery activities,which may include  attendance at NA/AA meetings, sober support network, and the importance of not isolating, being open, honest, and willing to change, possible triggers and how to avoid them, and managing cravings.    There are no discontinued medications.    Options for treatment and follow-up care were reviewed with the patient. Micah engaged in the decision making process and verbalized understanding of the options discussed and agreed with the final plan.    ELODIA Barrow

## 2020-08-20 ENCOUNTER — ANCILLARY PROCEDURE (OUTPATIENT)
Dept: GENERAL RADIOLOGY | Facility: OTHER | Age: 45
End: 2020-08-20
Attending: PHYSICIAN ASSISTANT
Payer: COMMERCIAL

## 2020-08-20 ENCOUNTER — OFFICE VISIT (OUTPATIENT)
Dept: FAMILY MEDICINE | Facility: OTHER | Age: 45
End: 2020-08-20
Attending: PHYSICIAN ASSISTANT
Payer: COMMERCIAL

## 2020-08-20 VITALS
HEIGHT: 70 IN | BODY MASS INDEX: 24.05 KG/M2 | SYSTOLIC BLOOD PRESSURE: 106 MMHG | HEART RATE: 78 BPM | WEIGHT: 168 LBS | OXYGEN SATURATION: 96 % | DIASTOLIC BLOOD PRESSURE: 60 MMHG | TEMPERATURE: 96.4 F

## 2020-08-20 DIAGNOSIS — R59.9 ENLARGED LYMPH NODE: ICD-10-CM

## 2020-08-20 DIAGNOSIS — T17.308A CHOKING, INITIAL ENCOUNTER: Primary | ICD-10-CM

## 2020-08-20 DIAGNOSIS — T17.308A CHOKING, INITIAL ENCOUNTER: ICD-10-CM

## 2020-08-20 DIAGNOSIS — M79.89 LEG SWELLING: ICD-10-CM

## 2020-08-20 DIAGNOSIS — R73.09 ELEVATED GLUCOSE: ICD-10-CM

## 2020-08-20 DIAGNOSIS — F11.90 OPIOID USE DISORDER: Primary | ICD-10-CM

## 2020-08-20 PROCEDURE — 71046 X-RAY EXAM CHEST 2 VIEWS: CPT | Mod: TC

## 2020-08-20 PROCEDURE — 99214 OFFICE O/P EST MOD 30 MIN: CPT | Performed by: PHYSICIAN ASSISTANT

## 2020-08-20 PROCEDURE — G0463 HOSPITAL OUTPT CLINIC VISIT: HCPCS | Mod: 25

## 2020-08-20 PROCEDURE — G0463 HOSPITAL OUTPT CLINIC VISIT: HCPCS

## 2020-08-20 PROCEDURE — 80306 DRUG TEST PRSMV INSTRMNT: CPT | Mod: ZL | Performed by: PHYSICIAN ASSISTANT

## 2020-08-20 ASSESSMENT — ANXIETY QUESTIONNAIRES
GAD7 TOTAL SCORE: 0
5. BEING SO RESTLESS THAT IT IS HARD TO SIT STILL: NOT AT ALL
1. FEELING NERVOUS, ANXIOUS, OR ON EDGE: NOT AT ALL
4. TROUBLE RELAXING: NOT AT ALL
3. WORRYING TOO MUCH ABOUT DIFFERENT THINGS: NOT AT ALL
6. BECOMING EASILY ANNOYED OR IRRITABLE: NOT AT ALL
2. NOT BEING ABLE TO STOP OR CONTROL WORRYING: NOT AT ALL
7. FEELING AFRAID AS IF SOMETHING AWFUL MIGHT HAPPEN: NOT AT ALL

## 2020-08-20 ASSESSMENT — PATIENT HEALTH QUESTIONNAIRE - PHQ9: SUM OF ALL RESPONSES TO PHQ QUESTIONS 1-9: 0

## 2020-08-20 ASSESSMENT — MIFFLIN-ST. JEOR: SCORE: 1653.29

## 2020-08-20 NOTE — PATIENT INSTRUCTIONS
Thank you for choosing [facility].   I have office hours 8:00 am to 4:30 pm on Monday's, Wednesday's, Thursday's and Friday's. My nurse and I are out of the office every Tuesday.    Following your visit, when your labs and diagnostic testing have returned, I will review then and you will be contacted by my nurse.  If you are on My Chart, you can also view results there.    For refills, notify your pharmacy regarding what you need and the pharmacy will generate a refill request. Do not call my nurse as she is unable to process refill request. Please plan ahead and allow 3-5 days for refill requests.    You will generally receive a reminder call the day prior to your appointment.  If you cannot attend your appointment, please cancel your appointment with as much notice as possible.  If there is a pattern of failure to present for your appointments, I cannot provide consistent, meaningful, ongoing care for you. It is very important to me that you come in for your care, so we can best assist you with your health care needs.    IMPORTANT:  Please note that it is my standard of practice to NOT participate in prescribing ongoing requested Narcotic Analgesic therapy, and/or participate in the prescribing of other controlled substances.  My nurse and I am happy to assist you with the process of referral for alternative pain management as needed, and other treatment modalities including but not limited to:  Physical Therapy, Physical Medicine and Rehab, Counseling, Chiropractic Care, Orthopedic Care, and non-narcotic medication management.     In the event that you need to be seen for emergent concerns and I am out of office,  please see one of my colleagues for acute concerns.  You may also present to  or ER.  I appreciate the opportunity to serve you and look forward to supporting your healthcare needs in the future. Please contact me with any questions or concerns that you may  have.    Sincerely,      Florence Sneed RN, PA-C

## 2020-08-20 NOTE — NURSING NOTE
"Chief Complaint   Patient presents with     Clinic Care Coordination - Follow-up       Initial /60 (BP Location: Right arm, Patient Position: Sitting, Cuff Size: Adult Regular)   Pulse 78   Temp 96.4  F (35.8  C) (Tympanic)   Ht 1.778 m (5' 10\")   Wt 76.2 kg (168 lb)   SpO2 96%   BMI 24.11 kg/m   Estimated body mass index is 24.11 kg/m  as calculated from the following:    Height as of this encounter: 1.778 m (5' 10\").    Weight as of this encounter: 76.2 kg (168 lb).  Medication Reconciliation: complete  Trista Brown LPN  "

## 2020-08-21 RX ORDER — BUPRENORPHINE AND NALOXONE 8; 2 MG/1; MG/1
1 FILM, SOLUBLE BUCCAL; SUBLINGUAL 2 TIMES DAILY
Qty: 60 EACH | Refills: 0 | Status: SHIPPED | OUTPATIENT
Start: 2020-08-21 | End: 2020-09-18

## 2020-08-21 ASSESSMENT — ANXIETY QUESTIONNAIRES: GAD7 TOTAL SCORE: 0

## 2020-09-09 DIAGNOSIS — K08.89 PAIN, DENTAL: ICD-10-CM

## 2020-09-09 RX ORDER — GABAPENTIN 300 MG/1
CAPSULE ORAL
Qty: 180 CAPSULE | Refills: 1 | Status: SHIPPED | OUTPATIENT
Start: 2020-09-09 | End: 2021-04-29

## 2020-09-09 NOTE — TELEPHONE ENCOUNTER
Gabapentin      Last Written Prescription Date:  5.7.20  Last Fill Quantity: #180,   # refills: 0  Last Office Visit: 8.20.20  Future Office visit:    Next 5 appointments (look out 90 days)    Sep 17, 2020  2:30 PM CDT  (Arrive by 2:15 PM)  [facility] with ELODIA Tomlin  [facility] [address]  [place] [address]  [phone]           Routing refill request to provider for review/approval because:  Drug not on the [facility], [facility] or [facility] refill protocol or controlled substance

## 2020-09-16 NOTE — PROGRESS NOTES
Follow-up Buprenorphine Visit           HPI       Micah Rosenthal is here for f/u on buprenophine/naloxone (Suboxone) therapy for opioid use disorder.  Clinic Care Coordination - Follow-up      Micah is currently taking 8/2 mg of Buprenorphine/Naloxone 2 times daily.     Status since last visit:    Since last visit patient has been: doing well.     Intensity:     There has been: no craving.      Suboxone Dose: adequate.    Progression of Symptoms:     Cues to use and relapse none    Recovery program has been: solid.   Accompanying Signs & Symptoms:    Side Effects: none.    Sobriety:     Status: no use since last visit.      Drug Screen: obtained.    Precipitating factors:    Triggers have been: non-existent.   Alleviating factors:    Contact with sponsor has been: no sponsor.     Family and support system has been: helpful.   Other Therapies Tried :     Patient has been going to recovery meetings:not at all.      Other medical concerns: No    Any ED visits? No       History   Smoking Status     Current Every Day Smoker     Packs/day: 1.50     Years: 20.00     Types: Cigarettes     Start date: 1/1/1988   Smokeless Tobacco     Never Used       Problem, Medication and Allergy Lists were reviewed and updated if needed.  reviewed and are current..    Pharmacy Database reviewed? Yes, 9.17.20, as expected.    Patient is an established patient of this clinic..         Review of Systems:   Review of Systems  CONSTITUTIONAL: NEGATIVE for fever, chills, change in weight  INTEGUMENTARY/SKIN: NEGATIVE for worrisome rashes, moles or lesions  EYES: NEGATIVE for vision changes or irritation  ENT/MOUTH: NEGATIVE for ear, mouth and throat problems  RESP: NEGATIVE for significant cough or SOB  CV: NEGATIVE for chest pain, palpitations or peripheral edema  GI: NEGATIVE for nausea, abdominal pain, heartburn, or change in bowel habits  : NEGATIVE for frequency, dysuria, or hematuria  MUSCULOSKELETAL: NEGATIVE for significant arthralgias  "or myalgia  NEURO: NEGATIVE for weakness, dizziness or paresthesias  ENDOCRINE: NEGATIVE for temperature intolerance, skin/hair changes  PSYCHIATRIC: NEGATIVE for changes in mood or affect          Physical Exam:     Vitals:    09/17/20 1426   BP: 108/62   BP Location: Right arm   Patient Position: Sitting   Cuff Size: Adult Regular   Pulse: 86   Temp: 96.9  F (36.1  C)   TempSrc: Tympanic   SpO2: 97%   Weight: 81.6 kg (180 lb)   Height: 1.778 m (5' 10\")     Body mass index is 25.83 kg/m .  Vitals were reviewed   Physical Exam  GENERAL APPEARANCE: alert, comfortable appearing  EYES: Eyes grossly normal to inspection  HENT:  nose no rhinorrhea  RESP: lungs clear to auscultation - no rales, rhonchi or wheezes and no resp distress  CV: regular rates and rhythm, normal S1 S2,no murmur   ABDOMEN:soft, non-tender, non-distended, no hepatosplenomegaly or masses  SKIN: no rashes, no jaundice, no obvious masses.   NEURO:  no tremor  MENTAL STATUS EXAM:  Appearance/Behavior:No apparent distress  Speech: Normal  Mood/Affect: normal affect  Insight: Adequate      Results:    Results from the last 24 hours  Results for orders placed or performed in visit on 09/17/20 (from the past 24 hour(s))   Urine Drugs of Abuse Screen (Tox13)   Result Value Ref Range    Cannabinoids (77-[id number]-7-carboxy-9-THC) Detected, Abnormal Result (A) NDET^Not Detected ng/mL    Phencyclidine (Phencyclidine) Not Detected NDET^Not Detected ng/mL    Cocaine (Benzoylecgonine) Not Detected NDET^Not Detected ng/mL    Methamphetamine (d-Methamphetamine) Not Detected NDET^Not Detected ng/mL    Opiates (Morphine) Not Detected NDET^Not Detected ng/mL    Amphetamine (d-Amphetamine) Not Detected NDET^Not Detected ng/mL    Benzodiazepines (Nordiazepam) Not Detected NDET^Not Detected ng/mL    Tricyclic Antidepressants (Desipramine) Not Detected NDET^Not Detected ng/mL    Methadone (Methadone) Not Detected NDET^Not Detected ng/mL    Barbiturates (Butalbital) Not Detected " NDET^Not Detected ng/mL    Oxycodone (Oxycodone) Not Detected NDET^Not Detected ng/mL    Propoxyphene (Norpropoxyphene) Not Detected NDET^Not Detected ng/mL    Buprenorphine (Buprenorphine) Detected, Abnormal Result (A) NDET^Not Detected ng/mL       Assessment and Plan     Was naloxone nasal spray prescribed? Yes.  1. Opioid use disorder (H)  He will be refilled on his Suboxone.   - Urine Drugs of Abuse Screen (Tox13)    Dosage will not need adjustment today.  Continue at 8 mg  2 time(s) a day of  buprenorphine/naloxone (Suboxone).     Follow up Plan  Stage 3 (4 weeks): Please make a clinic appointment for follow up with me (BLANCHE GAONA) or another Suboxone provider in 1 month for suboxone follow up.    Greater than 15 minutes was spent with this patient, over half of which was on counseling and coordination of care which includes importance of recovery activities,which may include  attendance at NA/AA meetings, sober support network, and the importance of not isolating, being open, honest, and willing to change, possible triggers and how to avoid them, and managing cravings.    There are no discontinued medications.    Options for treatment and follow-up care were reviewed with the patient. Micah engaged in the decision making process and verbalized understanding of the options discussed and agreed with the final plan.    ELODIA Barrow

## 2020-09-17 ENCOUNTER — OFFICE VISIT (OUTPATIENT)
Dept: FAMILY MEDICINE | Facility: OTHER | Age: 45
End: 2020-09-17
Attending: PHYSICIAN ASSISTANT
Payer: COMMERCIAL

## 2020-09-17 VITALS
TEMPERATURE: 96.9 F | WEIGHT: 180 LBS | HEIGHT: 70 IN | OXYGEN SATURATION: 97 % | BODY MASS INDEX: 25.77 KG/M2 | DIASTOLIC BLOOD PRESSURE: 62 MMHG | HEART RATE: 86 BPM | SYSTOLIC BLOOD PRESSURE: 108 MMHG

## 2020-09-17 DIAGNOSIS — M79.89 LEG SWELLING: ICD-10-CM

## 2020-09-17 DIAGNOSIS — R73.09 ELEVATED GLUCOSE: ICD-10-CM

## 2020-09-17 DIAGNOSIS — F11.90 OPIOID USE DISORDER: Primary | ICD-10-CM

## 2020-09-17 LAB
ALBUMIN SERPL-MCNC: 3.6 G/DL (ref 3.4–5)
ALP SERPL-CCNC: 131 U/L (ref 40–150)
ALT SERPL W P-5'-P-CCNC: 15 U/L (ref 0–70)
AMPHETAMINES UR QL: NOT DETECTED NG/ML
ANION GAP SERPL CALCULATED.3IONS-SCNC: 4 MMOL/L (ref 3–14)
AST SERPL W P-5'-P-CCNC: 13 U/L (ref 0–45)
BARBITURATES UR QL SCN: NOT DETECTED NG/ML
BASOPHILS # BLD AUTO: 0.1 10E9/L (ref 0–0.2)
BASOPHILS NFR BLD AUTO: 0.7 %
BENZODIAZ UR QL SCN: NOT DETECTED NG/ML
BILIRUB SERPL-MCNC: 0.2 MG/DL (ref 0.2–1.3)
BUN SERPL-MCNC: 4 MG/DL (ref 7–30)
BUPRENORPHINE UR QL: ABNORMAL NG/ML
CALCIUM SERPL-MCNC: 8.1 MG/DL (ref 8.5–10.1)
CANNABINOIDS UR QL: ABNORMAL NG/ML
CHLORIDE SERPL-SCNC: 103 MMOL/L (ref 94–109)
CO2 SERPL-SCNC: 31 MMOL/L (ref 20–32)
COCAINE UR QL SCN: NOT DETECTED NG/ML
CREAT SERPL-MCNC: 0.64 MG/DL (ref 0.66–1.25)
CREAT UR-MCNC: 18 MG/DL
D-METHAMPHET UR QL: NOT DETECTED NG/ML
DIFFERENTIAL METHOD BLD: ABNORMAL
EOSINOPHIL # BLD AUTO: 0.3 10E9/L (ref 0–0.7)
EOSINOPHIL NFR BLD AUTO: 4.3 %
ERYTHROCYTE [DISTWIDTH] IN BLOOD BY AUTOMATED COUNT: 12.5 % (ref 10–15)
EST. AVERAGE GLUCOSE BLD GHB EST-MCNC: 100 MG/DL
GFR SERPL CREATININE-BSD FRML MDRD: >90 ML/MIN/{1.73_M2}
GLUCOSE SERPL-MCNC: 77 MG/DL (ref 70–99)
HBA1C MFR BLD: 5.1 % (ref 0–5.6)
HCT VFR BLD AUTO: 38.4 % (ref 40–53)
HGB BLD-MCNC: 13.5 G/DL (ref 13.3–17.7)
IMM GRANULOCYTES # BLD: 0 10E9/L (ref 0–0.4)
IMM GRANULOCYTES NFR BLD: 0.1 %
LYMPHOCYTES # BLD AUTO: 3.5 10E9/L (ref 0.8–5.3)
LYMPHOCYTES NFR BLD AUTO: 46.2 %
MCH RBC QN AUTO: 29.3 PG (ref 26.5–33)
MCHC RBC AUTO-ENTMCNC: 35.2 G/DL (ref 31.5–36.5)
MCV RBC AUTO: 84 FL (ref 78–100)
METHADONE UR QL SCN: NOT DETECTED NG/ML
MICROALBUMIN UR-MCNC: <5 MG/L
MICROALBUMIN/CREAT UR: NORMAL MG/G CR (ref 0–17)
MONOCYTES # BLD AUTO: 0.6 10E9/L (ref 0–1.3)
MONOCYTES NFR BLD AUTO: 7.8 %
NEUTROPHILS # BLD AUTO: 3.1 10E9/L (ref 1.6–8.3)
NEUTROPHILS NFR BLD AUTO: 40.9 %
NRBC # BLD AUTO: 0 10*3/UL
NRBC BLD AUTO-RTO: 0 /100
OPIATES UR QL SCN: NOT DETECTED NG/ML
OXYCODONE UR QL SCN: NOT DETECTED NG/ML
PCP UR QL SCN: NOT DETECTED NG/ML
PLATELET # BLD AUTO: 293 10E9/L (ref 150–450)
POTASSIUM SERPL-SCNC: 3.2 MMOL/L (ref 3.4–5.3)
PROPOXYPH UR QL: NOT DETECTED NG/ML
PROT SERPL-MCNC: 6.8 G/DL (ref 6.8–8.8)
RBC # BLD AUTO: 4.6 10E12/L (ref 4.4–5.9)
SODIUM SERPL-SCNC: 138 MMOL/L (ref 133–144)
TRICYCLICS UR QL SCN: NOT DETECTED NG/ML
TSH SERPL DL<=0.005 MIU/L-ACNC: 1.98 MU/L (ref 0.4–4)
WBC # BLD AUTO: 7.5 10E9/L (ref 4–11)

## 2020-09-17 PROCEDURE — 82043 UR ALBUMIN QUANTITATIVE: CPT | Mod: ZL | Performed by: PHYSICIAN ASSISTANT

## 2020-09-17 PROCEDURE — 40000788 ZZHCL STATISTIC ESTIMATED AVERAGE GLUCOSE: Mod: ZL | Performed by: PHYSICIAN ASSISTANT

## 2020-09-17 PROCEDURE — 83036 HEMOGLOBIN GLYCOSYLATED A1C: CPT | Mod: ZL | Performed by: PHYSICIAN ASSISTANT

## 2020-09-17 PROCEDURE — 84443 ASSAY THYROID STIM HORMONE: CPT | Mod: ZL | Performed by: PHYSICIAN ASSISTANT

## 2020-09-17 PROCEDURE — 80053 COMPREHEN METABOLIC PANEL: CPT | Mod: ZL | Performed by: PHYSICIAN ASSISTANT

## 2020-09-17 PROCEDURE — 85025 COMPLETE CBC W/AUTO DIFF WBC: CPT | Mod: ZL | Performed by: PHYSICIAN ASSISTANT

## 2020-09-17 PROCEDURE — 80306 DRUG TEST PRSMV INSTRMNT: CPT | Mod: ZL | Performed by: PHYSICIAN ASSISTANT

## 2020-09-17 PROCEDURE — 36415 COLL VENOUS BLD VENIPUNCTURE: CPT | Mod: ZL | Performed by: PHYSICIAN ASSISTANT

## 2020-09-17 PROCEDURE — G0463 HOSPITAL OUTPT CLINIC VISIT: HCPCS

## 2020-09-17 PROCEDURE — 99213 OFFICE O/P EST LOW 20 MIN: CPT | Performed by: PHYSICIAN ASSISTANT

## 2020-09-17 ASSESSMENT — PAIN SCALES - GENERAL: PAINLEVEL: SEVERE PAIN (7)

## 2020-09-17 ASSESSMENT — ANXIETY QUESTIONNAIRES
2. NOT BEING ABLE TO STOP OR CONTROL WORRYING: NOT AT ALL
GAD7 TOTAL SCORE: 0
7. FEELING AFRAID AS IF SOMETHING AWFUL MIGHT HAPPEN: NOT AT ALL
6. BECOMING EASILY ANNOYED OR IRRITABLE: NOT AT ALL
1. FEELING NERVOUS, ANXIOUS, OR ON EDGE: NOT AT ALL
3. WORRYING TOO MUCH ABOUT DIFFERENT THINGS: NOT AT ALL
5. BEING SO RESTLESS THAT IT IS HARD TO SIT STILL: NOT AT ALL
4. TROUBLE RELAXING: NOT AT ALL

## 2020-09-17 ASSESSMENT — MIFFLIN-ST. JEOR: SCORE: 1707.72

## 2020-09-17 ASSESSMENT — PATIENT HEALTH QUESTIONNAIRE - PHQ9: SUM OF ALL RESPONSES TO PHQ QUESTIONS 1-9: 0

## 2020-09-17 NOTE — PATIENT INSTRUCTIONS
Thank you for choosing [facility].   I have office hours 8:00 am to 4:30 pm on Monday's, Wednesday's, Thursday's and Friday's. My nurse and I are out of the office every Tuesday.    Following your visit, when your labs and diagnostic testing have returned, I will review then and you will be contacted by my nurse.  If you are on My Chart, you can also view results there.    For refills, notify your pharmacy regarding what you need and the pharmacy will generate a refill request. Do not call my nurse as she is unable to process refill request. Please plan ahead and allow 3-5 days for refill requests.    You will generally receive a reminder call the day prior to your appointment.  If you cannot attend your appointment, please cancel your appointment with as much notice as possible.  If there is a pattern of failure to present for your appointments, I cannot provide consistent, meaningful, ongoing care for you. It is very important to me that you come in for your care, so we can best assist you with your health care needs.    IMPORTANT:  Please note that it is my standard of practice to NOT participate in prescribing ongoing requested Narcotic Analgesic therapy, and/or participate in the prescribing of other controlled substances.  My nurse and I am happy to assist you with the process of referral for alternative pain management as needed, and other treatment modalities including but not limited to:  Physical Therapy, Physical Medicine and Rehab, Counseling, Chiropractic Care, Orthopedic Care, and non-narcotic medication management.     In the event that you need to be seen for emergent concerns and I am out of office,  please see one of my colleagues for acute concerns.  You may also present to  or ER.  I appreciate the opportunity to serve you and look forward to supporting your healthcare needs in the future. Please contact me with any questions or concerns that you may  have.    Sincerely,      Florence Sneed RN, PA-C

## 2020-09-17 NOTE — NURSING NOTE
"Chief Complaint   Patient presents with     Clinic Care Coordination - Follow-up       Initial /62 (BP Location: Right arm, Patient Position: Sitting, Cuff Size: Adult Regular)   Pulse 86   Temp 96.9  F (36.1  C) (Tympanic)   Ht 1.778 m (5' 10\")   Wt 81.6 kg (180 lb)   SpO2 97%   BMI 25.83 kg/m   Estimated body mass index is 25.83 kg/m  as calculated from the following:    Height as of this encounter: 1.778 m (5' 10\").    Weight as of this encounter: 81.6 kg (180 lb).  Medication Reconciliation: complete  Trista Brown LPN  "

## 2020-09-18 DIAGNOSIS — F11.90 OPIOID USE DISORDER: ICD-10-CM

## 2020-09-18 RX ORDER — BUPRENORPHINE AND NALOXONE 8; 2 MG/1; MG/1
1 FILM, SOLUBLE BUCCAL; SUBLINGUAL 2 TIMES DAILY
Qty: 60 EACH | Refills: 0 | Status: SHIPPED | OUTPATIENT
Start: 2020-09-18 | End: 2020-10-15

## 2020-09-18 ASSESSMENT — ANXIETY QUESTIONNAIRES: GAD7 TOTAL SCORE: 0

## 2020-09-30 ENCOUNTER — PATIENT OUTREACH (OUTPATIENT)
Dept: CARE COORDINATION | Facility: OTHER | Age: 45
End: 2020-09-30

## 2020-09-30 NOTE — LETTER
[facility] [place]  [address]  [place] [address]  Phone: [phone]    September 30, 2020        Micah Rosenthal  [address]  [address]   [facility] [address]          To whom it may concern:    RE: Micah Rosenthal    Patient was seen and treated today at our clinic.  He is in active treatment for opoid use disorder.  We currently have had an excellent response to our current treatment plan and has remained sober with use of his Suboxone.  He also has severe neck disease and is on medical cannabis as he is not a candidate for surgery at this time.  Time in [facility] without these medications would most likely have significant detrimental effects.    Please contact me for questions or concerns.      Sincerely,        Florence Sneed RN, PARODRIGO

## 2020-09-30 NOTE — PROGRESS NOTES
Clinic Care Coordination Contact  Care Team Conversations    Requesting letter for court about his medical conditions.

## 2020-10-14 NOTE — PROGRESS NOTES
"Micah Rosenthal is a 45 year old male who is being evaluated via a billable telephone visit.      The patient has been notified of following:     \"This telephone visit will be conducted via a call between you and your physician/provider. We have found that certain health care needs can be provided without the need for a physical exam.  This service lets us provide the care you need with a short phone conversation.  If a prescription is necessary we can send it directly to your pharmacy.  If lab work is needed we can place an order for that and you can then stop by our lab to have the test done at a later time.    Telephone visits are billed at different rates depending on your insurance coverage. During this emergency period, for some insurers they may be billed the same as an in-person visit.  Please reach out to your insurance provider with any questions.    If during the course of the call the physician/provider feels a telephone visit is not appropriate, you will not be charged for this service.\"    Patient has given verbal consent for Telephone visit?  Yes    What phone number would you like to be contacted at? [phone]      How would you like to obtain your [facility]? [facility]    Follow-up Buprenorphine Visit           HPI       Micah Rosenthal is here for f/u on buprenophine/naloxone (Suboxone) therapy for opioid use disorder.  No chief complaint on file.      Micah is currently taking 8/2 mg of Buprenorphine/Naloxone 2 times daily.     Status since last visit:    Since last visit patient has been: doing well.     Intensity:     There has been: no craving.      Suboxone Dose: adequate.    Progression of Symptoms:     Cues to use and relapse none     Recovery program has been: solid.   Accompanying Signs & Symptoms:    Side Effects: present.    Sobriety:     Status: no use since last visit.      Drug Screen: patient willing but screen unnecessary.    Precipitating factors:    Triggers have been: non-existent. "   Alleviating factors:    Contact with sponsor has been: no sponsor.     Family and support system has been: helpful.   Other Therapies Tried :     Patient has been going to recovery meetings:not at all.      Other medical concerns:  YES    Any ED visits? No       History   Smoking Status     Current Every Day Smoker     Packs/day: 1.50     Years: 20.00     Types: Cigarettes     Start date: 1/1/1988   Smokeless Tobacco     Never Used       Problem, Medication and Allergy Lists were reviewed and updated if needed.  reviewed and are current..    Pharmacy Database reviewed? Yes, 10.15.20, as expected.l    Patient is an established patient of this clinic..    Assessment and Plan     Was naloxone nasal spray prescribed? Yes.  1. Opioid use disorder (H)  Things are going very well. No triggers no use not craving. Continue same.   - buprenorphine HCl-naloxone HCl (SUBOXONE) 8-2 MG per film; Place 1 Film under the tongue 2 times daily  Dispense: 60 each; Refill: 0    2. Cervical disc disease  Longstanding issues now worsening ability to swallow and increasing neck spasm. Refill is given of Flexeril.   - cyclobenzaprine (FLEXERIL) 10 MG tablet; One three times a day as needed.  Dispense: 90 tablet; Refill: 0    3. Choking, sequela  Called for a video. Needing just an esophagram first. Then ENT consult.   - XR Esophagram; Future    Dosage will not need adjustment today.  Continue at 8/2 mg  2 time(s) a day of  buprenorphine/naloxone (Suboxone).     Follow up Plan  Stage 3 (4 weeks): Please make a clinic appointment for follow up with me (BLANCHE GAONA) or another Suboxone provider in 1 month for suboxone follow up.    Greater than 15 minutes was spent with this patient, over half of which was on counseling and coordination of care which includes importance of recovery activities,which may include  attendance at NA/AA meetings, sober support network, and the importance of not isolating, being open, honest, and willing to  change, possible triggers and how to avoid them, and managing cravings.    There are no discontinued medications.    Options for treatment and follow-up care were reviewed with the patient. Micah engaged in the decision making process and verbalized understanding of the options discussed and agreed with the final plan.    ELODIA Barrow      Phone call duration:  15 minutes

## 2020-10-15 ENCOUNTER — VIRTUAL VISIT (OUTPATIENT)
Dept: FAMILY MEDICINE | Facility: OTHER | Age: 45
End: 2020-10-15
Attending: PHYSICIAN ASSISTANT
Payer: COMMERCIAL

## 2020-10-15 VITALS — WEIGHT: 175 LBS | BODY MASS INDEX: 25.11 KG/M2

## 2020-10-15 DIAGNOSIS — F11.90 OPIOID USE DISORDER: ICD-10-CM

## 2020-10-15 DIAGNOSIS — M50.90 CERVICAL DISC DISEASE: ICD-10-CM

## 2020-10-15 DIAGNOSIS — T17.308S CHOKING, SEQUELA: Primary | ICD-10-CM

## 2020-10-15 PROCEDURE — 99442 PR PHYSICIAN TELEPHONE EVALUATION 11-20 MIN: CPT | Performed by: PHYSICIAN ASSISTANT

## 2020-10-15 RX ORDER — BUPRENORPHINE AND NALOXONE 8; 2 MG/1; MG/1
1 FILM, SOLUBLE BUCCAL; SUBLINGUAL 2 TIMES DAILY
Qty: 60 EACH | Refills: 0 | Status: SHIPPED | OUTPATIENT
Start: 2020-10-15 | End: 2020-11-12

## 2020-10-15 RX ORDER — CYCLOBENZAPRINE HCL 10 MG
TABLET ORAL
Qty: 90 TABLET | Refills: 0 | Status: SHIPPED | OUTPATIENT
Start: 2020-10-15 | End: 2020-12-03

## 2020-10-15 ASSESSMENT — ANXIETY QUESTIONNAIRES
6. BECOMING EASILY ANNOYED OR IRRITABLE: NOT AT ALL
1. FEELING NERVOUS, ANXIOUS, OR ON EDGE: NOT AT ALL
5. BEING SO RESTLESS THAT IT IS HARD TO SIT STILL: NOT AT ALL
GAD7 TOTAL SCORE: 0
3. WORRYING TOO MUCH ABOUT DIFFERENT THINGS: NOT AT ALL
4. TROUBLE RELAXING: NOT AT ALL
2. NOT BEING ABLE TO STOP OR CONTROL WORRYING: NOT AT ALL
7. FEELING AFRAID AS IF SOMETHING AWFUL MIGHT HAPPEN: NOT AT ALL

## 2020-10-15 ASSESSMENT — PATIENT HEALTH QUESTIONNAIRE - PHQ9: SUM OF ALL RESPONSES TO PHQ QUESTIONS 1-9: 0

## 2020-10-16 ASSESSMENT — ANXIETY QUESTIONNAIRES: GAD7 TOTAL SCORE: 0

## 2020-10-19 DIAGNOSIS — J40 BRONCHITIS: ICD-10-CM

## 2020-10-19 RX ORDER — AMOXICILLIN 500 MG/1
500 CAPSULE ORAL 3 TIMES DAILY
Qty: 30 CAPSULE | Refills: 1 | Status: SHIPPED | OUTPATIENT
Start: 2020-10-19 | End: 2020-12-11

## 2020-11-02 ENCOUNTER — HOSPITAL ENCOUNTER (OUTPATIENT)
Dept: GENERAL RADIOLOGY | Facility: HOSPITAL | Age: 45
Discharge: HOME | End: 2020-11-02
Attending: PHYSICIAN ASSISTANT | Admitting: RADIOLOGY
Payer: COMMERCIAL

## 2020-11-02 DIAGNOSIS — T17.308S CHOKING, SEQUELA: ICD-10-CM

## 2020-11-02 PROCEDURE — 74220 X-RAY XM ESOPHAGUS 1CNTRST: CPT

## 2020-11-04 ENCOUNTER — OFFICE VISIT (OUTPATIENT)
Dept: FAMILY MEDICINE | Facility: OTHER | Age: 45
End: 2020-11-04
Attending: PHYSICIAN ASSISTANT
Payer: COMMERCIAL

## 2020-11-04 ENCOUNTER — NURSE TRIAGE (OUTPATIENT)
Dept: FAMILY MEDICINE | Facility: OTHER | Age: 45
End: 2020-11-04

## 2020-11-04 DIAGNOSIS — Z20.822 SUSPECTED 2019 NOVEL CORONAVIRUS INFECTION: ICD-10-CM

## 2020-11-04 DIAGNOSIS — Z20.822 SUSPECTED 2019 NOVEL CORONAVIRUS INFECTION: Primary | ICD-10-CM

## 2020-11-04 PROCEDURE — U0003 INFECTIOUS AGENT DETECTION BY NUCLEIC ACID (DNA OR RNA); SEVERE ACUTE RESPIRATORY SYNDROME CORONAVIRUS 2 (SARS-COV-2) (CORONAVIRUS DISEASE [COVID-19]), AMPLIFIED PROBE TECHNIQUE, MAKING USE OF HIGH THROUGHPUT TECHNOLOGIES AS DESCRIBED BY CMS-2020-01-R: HCPCS | Mod: ZL | Performed by: FAMILY MEDICINE

## 2020-11-04 NOTE — TELEPHONE ENCOUNTER
Reason for Disposition    [1] COVID-19 infection suspected by caller or triager AND [2] mild symptoms (cough, fever, or others) AND [3] no complications or SOB    Additional Information    Negative: SEVERE difficulty breathing (e.g., struggling for each breath, speaks in single words)    Negative: Difficult to awaken or acting confused (e.g., disoriented, slurred speech)    Negative: Bluish (or gray) lips or face now    Negative: Shock suspected (e.g., cold/pale/clammy skin, too weak to stand, low BP, rapid pulse)    Negative: Sounds like a life-threatening emergency to the triager    Negative: [1] COVID-19 exposure AND [2] no symptoms    Negative: COVID-19 and Breastfeeding, questions about    Negative: [1] Adult with possible COVID-19 symptoms AND [2] triager concerned about severity of symptoms or other causes    Negative: SEVERE or constant chest pain or pressure (Exception: mild central chest pain, present only when coughing)    Negative: MODERATE difficulty breathing (e.g., speaks in phrases, SOB even at rest, pulse 100-120)    Negative: Patient sounds very sick or weak to the triager    Negative: MILD difficulty breathing (e.g., minimal/no SOB at rest, SOB with walking, pulse <100)    Negative: Chest pain or pressure    Negative: Fever > 103 F (39.4 C)    Negative: [1] Fever > 101 F (38.3 C) AND [2] age > 60    Negative: [1] Fever > 100.0 F (37.8 C) AND [2] bedridden (e.g., nursing home patient, CVA, chronic illness, recovering from surgery)    Negative: HIGH RISK patient (e.g., age > 64 years, diabetes, heart or lung disease, weak immune system) (Exception: Has already been evaluated by healthcare provider and has no new or worsening symptoms)    Negative: Fever present > 3 days (72 hours)    Negative: [1] Fever returns after gone for over 24 hours AND [2] symptoms worse or not improved    Negative: [1] Continuous (nonstop) coughing interferes with work or school AND [2] no improvement using cough  "treatment per protocol    Answer Assessment - Initial Assessment Questions  1. COVID-19 DIAGNOSIS: \"Who made your Coronavirus (COVID-19) diagnosis?\" \"Was it confirmed by a positive lab test?\" If not diagnosed by a HCP, ask \"Are there lots of cases (community spread) where you live?\" (See [facility] health department website, if unsure)      Not diagnosed  2. ONSET: \"When did the COVID-19 symptoms start?\"       yesterday  3. WORST SYMPTOM: \"What is your worst symptom?\" (e.g., cough, fever, shortness of breath, muscle aches)      Fever  4. COUGH: \"Do you have a cough?\" If so, ask: \"How bad is the cough?\"        Yes. Pretty bad. moderate  5. FEVER: \"Do you have a fever?\" If so, ask: \"What is your temperature, how was it measured, and when did it start?\"      Yes. 101.2 oral, last night.  6. RESPIRATORY STATUS: \"Describe your breathing?\" (e.g., shortness of breath, wheezing, unable to speak)       raspy  7. BETTER-SAME-WORSE: \"Are you getting better, staying the same or getting worse compared to yesterday?\"  If getting worse, ask, \"In what way?\"      Worse, symptoms are getting worse  8. HIGH RISK DISEASE: \"Do you have any chronic medical problems?\" (e.g., asthma, heart or lung disease, weak immune system, etc.)     asthma  9. PREGNANCY: \"Is there any chance you are pregnant?\" \"When was your last menstrual period?\"      NA  10. OTHER SYMPTOMS: \"Do you have any other symptoms?\"  (e.g., chills, fatigue, headache, loss of smell or taste, muscle pain, sore throat)        Chills, body aches, sore throat    Protocols used: CORONAVIRUS (COVID-19) DIAGNOSED OR [id number] 8.4.20      "

## 2020-11-05 LAB
SARS-COV-2 RNA SPEC QL NAA+PROBE: NOT DETECTED
SPECIMEN SOURCE: NORMAL

## 2020-11-11 ENCOUNTER — PATIENT OUTREACH (OUTPATIENT)
Dept: CARE COORDINATION | Facility: OTHER | Age: 45
End: 2020-11-11

## 2020-11-11 NOTE — TELEPHONE ENCOUNTER
Clinic Care Coordination Contact  Care Team Conversations    Received message from pt's SO.  Please read below:      Good morning! 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 The problem now is pain, its now constant where he isn't getting sleeping and on top of the neck pain he has everyday, he is going to lose his mind and get crabbier. I am just wondering if you can talk to Florence and see if there is anything he can take for pain. We have tried the usual ibuprophen and tylenol, aleve, the over the counter stuff. For sure he is going to have to see another doctor, but for the time being is there anything that can be done?    Yoli Kowalski, RN-BSN, [facility] Care Coordinator  [phone] opt. #3

## 2020-11-11 NOTE — PROGRESS NOTES
Follow-up Buprenorphine Visit    The author of this note documented a reason for not sharing it with the patient.                     HPI       Micah Rosenthal is here for f/u on buprenophine/naloxone (Suboxone) therapy for opioid use disorder.  Clinic Care Coordination - Follow-up      Micah is currently taking 8/2 mg of Buprenorphine/Naloxone 2 times daily.     Status since last visit:    Since last visit patient has been: doing well.     Intensity:     There has been: no craving.      Suboxone Dose: adequate.    Progression of Symptoms:     Cues to use and relapse Admits to taking 2 Norco 2 weeks ago - his grandmother passed away.    Recovery program has been: solid.   Accompanying Signs & Symptoms:    Side Effects: none.    Sobriety:     Status: patient has relapsed.  2 Norco 2 weeks ago.    Drug Screen: obtained.    Precipitating factors:    Triggers have been: mild. Grandmother passed away.  Alleviating factors:    Contact with sponsor has been: no sponsor.     Family and support system has been: helpful.   Other Therapies Tried :     Patient has been going to recovery meetings:not at all.      Other medical concerns: Yes, stomach pain - constant - feels like someone is punching him in the stomach.    Any ED visits? No       History   Smoking Status     Current Every Day Smoker     Packs/day: 1.50     Years: 20.00     Types: Cigarettes     Start date: 1/1/1988   Smokeless Tobacco     Never Used       Problem, Medication and Allergy Lists were reviewed and updated if needed.  reviewed and are current..    Pharmacy Database reviewed? Yes, 11.12.20, as expected.    Patient is an established patient of this clinic..         Review of Systems:   Review of Systems  CONSTITUTIONAL: NEGATIVE for fever, chills, change in weight  INTEGUMENTARY/SKIN: NEGATIVE for worrisome rashes, moles or lesions  EYES: NEGATIVE for vision changes or irritation  ENT/MOUTH: NEGATIVE for ear, mouth and throat problems  RESP: NEGATIVE for  "significant cough or SOB  CV: NEGATIVE for chest pain, palpitations or peripheral edema  GI: NEGATIVE for nausea, abdominal pain, heartburn, or change in bowel habits  : NEGATIVE for frequency, dysuria, or hematuria  MUSCULOSKELETAL: NEGATIVE for significant arthralgias or myalgia  NEURO: NEGATIVE for weakness, dizziness or paresthesias  ENDOCRINE: NEGATIVE for temperature intolerance, skin/hair changes  PSYCHIATRIC: NEGATIVE for changes in mood or affect          Physical Exam:     Vitals:    11/12/20 1051   BP: 110/64   BP Location: Right arm   Patient Position: Sitting   Cuff Size: Adult Regular   Pulse: 85   Temp: 96.8  F (36  C)   TempSrc: Tympanic   SpO2: 95%   Weight: 75.8 kg (167 lb)   Height: 1.778 m (5' 10\")     Body mass index is 23.96 kg/m .  Vitals were reviewed   Physical Exam  GENERAL APPEARANCE: alert, comfortable appearing  EYES: Eyes grossly normal to inspection  HENT:  nose no rhinorrhea  RESP: lungs clear to auscultation - no rales, rhonchi or wheezes and no resp distress  CV: regular rates and rhythm, normal S1 S2,no murmur   ABDOMEN:soft, non-tender, non-distended, no hepatosplenomegaly or masses  SKIN: no rashes, no jaundice, no obvious masses.   NEURO:  no tremor  MENTAL STATUS EXAM:  Appearance/Behavior:No apparent distress  Speech: Normal  Mood/Affect: normal affect  Insight: Adequate      Results:    Results from the last 24 hours  Results for orders placed or performed in visit on 11/12/20 (from the past 24 hour(s))   Urine Drugs of Abuse Screen (Tox13)   Result Value Ref Range    Cannabinoids (09-yym-4-carboxy-9-THC) Detected, Abnormal Result (A) NDET^Not Detected ng/mL    Phencyclidine (Phencyclidine) Not Detected NDET^Not Detected ng/mL    Cocaine (Benzoylecgonine) Not Detected NDET^Not Detected ng/mL    Methamphetamine (d-Methamphetamine) Not Detected NDET^Not Detected ng/mL    Opiates (Morphine) Not Detected NDET^Not Detected ng/mL    Amphetamine (d-Amphetamine) Not Detected " NDET^Not Detected ng/mL    Benzodiazepines (Nordiazepam) Not Detected NDET^Not Detected ng/mL    Tricyclic Antidepressants (Desipramine) Detected, Abnormal Result (A) NDET^Not Detected ng/mL    Methadone (Methadone) Not Detected NDET^Not Detected ng/mL    Barbiturates (Butalbital) Not Detected NDET^Not Detected ng/mL    Oxycodone (Oxycodone) Not Detected NDET^Not Detected ng/mL    Propoxyphene (Norpropoxyphene) Not Detected NDET^Not Detected ng/mL    Buprenorphine (Buprenorphine) Detected, Abnormal Result (A) NDET^Not Detected ng/mL       Assessment and Plan     Was naloxone nasal spray prescribed? Yes.  1. Opioid use disorder (H)  He is doing ok. We will get him on track.   - Urine Drugs of Abuse Screen (Tox13)    - Urine Drugs of Abuse Screen (Tox13)  - buprenorphine HCl-naloxone HCl (SUBOXONE) 8-2 MG per film; Place 1 Film under the tongue 2 times daily  Dispense: 60 each; Refill: 0    2. Choking, sequela  I need to see him back if  His sx are not better.   Then it is not his stomach.   - GASTROENTEROLOGY ADULT REF CONSULT ONLY; Future  - lidocaine (XYLOCAINE) 2 % solution; GI cocktail 30 ml with 60 ml Maalox and simethicone tablet.  Max of 4 doses a day.  Dispense: 300 mL; Refill: 1    3. Hiatal hernia  Try this and see. Small stricture. See us back in one month.   - GASTROENTEROLOGY ADULT REF CONSULT ONLY; Future  - lidocaine (XYLOCAINE) 2 % solution; GI cocktail 30 ml with 60 ml Maalox and simethicone tablet.  Max of 4 doses a day.  Dispense: 300 mL; Refill: 1    Dosage will not need adjustment today.  Continue at 8/2 mg  2 time(s) a day of  buprenorphine/naloxone (Suboxone).     Follow up Plan  Stage 3 (4 weeks): Please make a clinic appointment for follow up with me (BLANCHE GAONA) or another Suboxone provider in 1 month for suboxone follow up.    Greater than 30  minutes was spent with this patient, over half of which was on counseling and coordination of care which includes importance of recovery  activities,which may include  attendance at NA/AA meetings, sober support network, and the importance of not isolating, being open, honest, and willing to change, possible triggers and how to avoid them, and managing cravings.    Medications Discontinued During This Encounter   Medication Reason     buprenorphine HCl-naloxone HCl (SUBOXONE) 8-2 MG per film Reorder       Options for treatment and follow-up care were reviewed with the patient. Micah engaged in the decision making process and verbalized understanding of the options discussed and agreed with the final plan.    ELODIA Barrow

## 2020-11-12 ENCOUNTER — PATIENT OUTREACH (OUTPATIENT)
Dept: CARE COORDINATION | Facility: OTHER | Age: 45
End: 2020-11-12

## 2020-11-12 ENCOUNTER — APPOINTMENT (OUTPATIENT)
Dept: LAB | Facility: OTHER | Age: 45
End: 2020-11-12
Attending: PHYSICIAN ASSISTANT
Payer: COMMERCIAL

## 2020-11-12 ENCOUNTER — OFFICE VISIT (OUTPATIENT)
Dept: FAMILY MEDICINE | Facility: OTHER | Age: 45
End: 2020-11-12
Attending: PHYSICIAN ASSISTANT
Payer: COMMERCIAL

## 2020-11-12 VITALS
TEMPERATURE: 96.8 F | OXYGEN SATURATION: 95 % | BODY MASS INDEX: 23.91 KG/M2 | HEIGHT: 70 IN | HEART RATE: 85 BPM | WEIGHT: 167 LBS | SYSTOLIC BLOOD PRESSURE: 110 MMHG | DIASTOLIC BLOOD PRESSURE: 64 MMHG

## 2020-11-12 DIAGNOSIS — F11.90 OPIOID USE DISORDER: Primary | ICD-10-CM

## 2020-11-12 DIAGNOSIS — K44.9 HIATAL HERNIA: ICD-10-CM

## 2020-11-12 DIAGNOSIS — T17.308S CHOKING, SEQUELA: ICD-10-CM

## 2020-11-12 LAB
AMPHETAMINES UR QL: NOT DETECTED NG/ML
BARBITURATES UR QL SCN: NOT DETECTED NG/ML
BENZODIAZ UR QL SCN: NOT DETECTED NG/ML
BUPRENORPHINE UR QL: ABNORMAL NG/ML
CANNABINOIDS UR QL: ABNORMAL NG/ML
COCAINE UR QL SCN: NOT DETECTED NG/ML
D-METHAMPHET UR QL: NOT DETECTED NG/ML
METHADONE UR QL SCN: NOT DETECTED NG/ML
OPIATES UR QL SCN: NOT DETECTED NG/ML
OXYCODONE UR QL SCN: NOT DETECTED NG/ML
PCP UR QL SCN: NOT DETECTED NG/ML
PROPOXYPH UR QL: NOT DETECTED NG/ML
TRICYCLICS UR QL SCN: ABNORMAL NG/ML

## 2020-11-12 PROCEDURE — 80306 DRUG TEST PRSMV INSTRMNT: CPT | Mod: ZL | Performed by: PHYSICIAN ASSISTANT

## 2020-11-12 PROCEDURE — 99214 OFFICE O/P EST MOD 30 MIN: CPT | Performed by: PHYSICIAN ASSISTANT

## 2020-11-12 PROCEDURE — G0463 HOSPITAL OUTPT CLINIC VISIT: HCPCS

## 2020-11-12 RX ORDER — BUPRENORPHINE AND NALOXONE 8; 2 MG/1; MG/1
1 FILM, SOLUBLE BUCCAL; SUBLINGUAL 2 TIMES DAILY
Qty: 60 EACH | Refills: 0 | Status: SHIPPED | OUTPATIENT
Start: 2020-11-12 | End: 2020-12-10

## 2020-11-12 RX ORDER — LIDOCAINE HYDROCHLORIDE 20 MG/ML
SOLUTION OROPHARYNGEAL
Qty: 300 ML | Refills: 1 | Status: SHIPPED | OUTPATIENT
Start: 2020-11-12 | End: 2021-08-19

## 2020-11-12 ASSESSMENT — ANXIETY QUESTIONNAIRES
6. BECOMING EASILY ANNOYED OR IRRITABLE: NOT AT ALL
2. NOT BEING ABLE TO STOP OR CONTROL WORRYING: NOT AT ALL
4. TROUBLE RELAXING: NOT AT ALL
GAD7 TOTAL SCORE: 0
1. FEELING NERVOUS, ANXIOUS, OR ON EDGE: NOT AT ALL
7. FEELING AFRAID AS IF SOMETHING AWFUL MIGHT HAPPEN: NOT AT ALL
3. WORRYING TOO MUCH ABOUT DIFFERENT THINGS: NOT AT ALL
5. BEING SO RESTLESS THAT IT IS HARD TO SIT STILL: NOT AT ALL

## 2020-11-12 ASSESSMENT — PATIENT HEALTH QUESTIONNAIRE - PHQ9: SUM OF ALL RESPONSES TO PHQ QUESTIONS 1-9: 0

## 2020-11-12 ASSESSMENT — MIFFLIN-ST. JEOR: SCORE: 1648.76

## 2020-11-12 NOTE — TELEPHONE ENCOUNTER
I can talk to him but there is no reflux per the study.  His pain is out of proportion to the findings. We can try some liquid lidocaine if it is severe and see if this helps him. Sounds more like esophageal spasm ?  Who is he seeing for this? ENT? Or GI?

## 2020-11-12 NOTE — PROGRESS NOTES
He is coming in today for an appt.    Yoli Kowalski, RN-BSN, [facility] Care Coordinator  [phone] opt. #3

## 2020-11-12 NOTE — PROGRESS NOTES
Clinic Care Coordination Contact  Care Team Conversations    CC attended office visit with pt and Florence Sneed this date.  Please refer to Florence's note for plan of care.  All questions answered.  Encouraged him to call/text CC with any questions/concerns/problems.  Verbalized understanding.    Yoli Kowalski RN-BSN, [facility] Care Coordinator  [phone] opt. #3

## 2020-11-13 ASSESSMENT — ANXIETY QUESTIONNAIRES: GAD7 TOTAL SCORE: 0

## 2020-11-23 ENCOUNTER — PATIENT OUTREACH (OUTPATIENT)
Dept: CARE COORDINATION | Facility: OTHER | Age: 45
End: 2020-11-23

## 2020-11-23 DIAGNOSIS — R10.11 RUQ ABDOMINAL PAIN: Primary | ICD-10-CM

## 2020-11-23 NOTE — PROGRESS NOTES
Clinic Care Coordination Contact  Care Team Conversations    Received message from Huong asking if you could order some tests to test Ronaldo's gallbladder.  She is wondering if this is causing him his abdominal pain.  C/O sharp, running pain in RUQ.  Please advise.    Thank you,  Yoli Kowalski, RN-BSN, [facility] Care Coordinator  [phone] opt. #3

## 2020-12-02 ENCOUNTER — HOSPITAL ENCOUNTER (OUTPATIENT)
Dept: ULTRASOUND IMAGING | Facility: HOSPITAL | Age: 45
Discharge: HOME | End: 2020-12-02
Attending: PHYSICIAN ASSISTANT | Admitting: PHYSICIAN ASSISTANT
Payer: COMMERCIAL

## 2020-12-02 DIAGNOSIS — K80.20 GALLSTONES: Primary | ICD-10-CM

## 2020-12-02 DIAGNOSIS — R10.11 RUQ ABDOMINAL PAIN: ICD-10-CM

## 2020-12-02 PROCEDURE — 76705 ECHO EXAM OF ABDOMEN: CPT

## 2020-12-03 DIAGNOSIS — M50.90 CERVICAL DISC DISEASE: ICD-10-CM

## 2020-12-03 RX ORDER — CYCLOBENZAPRINE HCL 10 MG
TABLET ORAL
Qty: 90 TABLET | Refills: 0 | Status: SHIPPED | OUTPATIENT
Start: 2020-12-03 | End: 2021-01-14

## 2020-12-03 NOTE — TELEPHONE ENCOUNTER
Flexeril      Last Written Prescription Date:  10.15.20  Last Fill Quantity: #90,   # refills: 0  Last Office Visit: 11.12.20  Future Office visit:       Routing refill request to provider for review/approval because:  Drug not on the [facility], P or [facility] refill protocol or controlled substance

## 2020-12-04 ENCOUNTER — HOSPITAL ENCOUNTER (OUTPATIENT)
Facility: HOSPITAL | Age: 45
End: 2020-12-04
Attending: SURGERY | Admitting: SURGERY
Payer: COMMERCIAL

## 2020-12-04 ENCOUNTER — OFFICE VISIT (OUTPATIENT)
Dept: SURGERY | Facility: OTHER | Age: 45
End: 2020-12-04
Attending: PHYSICIAN ASSISTANT
Payer: COMMERCIAL

## 2020-12-04 ENCOUNTER — PATIENT OUTREACH (OUTPATIENT)
Dept: CARE COORDINATION | Facility: OTHER | Age: 45
End: 2020-12-04

## 2020-12-04 ENCOUNTER — PREP FOR PROCEDURE (OUTPATIENT)
Dept: SURGERY | Facility: OTHER | Age: 45
End: 2020-12-04

## 2020-12-04 VITALS
HEIGHT: 70 IN | DIASTOLIC BLOOD PRESSURE: 70 MMHG | HEART RATE: 96 BPM | WEIGHT: 167 LBS | BODY MASS INDEX: 23.91 KG/M2 | OXYGEN SATURATION: 98 % | TEMPERATURE: 97.7 F | SYSTOLIC BLOOD PRESSURE: 120 MMHG

## 2020-12-04 DIAGNOSIS — K29.00 ACUTE SUPERFICIAL GASTRITIS WITHOUT HEMORRHAGE: Primary | ICD-10-CM

## 2020-12-04 DIAGNOSIS — K80.50 BILIARY COLIC: ICD-10-CM

## 2020-12-04 DIAGNOSIS — K80.50 BILIARY COLIC: Primary | ICD-10-CM

## 2020-12-04 PROCEDURE — 99204 OFFICE O/P NEW MOD 45 MIN: CPT | Performed by: SURGERY

## 2020-12-04 PROCEDURE — G0463 HOSPITAL OUTPT CLINIC VISIT: HCPCS

## 2020-12-04 RX ORDER — OMEPRAZOLE 40 MG/1
40 CAPSULE, DELAYED RELEASE ORAL DAILY
Qty: 30 CAPSULE | Refills: 0 | Status: SHIPPED | OUTPATIENT
Start: 2020-12-04 | End: 2023-04-20

## 2020-12-04 ASSESSMENT — PAIN SCALES - GENERAL: PAINLEVEL: SEVERE PAIN (7)

## 2020-12-04 ASSESSMENT — MIFFLIN-ST. JEOR: SCORE: 1648.76

## 2020-12-04 NOTE — PROGRESS NOTES
Clinic Care Coordination Contact  Care Team Conversations    Micah will be having gallbladder surgery on 12/22.  He is to stop his Ibu 7 days prior.  However, Micah is having severe pain due to the gallstones.  He hasn't slept in 2 days.  Is there anything he can take for the pain?  Please advise.    Thank you,    Yoli Kowalski RN-BSN, [facility] Care Coordinator  [phone] opt. #3

## 2020-12-04 NOTE — PATIENT INSTRUCTIONS
Thank you for allowing our surgical team to participate in your care. Please review the instructions below.   If you have questions, you may contact us at the any of the following numbers:     [facility] Coordinator: [phone]  Clinic Surgery Nurse ([place]): [phone]  Hospital Surgery Education Nurse: [phone]    You are scheduled for: Laparoscopic cholecystectomy possible open with Dr. Quevedo on 12/22/20.    Admit Time: Hospital Surgery will call you the day before your procedure by 5pm with your arrival time.   If your surgery is on Monday, expect a call on Friday.   If you are not contacted before 5PM, please call admitting at [phone].   After hours or on weekends, please call [phone] to postpone.   Call the clinic surgery nurse if you become ill within 1-2 weeks of your procedure to reschedule.   This includes fever, chills, sore throat, cough, chest congestion, runny nose, or any other symptom of any other illness.       Your pre-op physical is scheduled for:  12/15/20 at 12:45 with Dr Hansen    Your covid-19 test is scheduled for:  Friday, December 18, 2020 at 11:30 AM    COVID-19 test is needed 4-5 days before procedure in the morning. This testing is done in the [place] [facility] on the [place] side of the [facility] (weekdays and weekends) or in the [facility] at the [facility] (weekdays only).  Follow the signage for parking and bring your mobile phone if you have one to call the phone number on the sign outside the testing site for check-in. If you do not have a cell phone, please call the nurse for instructions on checking in.     Please call the Hospital Surgery Education Nurse at [phone] 1 week prior to your procedure and have a medication list ready.   Do not take Aspirin or other NSAIDS (Ibuprofen, Motrin, Aleve, Celebrex, Naproxen, etc), vitamins/supplements 7 days before your surgery unless you have been otherwise directed.   If you are prescribed a  daily 81mg Aspirin, you may continue this.   If you are prescribed blood thinners or insulin, please contact your primary care provider for instructions.    Shower the night before and the morning of your procedure with the surgical soap.    Do not have anything to eat or drink after midnight the night before your surgery (or 8 hours prior to surgery), except clear liquids (water, clear juice, clear broth, plain coffee or tea without cream or milk) up until 2 hours prior to arrival time.   Nothing by mouth after the 2 hours prior to arrival.  Do not chew gum, suck on hard candy, or smoke. You can brush your teeth.   If you are directed to take any medications, take them with a tiny sip of water.   If you use an inhaler, bring it with you.    Arrive in clean, comfortable clothing.   Do not wear any jewelry, make-up, nail polish, lotions, hair products, or perfumes.   [place] in hospital admitting through the [place] entrance.  A responsible adult must be available to drive you home and stay with you for 24 hours at home. If you need to take a taxi or the bus, you must have another responsible adult to ride with you. Your procedure will be cancelled if you do not have a responsible adult .

## 2020-12-04 NOTE — NURSING NOTE
"Chief Complaint   Patient presents with     Consult     gallstones       Initial /70 (BP Location: Right arm, Patient Position: Chair, Cuff Size: Adult Large)   Pulse 96   Temp 97.7  F (36.5  C) (Tympanic)   Ht 1.778 m (5' 10\")   Wt 75.8 kg (167 lb)   SpO2 98%   BMI 23.96 kg/m   Estimated body mass index is 23.96 kg/m  as calculated from the following:    Height as of this encounter: 1.778 m (5' 10\").    Weight as of this encounter: 75.8 kg (167 lb).  Medication Reconciliation: complete  Trice Henley LPN    "

## 2020-12-07 NOTE — PROGRESS NOTES
Moderate Procedure/Surgery Letter Template    To Whom It May Concern;     It is my understanding that my patient Micah will be having surgery on 12.22.20.  As his Suboxone prescriber, I have inserted below the recommendations for management of his pain perioperatively and postoperatively.  Being on Suboxone will not impact his anesthesia.     Recommendations for cholecystectomy surgical/procedure:     Patient to continue taking Suboxone as prescribed.     Utilize local anesthetic/block intra op. Inject max dose per weight for optimal pain management.      Avoid use of Benzodiazepines due to risk of severe respiratory depression due to drug interaction with Suboxone.  Benzo use is contraindicated when taking Suboxone.       Should narcotics need to be administered during or after the procedure, choose full agonists such as Fentanyl or Hydromorphone.  Note it may take up to 2x the recommended dosing to penetrate the receptor to achieve therapeutic pain management.      Alternate Tylenol and Ibuprofen post op.  Avoid sending the patient home with opioids if possible.   If you need to send patient home with a narcotic prescription, please ensure it is of minimal quantity.  Again, full agonists such as oral Hydromorphone are recommended.  Please avoid long acting opioids.      If you have questions or concerns please reach out.

## 2020-12-07 NOTE — PROGRESS NOTES
Please see post op pain recommendations below.  Let me know if you have any questions.    Thank you,  Yoli Kowalski RN-BSN, [facility] Care Coordinator  [phone] opt. #3

## 2020-12-08 DIAGNOSIS — F11.90 OPIOID USE DISORDER: Primary | Chronic | ICD-10-CM

## 2020-12-08 RX ORDER — BUPRENORPHINE HYDROCHLORIDE, NALOXONE HYDROCHLORIDE 4; 1 MG/1; MG/1
1 FILM, SOLUBLE BUCCAL; SUBLINGUAL DAILY
Qty: 14 EACH | Refills: 0 | Status: SHIPPED | OUTPATIENT
Start: 2020-12-08 | End: 2021-01-08

## 2020-12-08 NOTE — PROGRESS NOTES
[facility] Surgery Consultation    CC:  Abdominal pain     HPI:  This 45 year old year old male is seen at the request of Florence Sneed for evaluation of  abdominal pain.  He reports significant pain ongoing for at least the last month. It is his right upper quadrant and epigastric regions. It is worse with eating. It doesn't seem to matter what he eats. He was having some dysphagia and did get an esophagram. He is on suboxone for opioid addiction. He has been taking a Gi cocktail which hasn't helped much. He does take a lot of ibuprofen for his neck and back pain. He does have history of cervical fusion. He has never had abdominal surgery before.     Past Medical History:   Diagnosis Date     Cervical disc disease        Past Surgical History:   Procedure Laterality Date     BACK SURGERY      fusion of C5 and C6     IR TRANSLAMINAR EPIDURAL LUMBAR INJ INCL IMAGING  12/2012     OTHER SURGICAL HISTORY  03/2013    Cervical Fusion     wisdom teeth extracted         Allergies   Allergen Reactions     Benzodiazepines      On Suboxone - contraindicated.     Cranberry      Strawberry      Doxycycline Nausea and Vomiting     Zithromax [Azithromycin Dihydrate] Rash       Current Outpatient Medications   Medication     albuterol (PROAIR HFA/PROVENTIL HFA/VENTOLIN HFA) 108 (90 Base) MCG/ACT inhaler     amoxicillin (AMOXIL) 500 MG capsule     buprenorphine HCl-naloxone HCl (SUBOXONE) 8-2 MG per film     cyclobenzaprine (FLEXERIL) 10 MG tablet     gabapentin (NEURONTIN) 300 MG capsule     naloxone 4 MG/0.1ML NA nasal spray     omeprazole (PRILOSEC) 40 MG DR capsule     lidocaine (XYLOCAINE) 2 % solution     No current facility-administered medications for this visit.        HABITS:    Social History     Tobacco Use     Smoking status: Current Every Day Smoker     Packs/day: 1.50     Years: 20.00     Pack years: 30.00     Types: Cigarettes     Start date: 1/1/1988     Smokeless tobacco: Never Used   Substance Use Topics      "Alcohol use: No     Alcohol/week: 0.0 standard drinks     Drug use: No       Family History   Problem Relation Age of Onset     Unknown/Adopted Father      Cancer Maternal Uncle         throat ca     Cancer Maternal Uncle         lung ca     Cardiovascular Maternal Grandfather      Cardiovascular Paternal Grandfather        REVIEW OF SYSTEMS:  Ten point review of systems negative except those mentioned in the HPI.     OBJECTIVE:    /70 (BP Location: Right arm, Patient Position: Chair, Cuff Size: Adult Large)   Pulse 96   Temp 97.7  F (36.5  C) (Tympanic)   Ht 1.778 m (5' 10\")   Wt 75.8 kg (167 lb)   SpO2 98%   BMI 23.96 kg/m      GENERAL: Generally appears well, in no distress with appropriate affect.  HEENT:   Sclerae anicteric - normocephalic atraumatic   Respiratory:  No acute distress, no splinting   Cardiovascular:  Regular Rate and Rhythm  Abdomen: tender in epigastric and RUQ.   :  deferred  Extremities:  Extremities normal. No deformities, edema, or skin discoloration.  Skin:  no suspicious lesions or rashes  Neurological: grossly intact  Psych:  Alert, oriented, affect appropriate with normal decision making ability.    IMPRESSION:    45 y.o. male with history of opioid addiction here for right upper quadrant and epigastric pain. I have reviewed his US of his gallbladder and he does have multiple gallstones. I do believe he likely has gastritis on top of this but with the number of stones and his symptoms I cannot rule out his gallbladder as cause of pain. He is on suboxone so will ask for PCP help with pain management post-op. I discussed that he needs to stop taking NSAIDs and start a PPI for a 30 day trial.     PLAN:    Lap Cholecystectomy   Will have him get a pre-op prior.     Teddy Quevedo MD,     12/8/2020  9:19 AM        "

## 2020-12-08 NOTE — TELEPHONE ENCOUNTER
Pt currently sees Florence Sneed for Suboxone.  He is scheduled to have a cholecystectomy on 12.22.20.  He is having an increase in abd pain and neck pain and has not slept in 4 days.  CC did place phone call to Florence Sneed this date and she recommends adding 4mg of Bup for pain relief.  This is pended.

## 2020-12-10 ENCOUNTER — PATIENT OUTREACH (OUTPATIENT)
Dept: CARE COORDINATION | Facility: OTHER | Age: 45
End: 2020-12-10

## 2020-12-10 DIAGNOSIS — M54.2 NECK PAIN: Primary | ICD-10-CM

## 2020-12-10 RX ORDER — METHYLPREDNISOLONE 4 MG
TABLET, DOSE PACK ORAL
Qty: 21 TABLET | Refills: 0 | Status: SHIPPED | OUTPATIENT
Start: 2020-12-10 | End: 2021-03-05

## 2020-12-10 NOTE — PROGRESS NOTES
No do not quit Suboxone. He will not get narcotics in any way shape or form. The one time dose of bup isn't working he needs to give this time. His neck never was good. . Try muscle relaxer's ice and heat. He knows better to use alternate pathways.

## 2020-12-10 NOTE — PROGRESS NOTES
Clinic Care Coordination Contact  Care Team Conversations    From pt's SO:        So we are now at day 5 with no sleep. He is miserable, the neck pain is not letting up which is causing the no sleeping. The extra suboxone is not working the patch didn't work, nothing is working, what is he suppose to do? Go to another Doctor? Quit the suboxone, so he can get pain relief help? We don't know what to do? All I do know is he needs pain relief so he can sleep. And he doesn't need a long term pain relief, just something for a couple days so he can get sleep and then it may be back to his normal which is tolerable pain.

## 2020-12-10 NOTE — PROGRESS NOTES
"Micah Rosenthal is a 45 year old male who is being evaluated via a billable telephone visit.    The author of this note documented a reason for not sharing it with the patient.    The patient has been notified of following:     \"This telephone visit will be conducted via a call between you and your physician/provider. We have found that certain health care needs can be provided without the need for a physical exam.  This service lets us provide the care you need with a short phone conversation.  If a prescription is necessary we can send it directly to your pharmacy.  If lab work is needed we can place an order for that and you can then stop by our lab to have the test done at a later time.    Telephone visits are billed at different rates depending on your insurance coverage. During this emergency period, for some insurers they may be billed the same as an in-person visit.  Please reach out to your insurance provider with any questions.    If during the course of the call the physician/provider feels a telephone visit is not appropriate, you will not be charged for this service.\"    Patient has given verbal consent for Telephone visit?  Yes    What phone number would you like to be contacted at? [phone]    How [facility]    Follow-up Buprenorphine Visit           HPI       Micah Rosenthal is here for f/u on buprenophine/naloxone (Suboxone) therapy for opioid use disorder.  No chief complaint on file.      Micah is currently taking 8/2mg 2 times daily and 4/1mg PRN for neck pain of Buprenorphine/Naloxone  daily.     Status since last visit:    Since last visit patient has been: stable. Pain is an \"8\" neck    Intensity:     There has been: no craving.      Suboxone Dose: adequate.    Progression of Symptoms:     Cues to use and relapse none    Recovery program has been ignored. Not currently seing anyone  Accompanying Signs & Symptoms:    Side Effects: none.    Sobriety:     Status: no " use since last visit.  Last month moderate pain in neck    Drug Screen:   Unable to obtain - telephone visit   Precipitating factors:    Triggers have been: . None- just moderate pain in neck   Alleviating factors:    Contact with sponsor has been: no sponsor.     Family and support system has been: helpful.   Other Therapies Tried :     Patient has been going to recovery meetings:not at all.      Other medical concerns: No    Any ED visits? No       History   Smoking Status     Current Every Day Smoker     Packs/day: 1.50     Years: 20.00     Types: Cigarettes     Start date: 1/1/1988   Smokeless Tobacco     Never Used       Problem, Medication and Allergy Lists were reviewed and updated if needed.  reviewed and are current..    Pharmacy Database reviewed? yes    Patient is an established patient of this clinic..      Assessment and Plan     Was naloxone nasal spray prescribed? Yes.  1. Opioid use disorder (H)  He is having trouble with pain in his neck. He is anxious and not sleeping. He is very worried about his gallbladder surgery. His anxiety is getting the best of him. We will continue the buprenorphine. We will try a 4 mg for help with his pain. Adding in a medrol dose pack for his neck. We filled flexeril and he is needing to start taking this 3 times a day and add in ICE and HEAT for pain in his neck. The sleep is addressed below.   - buprenorphine HCl-naloxone HCl (SUBOXONE) 8-2 MG per film; Place 1 Film under the tongue 2 times daily  Dispense: 60 each; Refill: 0    2. Insomnia due to medical condition  He will try Ambien. Hasn't slept in 6 days. Crying and angry threw the phone at his girlfriend. Explained her her no need for pain medications before surgery. Taking away his Ibuprofen before hand causes this kind of Pain is a concern. I think this is more of a pain reaction. We will be seeing us back as recommended. Address his sleep and will recheck in a few weeks.   - zolpidem (AMBIEN) 10 MG tablet;  Take 1 tablet (10 mg) by mouth nightly as needed for sleep  Dispense: 10 tablet; Refill: 0    Dosage will need adjustment today.  continue daily dose and then we will add in 4 mg as needed for pain until his surgery then we will make appropriate changes  buprenorphine/naloxone (Suboxone).     Follow up Plan  Stage 3 (4 weeks): Please make a clinic appointment for follow up with me (BLANCHE GAONA) or another Suboxone provider in 1 month for suboxone follow up.    Greater than 21  minutes was spent with this patient, over half of which was on counseling and coordination of care which includes importance of recovery activities,which may include  attendance at NA/AA meetings, sober support network, and the importance of not isolating, being open, honest, and willing to change, possible triggers and how to avoid them, and managing cravings.    There are no discontinued medications.    Options for treatment and follow-up care were reviewed with the patient. Micah engaged in the decision making process and verbalized understanding of the options discussed and agreed with the final plan.    ELODIA Barrow      Phone call duration:  20  minutes

## 2020-12-11 ENCOUNTER — VIRTUAL VISIT (OUTPATIENT)
Dept: FAMILY MEDICINE | Facility: OTHER | Age: 45
End: 2020-12-11
Attending: PHYSICIAN ASSISTANT
Payer: COMMERCIAL

## 2020-12-11 VITALS — BODY MASS INDEX: 25.11 KG/M2 | WEIGHT: 175 LBS

## 2020-12-11 DIAGNOSIS — F11.90 OPIOID USE DISORDER: ICD-10-CM

## 2020-12-11 DIAGNOSIS — G47.01 INSOMNIA DUE TO MEDICAL CONDITION: Primary | ICD-10-CM

## 2020-12-11 PROCEDURE — 99443 PR PHYSICIAN TELEPHONE EVALUATION 21-30 MIN: CPT | Performed by: PHYSICIAN ASSISTANT

## 2020-12-11 RX ORDER — ZOLPIDEM TARTRATE 10 MG/1
10 TABLET ORAL
Qty: 10 TABLET | Refills: 0 | Status: SHIPPED | OUTPATIENT
Start: 2020-12-11 | End: 2021-02-05

## 2020-12-11 RX ORDER — BUPRENORPHINE AND NALOXONE 8; 2 MG/1; MG/1
1 FILM, SOLUBLE BUCCAL; SUBLINGUAL 2 TIMES DAILY
Qty: 60 EACH | Refills: 0 | Status: SHIPPED | OUTPATIENT
Start: 2020-12-11 | End: 2021-01-08

## 2020-12-11 ASSESSMENT — PAIN SCALES - GENERAL: PAINLEVEL: EXTREME PAIN (8)

## 2020-12-14 ENCOUNTER — TELEPHONE (OUTPATIENT)
Dept: SURGERY | Facility: OTHER | Age: 45
End: 2020-12-14

## 2020-12-14 NOTE — TELEPHONE ENCOUNTER
Patient would like to cancel upcoming surgery on 12/22/20 - he will call back when he is ready to reschedule. Surgery has been canceled.

## 2020-12-16 DIAGNOSIS — F11.90 OPIOID USE DISORDER: ICD-10-CM

## 2020-12-16 RX ORDER — BUPRENORPHINE HYDROCHLORIDE, NALOXONE HYDROCHLORIDE 8; 2 MG/1; MG/1
FILM, SOLUBLE BUCCAL; SUBLINGUAL
Qty: 60 EACH | Refills: 0 | OUTPATIENT
Start: 2020-12-16

## 2020-12-20 ENCOUNTER — HEALTH MAINTENANCE LETTER (OUTPATIENT)
Age: 45
End: 2020-12-20

## 2021-01-07 NOTE — PROGRESS NOTES
Micah Rosenthal is a 45 year old male who is being evaluated via a billable telephone visit.      What phone number would you like to be contacted at?   How would you like to obtain your AVS? MyChart      Follow-up Buprenorphine Visit  The author of this note documented a reason for not sharing it with the patient.           HPI       Micah Rosenthal is here for f/u on buprenophine/naloxone (Suboxone) therapy for opioid use disorder.    Micah is currently taking 8/2 mg of Buprenorphine/Naloxone 2 times daily.     Status since last visit:    Since last visit patient has been: stable.     Intensity:     There has been: moderate craving.  But did not use     Suboxone Dose: adequate.  8 mg bid   Progression of Symptoms:     Cues to use and relapse none      Recovery program has been: active.   Accompanying Signs & Symptoms:    Side Effects: present.    Sobriety:     Status: no use since last visit.      Drug Screen:   Unable to obtain - telephone visit   Precipitating factors:    Triggers have been: moderate. But never used. The first clean lionel he has ever had.   Alleviating factors:    Contact with sponsor has been: no sponsor.     Family and support system has been: helpful.   Other Therapies Tried :     Patient has been going to recovery meetings:not at all.      Other medical concerns:  YES has a  Bad gallbladder and lots of pain. He will be having removed in the future. Has appointment.    Any ED visits? No       History   Smoking Status     Current Every Day Smoker     Packs/day: 1.50     Years: 20.00     Types: Cigarettes     Start date: 1/1/1988   Smokeless Tobacco     Never Used       Problem, Medication and Allergy Lists were reviewed and updated if needed.  reviewed and are current..    Pharmacy Database reviewed? Yes, 1.8.21, as expected.    Patient is an established patient of this clinic..      Assessment and Plan     Was naloxone nasal spray prescribed? Yes.  1. Need for prophylactic vaccination and  inoculation against influenza  Encouraged to have this.     2. Opioid use disorder (H)  Given refill and sent in. Reassured we will treat him in surgical state.   - buprenorphine HCl-naloxone HCl (SUBOXONE) 8-2 MG per film; Place 1 Film under the tongue 2 times daily  Dispense: 60 each; Refill: 0    Dosage will not need adjustment today.  Continue at 8 mg  2 time(s) a day of  buprenorphine/naloxone (Suboxone).     Follow up Plan  Stage 3 (4 weeks): Please make a clinic appointment for follow up with me (BLANCHE GAONA) or another Suboxone provider in 1 month for suboxone follow up.    Greater than 12  minutes was spent with this patient, over half of which was on counseling and coordination of care which includes importance of recovery activities,which may include  attendance at NA/AA meetings, sober support network, and the importance of not isolating, being open, honest, and willing to change, possible triggers and how to avoid them, and managing cravings.    There are no discontinued medications.    Options for treatment and follow-up care were reviewed with the patient. Micah engaged in the decision making process and verbalized understanding of the options discussed and agreed with the final plan.    ELODIA Barrow          Phone call duration: 12 minutes

## 2021-01-08 ENCOUNTER — OFFICE VISIT (OUTPATIENT)
Dept: FAMILY MEDICINE | Facility: OTHER | Age: 46
End: 2021-01-08
Attending: PHYSICIAN ASSISTANT
Payer: COMMERCIAL

## 2021-01-08 VITALS — WEIGHT: 170 LBS | BODY MASS INDEX: 24.39 KG/M2

## 2021-01-08 DIAGNOSIS — Z23 NEED FOR PROPHYLACTIC VACCINATION AND INOCULATION AGAINST INFLUENZA: Primary | ICD-10-CM

## 2021-01-08 DIAGNOSIS — F11.90 OPIOID USE DISORDER: ICD-10-CM

## 2021-01-08 PROCEDURE — 99442 PR PHYSICIAN TELEPHONE EVALUATION 11-20 MIN: CPT | Performed by: PHYSICIAN ASSISTANT

## 2021-01-08 RX ORDER — BUPRENORPHINE AND NALOXONE 8; 2 MG/1; MG/1
1 FILM, SOLUBLE BUCCAL; SUBLINGUAL 2 TIMES DAILY
Qty: 60 EACH | Refills: 0 | Status: SHIPPED | OUTPATIENT
Start: 2021-01-08 | End: 2021-02-05

## 2021-01-08 NOTE — NURSING NOTE
"Chief Complaint   Patient presents with     Recheck Medication       Initial Wt 77.1 kg (170 lb)   BMI 24.39 kg/m   Estimated body mass index is 24.39 kg/m  as calculated from the following:    Height as of 12/4/20: 1.778 m (5' 10\").    Weight as of this encounter: 77.1 kg (170 lb).  Medication Reconciliation: complete  Trista Brown LPN  "

## 2021-01-13 DIAGNOSIS — M50.90 CERVICAL DISC DISEASE: ICD-10-CM

## 2021-01-13 NOTE — TELEPHONE ENCOUNTER
Flexeril 10 mg      Last Written Prescription Date:  12/3/20  Last Fill Quantity: 90,   # refills: 0  Last Office Visit: 1/8/21  Future Office visit:    Next 5 appointments (look out 90 days)    Feb 05, 2021 11:30 AM  (Arrive by 11:15 AM)  SHORT with ELODIA Tomlin  [facility] ([facility] [place] ) [address]  [place] [address]  [phone]           Routing refill request to provider for review/approval because:

## 2021-01-14 RX ORDER — CYCLOBENZAPRINE HCL 10 MG
TABLET ORAL
Qty: 90 TABLET | Refills: 0 | Status: SHIPPED | OUTPATIENT
Start: 2021-01-14 | End: 2021-03-02

## 2021-02-04 NOTE — PROGRESS NOTES
Follow-up Buprenorphine Visit           HPI       Micah Rosenthal is here for f/u on buprenophine/naloxone (Suboxone) therapy for opioid use disorder.  Clinic Care Coordination - Follow-up      Micah is currently taking 8/2 mg of Buprenorphine/Naloxone 2 times daily.     Status since last visit:    Since last visit patient has been: doing well.     Intensity:     There has been: no craving.      Suboxone Dose: adequate.    Progression of Symptoms:     Cues to use and relapse None     Recovery program has been: solid.   Accompanying Signs & Symptoms:    Side Effects: none.    Sobriety:     Status: no use since last visit.      Drug Screen: obtained.    Precipitating factors:    Triggers have been: non-existent.   Alleviating factors:    Contact with sponsor has been: no sponsor.     Family and support system has been: helpful.   Other Therapies Tried :     Patient has been going to recovery meetings:not at all.      Other medical concerns: No    Any ED visits? No       History   Smoking Status     Current Every Day Smoker     Packs/day: 1.50     Years: 20.00     Types: Cigarettes     Start date: 1/1/1988   Smokeless Tobacco     Never Used       Problem, Medication and Allergy Lists were reviewed and updated if needed.  reviewed and are current..    Pharmacy Database reviewed? Yes, 2.5.21, as expected.    Patient is an established patient of this clinic..         Review of Systems:   Review of Systems  CONSTITUTIONAL: NEGATIVE for fever, chills, change in weight  INTEGUMENTARY/SKIN: NEGATIVE for worrisome rashes, moles or lesions  EYES: NEGATIVE for vision changes or irritation  ENT/MOUTH: NEGATIVE for ear, mouth and throat problems  RESP: NEGATIVE for significant cough or SOB  CV: NEGATIVE for chest pain, palpitations or peripheral edema  GI: NEGATIVE for nausea, abdominal pain, heartburn, or change in bowel habits  : NEGATIVE for frequency, dysuria, or hematuria  MUSCULOSKELETAL: NEGATIVE for significant arthralgias  "or myalgia  NEURO: NEGATIVE for weakness, dizziness or paresthesias  ENDOCRINE: NEGATIVE for temperature intolerance, skin/hair changes  PSYCHIATRIC: NEGATIVE for changes in mood or affect          Physical Exam:     Vitals:    02/05/21 1140   BP: 110/60   BP Location: Right arm   Patient Position: Sitting   Cuff Size: Adult Regular   Pulse: 90   Temp: 98.4  F (36.9  C)   TempSrc: Tympanic   SpO2: 95%   Weight: 77.1 kg (170 lb)   Height: 1.778 m (5' 10\")     Body mass index is 24.39 kg/m .  Vitals were reviewed   Physical Exam  GENERAL APPEARANCE: alert, comfortable appearing  EYES: Eyes grossly normal to inspection  HENT:  nose no rhinorrhea  RESP: lungs clear to auscultation - no rales, rhonchi or wheezes and no resp distress  CV: regular rates and rhythm, normal S1 S2,no murmur   ABDOMEN:soft, non-tender, non-distended, no hepatosplenomegaly or masses  SKIN: no rashes, no jaundice, no obvious masses.   NEURO:  no tremor  MENTAL STATUS EXAM:  Appearance/Behavior:No apparent distress  Speech: Normal  Mood/Affect: normal affect  Insight: Adequate      Results:    Results from the last 24 hours  Results for orders placed or performed in visit on 02/05/21 (from the past 24 hour(s))   Urine Drugs of Abuse Screen (Tox13)   Result Value Ref Range    Cannabinoids (70-hkf-4-carboxy-9-THC) Detected, Abnormal Result (A) NDET^Not Detected ng/mL    Phencyclidine (Phencyclidine) Not Detected NDET^Not Detected ng/mL    Cocaine (Benzoylecgonine) Not Detected NDET^Not Detected ng/mL    Methamphetamine (d-Methamphetamine) Not Detected NDET^Not Detected ng/mL    Opiates (Morphine) Not Detected NDET^Not Detected ng/mL    Amphetamine (d-Amphetamine) Not Detected NDET^Not Detected ng/mL    Benzodiazepines (Nordiazepam) Not Detected NDET^Not Detected ng/mL    Tricyclic Antidepressants (Desipramine) Detected, Abnormal Result (A) NDET^Not Detected ng/mL    Methadone (Methadone) Not Detected NDET^Not Detected ng/mL    Barbiturates " (Butalbital) Not Detected NDET^Not Detected ng/mL    Oxycodone (Oxycodone) Not Detected NDET^Not Detected ng/mL    Propoxyphene (Norpropoxyphene) Not Detected NDET^Not Detected ng/mL    Buprenorphine (Buprenorphine) Detected, Abnormal Result (A) NDET^Not Detected ng/mL       Assessment and Plan     Was naloxone nasal spray prescribed? Yes.  1. Opioid use disorder (H)  His urine is good. He is under a lot of stress. Discussion .  We will see him back in a phone visit in a month.   - Urine Drugs of Abuse Screen (Tox13)  - buprenorphine HCl-naloxone HCl (SUBOXONE) 8-2 MG per film; Place 1 Film under the tongue 2 times daily  Dispense: 60 each; Refill: 0    2. Insomnia due to medical condition  Change to Ambien 5 mg.   He is still under a lot of stress.   We will be seeing him back in a month on the phone.       Dosage will not need adjustment today.  Continue at 8/2 mg  2 time(s) a day of  buprenorphine/naloxone (Suboxone).     Follow up Plan  Stage 3 (4 weeks): Please make a clinic appointment for follow up with me (BLANCHE GAONA) or another Suboxone provider in 1 month for suboxone follow up.    Greater than 20  minutes was spent with this patient, over half of which was on counseling and coordination of care which includes importance of recovery activities,which may include  attendance at NA/AA meetings, sober support network, and the importance of not isolating, being open, honest, and willing to change, possible triggers and how to avoid them, and managing cravings.    There are no discontinued medications.    Options for treatment and follow-up care were reviewed with the patient. Micah engaged in the decision making process and verbalized understanding of the options discussed and agreed with the final plan.    ELODIA Barrow

## 2021-02-05 ENCOUNTER — APPOINTMENT (OUTPATIENT)
Dept: LAB | Facility: OTHER | Age: 46
End: 2021-02-05
Attending: PHYSICIAN ASSISTANT
Payer: COMMERCIAL

## 2021-02-05 ENCOUNTER — PATIENT OUTREACH (OUTPATIENT)
Dept: CARE COORDINATION | Facility: OTHER | Age: 46
End: 2021-02-05

## 2021-02-05 ENCOUNTER — OFFICE VISIT (OUTPATIENT)
Dept: FAMILY MEDICINE | Facility: OTHER | Age: 46
End: 2021-02-05
Attending: PHYSICIAN ASSISTANT
Payer: COMMERCIAL

## 2021-02-05 VITALS
HEART RATE: 90 BPM | HEIGHT: 70 IN | BODY MASS INDEX: 24.34 KG/M2 | WEIGHT: 170 LBS | DIASTOLIC BLOOD PRESSURE: 60 MMHG | SYSTOLIC BLOOD PRESSURE: 110 MMHG | OXYGEN SATURATION: 95 % | TEMPERATURE: 98.4 F

## 2021-02-05 DIAGNOSIS — F11.90 OPIOID USE DISORDER: ICD-10-CM

## 2021-02-05 DIAGNOSIS — G47.01 INSOMNIA DUE TO MEDICAL CONDITION: ICD-10-CM

## 2021-02-05 DIAGNOSIS — F11.90 OPIOID USE DISORDER: Primary | ICD-10-CM

## 2021-02-05 PROCEDURE — 80306 DRUG TEST PRSMV INSTRMNT: CPT | Mod: ZL | Performed by: PHYSICIAN ASSISTANT

## 2021-02-05 PROCEDURE — G0463 HOSPITAL OUTPT CLINIC VISIT: HCPCS

## 2021-02-05 PROCEDURE — 99214 OFFICE O/P EST MOD 30 MIN: CPT | Performed by: PHYSICIAN ASSISTANT

## 2021-02-05 RX ORDER — BUPRENORPHINE AND NALOXONE 8; 2 MG/1; MG/1
FILM, SOLUBLE BUCCAL; SUBLINGUAL
Qty: 60 EACH | Refills: 0 | Status: SHIPPED | OUTPATIENT
Start: 2021-02-05 | End: 2021-03-05

## 2021-02-05 RX ORDER — BUPRENORPHINE AND NALOXONE 8; 2 MG/1; MG/1
1 FILM, SOLUBLE BUCCAL; SUBLINGUAL 2 TIMES DAILY
Qty: 60 EACH | Refills: 0 | Status: SHIPPED | OUTPATIENT
Start: 2021-02-05 | End: 2021-02-05

## 2021-02-05 RX ORDER — ZOLPIDEM TARTRATE 5 MG/1
5 TABLET ORAL
Qty: 10 TABLET | Refills: 0 | Status: SHIPPED | OUTPATIENT
Start: 2021-02-05 | End: 2021-03-05

## 2021-02-05 RX ORDER — BUPRENORPHINE AND NALOXONE 8; 2 MG/1; MG/1
FILM, SOLUBLE BUCCAL; SUBLINGUAL
Qty: 60 EACH | Refills: 0 | Status: CANCELLED | OUTPATIENT
Start: 2021-02-05

## 2021-02-05 ASSESSMENT — MIFFLIN-ST. JEOR: SCORE: 1662.36

## 2021-02-05 NOTE — PATIENT INSTRUCTIONS
Thank you for choosing [facility].   I have office hours 8:00 am to 4:30 pm on Monday's, Wednesday's, Thursday's and Friday's. My nurse and I are out of the office every Tuesday.    Following your visit, when your labs and diagnostic testing have returned, I will review then and you will be contacted by my nurse.  If you are on My Chart, you can also view results there.    For refills, notify your pharmacy regarding what you need and the pharmacy will generate a refill request. Do not call my nurse as she is unable to process refill request. Please plan ahead and allow 3-5 days for refill requests.    You will generally receive a reminder call the day prior to your appointment.  If you cannot attend your appointment, please cancel your appointment with as much notice as possible.  If there is a pattern of failure to present for your appointments, I cannot provide consistent, meaningful, ongoing care for you. It is very important to me that you come in for your care, so we can best assist you with your health care needs.    IMPORTANT:  Please note that it is my standard of practice to NOT participate in prescribing ongoing requested Narcotic Analgesic therapy, and/or participate in the prescribing of other controlled substances.  My nurse and I am happy to assist you with the process of referral for alternative pain management as needed, and other treatment modalities including but not limited to:  Physical Therapy, Physical Medicine and Rehab, Counseling, Chiropractic Care, Orthopedic Care, and non-narcotic medication management.     In the event that you need to be seen for emergent concerns and I am out of office,  please see one of my colleagues for acute concerns.  You may also present to  or ER.  I appreciate the opportunity to serve you and look forward to supporting your healthcare needs in the future. Please contact me with any questions or concerns that you may  have.    Sincerely,      Florence Sneed RN, PA-C

## 2021-02-05 NOTE — TELEPHONE ENCOUNTER
Clinic Care Coordination Contact  Care Team Conversations    CC attended office visit with pt and Florence Sneed this date.  Please refer to Florence's note for plan of care.  All questions answered.  Encouraged him to call/text CC with any questions/concerns/problems.  Verbalized understanding.    Yoli Kowalski RN-BSN, [facility] Care Coordinator  [phone] opt. #3

## 2021-02-05 NOTE — NURSING NOTE
"Chief Complaint   Patient presents with     Clinic Care Coordination - Follow-up       Initial /60 (BP Location: Right arm, Patient Position: Sitting, Cuff Size: Adult Regular)   Pulse 90   Temp 98.4  F (36.9  C) (Tympanic)   Ht 1.778 m (5' 10\")   Wt 77.1 kg (170 lb)   SpO2 95%   BMI 24.39 kg/m   Estimated body mass index is 24.39 kg/m  as calculated from the following:    Height as of this encounter: 1.778 m (5' 10\").    Weight as of this encounter: 77.1 kg (170 lb).  Medication Reconciliation: complete  Trista Brown LPN  "

## 2021-03-02 DIAGNOSIS — F11.90 OPIOID USE DISORDER: ICD-10-CM

## 2021-03-02 DIAGNOSIS — M50.90 CERVICAL DISC DISEASE: ICD-10-CM

## 2021-03-02 RX ORDER — CYCLOBENZAPRINE HCL 10 MG
TABLET ORAL
Qty: 90 TABLET | Refills: 0 | Status: SHIPPED | OUTPATIENT
Start: 2021-03-02 | End: 2021-04-19

## 2021-03-02 NOTE — TELEPHONE ENCOUNTER
Flexeril      Last Written Prescription Date:  1.14.21  Last Fill Quantity: #90,   # refills: 0  Last Office Visit: 2.5.21  Future Office visit:       Routing refill request to provider for review/approval because:  Drug not on the [facility], [facility] or [facility] refill protocol or controlled substance

## 2021-03-04 NOTE — PROGRESS NOTES
Micah is a 46 year old who is being evaluated via a billable telephone visit.      What phone number would you like to be contacted at? [phone]  How would you like to obtain your AVS? none    Follow-up Buprenorphine Visit           HPI       Micah Rosenthal is here for f/u on buprenophine/naloxone (Suboxone) therapy for opioid use disorder.  Clinic Care Coordination - Follow-up      Micah is currently taking 8/2 mg of Buprenorphine/Naloxone 2 times daily.     Status since last visit:    Since last visit patient has been: doing well.     Intensity:     There has been: no craving.      Suboxone Dose: adequate.    Progression of Symptoms:     Cues to use and relapse none. All things in his life a setting down. No triggers right now.     Recovery program has been: active.   Accompanying Signs & Symptoms:    Side Effects: none.    Sobriety:     Status: no use since last visit.      Drug Screen:   Unable to obtain - telephone visit   Precipitating factors:    Triggers have been: non-existent.   Alleviating factors:    Contact with sponsor has been: helpful.     Family and support system has been: helpful.   Other Therapies Tried :     Patient has been going to recovery meetings:not at all.      Other medical concerns: No    Any ED visits? No       History   Smoking Status     Current Every Day Smoker     Packs/day: 1.50     Years: 20.00     Types: Cigarettes     Start date: 1/1/1988   Smokeless Tobacco     Never Used       Problem, Medication and Allergy Lists were reviewed and updated if needed.  reviewed and are current..    Pharmacy Database reviewed? Yes, 3.5.21, as expected.    Patient is an established patient of this clinic..        Assessment and Plan     Was naloxone nasal spray prescribed? Yes.  1. Opioid use disorder (H)  Refill is given we will see him back in 4 weeks either in clinic or on phone depending on his schedule.   - buprenorphine HCl-naloxone HCl (SUBOXONE) 8-2 MG per film; Take 1 film in the  morning and take 1.5 films in the PM  Dispense: 60 each; Refill: 0    2. Insomnia due to medical condition  Use is very sparse and limited. Was much worse a month ago when there was chaos.   - zolpidem (AMBIEN) 5 MG tablet; Take 1 tablet (5 mg) by mouth nightly as needed for sleep  Dispense: 10 tablet; Refill: 0  3. Gallstones  Will be calling his surgeon on getting his stones out now. He needs to do this now as life is pretty stable. He is very stable.     Dosage will not need adjustment today.  he will take 8 in a.m. and 12 in pm  buprenorphine/naloxone (Suboxone).     Follow up Plan  Stage 3 (4 weeks): Please make a clinic appointment for follow up with me (BLANCHE GAONA) or another Suboxone provider in 1 month for suboxone follow up.    Greater than 14  minutes was spent with this patient, over half of which was on counseling and coordination of care which includes importance of recovery activities,which may include  attendance at NA/AA meetings, sober support network, and the importance of not isolating, being open, honest, and willing to change, possible triggers and how to avoid them, and managing cravings.    Medications Discontinued During This Encounter   Medication Reason     methylPREDNISolone (MEDROL DOSEPAK) 4 MG tablet therapy pack Therapy completed       Options for treatment and follow-up care were reviewed with the patient. Micah engaged in the decision making process and verbalized understanding of the options discussed and agreed with the final plan.    ELODIA Barrow              Phone call duration: 14  minutes

## 2021-03-05 ENCOUNTER — VIRTUAL VISIT (OUTPATIENT)
Dept: FAMILY MEDICINE | Facility: OTHER | Age: 46
End: 2021-03-05
Attending: PHYSICIAN ASSISTANT
Payer: COMMERCIAL

## 2021-03-05 DIAGNOSIS — G47.01 INSOMNIA DUE TO MEDICAL CONDITION: ICD-10-CM

## 2021-03-05 DIAGNOSIS — F11.90 OPIOID USE DISORDER: ICD-10-CM

## 2021-03-05 PROCEDURE — 99214 OFFICE O/P EST MOD 30 MIN: CPT | Mod: 95 | Performed by: PHYSICIAN ASSISTANT

## 2021-03-05 RX ORDER — BUPRENORPHINE AND NALOXONE 8; 2 MG/1; MG/1
FILM, SOLUBLE BUCCAL; SUBLINGUAL
Qty: 60 EACH | Refills: 0 | Status: SHIPPED | OUTPATIENT
Start: 2021-03-05 | End: 2021-04-01

## 2021-03-05 RX ORDER — ZOLPIDEM TARTRATE 5 MG/1
5 TABLET ORAL
Qty: 10 TABLET | Refills: 0 | Status: SHIPPED | OUTPATIENT
Start: 2021-03-05 | End: 2021-04-01

## 2021-03-05 NOTE — Clinical Note
Needs appointment in 4 weeks and ok to go ahead with gallstone removal. He is doing really well right now. He will call them unless referral is needed. Has been seen in the past and cancelled.

## 2021-03-08 ENCOUNTER — TELEPHONE (OUTPATIENT)
Dept: SURGERY | Facility: OTHER | Age: 46
End: 2021-03-08

## 2021-03-08 NOTE — TELEPHONE ENCOUNTER
Patient needs to be scheduled for a laparoscopic cholecystectomy. Called patient to schedule procedure, and pre-op per . Patient states that he still hasn't talked to his girlfriend about dates to ensure that she is able to help him. Patient states that he will call tomorrow.   Message from Dr. Sae Quevedo, MD Kale Thao Bethany, LPN             Can you arrange a Lap paula for this patient as well as a pre-op, I have seen before and have gotten the Ok to proceed with PCP.        Trice Henley LPN

## 2021-03-09 ENCOUNTER — TELEPHONE (OUTPATIENT)
Dept: SURGERY | Facility: OTHER | Age: 46
End: 2021-03-09

## 2021-03-09 NOTE — TELEPHONE ENCOUNTER
Called patient again to get scheduled for laparoscopic cholecystectomy for a DX of biliary colic. Patient states that he does not want to schedule at this time and will call our office if and when he would like to have procedure. Surgeon, and PCP will be notified. Trice Henley LPN

## 2021-03-26 ENCOUNTER — APPOINTMENT (OUTPATIENT)
Dept: GENERAL RADIOLOGY | Facility: HOSPITAL | Age: 46
End: 2021-03-26
Attending: NURSE PRACTITIONER
Payer: COMMERCIAL

## 2021-03-26 ENCOUNTER — HOSPITAL ENCOUNTER (EMERGENCY)
Facility: HOSPITAL | Age: 46
Discharge: HOME | End: 2021-03-27
Attending: NURSE PRACTITIONER | Admitting: NURSE PRACTITIONER
Payer: COMMERCIAL

## 2021-03-26 ENCOUNTER — APPOINTMENT (OUTPATIENT)
Dept: CT IMAGING | Facility: HOSPITAL | Age: 46
End: 2021-03-26
Attending: NURSE PRACTITIONER
Payer: COMMERCIAL

## 2021-03-26 DIAGNOSIS — R11.0 NAUSEA: ICD-10-CM

## 2021-03-26 LAB
ALBUMIN SERPL-MCNC: 3.9 G/DL (ref 3.4–5)
ALP SERPL-CCNC: 141 U/L (ref 40–150)
ALT SERPL W P-5'-P-CCNC: 13 U/L (ref 0–70)
ANION GAP SERPL CALCULATED.3IONS-SCNC: 3 MMOL/L (ref 3–14)
AST SERPL W P-5'-P-CCNC: 14 U/L (ref 0–45)
BASOPHILS # BLD AUTO: 0 10E9/L (ref 0–0.2)
BASOPHILS NFR BLD AUTO: 0.3 %
BILIRUB SERPL-MCNC: 0.4 MG/DL (ref 0.2–1.3)
BUN SERPL-MCNC: 4 MG/DL (ref 7–30)
CALCIUM SERPL-MCNC: 8.5 MG/DL (ref 8.5–10.1)
CHLORIDE SERPL-SCNC: 101 MMOL/L (ref 94–109)
CO2 SERPL-SCNC: 31 MMOL/L (ref 20–32)
CREAT SERPL-MCNC: 0.65 MG/DL (ref 0.66–1.25)
DIFFERENTIAL METHOD BLD: ABNORMAL
EOSINOPHIL # BLD AUTO: 0 10E9/L (ref 0–0.7)
EOSINOPHIL NFR BLD AUTO: 0.3 %
ERYTHROCYTE [DISTWIDTH] IN BLOOD BY AUTOMATED COUNT: 12.7 % (ref 10–15)
GFR SERPL CREATININE-BSD FRML MDRD: >90 ML/MIN/{1.73_M2}
GLUCOSE SERPL-MCNC: 108 MG/DL (ref 70–99)
HCT VFR BLD AUTO: 45.2 % (ref 40–53)
HGB BLD-MCNC: 15.3 G/DL (ref 13.3–17.7)
IMM GRANULOCYTES # BLD: 0 10E9/L (ref 0–0.4)
IMM GRANULOCYTES NFR BLD: 0.3 %
LACTATE BLD-SCNC: 1 MMOL/L (ref 0.7–2)
LYMPHOCYTES # BLD AUTO: 1.5 10E9/L (ref 0.8–5.3)
LYMPHOCYTES NFR BLD AUTO: 12.8 %
MCH RBC QN AUTO: 28.9 PG (ref 26.5–33)
MCHC RBC AUTO-ENTMCNC: 33.8 G/DL (ref 31.5–36.5)
MCV RBC AUTO: 85 FL (ref 78–100)
MONOCYTES # BLD AUTO: 0.5 10E9/L (ref 0–1.3)
MONOCYTES NFR BLD AUTO: 4.1 %
NEUTROPHILS # BLD AUTO: 9.8 10E9/L (ref 1.6–8.3)
NEUTROPHILS NFR BLD AUTO: 82.2 %
NRBC # BLD AUTO: 0 10*3/UL
NRBC BLD AUTO-RTO: 0 /100
PLATELET # BLD AUTO: 306 10E9/L (ref 150–450)
POTASSIUM SERPL-SCNC: 3.7 MMOL/L (ref 3.4–5.3)
PROT SERPL-MCNC: 6.9 G/DL (ref 6.8–8.8)
RBC # BLD AUTO: 5.29 10E12/L (ref 4.4–5.9)
SODIUM SERPL-SCNC: 135 MMOL/L (ref 133–144)
WBC # BLD AUTO: 12 10E9/L (ref 4–11)

## 2021-03-26 PROCEDURE — 71045 X-RAY EXAM CHEST 1 VIEW: CPT

## 2021-03-26 PROCEDURE — 70450 CT HEAD/BRAIN W/O DYE: CPT

## 2021-03-26 PROCEDURE — 83605 ASSAY OF LACTIC ACID: CPT | Performed by: NURSE PRACTITIONER

## 2021-03-26 PROCEDURE — 87636 SARSCOV2 & INF A&B AMP PRB: CPT | Performed by: NURSE PRACTITIONER

## 2021-03-26 PROCEDURE — 93005 ELECTROCARDIOGRAM TRACING: CPT

## 2021-03-26 PROCEDURE — 250N000011 HC RX IP 250 OP 636: Performed by: NURSE PRACTITIONER

## 2021-03-26 PROCEDURE — 93010 ELECTROCARDIOGRAM REPORT: CPT | Performed by: INTERNAL MEDICINE

## 2021-03-26 PROCEDURE — C9803 HOPD COVID-19 SPEC COLLECT: HCPCS

## 2021-03-26 PROCEDURE — 36415 COLL VENOUS BLD VENIPUNCTURE: CPT | Performed by: NURSE PRACTITIONER

## 2021-03-26 PROCEDURE — 85025 COMPLETE CBC W/AUTO DIFF WBC: CPT | Performed by: NURSE PRACTITIONER

## 2021-03-26 PROCEDURE — 99285 EMERGENCY DEPT VISIT HI MDM: CPT | Performed by: NURSE PRACTITIONER

## 2021-03-26 PROCEDURE — 80053 COMPREHEN METABOLIC PANEL: CPT | Performed by: NURSE PRACTITIONER

## 2021-03-26 PROCEDURE — 99285 EMERGENCY DEPT VISIT HI MDM: CPT | Mod: 25

## 2021-03-26 RX ORDER — ONDANSETRON 4 MG/1
4 TABLET, ORALLY DISINTEGRATING ORAL ONCE
Status: COMPLETED | OUTPATIENT
Start: 2021-03-26 | End: 2021-03-26

## 2021-03-26 RX ORDER — ONDANSETRON 2 MG/ML
4 INJECTION INTRAMUSCULAR; INTRAVENOUS EVERY 30 MIN PRN
Status: DISCONTINUED | OUTPATIENT
Start: 2021-03-26 | End: 2021-03-27 | Stop reason: HOSPADM

## 2021-03-26 RX ORDER — SODIUM CHLORIDE 9 MG/ML
INJECTION, SOLUTION INTRAVENOUS CONTINUOUS
Status: DISCONTINUED | OUTPATIENT
Start: 2021-03-26 | End: 2021-03-27 | Stop reason: HOSPADM

## 2021-03-26 RX ADMIN — ONDANSETRON 4 MG: 4 TABLET, ORALLY DISINTEGRATING ORAL at 22:23

## 2021-03-26 ASSESSMENT — ENCOUNTER SYMPTOMS
DIARRHEA: 0
SHORTNESS OF BREATH: 0
CHILLS: 1
PALPITATIONS: 0
DYSURIA: 0
SORE THROAT: 0
COUGH: 1
WEAKNESS: 1
ABDOMINAL PAIN: 0
BACK PAIN: 0
SPEECH DIFFICULTY: 0
NAUSEA: 1
FEVER: 0
NECK PAIN: 0
CONSTIPATION: 0
HEADACHES: 0
FLANK PAIN: 0
CONFUSION: 0
NUMBNESS: 0
LIGHT-HEADEDNESS: 0

## 2021-03-27 VITALS
RESPIRATION RATE: 18 BRPM | SYSTOLIC BLOOD PRESSURE: 124 MMHG | DIASTOLIC BLOOD PRESSURE: 82 MMHG | OXYGEN SATURATION: 94 % | HEART RATE: 86 BPM | TEMPERATURE: 97.8 F

## 2021-03-27 LAB
FLUAV RNA RESP QL NAA+PROBE: NEGATIVE
FLUBV RNA RESP QL NAA+PROBE: NEGATIVE
LABORATORY COMMENT REPORT: NORMAL
RSV RNA SPEC QL NAA+PROBE: NEGATIVE
SARS-COV-2 RNA RESP QL NAA+PROBE: NEGATIVE
SPECIMEN SOURCE: NORMAL

## 2021-03-27 NOTE — ED NOTES
Patient states he last ate around 1530. States he developed generalized weakness and nausea around 1930. Denies vomiting. Denies pain. States last bowel movement was around 1900. Denies history of same symptoms. CMS intact.  are equal and strong. Able to move lower extremities against resistance.

## 2021-03-27 NOTE — DISCHARGE INSTRUCTIONS
Return for any new or worsening symptoms.  Labs, imaging and covid/flu swabs all reassuring today.   Drink plenty of clear liquids and assure adequate urination.   Call HERMELINDA Sneed for persistent symptoms. Never hesitate to come back to the ED for any worsening concerns.

## 2021-03-27 NOTE — ED NOTES
Attempted starting IV and patient moves and IV is not usable. He states he will drink the fluid and doesn't want another attempt made.

## 2021-03-27 NOTE — ED PROVIDER NOTES
History     Chief Complaint   Patient presents with     Generalized Weakness     Nausea     The history is provided by the patient and the spouse.     Micah Rosenthal is a 46 year old male who presents to the emergency department for a sudden onset of weakness, nausea, and diaphoresis.  Patient reports extreme nausea and was withdrawn on arrival.  Patient states that he felt like he had to have a bowel movement, he went to the bathroom and developed symptoms well having a bowel movement.  He denies excessive straining.  He had no blood in his stool.  He has not had an emesis but he does feel very nauseated.  His wife went to get him up to bring him into the emergency department and he stated that he could not get up therefore EMS was called.  Patient had mac & cheese at approximately 1530.  He has no abdominal pain, no chest pain, no shortness of breath.  Patient denies headache.  He does have chills but no fever.    Patient has a history of opioid abuse and is currently on Suboxone treatment.  Patient states that he has been 1 year sober.  Denies alcohol ingestion.    Allergies:  Allergies   Allergen Reactions     Benzodiazepines      On Suboxone - contraindicated.     Cranberry      Strawberry      Doxycycline Nausea and Vomiting     Zithromax [Azithromycin Dihydrate] Rash       Problem List:    Patient Active Problem List    Diagnosis Date Noted     Biliary colic 12/04/2020     Priority: Medium     Added automatically from request for surgery [id number]       Hiatal hernia 11/12/2020     Priority: Medium     Choking, sequela 11/12/2020     Priority: Medium     Opioid use disorder (H) 04/03/2020     Priority: Medium     ACP (advance care planning) 01/17/2017     Priority: Medium     Advance Care Planning 1/17/2017: ACP Review of Chart / Resources Provided:  Reviewed chart for advance care plan.  Micah Rosenthal has no plan or code status on file. Discussed available resources and provided with information. Confirmed code  status reflects current choices pending further ACP discussions.  Confirmed/documented legally designated decision makers.  Added by Thania Suárez             Positive urine drug screen 10/31/2016     Priority: Medium     Amphetamine and Cannabis       Pain management contract signed 04/14/2016     Priority: Medium     Updated 09/25/17       Cervical disc disease      Priority: Medium        Past Medical History:    Past Medical History:   Diagnosis Date     Cervical disc disease        Past Surgical History:    Past Surgical History:   Procedure Laterality Date     BACK SURGERY      fusion of C5 and C6     IR TRANSLAMINAR EPIDURAL LUMBAR INJ INCL IMAGING  12/2012     OTHER SURGICAL HISTORY  03/2013    Cervical Fusion     wisdom teeth extracted         Family History:    Family History   Problem Relation Age of Onset     Unknown/Adopted Father      Cancer Maternal Uncle         throat ca     Cancer Maternal Uncle         lung ca     Cardiovascular Maternal Grandfather      Cardiovascular Paternal Grandfather        Social History:  Marital Status:   [4]  Social History     Tobacco Use     Smoking status: Current Every Day Smoker     Packs/day: 1.50     Years: 20.00     Pack years: 30.00     Types: Cigarettes     Start date: 1/1/1988     Smokeless tobacco: Never Used   Substance Use Topics     Alcohol use: No     Alcohol/week: 0.0 standard drinks     Drug use: No        Medications:    albuterol (PROAIR HFA/PROVENTIL HFA/VENTOLIN HFA) 108 (90 Base) MCG/ACT inhaler  buprenorphine HCl-naloxone HCl (SUBOXONE) 8-2 MG per film  cyclobenzaprine (FLEXERIL) 10 MG tablet  gabapentin (NEURONTIN) 300 MG capsule  zolpidem (AMBIEN) 5 MG tablet  lidocaine (XYLOCAINE) 2 % solution  naloxone 4 MG/0.1ML NA nasal spray          Review of Systems   Constitutional: Positive for chills. Negative for fever.   HENT: Negative for sore throat.    Eyes: Negative for visual disturbance.   Respiratory: Positive for cough (chronic).  Negative for shortness of breath.    Cardiovascular: Negative for chest pain, palpitations and leg swelling.   Gastrointestinal: Positive for nausea. Negative for abdominal pain, constipation and diarrhea.   Genitourinary: Negative for decreased urine volume, dysuria and flank pain.   Musculoskeletal: Negative for back pain and neck pain.   Neurological: Positive for weakness. Negative for speech difficulty, light-headedness, numbness and headaches.   Psychiatric/Behavioral: Negative for confusion.       Physical Exam   BP: 157/88  Pulse: 79  Temp: 97.8  F (36.6  C)  Resp: 18  SpO2: 94 %      Physical Exam  Vitals signs and nursing note reviewed.   Constitutional:       General: He is not in acute distress.     Appearance: He is well-developed. He is ill-appearing. He is not toxic-appearing.   HENT:      Head: Normocephalic.      Right Ear: Hearing normal.      Left Ear: Hearing normal.      Nose: Nose normal.      Mouth/Throat:      Mouth: Mucous membranes are dry.   Eyes:      General: Lids are normal.      Extraocular Movements: Extraocular movements intact.      Conjunctiva/sclera: Conjunctivae normal.      Pupils: Pupils are equal, round, and reactive to light.   Neck:      Musculoskeletal: Neck supple.   Cardiovascular:      Rate and Rhythm: Normal rate and regular rhythm.      Pulses:           Radial pulses are 2+ on the right side and 2+ on the left side.      Heart sounds: S1 normal and S2 normal. No murmur.   Pulmonary:      Effort: Pulmonary effort is normal. No accessory muscle usage or respiratory distress.      Breath sounds: Normal breath sounds.   Abdominal:      General: Bowel sounds are normal.      Palpations: Abdomen is soft.      Tenderness: There is no abdominal tenderness. There is no right CVA tenderness, left CVA tenderness, guarding or rebound.   Musculoskeletal:      Right lower leg: No edema.      Left lower leg: No edema.   Skin:     Coloration: Skin is pale.   Neurological:      Mental  Status: He is alert.      Cranial Nerves: No dysarthria or facial asymmetry.      Sensory: Sensation is intact.      Motor: No weakness, tremor or abnormal muscle tone.      Coordination: Coordination is intact.      Comments: 5/5 strength in upper and lower extremities.   Psychiatric:         Behavior: Behavior is cooperative.         ED Course     Patient was interviewed and examined.  Blood pressure 157/88, afebrile at 97.8.  There was no tachycardia, or bradycardia.  No increased work of breathing or hypoxia.  Patient appeared ill and pale however was in no acute distress and had a nontoxic appearance.    Exam reveals slightly dry mucous membranes.  There was some rhonchi in the lungs.  Patient is a smoker and is reported to have a chronic cough.  He had no abdominal tenderness with palpation.    Broad differential for sudden onset of weakness and nausea, includes viral syndrome including influenza or Covid given rapid onset.  Other considerations are pneumonia, urinary tract infection, intracerebral bleeding, vagal reaction or potential for ingestion.    Labs suggested an elevated white count at 12.  There is no anemia or platelet abnormality.  Lactic acid was normal.  Electrolytes were normal.  Glucose was 108.  Creatinine low at 0.65.  Hepatic function was normal.      x-ray the chest appears to be without increased vascular congestion, cardiomegaly or infiltrate to suggest pneumonia.  Official read will be done by V rad on a nonurgent basis.  I did obtain head CT to assure no intracerebral bleeding which was without any acute intracranial abnormality.  I reassess patient at 12:30 AM, he reported improved nausea.  I told him that I cannot rule out UTI without a urine however he requested to be discharged home.  Patient's wife feels as though the patient may have been experiencing some anxiety as they have had some recent deaths and people with heart trouble.  I did provide reassurance that he does not have  Covid or influenza.  His labs are reassuring only with a nonspecific leukocytosis which could be viral in nature.  The patient could have gastroenteritis it is also possible that he vagal down while on the toilet causing diaphoresis, a hot flash and nausea which increased his anxiety.  Emulated of department at 1235 in stable condition.  Return precautions were discussed.       Procedures               EKG Interpretation:      Interpreted by Thania Hernandez CNP  Time reviewed: 2205  Symptoms at time of EKG: NSR with sinus arrhythmia  Rhythm: normal sinus   Rate: normal  Axis: normal  Ectopy: none  Conduction: normal  ST Segments/ T Waves: No ST-T wave changes  Q Waves: none  Comparison to prior: No old EKG available    Clinical Impression: normal EKG    Results for orders placed or performed during the hospital encounter of 03/26/21 (from the past 24 hour(s))   Symptomatic Influenza A/B & SARS-CoV2 (COVID-19) Virus PCR Multiplex    Specimen: Nasopharyngeal   Result Value Ref Range    Flu A/B & SARS-COV-2 PCR Source Nasopharyngeal     SARS-CoV-2 PCR Result NEGATIVE     Influenza A PCR Negative NEG^Negative    Influenza B PCR Negative NEG^Negative    Respiratory Syncytial Virus PCR Negative NEG^Negative    Flu A/B & SARS-CoV-2 PCR Comment       Testing was performed using the Xpert Xpress SARS-CoV2/Flu/RSV Assay on the [employer]   GeneXpert Instrument. Additional information about the Emergency Use Authorization (EUA)   assay can be found via the Lab Guide.     CBC with platelets differential   Result Value Ref Range    WBC 12.0 (H) 4.0 - 11.0 10e9/L    RBC Count 5.29 4.4 - 5.9 10e12/L    Hemoglobin 15.3 13.3 - 17.7 g/dL    Hematocrit 45.2 40.0 - 53.0 %    MCV 85 78 - 100 fl    MCH 28.9 26.5 - 33.0 pg    MCHC 33.8 31.5 - 36.5 g/dL    RDW 12.7 10.0 - 15.0 %    Platelet Count 306 150 - 450 10e9/L    Diff Method Automated Method     % Neutrophils 82.2 %    % Lymphocytes 12.8 %    % Monocytes 4.1 %    % Eosinophils 0.3 %     % Basophils 0.3 %    % Immature Granulocytes 0.3 %    Nucleated RBCs 0 0 /100    Absolute Neutrophil 9.8 (H) 1.6 - 8.3 10e9/L    Absolute Lymphocytes 1.5 0.8 - 5.3 10e9/L    Absolute Monocytes 0.5 0.0 - 1.3 10e9/L    Absolute Eosinophils 0.0 0.0 - 0.7 10e9/L    Absolute Basophils 0.0 0.0 - 0.2 10e9/L    Abs Immature Granulocytes 0.0 0 - 0.4 10e9/L    Absolute Nucleated RBC 0.0    Comprehensive metabolic panel   Result Value Ref Range    Sodium 135 133 - 144 mmol/L    Potassium 3.7 3.4 - 5.3 mmol/L    Chloride 101 94 - 109 mmol/L    Carbon Dioxide 31 20 - 32 mmol/L    Anion Gap 3 3 - 14 mmol/L    Glucose 108 (H) 70 - 99 mg/dL    Urea Nitrogen 4 (L) 7 - 30 mg/dL    Creatinine 0.65 (L) 0.66 - 1.25 mg/dL    GFR Estimate >90 >60 mL/min/[1.73_m2]    GFR Estimate If Black >90 >60 mL/min/[1.73_m2]    Calcium 8.5 8.5 - 10.1 mg/dL    Bilirubin Total 0.4 0.2 - 1.3 mg/dL    Albumin 3.9 3.4 - 5.0 g/dL    Protein Total 6.9 6.8 - 8.8 g/dL    Alkaline Phosphatase 141 40 - 150 U/L    ALT 13 0 - 70 U/L    AST 14 0 - 45 U/L   Lactic acid whole blood   Result Value Ref Range    Lactic Acid 1.0 0.7 - 2.0 mmol/L       Medications   0.9% sodium chloride BOLUS (1,000 mLs Intravenous Not Given 3/27/21 0031)     Followed by   sodium chloride 0.9% infusion ( Intravenous Not Given 3/27/21 0032)   ondansetron (ZOFRAN) injection 4 mg (has no administration in time range)   ondansetron (ZOFRAN-ODT) ODT tab 4 mg (4 mg Oral Given 3/26/21 2223)       Assessments & Plan (with Medical Decision Making)     I have reviewed the nursing notes.    I have reviewed the findings, diagnosis, plan and need for follow up with the patient.    New Prescriptions    No medications on file     Discharge home:  Return for any new or worsening symptoms.  Labs, imaging and covid/flu swabs all reassuring today.   Drink plenty of clear liquids and assure adequate urination.   Call HERMELINDA Sneed for persistent symptoms. Never hesitate to come back to the ED for any worsening  concerns.   Final diagnoses:   Nausea       3/26/2021   [facility]     Thania Rudolph CNP  03/27/21 0035

## 2021-03-31 NOTE — PROGRESS NOTES
Follow-up Buprenorphine Visit           HPI       Micah Rosenthal is here for f/u on buprenophine/naloxone (Suboxone) therapy for opioid use disorder.  Recheck Medication      Micah is currently taking 8/2mg in the AM and 12/3mg in the PM of Buprenorphine/Naloxone  daily.     Status since last visit:    Since last visit patient has been: stable.     Intensity:     There has been: no craving.      Suboxone Dose: adequate.    Progression of Symptoms:     Cues to use and relapse None    Recovery program has been: solid.   Accompanying Signs & Symptoms:    Side Effects: none.    Sobriety:     Status: no use since last visit.      Drug Screen: obtained.    Precipitating factors:    Triggers have been: mild.   Alleviating factors:    Contact with sponsor has been: no sponsor.     Family and support system has been: helpful.   Other Therapies Tried :     Patient has been going to recovery meetings:not at all.      Other medical concerns: No    Any ED visits?  YES 3.26.21, Nausea       History   Smoking Status     Current Every Day Smoker     Packs/day: 1.50     Years: 20.00     Types: Cigarettes     Start date: 1/1/1988   Smokeless Tobacco     Never Used       Problem, Medication and Allergy Lists were reviewed and updated if needed.  reviewed and are current..    Pharmacy Database reviewed? Yes, 4.1.21, as expected.    Patient is an established patient of this clinic..         Review of Systems:   Review of Systems  CONSTITUTIONAL: NEGATIVE for fever, chills, change in weight  INTEGUMENTARY/SKIN: NEGATIVE for worrisome rashes, moles or lesions  EYES: NEGATIVE for vision changes or irritation  ENT/MOUTH: NEGATIVE for ear, mouth and throat problems  RESP: NEGATIVE for significant cough or SOB  CV: NEGATIVE for chest pain, palpitations or peripheral edema  GI: NEGATIVE for nausea, abdominal pain, heartburn, or change in bowel habits  : NEGATIVE for frequency, dysuria, or hematuria  MUSCULOSKELETAL: NEGATIVE for significant  "arthralgias or myalgia  NEURO: NEGATIVE for weakness, dizziness or paresthesias  ENDOCRINE: NEGATIVE for temperature intolerance, skin/hair changes  PSYCHIATRIC: NEGATIVE for changes in mood or affect          Physical Exam:     Vitals:    04/01/21 1118   BP: 124/60   Pulse: 102   Temp: 97.5  F (36.4  C)   SpO2: 95%   Weight: 75.3 kg (166 lb)   Height: 1.778 m (5' 10\")     Body mass index is 23.82 kg/m .  Vitals were reviewed   Physical Exam  GENERAL APPEARANCE: pale and fatigued  EYES: Eyes grossly normal to inspection  HENT:  nose no rhinorrhea  RESP: lungs clear to auscultation - no rales, rhonchi or wheezes and no resp distress  CV: regular rates and rhythm, normal S1 S2,no murmur   ABDOMEN:soft, non-tender, non-distended, no hepatosplenomegaly or masses  SKIN: no rashes, no jaundice, no obvious masses.   NEURO:  no tremor  MENTAL STATUS EXAM:  Appearance/Behavior:No apparent distress  Speech: Normal  Mood/Affect: normal affect  Insight: Adequate      Results:    Results from the last 24 hours  Results for orders placed or performed in visit on 04/01/21 (from the past 24 hour(s))   Urine Drugs of Abuse Screen (Tox13)   Result Value Ref Range    Cannabinoids (89-xsq-6-carboxy-9-THC) Detected, Abnormal Result (A) NDET^Not Detected ng/mL    Phencyclidine (Phencyclidine) Not Detected NDET^Not Detected ng/mL    Cocaine (Benzoylecgonine) Not Detected NDET^Not Detected ng/mL    Methamphetamine (d-Methamphetamine) Not Detected NDET^Not Detected ng/mL    Opiates (Morphine) Not Detected NDET^Not Detected ng/mL    Amphetamine (d-Amphetamine) Not Detected NDET^Not Detected ng/mL    Benzodiazepines (Nordiazepam) Not Detected NDET^Not Detected ng/mL    Tricyclic Antidepressants (Desipramine) Detected, Abnormal Result (A) NDET^Not Detected ng/mL    Methadone (Methadone) Not Detected NDET^Not Detected ng/mL    Barbiturates (Butalbital) Not Detected NDET^Not Detected ng/mL    Oxycodone (Oxycodone) Not Detected NDET^Not Detected " ng/mL    Propoxyphene (Norpropoxyphene) Not Detected NDET^Not Detected ng/mL    Buprenorphine (Buprenorphine) Detected, Abnormal Result (A) NDET^Not Detected ng/mL       Assessment and Plan     Was naloxone nasal spray prescribed? Yes - previously.    1. Opioid use disorder (H)  He is taking a full strip in a.m. and one in PM hasn't had to take a 1/2 of this medication.    - Urine Drugs of Abuse Screen (Tox13)    Dosage will not need adjustment today.  Continue at 8 mg  2 time(s) a day of  buprenorphine/naloxone (Suboxone).     Follow up Plan  Stage 3 (4 weeks): Please make a clinic appointment for follow up with me (BLANCHE GAONA) or another Suboxone provider in 1 month for suboxone follow up.    Greater than 15 minutes was spent with this patient, over half of which was on counseling and coordination of care which includes importance of recovery activities,which may include  attendance at NA/AA meetings, sober support network, and the importance of not isolating, being open, honest, and willing to change, possible triggers and how to avoid them, and managing cravings.    Medications Discontinued During This Encounter   Medication Reason     buprenorphine HCl-naloxone HCl (SUBOXONE) 8-2 MG per film Reorder     zolpidem (AMBIEN) 5 MG tablet Reorder       Options for treatment and follow-up care were reviewed with the patient. Micah engaged in the decision making process and verbalized understanding of the options discussed and agreed with the final plan.    ELODIA Barrow

## 2021-04-01 ENCOUNTER — OFFICE VISIT (OUTPATIENT)
Dept: FAMILY MEDICINE | Facility: OTHER | Age: 46
End: 2021-04-01
Attending: PHYSICIAN ASSISTANT
Payer: COMMERCIAL

## 2021-04-01 ENCOUNTER — PATIENT OUTREACH (OUTPATIENT)
Dept: CARE COORDINATION | Facility: OTHER | Age: 46
End: 2021-04-01

## 2021-04-01 ENCOUNTER — APPOINTMENT (OUTPATIENT)
Dept: LAB | Facility: OTHER | Age: 46
End: 2021-04-01
Attending: PHYSICIAN ASSISTANT
Payer: COMMERCIAL

## 2021-04-01 VITALS
BODY MASS INDEX: 23.77 KG/M2 | OXYGEN SATURATION: 95 % | WEIGHT: 166 LBS | HEIGHT: 70 IN | DIASTOLIC BLOOD PRESSURE: 60 MMHG | SYSTOLIC BLOOD PRESSURE: 124 MMHG | TEMPERATURE: 97.5 F | HEART RATE: 102 BPM

## 2021-04-01 DIAGNOSIS — F11.90 OPIOID USE DISORDER: Primary | ICD-10-CM

## 2021-04-01 DIAGNOSIS — G47.01 INSOMNIA DUE TO MEDICAL CONDITION: ICD-10-CM

## 2021-04-01 PROCEDURE — G0463 HOSPITAL OUTPT CLINIC VISIT: HCPCS

## 2021-04-01 PROCEDURE — 80306 DRUG TEST PRSMV INSTRMNT: CPT | Mod: ZL | Performed by: PHYSICIAN ASSISTANT

## 2021-04-01 PROCEDURE — 99213 OFFICE O/P EST LOW 20 MIN: CPT | Performed by: PHYSICIAN ASSISTANT

## 2021-04-01 RX ORDER — BUPRENORPHINE AND NALOXONE 8; 2 MG/1; MG/1
FILM, SOLUBLE BUCCAL; SUBLINGUAL
Qty: 60 EACH | Refills: 0 | Status: SHIPPED | OUTPATIENT
Start: 2021-04-01 | End: 2021-04-29

## 2021-04-01 RX ORDER — ZOLPIDEM TARTRATE 5 MG/1
5 TABLET ORAL
Qty: 30 TABLET | Refills: 0 | Status: SHIPPED | OUTPATIENT
Start: 2021-04-01 | End: 2021-04-29

## 2021-04-01 ASSESSMENT — MIFFLIN-ST. JEOR: SCORE: 1639.22

## 2021-04-01 ASSESSMENT — PAIN SCALES - GENERAL: PAINLEVEL: SEVERE PAIN (7)

## 2021-04-01 NOTE — NURSING NOTE
"Chief Complaint   Patient presents with     Recheck Medication       Initial /60   Pulse 102   Temp 97.5  F (36.4  C)   Ht 1.778 m (5' 10\")   Wt 75.3 kg (166 lb)   SpO2 95%   BMI 23.82 kg/m   Estimated body mass index is 23.82 kg/m  as calculated from the following:    Height as of this encounter: 1.778 m (5' 10\").    Weight as of this encounter: 75.3 kg (166 lb).  Medication Reconciliation: complete  Michele Schwartz LPN  "

## 2021-04-01 NOTE — PROGRESS NOTES
Clinic Care Coordination Contact  Care Team Conversations    CC attended office visit with pt and Florence Sneed this date.  Please refer to Florence's note for plan of care.  Doing well.   Interested in some counseling.  Will send [facility] a message to reach out to Ronaldo to set up counseling.  All questions answered.  Encouraged him to call/text CC with any questions/concerns/problems.  Verbalized understanding.    Yoli Kowalski RN-BSN, [facility] Care Coordinator  [phone] opt. #3

## 2021-04-12 NOTE — PROGRESS NOTES
Assessment & Plan     Balanitis  Will treat with below. Symptomatic treatment was discussed along when patient should call and/or come back into the clinic or go to ER/Urgent care. All questions answered.   - mupirocin (BACTROBAN) 2 % external cream; Apply topically 3 times daily  - ketoconazole (NIZORAL) 2 % external cream; Apply topically 2 times daily             Tobacco Cessation:   reports that he has been smoking cigarettes. He started smoking about 33 years ago. He has a 30.00 pack-year smoking history. He has never used smokeless tobacco.          No follow-ups on file.    Arvind Mccoy MD  [facility] - REVA Obrien is a 46 year old who presents for the following health issues     HPI   Personal  Falking skin causing bleeding sometimes       Duration: Three weeks ago     Description (location/character/radiation): Penis     Intensity:  moderate    Accompanying signs and symptoms: as below     History (similar episodes/previous evaluation): None    Precipitating or alleviating factors: None    Therapies tried and outcome: None    Came on slow over 3 weeks     Itches    No meds tried     Never before     Monogamous relationship for 11 yrs               Review of Systems   CONSTITUTIONAL: NEGATIVE for fever, chills, change in weight  RESP: NEGATIVE for significant cough or SOB  CV: NEGATIVE for chest pain, palpitations or peripheral edema      Objective    /70   Pulse 100   Temp 98  F (36.7  C)   Resp 20   Wt 81.2 kg (179 lb)   SpO2 97%   BMI 25.68 kg/m    Body mass index is 25.68 kg/m .  Physical Exam   GENERAL: healthy, alert and no distress   (male): normal male genitalia without lesions or urethral discharge, red and scaley around head of penis

## 2021-04-14 ENCOUNTER — OFFICE VISIT (OUTPATIENT)
Dept: FAMILY MEDICINE | Facility: OTHER | Age: 46
End: 2021-04-14
Attending: PHYSICIAN ASSISTANT
Payer: COMMERCIAL

## 2021-04-14 VITALS
RESPIRATION RATE: 20 BRPM | TEMPERATURE: 98 F | DIASTOLIC BLOOD PRESSURE: 70 MMHG | OXYGEN SATURATION: 97 % | HEART RATE: 100 BPM | SYSTOLIC BLOOD PRESSURE: 126 MMHG | WEIGHT: 179 LBS | BODY MASS INDEX: 25.68 KG/M2

## 2021-04-14 DIAGNOSIS — N48.1 BALANITIS: Primary | ICD-10-CM

## 2021-04-14 PROCEDURE — 99213 OFFICE O/P EST LOW 20 MIN: CPT | Performed by: FAMILY MEDICINE

## 2021-04-14 RX ORDER — KETOCONAZOLE 20 MG/G
CREAM TOPICAL 2 TIMES DAILY
Qty: 15 G | Refills: 1 | Status: SHIPPED | OUTPATIENT
Start: 2021-04-14 | End: 2021-08-19

## 2021-04-14 RX ORDER — MUPIROCIN CALCIUM 20 MG/G
CREAM TOPICAL 3 TIMES DAILY
Qty: 15 G | Refills: 1 | Status: SHIPPED | OUTPATIENT
Start: 2021-04-14 | End: 2021-08-19

## 2021-04-14 ASSESSMENT — PAIN SCALES - GENERAL: PAINLEVEL: SEVERE PAIN (7)

## 2021-04-14 NOTE — NURSING NOTE
"Chief Complaint   Patient presents with     Derm Problem       Initial /70   Pulse 100   Temp 98  F (36.7  C)   Resp 20   Wt 81.2 kg (179 lb)   SpO2 97%   BMI 25.68 kg/m   Estimated body mass index is 25.68 kg/m  as calculated from the following:    Height as of 4/1/21: 1.778 m (5' 10\").    Weight as of this encounter: 81.2 kg (179 lb).  Medication Reconciliation: steve Santana  "

## 2021-04-19 ENCOUNTER — TELEPHONE (OUTPATIENT)
Dept: FAMILY MEDICINE | Facility: OTHER | Age: 46
End: 2021-04-19

## 2021-04-19 DIAGNOSIS — M50.90 CERVICAL DISC DISEASE: ICD-10-CM

## 2021-04-19 RX ORDER — CYCLOBENZAPRINE HCL 10 MG
TABLET ORAL
Qty: 90 TABLET | Refills: 0 | Status: SHIPPED | OUTPATIENT
Start: 2021-04-19 | End: 2021-06-01

## 2021-04-28 NOTE — PROGRESS NOTES
Micah is a 46 year old who is being evaluated via a billable telephone visit.      What phone number would you like to be contacted at? [phone]  How would you like to obtain your AVS? MyChart    Follow-up Buprenorphine Visit           HPI       Micah Rosenthal is here for f/u on buprenophine/naloxone (Suboxone) therapy for opioid use disorder.  Clinic Care Coordination - Follow-up      Micah is currently taking 8/2mg in the AM and 12/3mg in the PM of Buprenorphine/Naloxone  daily.     Status since last visit:    Since last visit patient has been: doing well. No struggling. No triggers no issues. Has been over a year since last used anything.     Intensity:     There has been: no craving.      Suboxone Dose: adequate.    Progression of Symptoms:     Cues to use and relapse none needs a new rule 25 done. Had it done in our Clinic in the past.     Recovery program has been: solid.   Accompanying Signs & Symptoms:    Side Effects: none.    Sobriety:     Status: no use since last visit.      Drug Screen:   Unable to obtain  - telephone visit   Precipitating factors:    Triggers have been: non-existent.   Alleviating factors:    Contact with sponsor has been: regular.     Family and support system has been: helpful.   Other Therapies Tried :     Patient has been going to recovery meetings:regularly.      Other medical concerns: No    Any ED visits? No       History   Smoking Status     Current Every Day Smoker     Packs/day: 1.50     Years: 20.00     Types: Cigarettes     Start date: 1/1/1988   Smokeless Tobacco     Never Used       Problem, Medication and Allergy Lists were reviewed and updated if needed.  reviewed and are current..    Pharmacy Database reviewed? Yes, 4.29.21, as expected.    Patient is an established patient of this clinic..    Assessment and Plan     Was naloxone nasal spray prescribed? Yes.  1. Insomnia due to medical condition  He wakes every few hours due to pain . Total hours of sleep about 5  or so.  Lays in bed for 8 hours and no hung over feeling.   - zolpidem (AMBIEN) 5 MG tablet; Take 1 tablet (5 mg) by mouth nightly as needed for sleep  Dispense: 30 tablet; Refill: 0    2. Pain, dental  Still working on getting teeth pulled has all the resources, he is anxious about this.   - gabapentin (NEURONTIN) 300 MG capsule; TAKE 2 CAPSULES BY MOUTH 3 TIMES DAILY  Dispense: 180 capsule; Refill: 1    3. Opioid use disorder (H)  No issues, no use in over a year. Needing Rule 25 will contact coordinator and see if she can set this all up.   - buprenorphine HCl-naloxone HCl (SUBOXONE) 8-2 MG per film; Take 1 film in the morning and take 1.5 films in the PM  Dispense: 60 each; Refill: 0    Dosage will need adjustment today.  Continue at 8 mg  12 time(s) a day of  buprenorphine/naloxone (Suboxone).     Follow up Plan  Stage 1(1 week): Please make a clinic appointment for follow up with me (BLANCHE GAONA) or another Suboxone provider in 1 week for suboxone follow up.    Greater than 15 minutes was spent with this patient, over half of which was on counseling and coordination of care which includes importance of recovery activities,which may include  attendance at NA/AA meetings, sober support network, and the importance of not isolating, being open, honest, and willing to change, possible triggers and how to avoid them, and managing cravings.    There are no discontinued medications.    Options for treatment and follow-up care were reviewed with the patient. Micah engaged in the decision making process and verbalized understanding of the options discussed and agreed with the final plan.    ELODIA Barrow              Phone call duration: 15 minutes

## 2021-04-29 ENCOUNTER — VIRTUAL VISIT (OUTPATIENT)
Dept: FAMILY MEDICINE | Facility: OTHER | Age: 46
End: 2021-04-29
Attending: PHYSICIAN ASSISTANT
Payer: COMMERCIAL

## 2021-04-29 DIAGNOSIS — K08.89 PAIN, DENTAL: ICD-10-CM

## 2021-04-29 DIAGNOSIS — F11.90 OPIOID USE DISORDER: ICD-10-CM

## 2021-04-29 DIAGNOSIS — G47.01 INSOMNIA DUE TO MEDICAL CONDITION: ICD-10-CM

## 2021-04-29 PROCEDURE — 99214 OFFICE O/P EST MOD 30 MIN: CPT | Mod: 95 | Performed by: PHYSICIAN ASSISTANT

## 2021-04-29 RX ORDER — GABAPENTIN 300 MG/1
CAPSULE ORAL
Qty: 180 CAPSULE | Refills: 1 | Status: SHIPPED | OUTPATIENT
Start: 2021-04-29 | End: 2021-06-11

## 2021-04-29 RX ORDER — ZOLPIDEM TARTRATE 5 MG/1
5 TABLET ORAL
Qty: 30 TABLET | Refills: 0 | Status: SHIPPED | OUTPATIENT
Start: 2021-04-29 | End: 2021-06-01

## 2021-04-29 RX ORDER — BUPRENORPHINE AND NALOXONE 8; 2 MG/1; MG/1
FILM, SOLUBLE BUCCAL; SUBLINGUAL
Qty: 60 EACH | Refills: 0 | Status: SHIPPED | OUTPATIENT
Start: 2021-04-29 | End: 2021-05-20

## 2021-04-29 ASSESSMENT — ANXIETY QUESTIONNAIRES
GAD7 TOTAL SCORE: 0
6. BECOMING EASILY ANNOYED OR IRRITABLE: NOT AT ALL
5. BEING SO RESTLESS THAT IT IS HARD TO SIT STILL: NOT AT ALL
4. TROUBLE RELAXING: NOT AT ALL
2. NOT BEING ABLE TO STOP OR CONTROL WORRYING: NOT AT ALL
3. WORRYING TOO MUCH ABOUT DIFFERENT THINGS: NOT AT ALL
1. FEELING NERVOUS, ANXIOUS, OR ON EDGE: NOT AT ALL
7. FEELING AFRAID AS IF SOMETHING AWFUL MIGHT HAPPEN: NOT AT ALL

## 2021-04-29 ASSESSMENT — PATIENT HEALTH QUESTIONNAIRE - PHQ9: SUM OF ALL RESPONSES TO PHQ QUESTIONS 1-9: 0

## 2021-04-29 NOTE — Clinical Note
Needs Rule 25 done again. Can we help him with this?  Also set him out for 2 months.  Has been sober over a year.  Wants to try it.  No urges not depressed. Things are going much better.

## 2021-04-30 ASSESSMENT — ANXIETY QUESTIONNAIRES: GAD7 TOTAL SCORE: 0

## 2021-05-20 ENCOUNTER — TELEPHONE (OUTPATIENT)
Dept: FAMILY MEDICINE | Facility: OTHER | Age: 46
End: 2021-05-20

## 2021-05-20 DIAGNOSIS — F11.90 OPIOID USE DISORDER: ICD-10-CM

## 2021-05-20 RX ORDER — BUPRENORPHINE AND NALOXONE 8; 2 MG/1; MG/1
FILM, SOLUBLE BUCCAL; SUBLINGUAL
Qty: 60 EACH | Refills: 0 | Status: SHIPPED | OUTPATIENT
Start: 2021-05-20 | End: 2021-05-20

## 2021-05-20 RX ORDER — BUPRENORPHINE HYDROCHLORIDE, NALOXONE HYDROCHLORIDE 8; 2 MG/1; MG/1
1 FILM, SOLUBLE BUCCAL; SUBLINGUAL DAILY
Qty: 30 EACH | Refills: 0 | Status: SHIPPED | OUTPATIENT
Start: 2021-05-20 | End: 2021-06-24

## 2021-05-20 RX ORDER — BUPRENORPHINE HYDROCHLORIDE, NALOXONE HYDROCHLORIDE 12; 3 MG/1; MG/1
1 FILM, SOLUBLE BUCCAL; SUBLINGUAL DAILY
Qty: 30 EACH | Refills: 0 | Status: SHIPPED | OUTPATIENT
Start: 2021-05-20 | End: 2021-06-24

## 2021-05-20 NOTE — TELEPHONE ENCOUNTER
Insurance only allows #60/30 days.      New RXs pended for 8mg in AM and 12mg in PM.  Same dose, just will receive 12mg films in PM instead of taking 1.5 of the 8mg films.

## 2021-05-20 NOTE — TELEPHONE ENCOUNTER
Suboxone    - requesting now since you are out next week.  Last Written Prescription Date:  4.29.21  Last Fill Quantity: #60,   # refills: 0  Last Office Visit: 4.29.21  Future Office visit:    Next 5 appointments (look out 90 days)    Jun 24, 2021 11:15 AM  (Arrive by 11:00 AM)  SHORT with ELODIA Tomlin  [facility] ([facility] [place] ) [address]  [place] [address]  [phone]           Routing refill request to provider for review/approval because:  Drug not on the [facility], UMP or  Health refill protocol or controlled substance

## 2021-06-01 DIAGNOSIS — G47.01 INSOMNIA DUE TO MEDICAL CONDITION: ICD-10-CM

## 2021-06-01 DIAGNOSIS — M50.90 CERVICAL DISC DISEASE: ICD-10-CM

## 2021-06-01 RX ORDER — ZOLPIDEM TARTRATE 5 MG/1
5 TABLET ORAL
Qty: 30 TABLET | Refills: 0 | Status: SHIPPED | OUTPATIENT
Start: 2021-06-01 | End: 2021-06-30

## 2021-06-01 RX ORDER — CYCLOBENZAPRINE HCL 10 MG
TABLET ORAL
Qty: 90 TABLET | Refills: 0 | Status: SHIPPED | OUTPATIENT
Start: 2021-06-01 | End: 2021-07-19

## 2021-06-01 NOTE — TELEPHONE ENCOUNTER
Ambien, Flexeril      Last Written Prescription Date:  4.29.21, 4.19.21  Last Fill Quantity: #30, #90,   # refills: 0  Last Office Visit: 4.29.21  Future Office visit:    Next 5 appointments (look out 90 days)    Jun 24, 2021 11:15 AM  (Arrive by 11:00 AM)  SHORT with ELODIA Tomlin  [facility] ([facility] [place] ) [address]  [place] [address]  [phone]           Routing refill request to provider for review/approval because:  Drug not on the [facility], UMP or  Health refill protocol or controlled substance

## 2021-06-11 DIAGNOSIS — J40 BRONCHITIS: ICD-10-CM

## 2021-06-11 DIAGNOSIS — K08.89 PAIN, DENTAL: ICD-10-CM

## 2021-06-11 RX ORDER — AMOXICILLIN 500 MG/1
500 CAPSULE ORAL 3 TIMES DAILY
Qty: 30 CAPSULE | Refills: 1 | Status: SHIPPED | OUTPATIENT
Start: 2021-06-11 | End: 2021-10-07

## 2021-06-11 RX ORDER — GABAPENTIN 300 MG/1
CAPSULE ORAL
Qty: 180 CAPSULE | Refills: 1 | Status: SHIPPED | OUTPATIENT
Start: 2021-06-11 | End: 2021-09-12

## 2021-06-11 NOTE — TELEPHONE ENCOUNTER
Amoxicillin, Gabapentin       Last Written Prescription Date:  10.19.20, 4.29.21  Last Fill Quantity: #30, #180,   # refills: 0  Last Office Visit: 4.29.21  Future Office visit:    Next 5 appointments (look out 90 days)    Jun 24, 2021 11:15 AM  (Arrive by 11:00 AM)  SHORT with ELODIA Tomlin  [facility] ([facility] [place] ) [address]  [place] [address]  [phone]           Routing refill request to provider for review/approval because:  Drug not on the [facility], UMP or  Health refill protocol or controlled substance

## 2021-06-23 NOTE — PROGRESS NOTES
Follow-up Buprenorphine Visit           HPI       Micah Rosenthal is here for f/u on buprenophine/naloxone (Suboxone) therapy for opioid use disorder.  Clinic Care Coordination - Follow-up      Micah is currently taking 8/2mg in the AM and 12/3mg in the PM of Buprenorphine/Naloxone  daily.     Status since last visit:    Since last visit patient has been: doing well.   Intensity:     There has been: no craving.      Suboxone Dose: adequate.    Progression of Symptoms:     Cues to use and relapse     Recovery program has been: solid.   Accompanying Signs & Symptoms:    Side Effects: none.    Sobriety:     Status: no use since last visit.      Drug Screen: obtained.    Precipitating factors:    Triggers have been: non-existent.   Alleviating factors:    Contact with sponsor has been: no sponsor.     Family and support system has been: helpful.   Other Therapies Tried :     Patient has been going to recovery meetings:not at all.      Other medical concerns: No    Any ED visits? No       History   Smoking Status     Current Every Day Smoker     Packs/day: 1.50     Years: 20.00     Types: Cigarettes     Start date: 1/1/1988   Smokeless Tobacco     Never Used       Problem, Medication and Allergy Lists were reviewed and updated if needed.  reviewed and are current..    Pharmacy Database reviewed? Yes, 6.24.21, as expected.    Patient is an established patient of this clinic..         Review of Systems:   Review of Systems  CONSTITUTIONAL: NEGATIVE for fever, chills, change in weight  INTEGUMENTARY/SKIN: NEGATIVE for worrisome rashes, moles or lesions  EYES: NEGATIVE for vision changes or irritation  ENT/MOUTH: NEGATIVE for ear, mouth and throat problems  RESP: NEGATIVE for significant cough or SOB  CV: NEGATIVE for chest pain, palpitations or peripheral edema  GI: NEGATIVE for nausea, abdominal pain, heartburn, or change in bowel habits  : NEGATIVE for frequency, dysuria, or hematuria  MUSCULOSKELETAL: NEGATIVE for  "significant arthralgias or myalgia  NEURO: NEGATIVE for weakness, dizziness or paresthesias  ENDOCRINE: NEGATIVE for temperature intolerance, skin/hair changes  PSYCHIATRIC: NEGATIVE for changes in mood or affect          Physical Exam:     Vitals:    06/24/21 1106   BP: 122/76   BP Location: Left arm   Patient Position: Sitting   Cuff Size: Adult Regular   Pulse: 75   Temp: 96.1  F (35.6  C)   TempSrc: Tympanic   SpO2: 94%   Weight: 80.7 kg (178 lb)   Height: 1.778 m (5' 10\")     Body mass index is 25.54 kg/m .  Vitals were reviewed   Physical Exam  GENERAL APPEARANCE: alert, comfortable appearing  EYES: Eyes grossly normal to inspection  HENT:  nose no rhinorrhea  RESP: lungs clear to auscultation - no rales, rhonchi or wheezes and no resp distress  CV: regular rates and rhythm, normal S1 S2,no murmur   ABDOMEN:soft, non-tender, non-distended, no hepatosplenomegaly or masses  SKIN: no rashes, no jaundice, no obvious masses.   NEURO:  no tremor  MENTAL STATUS EXAM:  Appearance/Behavior:No apparent distress  Speech: Normal  Mood/Affect: normal affect  Insight: Adequate      Results:    Results from the last 24 hours  Results for orders placed or performed in visit on 06/24/21 (from the past 24 hour(s))   Urine Drugs of Abuse Screen (Tox13)   Result Value Ref Range    Cannabinoids (62-lbu-9-carboxy-9-THC) Detected, Abnormal Result (A) NDET^Not Detected ng/mL    Phencyclidine (Phencyclidine) Not Detected NDET^Not Detected ng/mL    Cocaine (Benzoylecgonine) Not Detected NDET^Not Detected ng/mL    Methamphetamine (d-Methamphetamine) Not Detected NDET^Not Detected ng/mL    Opiates (Morphine) Not Detected NDET^Not Detected ng/mL    Amphetamine (d-Amphetamine) Not Detected NDET^Not Detected ng/mL    Benzodiazepines (Nordiazepam) Not Detected NDET^Not Detected ng/mL    Tricyclic Antidepressants (Desipramine) Detected, Abnormal Result (A) NDET^Not Detected ng/mL    Methadone (Methadone) Not Detected NDET^Not Detected ng/mL "    Barbiturates (Butalbital) Not Detected NDET^Not Detected ng/mL    Oxycodone (Oxycodone) Not Detected NDET^Not Detected ng/mL    Propoxyphene (Norpropoxyphene) Not Detected NDET^Not Detected ng/mL    Buprenorphine (Buprenorphine) Detected, Abnormal Result (A) NDET^Not Detected ng/mL       Assessment and Plan     Was naloxone nasal spray prescribed? Yes.  Micah was seen today for clinic care coordination - follow-up.    Diagnoses and all orders for this visit:    Opioid use disorder (H)  -     Urine Drugs of Abuse Screen (Tox13)  -     SUBOXONE 8-2 MG per film; Place 1 Film under the tongue daily AM  -     SUBOXONE 12-3 MG FILM per film; Place 1 Film under the tongue daily PM        Dosage will not need adjustment today.  8 mg in both morning and 12 mg in evening.  buprenorphine/naloxone (Suboxone).     Follow up Plan  see us back in 8 weeks.     Greater than 15  minutes was spent with this patient, over half of which was on counseling and coordination of care which includes importance of recovery activities,which may include  attendance at NA/AA meetings, sober support network, and the importance of not isolating, being open, honest, and willing to change, possible triggers and how to avoid them, and managing cravings.    Medications Discontinued During This Encounter   Medication Reason     SUBOXONE 8-2 MG per film Reorder     SUBOXONE 12-3 MG FILM per film Reorder       Options for treatment and follow-up care were reviewed with the patient. Micah engaged in the decision making process and verbalized understanding of the options discussed and agreed with the final plan.    ELODIA Barrow

## 2021-06-24 ENCOUNTER — APPOINTMENT (OUTPATIENT)
Dept: LAB | Facility: OTHER | Age: 46
End: 2021-06-24
Attending: PHYSICIAN ASSISTANT
Payer: COMMERCIAL

## 2021-06-24 ENCOUNTER — PATIENT OUTREACH (OUTPATIENT)
Dept: CARE COORDINATION | Facility: OTHER | Age: 46
End: 2021-06-24

## 2021-06-24 ENCOUNTER — OFFICE VISIT (OUTPATIENT)
Dept: FAMILY MEDICINE | Facility: OTHER | Age: 46
End: 2021-06-24
Attending: PHYSICIAN ASSISTANT
Payer: COMMERCIAL

## 2021-06-24 VITALS
OXYGEN SATURATION: 94 % | HEIGHT: 70 IN | WEIGHT: 178 LBS | BODY MASS INDEX: 25.48 KG/M2 | HEART RATE: 75 BPM | DIASTOLIC BLOOD PRESSURE: 76 MMHG | TEMPERATURE: 96.1 F | SYSTOLIC BLOOD PRESSURE: 122 MMHG

## 2021-06-24 DIAGNOSIS — F11.90 OPIOID USE DISORDER: Primary | ICD-10-CM

## 2021-06-24 PROCEDURE — 80306 DRUG TEST PRSMV INSTRMNT: CPT | Mod: ZL | Performed by: PHYSICIAN ASSISTANT

## 2021-06-24 PROCEDURE — 99214 OFFICE O/P EST MOD 30 MIN: CPT | Performed by: PHYSICIAN ASSISTANT

## 2021-06-24 PROCEDURE — G0463 HOSPITAL OUTPT CLINIC VISIT: HCPCS

## 2021-06-24 RX ORDER — BUPRENORPHINE HYDROCHLORIDE, NALOXONE HYDROCHLORIDE 8; 2 MG/1; MG/1
1 FILM, SOLUBLE BUCCAL; SUBLINGUAL DAILY
Qty: 30 EACH | Refills: 0 | Status: CANCELLED | OUTPATIENT
Start: 2021-06-24

## 2021-06-24 RX ORDER — BUPRENORPHINE HYDROCHLORIDE, NALOXONE HYDROCHLORIDE 12; 3 MG/1; MG/1
1 FILM, SOLUBLE BUCCAL; SUBLINGUAL DAILY
Qty: 30 EACH | Refills: 0 | Status: SHIPPED | OUTPATIENT
Start: 2021-06-24 | End: 2021-07-19

## 2021-06-24 RX ORDER — BUPRENORPHINE HYDROCHLORIDE, NALOXONE HYDROCHLORIDE 12; 3 MG/1; MG/1
1 FILM, SOLUBLE BUCCAL; SUBLINGUAL DAILY
Qty: 30 EACH | Refills: 0 | Status: CANCELLED | OUTPATIENT
Start: 2021-06-24

## 2021-06-24 RX ORDER — BUPRENORPHINE HYDROCHLORIDE, NALOXONE HYDROCHLORIDE 8; 2 MG/1; MG/1
1 FILM, SOLUBLE BUCCAL; SUBLINGUAL DAILY
Qty: 30 EACH | Refills: 0 | Status: SHIPPED | OUTPATIENT
Start: 2021-06-24 | End: 2021-07-19

## 2021-06-24 ASSESSMENT — MIFFLIN-ST. JEOR: SCORE: 1693.65

## 2021-06-24 ASSESSMENT — PAIN SCALES - GENERAL: PAINLEVEL: NO PAIN (0)

## 2021-06-24 NOTE — NURSING NOTE
"Chief Complaint   Patient presents with     Clinic Care Coordination - Follow-up       Initial Blood Pressure 122/76 (BP Location: Left arm, Patient Position: Sitting, Cuff Size: Adult Regular)   Pulse 75   Temperature 96.1  F (35.6  C) (Tympanic)   Height 1.778 m (5' 10\")   Weight 80.7 kg (178 lb)   Oxygen Saturation 94%   Body Mass Index 25.54 kg/m   Estimated body mass index is 25.54 kg/m  as calculated from the following:    Height as of this encounter: 1.778 m (5' 10\").    Weight as of this encounter: 80.7 kg (178 lb).  Medication Reconciliation: complete  Trista Brown LPN  "

## 2021-06-24 NOTE — PROGRESS NOTES
Clinic Care Coordination Contact  Care Team Conversations    CC attended office visit with pt and Florence Sneed this date.  Please refer to Florence's note for plan of care.   Doing great.  Does have upcoming dental appt in July.  Will keep CC posted on any upcoming appts with the dentist/oral surgeon.  No other questions or concerns.  Encouraged him to call/text CC with any questions/concerns/problems.  Verbalized understanding.    Yoli Kowalski RN-BSN, [facility] Care Coordinator  [phone] opt. #3

## 2021-06-30 DIAGNOSIS — G47.01 INSOMNIA DUE TO MEDICAL CONDITION: ICD-10-CM

## 2021-06-30 RX ORDER — ZOLPIDEM TARTRATE 5 MG/1
5 TABLET ORAL
Qty: 30 TABLET | Refills: 2 | Status: SHIPPED | OUTPATIENT
Start: 2021-06-30 | End: 2021-10-04

## 2021-06-30 NOTE — TELEPHONE ENCOUNTER
Ambien      Last Written Prescription Date:  6.1.21  Last Fill Quantity: #30,   # refills: 0  Last Office Visit: 6.24.21  Future Office visit:    Next 5 appointments (look out 90 days)    Aug 19, 2021 11:15 AM  (Arrive by 11:00 AM)  SHORT with ELODIA Tomlin  [facility] ([facility] ) [address]  [place] [address]  [phone]           Routing refill request to provider for review/approval because:  Drug not on the [facility], P or [facility] refill protocol or controlled substance

## 2021-07-19 DIAGNOSIS — M50.90 CERVICAL DISC DISEASE: ICD-10-CM

## 2021-07-19 DIAGNOSIS — F11.90 OPIOID USE DISORDER: ICD-10-CM

## 2021-07-19 RX ORDER — CYCLOBENZAPRINE HCL 10 MG
TABLET ORAL
Qty: 90 TABLET | Refills: 0 | Status: SHIPPED | OUTPATIENT
Start: 2021-07-19 | End: 2021-09-01

## 2021-07-19 NOTE — TELEPHONE ENCOUNTER
Suboxone      Last Written Prescription Date:  6.24.21  Last Fill Quantity: #30, #30,   # refills: 0  Last Office Visit: 6.24.21  Future Office visit:    Next 5 appointments (look out 90 days)    Aug 19, 2021 11:15 AM  (Arrive by 11:00 AM)  SHORT with ELODIA Tomlin  [facility] ([facility] ) [address]  [place] [address]  [phone]           Routing refill request to provider for review/approval because:  Drug not on the [facility], UMP or [facility] refill protocol or controlled substance

## 2021-07-19 NOTE — TELEPHONE ENCOUNTER
Flexeril      Last Written Prescription Date:  6.1.21  Last Fill Quantity: #90,   # refills: 0  Last Office Visit: 6.24.21  Future Office visit:    Next 5 appointments (look out 90 days)    Aug 19, 2021 11:15 AM  (Arrive by 11:00 AM)  SHORT with ELODIA Tomlin  [facility] ([facility] ) [address]  [place] [address]  [phone]           Routing refill request to provider for review/approval because:  Drug not on the [facility], UMP or [facility] refill protocol or controlled substance

## 2021-07-20 RX ORDER — BUPRENORPHINE HYDROCHLORIDE, NALOXONE HYDROCHLORIDE 12; 3 MG/1; MG/1
1 FILM, SOLUBLE BUCCAL; SUBLINGUAL DAILY
Qty: 30 EACH | Refills: 0 | Status: SHIPPED | OUTPATIENT
Start: 2021-07-20 | End: 2021-08-19

## 2021-07-20 RX ORDER — BUPRENORPHINE HYDROCHLORIDE, NALOXONE HYDROCHLORIDE 8; 2 MG/1; MG/1
1 FILM, SOLUBLE BUCCAL; SUBLINGUAL DAILY
Qty: 30 EACH | Refills: 0 | Status: SHIPPED | OUTPATIENT
Start: 2021-07-20 | End: 2021-08-19

## 2021-07-26 ENCOUNTER — PATIENT OUTREACH (OUTPATIENT)
Dept: CARE COORDINATION | Facility: OTHER | Age: 46
End: 2021-07-26

## 2021-08-16 NOTE — PROGRESS NOTES
Follow-up Buprenorphine Visit           HPI       Micah Rosenthal is here for f/u on buprenophine/naloxone (Suboxone) therapy for opioid use disorder.  Clinic Care Coordination - Follow-up      Micah is currently taking 8/2mg in the AM and 12/3mg in the PM of Buprenorphine/Naloxone  daily.     Status since last visit:    Since last visit patient has been: doing well.     Intensity:     There has been: no craving.      Suboxone Dose: adequate.      When did you take your last dose? This AM  Progression of Symptoms:     Cues to use and relapse None    Recovery program has been: solid.   Accompanying Signs & Symptoms:    Side Effects: none.    Sobriety:     Status: no use since last visit.      Drug Screen: obtained.    Precipitating factors:    Triggers have been: mild. Someone offered him pills - turned them down   Alleviating factors:    Contact with sponsor has been: no sponsor.     Family and support system has been: helpful.   Other Therapies Tried :     Patient has been going to recovery meetings:not at all.      Other medical concerns: No    Going to the dentist on September 7 in [place].  Any ED visits? No       History   Smoking Status     Current Every Day Smoker     Packs/day: 1.50     Years: 20.00     Types: Cigarettes     Start date: 1/1/1988   Smokeless Tobacco     Never Used       Problem, Medication and Allergy Lists were reviewed and updated if needed.  reviewed and are current..    Pharmacy Database reviewed? Yes, 8.19.21, as expected.    Patient is an established patient of this clinic..         Review of Systems:   Review of Systems  CONSTITUTIONAL: NEGATIVE for fever, chills, change in weight  INTEGUMENTARY/SKIN: NEGATIVE for worrisome rashes, moles or lesions  EYES: NEGATIVE for vision changes or irritation  ENT/MOUTH: NEGATIVE for ear, mouth and throat problems  RESP: NEGATIVE for significant cough or SOB  CV: NEGATIVE for chest pain, palpitations or peripheral edema  GI: NEGATIVE for nausea,  "abdominal pain, heartburn, or change in bowel habits  : NEGATIVE for frequency, dysuria, or hematuria  MUSCULOSKELETAL: NEGATIVE for significant arthralgias or myalgia  NEURO: NEGATIVE for weakness, dizziness or paresthesias  ENDOCRINE: NEGATIVE for temperature intolerance, skin/hair changes  PSYCHIATRIC: NEGATIVE for changes in mood or affect          Physical Exam:     Vitals:    08/19/21 1107   BP: 122/74   BP Location: Right arm   Patient Position: Sitting   Cuff Size: Adult Regular   Pulse: 73   Temp: 98.7  F (37.1  C)   TempSrc: Tympanic   SpO2: 94%   Weight: 81.5 kg (179 lb 9.6 oz)   Height: 1.778 m (5' 10\")     Body mass index is 25.77 kg/m .  Vitals were reviewed   Physical Exam  GENERAL APPEARANCE: alert, comfortable appearing  EYES: Eyes grossly normal to inspection  HENT:  nose no rhinorrhea  RESP: lungs clear to auscultation - no rales, rhonchi or wheezes and no resp distress  CV: regular rates and rhythm, normal S1 S2,no murmur   ABDOMEN:soft, non-tender, non-distended, no hepatosplenomegaly or masses  SKIN: no rashes, no jaundice, no obvious masses.   NEURO:  no tremor  MENTAL STATUS EXAM:  Appearance/Behavior:No apparent distress  Speech: Normal  Mood/Affect: normal affect  Insight: Adequate      Results:    Results from the last 24 hoursNo results found for this or any previous visit (from the past 24 hour(s)).    Assessment and Plan     Was naloxone nasal spray prescribed? Yes.  1. Opioid use disorder (H)  He is going very well. No use at all even when he was asked. He is very anxious bout dental work. Trying to get his teeth pulled.    - Urine Drugs of Abuse Screen (Tox13)  - SUBOXONE 8-2 MG per film; Place 1 Film under the tongue daily AM  Dispense: 30 each; Refill: 1  - SUBOXONE 12-3 MG FILM per film; Place 1 Film under the tongue daily PM  Dispense: 30 each; Refill: 1    Dosage will not need adjustment today.  Continue on 8/2mg in the AM and 12/3mg in the PM buprenorphine/naloxone (Suboxone). "     Follow up Plan  Stage 4 (2 months): Please make a clinic appointment for follow up with me (BLANCHE GAONA) or another Suboxone provider in 2 months for suboxone follow up.    Greater than 15  minutes was spent with this patient, over half of which was on counseling and coordination of care which includes importance of recovery activities,which may include  attendance at NA/AA meetings, sober support network, and the importance of not isolating, being open, honest, and willing to change, possible triggers and how to avoid them, and managing cravings.    Medications Discontinued During This Encounter   Medication Reason     ketoconazole (NIZORAL) 2 % external cream Therapy completed     lidocaine (XYLOCAINE) 2 % solution Therapy completed     mupirocin (BACTROBAN) 2 % external cream Therapy completed       Options for treatment and follow-up care were reviewed with the patient. Micah engaged in the decision making process and verbalized understanding of the options discussed and agreed with the final plan.    ELODIA Barrow

## 2021-08-19 ENCOUNTER — PATIENT OUTREACH (OUTPATIENT)
Dept: CARE COORDINATION | Facility: OTHER | Age: 46
End: 2021-08-19

## 2021-08-19 ENCOUNTER — APPOINTMENT (OUTPATIENT)
Dept: LAB | Facility: OTHER | Age: 46
End: 2021-08-19
Attending: PHYSICIAN ASSISTANT
Payer: COMMERCIAL

## 2021-08-19 ENCOUNTER — OFFICE VISIT (OUTPATIENT)
Dept: FAMILY MEDICINE | Facility: OTHER | Age: 46
End: 2021-08-19
Attending: PHYSICIAN ASSISTANT
Payer: COMMERCIAL

## 2021-08-19 VITALS
WEIGHT: 179.6 LBS | HEIGHT: 70 IN | TEMPERATURE: 98.7 F | BODY MASS INDEX: 25.71 KG/M2 | SYSTOLIC BLOOD PRESSURE: 122 MMHG | DIASTOLIC BLOOD PRESSURE: 74 MMHG | OXYGEN SATURATION: 94 % | HEART RATE: 73 BPM

## 2021-08-19 DIAGNOSIS — F11.90 OPIOID USE DISORDER: Primary | ICD-10-CM

## 2021-08-19 LAB
AMPHETAMINES UR QL: NOT DETECTED
BARBITURATES UR QL SCN: NOT DETECTED
BENZODIAZ UR QL SCN: NOT DETECTED
BUPRENORPHINE UR QL: DETECTED
CANNABINOIDS UR QL: DETECTED
COCAINE UR QL SCN: NOT DETECTED
D-METHAMPHET UR QL: NOT DETECTED
METHADONE UR QL SCN: NOT DETECTED
OPIATES UR QL SCN: NOT DETECTED
OXYCODONE UR QL SCN: NOT DETECTED
PCP UR QL SCN: NOT DETECTED
PROPOXYPH UR QL: NOT DETECTED
TRICYCLICS UR QL SCN: DETECTED

## 2021-08-19 PROCEDURE — 80306 DRUG TEST PRSMV INSTRMNT: CPT | Mod: ZL | Performed by: PHYSICIAN ASSISTANT

## 2021-08-19 PROCEDURE — 99213 OFFICE O/P EST LOW 20 MIN: CPT | Performed by: PHYSICIAN ASSISTANT

## 2021-08-19 PROCEDURE — G0463 HOSPITAL OUTPT CLINIC VISIT: HCPCS

## 2021-08-19 RX ORDER — BUPRENORPHINE HYDROCHLORIDE, NALOXONE HYDROCHLORIDE 8; 2 MG/1; MG/1
1 FILM, SOLUBLE BUCCAL; SUBLINGUAL DAILY
Qty: 30 EACH | Refills: 1 | Status: SHIPPED | OUTPATIENT
Start: 2021-08-19 | End: 2021-10-07

## 2021-08-19 RX ORDER — BUPRENORPHINE HYDROCHLORIDE, NALOXONE HYDROCHLORIDE 12; 3 MG/1; MG/1
1 FILM, SOLUBLE BUCCAL; SUBLINGUAL DAILY
Qty: 30 EACH | Refills: 1 | Status: SHIPPED | OUTPATIENT
Start: 2021-08-19 | End: 2021-10-07

## 2021-08-19 ASSESSMENT — ANXIETY QUESTIONNAIRES
7. FEELING AFRAID AS IF SOMETHING AWFUL MIGHT HAPPEN: NOT AT ALL
5. BEING SO RESTLESS THAT IT IS HARD TO SIT STILL: NOT AT ALL
GAD7 TOTAL SCORE: 0
1. FEELING NERVOUS, ANXIOUS, OR ON EDGE: NOT AT ALL
2. NOT BEING ABLE TO STOP OR CONTROL WORRYING: NOT AT ALL
3. WORRYING TOO MUCH ABOUT DIFFERENT THINGS: NOT AT ALL
4. TROUBLE RELAXING: NOT AT ALL
6. BECOMING EASILY ANNOYED OR IRRITABLE: NOT AT ALL

## 2021-08-19 ASSESSMENT — PAIN SCALES - GENERAL: PAINLEVEL: NO PAIN (0)

## 2021-08-19 ASSESSMENT — PATIENT HEALTH QUESTIONNAIRE - PHQ9: SUM OF ALL RESPONSES TO PHQ QUESTIONS 1-9: 0

## 2021-08-19 ASSESSMENT — MIFFLIN-ST. JEOR: SCORE: 1700.91

## 2021-08-19 NOTE — NURSING NOTE
"Chief Complaint   Patient presents with     Clinic Care Coordination - Follow-up       Initial Blood Pressure 122/74 (BP Location: Right arm, Patient Position: Sitting, Cuff Size: Adult Regular)   Pulse 73   Temperature 98.7  F (37.1  C) (Tympanic)   Height 1.778 m (5' 10\")   Weight 81.5 kg (179 lb 9.6 oz)   Oxygen Saturation 94%   Body Mass Index 25.77 kg/m   Estimated body mass index is 25.77 kg/m  as calculated from the following:    Height as of this encounter: 1.778 m (5' 10\").    Weight as of this encounter: 81.5 kg (179 lb 9.6 oz).  Medication Reconciliation: complete  Trista Brown LPN  "

## 2021-08-19 NOTE — PROGRESS NOTES
Clinic Care Coordination Contact  Care Team Conversations    CC attended office visit with pt and Florence Sneed this date.  Please refer to Florence's note for plan of care.  Doing well in recovery.  Able to get out and do things he enjoys.   Has dental appt on September 7.  Will update CC after this to see if oral surgery is planned in the near future.  Will plan for pain control accordingly.  All questions answered.  Encouraged him to call/text CC with any questions/concerns/problems.  Verbalized understanding.    Yoli Kowalski RN-BSN, [facility] Care Coordinator  [phone] opt. #3

## 2021-08-20 ASSESSMENT — ANXIETY QUESTIONNAIRES: GAD7 TOTAL SCORE: 0

## 2021-09-01 DIAGNOSIS — M50.90 CERVICAL DISC DISEASE: ICD-10-CM

## 2021-09-01 RX ORDER — CYCLOBENZAPRINE HCL 10 MG
TABLET ORAL
Qty: 90 TABLET | Refills: 0 | Status: SHIPPED | OUTPATIENT
Start: 2021-09-01 | End: 2021-10-18

## 2021-09-01 NOTE — TELEPHONE ENCOUNTER
Flexeril      Last Written Prescription Date:  7.19.21  Last Fill Quantity: #90,   # refills: 0  Last Office Visit: 8.19.21  Future Office visit:    Next 5 appointments (look out 90 days)    Oct 07, 2021 10:45 AM  (Arrive by 10:30 AM)  SHORT with ELODIA Tomlin  [facility] ([facility] [place] ) [address]  [place] [address]  [phone]           Routing refill request to provider for review/approval because:  Drug not on the [facility], UMP or [facility] refill protocol or controlled substance

## 2021-09-10 DIAGNOSIS — K08.89 PAIN, DENTAL: ICD-10-CM

## 2021-09-10 NOTE — TELEPHONE ENCOUNTER
Gabapentin      Last Written Prescription Date:  6.11.21  Last Fill Quantity: #180,   # refills: 1  Last Office Visit: 8.19.21  Future Office visit:    Next 5 appointments (look out 90 days)    Oct 07, 2021 10:45 AM  (Arrive by 10:30 AM)  SHORT with ELODIA Tomlin  [facility] ([facility] [place] ) [address]  [place] [address]  [phone]           Routing refill request to provider for review/approval because:  Drug not on the [facility], UMP or [facility] refill protocol or controlled substance

## 2021-09-12 RX ORDER — GABAPENTIN 300 MG/1
CAPSULE ORAL
Qty: 180 CAPSULE | Refills: 1 | Status: SHIPPED | OUTPATIENT
Start: 2021-09-12 | End: 2021-12-09

## 2021-10-03 ENCOUNTER — HEALTH MAINTENANCE LETTER (OUTPATIENT)
Age: 46
End: 2021-10-03

## 2021-10-04 DIAGNOSIS — G47.01 INSOMNIA DUE TO MEDICAL CONDITION: ICD-10-CM

## 2021-10-04 RX ORDER — ZOLPIDEM TARTRATE 5 MG/1
5 TABLET ORAL
Qty: 30 TABLET | Refills: 2 | Status: SHIPPED | OUTPATIENT
Start: 2021-10-04 | End: 2021-12-02

## 2021-10-06 NOTE — PROGRESS NOTES
Follow-up Buprenorphine Visit           HPI       Micah Rosenthal is here for f/u on buprenophine/naloxone (Suboxone) therapy for opioid use disorder.  Recheck Medication    Micah is currently taking 8/2mg in the AM and 12/3mg in the PM of Buprenorphine/Naloxone daily.     Status since last visit:    Since last visit patient has been: doing well.     Intensity:     There has been: no craving.      Suboxone Dose: adequate.      When did you take your last dose? This AM  Progression of Symptoms:     Cues to use and relapse None    Recovery program has been: solid.   Accompanying Signs & Symptoms:    Side Effects: none.    Sobriety:     Status: no use since last visit.   Stable on his medical cannabis through [facility].  Sees them once a year for re-certification.     Drug Screen: obtained.    Precipitating factors:    Triggers have been: non-existent.   Alleviating factors:    Contact with sponsor has been: no sponsor.     Family and support system has been: helpful.  One of his daughters is graduating HS this year and plans on going to [employer] for teaching.  He is emotional about this.   SO is very supportive and plays a tremendous role in his recovery.    Other Therapies Tried :     Patient has been going to recovery meetings:not at all.      Other medical concerns: No    Any ED visits? No       History   Smoking Status     Current Every Day Smoker     Packs/day: 1.50     Years: 20.00     Types: Cigarettes     Start date: 1/1/1988   Smokeless Tobacco     Never Used       Problem, Medication and Allergy Lists were reviewed and updated if needed.  reviewed and are current..    Pharmacy Database reviewed? Yes, 10.7.21, as expected.  Last fill 9.21.21 #30 each.    Patient is an established patient of this clinic..         Review of Systems:   Review of Systems  CONSTITUTIONAL: NEGATIVE for fever, chills, change in weight  INTEGUMENTARY/SKIN: NEGATIVE for worrisome rashes, moles or lesions  EYES: NEGATIVE for  vision changes or irritation  ENT/MOUTH: NEGATIVE for ear, mouth and throat problems  RESP: NEGATIVE for significant cough or SOB  CV: NEGATIVE for chest pain, palpitations or peripheral edema  GI: NEGATIVE for nausea, abdominal pain, heartburn, or change in bowel habits  : NEGATIVE for frequency, dysuria, or hematuria  MUSCULOSKELETAL: NEGATIVE for significant arthralgias or myalgia  NEURO: NEGATIVE for weakness, dizziness or paresthesias  ENDOCRINE: NEGATIVE for temperature intolerance, skin/hair changes  PSYCHIATRIC: NEGATIVE for changes in mood or affect          Physical Exam:     Vitals:    10/07/21 1039   BP: 102/66   BP Location: Right arm   Patient Position: Chair   Cuff Size: Adult Regular   Pulse: 74   Resp: 18   Temp: 97.5  F (36.4  C)   TempSrc: Tympanic   SpO2: 96%   Weight: 79.7 kg (175 lb 12.8 oz)     Body mass index is 25.22 kg/m .  Vitals were reviewed   Physical Exam  GENERAL APPEARANCE: alert, comfortable appearing  EYES: Eyes grossly normal to inspection  HENT:  nose no rhinorrhea  RESP: lungs clear to auscultation - no rales, rhonchi or wheezes and no resp distress  CV: regular rates and rhythm, normal S1 S2,no murmur   ABDOMEN:soft, non-tender, non-distended, no hepatosplenomegaly or masses  SKIN: no rashes, no jaundice, no obvious masses.   NEURO:  no tremor  MENTAL STATUS EXAM:  Appearance/Behavior:No apparent distress  Speech: Normal  Mood/Affect: normal affect  Insight: Adequate      Results:    Results from the last 24 hours  Results for orders placed or performed in visit on 10/07/21 (from the past 24 hour(s))   Urine Drugs of Abuse Screen (Tox13)   Result Value Ref Range    Cannabinoids (38-nph-4-carboxy-9-THC) Detected (A) Not Detected, Indeterminate    Phencyclidine Not Detected Not Detected, Indeterminate    Cocaine (Benzoylecgonine) Not Detected Not Detected, Indeterminate    Methamphetamine (d-Methamphetamine) Not Detected Not Detected, Indeterminate    Opiates (Morphine) Not  Detected Not Detected, Indeterminate    Amphetamine (d-Amphetamine) Not Detected Not Detected, Indeterminate    Benzodiazepines (Nordiazepam) Not Detected Not Detected, Indeterminate    Tricyclic Antidepressants (Desipramine) Detected (A) Not Detected, Indeterminate    Methadone Not Detected Not Detected, Indeterminate    Barbiturates (Butalbital) Not Detected Not Detected, Indeterminate    Oxycodone Not Detected Not Detected, Indeterminate    Propoxyphene (Norpropoxyphene) Not Detected Not Detected, Indeterminate    Buprenorphine Detected (A) Not Detected, Indeterminate       Assessment and Plan     Was naloxone nasal spray prescribed? Yes - previously.  (F11.90) Opioid use disorder  (primary encounter diagnosis)  Comment: see us back as recommended  Doing very well.   Plan: Urine Drugs of Abuse Screen (Tox13)            (J40) Bronchitis  Comment: given amoxil.    Plan: see us back as needed.     Dosage will need adjustment today.  Continue on 8/2mg in the AM and 12/3mg in the PM buprenorphine/naloxone (Suboxone).     Follow up Plan  Stage 4 (2 months): Please make a clinic appointment for follow up with me (BLANCHE GAONA) or another Suboxone provider in 2 months for suboxone follow up.    Greater than 10  minutes was spent with this patient, over half of which was on counseling and coordination of care which includes importance of recovery activities,which may include  attendance at NA/AA meetings, sober support network, and the importance of not isolating, being open, honest, and willing to change, possible triggers and how to avoid them, and managing cravings.    There are no discontinued medications.    Options for treatment and follow-up care were reviewed with the patient. Micah engaged in the decision making process and verbalized understanding of the options discussed and agreed with the final plan.    ELODIA Barrow

## 2021-10-07 ENCOUNTER — TELEPHONE (OUTPATIENT)
Dept: BEHAVIORAL HEALTH | Facility: OTHER | Age: 46
End: 2021-10-07

## 2021-10-07 ENCOUNTER — OFFICE VISIT (OUTPATIENT)
Dept: FAMILY MEDICINE | Facility: OTHER | Age: 46
End: 2021-10-07
Attending: PHYSICIAN ASSISTANT
Payer: COMMERCIAL

## 2021-10-07 ENCOUNTER — APPOINTMENT (OUTPATIENT)
Dept: LAB | Facility: OTHER | Age: 46
End: 2021-10-07
Attending: PHYSICIAN ASSISTANT
Payer: COMMERCIAL

## 2021-10-07 ENCOUNTER — PATIENT OUTREACH (OUTPATIENT)
Dept: CARE COORDINATION | Facility: OTHER | Age: 46
End: 2021-10-07

## 2021-10-07 VITALS
TEMPERATURE: 97.5 F | DIASTOLIC BLOOD PRESSURE: 66 MMHG | BODY MASS INDEX: 25.22 KG/M2 | SYSTOLIC BLOOD PRESSURE: 102 MMHG | RESPIRATION RATE: 18 BRPM | HEART RATE: 74 BPM | OXYGEN SATURATION: 96 % | WEIGHT: 175.8 LBS

## 2021-10-07 DIAGNOSIS — J40 BRONCHITIS: ICD-10-CM

## 2021-10-07 DIAGNOSIS — F11.90 OPIOID USE DISORDER: Primary | ICD-10-CM

## 2021-10-07 PROCEDURE — 80306 DRUG TEST PRSMV INSTRMNT: CPT | Mod: ZL | Performed by: PHYSICIAN ASSISTANT

## 2021-10-07 PROCEDURE — G0463 HOSPITAL OUTPT CLINIC VISIT: HCPCS | Mod: 25

## 2021-10-07 PROCEDURE — 99213 OFFICE O/P EST LOW 20 MIN: CPT | Performed by: PHYSICIAN ASSISTANT

## 2021-10-07 PROCEDURE — G0463 HOSPITAL OUTPT CLINIC VISIT: HCPCS

## 2021-10-07 RX ORDER — BUPRENORPHINE HYDROCHLORIDE, NALOXONE HYDROCHLORIDE 8; 2 MG/1; MG/1
1 FILM, SOLUBLE BUCCAL; SUBLINGUAL DAILY
Qty: 30 EACH | Refills: 1 | Status: SHIPPED | OUTPATIENT
Start: 2021-10-07 | End: 2021-12-02

## 2021-10-07 RX ORDER — AMOXICILLIN 500 MG/1
500 CAPSULE ORAL 3 TIMES DAILY
Qty: 30 CAPSULE | Refills: 1 | Status: SHIPPED | OUTPATIENT
Start: 2021-10-07 | End: 2022-01-14

## 2021-10-07 RX ORDER — BUPRENORPHINE HYDROCHLORIDE, NALOXONE HYDROCHLORIDE 12; 3 MG/1; MG/1
1 FILM, SOLUBLE BUCCAL; SUBLINGUAL DAILY
Qty: 30 EACH | Refills: 1 | Status: SHIPPED | OUTPATIENT
Start: 2021-10-07 | End: 2021-12-02

## 2021-10-07 ASSESSMENT — PAIN SCALES - GENERAL: PAINLEVEL: SEVERE PAIN (7)

## 2021-10-07 NOTE — TELEPHONE ENCOUNTER
MIMI PANIAGUA received message from RN Chronic Pain Care Coordinator, Yoli Kowalski stating patient is [facility]     MIMI PANIAGUA talked with Jacob Wong who has agreed to see patient and asks that the first visit be 60 minutes and second 90 minutes.    MIMI PANIAGUA attempted to call patient, but was unsuccessful. Will try again.

## 2021-10-07 NOTE — PROGRESS NOTES
Clinic Care Coordination Contact  Care Team Conversations    CC attended office visit with pt and Florence Sneed this date.  Please refer to Florence's note for plan of care.  Doing well in recovery.  No thoughts of opioids.  Suboxone dose is adequate and would like to remain on this.  Still having some dental issues - working on this.  No concerns this date.  Encouraged him or his SO to call/text CC with any questions/concerns/problems.  Verbalized understanding.    Yoli Kowalski RN-BSN, [facility] Care Coordinator  [phone] opt. #3

## 2021-10-07 NOTE — PATIENT INSTRUCTIONS
Thank you for choosing [facility].   I have office hours 8:00 am to 4:30 pm on Monday's, Wednesday's, Thursday's and Friday's. My nurse and I are out of the office every Tuesday.    Following your visit, when your labs and diagnostic testing have returned, I will review then and you will be contacted by my nurse.  If you are on My Chart, you can also view results there.    For refills, notify your pharmacy regarding what you need and the pharmacy will generate a refill request. Do not call my nurse as she is unable to process refill request. Please plan ahead and allow 3-5 days for refill requests.    You will generally receive a reminder call the day prior to your appointment.  If you cannot attend your appointment, please cancel your appointment with as much notice as possible.  If there is a pattern of failure to present for your appointments, I cannot provide consistent, meaningful, ongoing care for you. It is very important to me that you come in for your care, so we can best assist you with your health care needs.    IMPORTANT:  Please note that it is my standard of practice to NOT participate in prescribing ongoing requested Narcotic Analgesic therapy, and/or participate in the prescribing of other controlled substances.  My nurse and I am happy to assist you with the process of referral for alternative pain management as needed, and other treatment modalities including but not limited to:  Physical Therapy, Physical Medicine and Rehab, Counseling, Chiropractic Care, Orthopedic Care, and non-narcotic medication management.     In the event that you need to be seen for emergent concerns and I am out of office,  please see one of my colleagues for acute concerns.  You may also present to  or ER.  I appreciate the opportunity to serve you and look forward to supporting your healthcare needs in the future. Please contact me with any questions or concerns that you may  have.    Sincerely,      Florence Sneed RN, PA-C

## 2021-10-07 NOTE — NURSING NOTE
"Chief Complaint   Patient presents with     Recheck Medication       Initial /66 (BP Location: Right arm, Patient Position: Chair, Cuff Size: Adult Regular)   Pulse 74   Temp 97.5  F (36.4  C) (Tympanic)   Resp 18   Wt 79.7 kg (175 lb 12.8 oz)   SpO2 96%   BMI 25.22 kg/m   Estimated body mass index is 25.22 kg/m  as calculated from the following:    Height as of 8/19/21: 1.778 m (5' 10\").    Weight as of this encounter: 79.7 kg (175 lb 12.8 oz).  Medication Reconciliation: complete  Gladys Morales LPN  "

## 2021-10-08 NOTE — NURSING NOTE
"Chief Complaint   Patient presents with     Clinic Care Coordination - Follow-up       Initial /64 (BP Location: Right arm, Patient Position: Sitting, Cuff Size: Adult Regular)   Pulse 85   Temp 96.8  F (36  C) (Tympanic)   Ht 1.778 m (5' 10\")   Wt 75.8 kg (167 lb)   SpO2 95%   BMI 23.96 kg/m   Estimated body mass index is 23.96 kg/m  as calculated from the following:    Height as of this encounter: 1.778 m (5' 10\").    Weight as of this encounter: 75.8 kg (167 lb).  Medication Reconciliation: complete  Trista Brown LPN  " Verbal order ok to to d.c with BP charted, pt has not taken home meds yet today and will do so when he gets home.

## 2021-10-13 ENCOUNTER — OFFICE VISIT (OUTPATIENT)
Dept: PSYCHOLOGY | Facility: OTHER | Age: 46
End: 2021-10-13
Attending: COUNSELOR
Payer: COMMERCIAL

## 2021-10-13 ENCOUNTER — MEDICAL CORRESPONDENCE (OUTPATIENT)
Dept: HEALTH INFORMATION MANAGEMENT | Facility: CLINIC | Age: 46
End: 2021-10-13

## 2021-10-13 DIAGNOSIS — F32.A ANXIETY AND DEPRESSION: Primary | ICD-10-CM

## 2021-10-13 DIAGNOSIS — F41.9 ANXIETY AND DEPRESSION: Primary | ICD-10-CM

## 2021-10-13 PROCEDURE — 90837 PSYTX W PT 60 MINUTES: CPT | Performed by: COUNSELOR

## 2021-10-13 NOTE — PROGRESS NOTES
Progress Note    Client Name: Micah Rosenthal  Date: October 13, 2021         Service Type: Individual      Session Start Time: 1000  Session End Time: 1100      Session Length: 55         ATTENDEES: Patient   Service Modality:  In-person    Previous PHQ-9:   PHQ-9 SCORE 11/12/2020 4/29/2021 8/19/2021   PHQ-9 Total Score 0 0 0     Previous YURI-7:   YURI-7 SCORE 11/12/2020 4/29/2021 8/19/2021   Total Score 0 0 0       DATA    Micah was seen today. He presented as O x 4,coherent,relevant and appearing very distressed. He started crying for no apparent reason. When asked about this,he said that he found it very uncomfortable talking to someone like me. He denied S/ H ideations.He exhibited no nor reported any problem with memory. His insight and judgement were with in a range deemed acceptable for safety.      Micah reported that he was coming to clinic to address being stuck and feeling that he hasn't functioned well in life for eight years. Having what might be panic attacks. These attacks appear to be new. Socially isolated. Not sleeping well. Multiple awakening through  with fatigue in the morning.    Treatment Objective(s) Addressed in This Session    Problem clarification    Current Stressors / Issues:    Low self esteem,dysphoria,anxiety.      Progress on Treatment Objective(s) / Homework:  First meeting with the pt    Therapeutic Interventions/Treatment Strategies:  Motivational Enhancement Therapy    Response to Treatment Strategies:  Listened    Changes in Health Issues:   None reported    Chemical Use Review:   Substance Use: No substance use concerns reported / identified    Assessment: Current Emotional / Mental Status (status of significant symptoms):  Risk status (Self / Other harm or suicidal ideation)  Patient denies a history of suicidal ideation, suicide attempts, self-injurious behavior, homicidal ideation, homicidal behavior and and other safety  concerns  Patient denies current fears or concerns for personal safety.  Patient denies current or recent suicidal ideation or behaviors.  Patient denies current or recent homicidal ideation or behaviors.  Patient denies current or recent self injurious behavior or ideation.  Patient denies other safety concerns.  A safety and risk management plan has not been developed at this time, however patient was encouraged to call [place] [phone] should there be a change in any of these risk factors.    Appearance:   Disheveled   Eye Contact:   Interactive upon request   Psychomotor Behavior: Normal  ,suspected   Attitude:   Cooperative   Orientation:   All  Speech   Rate / Production: Normal/ Responsive Normal    Volume:  Normal   Mood:    Normal  Affect:    Appropriate  Subdued  Tearful  Thought Content:  Clear  but guarded  Thought Form:  Coherent  [place]  Insight:    Poor     Diagnoses:                   Persistent Depressive Disorder;Unspecified Anxiety Disorder       Plan: (Homework, other):    Engage the pt , Refer to [facility].  Will have a low stressed interview with [facility] on Oct 27.    Patient has not engage into service yet.                                              Jacob Wong, [facility]  October 14, 2021

## 2021-10-14 ASSESSMENT — ANXIETY QUESTIONNAIRES
GAD7 TOTAL SCORE: 7
7. FEELING AFRAID AS IF SOMETHING AWFUL MIGHT HAPPEN: SEVERAL DAYS
5. BEING SO RESTLESS THAT IT IS HARD TO SIT STILL: SEVERAL DAYS
1. FEELING NERVOUS, ANXIOUS, OR ON EDGE: SEVERAL DAYS
2. NOT BEING ABLE TO STOP OR CONTROL WORRYING: SEVERAL DAYS
6. BECOMING EASILY ANNOYED OR IRRITABLE: SEVERAL DAYS
3. WORRYING TOO MUCH ABOUT DIFFERENT THINGS: SEVERAL DAYS

## 2021-10-14 ASSESSMENT — PATIENT HEALTH QUESTIONNAIRE - PHQ9
SUM OF ALL RESPONSES TO PHQ QUESTIONS 1-9: 12
5. POOR APPETITE OR OVEREATING: SEVERAL DAYS

## 2021-10-15 ASSESSMENT — ANXIETY QUESTIONNAIRES: GAD7 TOTAL SCORE: 7

## 2021-10-18 ENCOUNTER — OFFICE VISIT (OUTPATIENT)
Dept: PSYCHOLOGY | Facility: OTHER | Age: 46
End: 2021-10-18
Attending: COUNSELOR
Payer: COMMERCIAL

## 2021-10-18 DIAGNOSIS — M50.90 CERVICAL DISC DISEASE: ICD-10-CM

## 2021-10-18 DIAGNOSIS — F41.9 ANXIETY: Primary | ICD-10-CM

## 2021-10-18 PROCEDURE — 90791 PSYCH DIAGNOSTIC EVALUATION: CPT | Performed by: COUNSELOR

## 2021-10-18 RX ORDER — CYCLOBENZAPRINE HCL 10 MG
TABLET ORAL
Qty: 90 TABLET | Refills: 0 | Status: SHIPPED | OUTPATIENT
Start: 2021-10-18 | End: 2021-12-02
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
## 2021-10-27 ENCOUNTER — OFFICE VISIT (OUTPATIENT)
Dept: PSYCHOLOGY | Facility: OTHER | Age: 46
End: 2021-10-27
Attending: COUNSELOR
Payer: COMMERCIAL

## 2021-10-27 DIAGNOSIS — F41.9 ANXIETY: Primary | ICD-10-CM

## 2021-10-27 PROCEDURE — 90837 PSYTX W PT 60 MINUTES: CPT | Performed by: COUNSELOR

## 2021-10-27 ASSESSMENT — ANXIETY QUESTIONNAIRES
IF YOU CHECKED OFF ANY PROBLEMS ON THIS QUESTIONNAIRE, HOW DIFFICULT HAVE THESE PROBLEMS MADE IT FOR YOU TO DO YOUR WORK, TAKE CARE OF THINGS AT HOME, OR GET ALONG WITH OTHER PEOPLE: SOMEWHAT DIFFICULT
3. WORRYING TOO MUCH ABOUT DIFFERENT THINGS: SEVERAL DAYS
1. FEELING NERVOUS, ANXIOUS, OR ON EDGE: SEVERAL DAYS
7. FEELING AFRAID AS IF SOMETHING AWFUL MIGHT HAPPEN: NOT AT ALL
5. BEING SO RESTLESS THAT IT IS HARD TO SIT STILL: NOT AT ALL
6. BECOMING EASILY ANNOYED OR IRRITABLE: SEVERAL DAYS
GAD7 TOTAL SCORE: 4
2. NOT BEING ABLE TO STOP OR CONTROL WORRYING: SEVERAL DAYS

## 2021-10-27 ASSESSMENT — PATIENT HEALTH QUESTIONNAIRE - PHQ9
SUM OF ALL RESPONSES TO PHQ QUESTIONS 1-9: 8
5. POOR APPETITE OR OVEREATING: NOT AT ALL

## 2021-10-27 NOTE — PROGRESS NOTES
Progress Note    Client Name: Micah Rosenthal  Date: October 27, 2021         Service Type: Individual      Session Start Time: 1000  Session End Time: 1100      Session Length: 55      ATTENDEES: Patient      Service Modality:  In-person    Previous PHQ-9:   PHQ-9 SCORE 4/29/2021 8/19/2021 10/14/2021   PHQ-9 Total Score 0 0 12     Previous YURI-7:   YURI-7 SCORE 4/29/2021 8/19/2021 10/14/2021   Total Score 0 0 7       DATA    Micah was seen today as scheduled.He presented as presented as O x 4 ,coherent,relevant and   appearing moderately fatigued. He denied S/H ideations.His memory appeared to be intact. Micah's insight and judgement seemed within a range acceptable for safety.    When asked about fatigue,the pt reported that he is only sleeping a short time each night. He also said that he is also irritable because of that. Micah said that his painnic attacks started several years ago. and that they are increasing in frequency. He doesn't necessarily associate them with any particular event. The pt hasn't had a sleep study but agreed to have one.    Treatment Objective(s) Addressed in This Session:    Improved Sleep,Alleviate anxiety    Current Stressors / Issues:    Poor Sleep; a lack of a stable income.      Progress on Treatment Objective(s) / Homework:  New Objective established this session - PRECONTEMPLATION (Not seeing need for change); Intervened by educating the patient about the effects of current behavior on health.  Evoked information about reasons to continue behavior, express concern / recommendations, and explored any change talk ( may due to a lack of awareness)    Therapeutic Interventions/Treatment Strategies:  Support, Problem Solving, Clarification and Education    Response to Treatment Strategies:  Accepted Feedback and Accepted Support    Changes in Health Issues:   None reported    Chemical Use Review:   Substance Use: No substance use concerns  reported / identified    Assessment: Current Emotional / Mental Status (status of significant symptoms):  Risk status (Self / Other harm or suicidal ideation)  Patient denies a history of suicidal ideation, suicide attempts, self-injurious behavior, homicidal ideation, homicidal behavior and and other safety concerns  Patient denies current fears or concerns for personal safety.  Patient denies current or recent suicidal ideation or behaviors.  Patient denies current or recent homicidal ideation or behaviors.  Patient denies current or recent self injurious behavior or ideation.  Patient denies other safety concerns.  A safety and risk management plan has not been developed at this time, however patient was encouraged to call [place] [phone] should there be a change in any of these risk factors.    Appearance:   Appropriate   Eye Contact:   Good   Psychomotor Behavior: Normal   Attitude:   Cooperative   Orientation:   All  Speech   Rate / Production: Normal    Volume:  Normal   Mood:    Anxious  Irritable  Dysphoric  Affect:    Mood Congruent  Thought Content:  Clear   Thought Form:  Coherent  Logical   Insight:    Fair     Diagnoses: Unspecified Anxiety  Disorder      Plan: (Homework, other):    Refer for a sleep study. Refer to [facility] for assistance with a disability application.    Patient has an initial individualized treatment plan that was created as part of their diagnostic assessment / admission process.  A master individualized treatment plan is in the process of being developed with the patient and multi-disciplinary care team.                                                Patient has not reviewed nor agreed to the above plan.      Jacob Wong [facility]  October 27, 2021    The author of this note documented a reason for not sharing it with the patient.

## 2021-10-28 ENCOUNTER — TRANSCRIBE ORDERS (OUTPATIENT)
Dept: PSYCHOLOGY | Facility: OTHER | Age: 46
End: 2021-10-28

## 2021-10-28 DIAGNOSIS — G47.00 INSOMNIA: Primary | ICD-10-CM

## 2021-10-28 ASSESSMENT — ANXIETY QUESTIONNAIRES: GAD7 TOTAL SCORE: 4

## 2021-11-11 ENCOUNTER — OFFICE VISIT (OUTPATIENT)
Dept: BEHAVIORAL HEALTH | Facility: OTHER | Age: 46
End: 2021-11-11
Attending: SOCIAL WORKER
Payer: COMMERCIAL

## 2021-11-11 DIAGNOSIS — F11.20 MODERATE OPIOID USE DISORDER ON MAINTENANCE THERAPY (H): ICD-10-CM

## 2021-11-11 DIAGNOSIS — R69 DIAGNOSIS DEFERRED: Primary | ICD-10-CM

## 2021-11-11 DIAGNOSIS — F32.1 MAJOR DEPRESSIVE DISORDER, SINGLE EPISODE, MODERATE (H): Primary | ICD-10-CM

## 2021-11-11 PROCEDURE — 90837 PSYTX W PT 60 MINUTES: CPT | Performed by: SOCIAL WORKER

## 2021-11-15 NOTE — CONFIDENTIAL NOTE
[facility]  November 11, 2021    Behavioral Health Clinician Progress Note    Patient Name: Micah Rosenthal         Service Type: Individual           Service Location:  Face to Face in Clinic      Session Start Time:  9:00am  Session End Time: 10:00am      Session Length: 53 - 60      Attendees: Client and Spouse / Significant Other    Visit Activities (Refresh list every visit): NEW      DATA  [facility] Patient Yes, addressed the follow [facility] Core Component(s):                          Comprehensive Care Management: utilized the electronic medical record / patient registry to identify and support patient's health conditions / needs more effectively   Care Transitions: focused on the coordinated and seamless movement of patient between or within different levels of care or settings  Health and Wellness Promotion  Individual and Family Support: aimed to help clients reduce barriers to achieving goals, increase health literacy and knowledge about chronic condition(s), increase self-efficacy skills, and improve health outcomes          Micah Rosenthal complains of    Chief Complaint   Patient presents with     Counseling       I have reviewed and updated the patient's Past Medical History, Social History, Family History and Medication List.    Previous PHQ-9:   PHQ 8/19/2021 10/14/2021 10/27/2021   PHQ-9 Total Score 0 12 8   Q9: Thoughts of better off dead/self-harm past 2 weeks Not at all Several days Not at all     Previous YURI-7:   YURI-7 SCORE 8/19/2021 10/14/2021 10/27/2021   Total Score 0 7 4         Treatment Objective(s) Addressed in This Session:  Target Behavior(s):  Depressed Mood: Increase interest, engagement, and pleasure in doing things  Improve quantity and quality of night time sleep / decrease daytime naps  Feel less tired and more energy during the day   Improve diet, appetite, mindful eating, and / or meal planning  Identify negative self-talk and behaviors: challenge core beliefs, myths,  "and actions  Anxiety: will experience a reduction in anxiety, will develop more effective coping skills to manage anxiety symptoms, will develop healthy cognitive patterns and beliefs and will increase ability to function adaptively  Mood Instability: will develop better understanding of triggers and coping strategies to stabilize mood  Grief / Loss: will increase understanding of normal grieving process  Alcohol / Substance Use: continue to make healthy choices regarding substance use and engage in activities / supportive services that promote sobriety  Anger Management: will learn strategies to resolve conflict adaptively and will learn and practice positive anger management skills   Psychological distress related to Sleep Disturbance          Current Stressors / Issues:  Met with client and his partner, Huong, for first established session. Client is transferring his mental health care from provider Jacob Wong. Client reports that he did not feel listened to or understood by Jacob and that therapy was not helpful because of that. Throughout the session reviewed most recent diagnostic assessment and any relevant changes. Client reports that his largest goal in therapy is to help him understand and control his anger and other emotions/mood. He states that since he got sober a few years ago his mood has worsened and he has had more impulsive and unpredictable anger. His partner gave more relevant background around this as well as her observations. States that he has a lot of \"unresolved grief\" and never has dealt with his emotions. Huong also states that client has some learning disabilities and sometimes struggles to vocalize his feelings and be a good historian. They both agreed that writer can check in with Huong if needing collateral connections. Client will be enrolling in [facility] after this session for ongoing support as well. He is also open to medication management for mood. Currently on suboxone for " maintainenceof opoid addiction. Agreed to meet once a week to work on identifying treatment goals and objectives together and improve overall mood.         Progress on Treatment Objective(s) / Homework:  First session together; will continue working on establishing treatment goals and objectives together. Client appears motivated.          Interventions used: CBT, DBT, psychodynamic and attachment theory, and relational approach      Medication Review:  No changes to current psychiatric medication(s)    Medication Compliance:  Yes    Changes in Health Issues:   None reported    Chemical Use Review:   Substance Use: Chemical use reviewed, no active concerns identified      Tobacco Use: No change in amount of tobacco use since last session.  Patient declined discussion at this time        Assessment: Current Emotional / Mental Status (status of significant symptoms):    Risk status (Self / Other harm or suicidal ideation)  Patient denies current fears or concerns for personal safety.  Patient denies current or recent suicidal ideation or behaviors.  Patient denies current or recent homicidal ideation or behaviors.  Patient denies current or recent self injurious behavior or ideation.  Patient denies other safety concerns.  A safety and risk management plan has not been developed at this time, however patient was encouraged to call [place] [phone] should there be a change in any of these risk factors.      Attitude:   Cooperative   Orientation:   All  Speech   Rate / Production: Normal/ Responsive   Volume:  Soft   Mood:    Angry  Anxious  Depressed  Normal  Affect:    Appropriate  Tearful  Thought Content:  Clear   Thought Form:  Coherent  Logical   Insight:    Good  and client has some learning disabilities which may impact insight.    Diagnoses:  1. Major depressive disorder, single episode, moderate (H)    2. Moderate opioid use disorder on maintenance therapy (H)            Collateral Reports  Completed:  Communicated with: partner, Huong          Plan: (Homework, other):  Planning to meet with weekly for individual therapy.          Nikole Solano, LICSW

## 2021-11-15 NOTE — CONFIDENTIAL NOTE
Behavioral Health Home Services  [facility]: Lamont Ceja      Social Work Care Navigator Note      Patient: Micah Rosenthal  Date: November 15, 2021  Preferred Name: Micah    Previous PHQ-9:   PHQ-9 SCORE 8/19/2021 10/14/2021 10/27/2021   PHQ-9 Total Score 0 12 8     Previous YURI-7:   YURI-7 SCORE 8/19/2021 10/14/2021 10/27/2021   Total Score 0 7 4     ROXANA LEVEL:  No flowsheet data found.    Preferred Contact: @[facility]([phone])@  Type of Contact Today: Face to Face in Clinic      Data: (subjective / Objective):  Patient Goals Areas: Goal Areas: Health,Financial and Social Service Benefits  Patient Stated Goals: Patient stated goals: getting teeth fixed, getting connected with community resources and applying for disabiility  Recent ED/IP Admission or Discharge?   None    Patient came in to complete the comprehensive wellness assessment for Behavioral Health Home Services.  See [facility] Flowsheets for details on the assessment.  See [facility] for a copy of the patient's care plan.    CORNELIUS Enciso, Social Work Care Coordinator      Referrals/other: Completed disability intake with patient through [employer], had patient sign POLLY to send over recent Diagnostic Assessment.  Applied for SNAP benefits today for patient and family, faxed over today's date.    Still looking for dental - called [facility] no new dental patients     Called [facility] dental no new dental patients      Called [facility] dental and was told to call back Friday morning 11/19/21     Will plan to call [facility] [phone] and [facility] [phone].       Patient has application for [employer] energy assistance but hasn't heard back, encouraged patient and significant other to reach out to this writer in one week if still no update.  The author of this note documented a reason for not sharing it with the patient.      JACQUELYN Enciso, LSW     Next 5 appointments (look out  90 days)    Nov 18, 2021 11:00 AM  (Arrive by 10:45 AM)  Return Visit with KATI Toro  [facility] [place] ([facility] [place] ) [address]  [place] [address]  [phone]   Nov 23, 2021 10:00 AM  (Arrive by 9:45 AM)  Return Visit with KATI Toro  [facility] [place] ([facility] [place] ) [address]  [place] [address]  [phone]   Dec 02, 2021 11:15 AM  (Arrive by 11:00 AM)  SHORT with ELODIA Tomlin  [facility] [place] ([facility] [place] ) [address] [place] [address]  [place] [address]  [phone]

## 2021-11-17 ENCOUNTER — TELEPHONE (OUTPATIENT)
Dept: BEHAVIORAL HEALTH | Facility: OTHER | Age: 46
End: 2021-11-17
Payer: COMMERCIAL

## 2021-11-17 NOTE — TELEPHONE ENCOUNTER
Behavioral Health Home Services  @FLOW([id number])@      Social Work Care Navigator Note      Patient: Micah Rosenthal  Date: November 17, 2021  Preferred Name: Micah    Previous PHQ-9:   PHQ-9 SCORE 8/19/2021 10/14/2021 10/27/2021   PHQ-9 Total Score 0 12 8     Previous YURI-7:   YURI-7 SCORE 8/19/2021 10/14/2021 10/27/2021   Total Score 0 7 4     ROXANA LEVEL:  No flowsheet data found.    Preferred Contact: @KARIME([id number])@  Type of Contact Today: Phone call (patient / identified key support person reached)      Data: (subjective / Objective):  Patient Goals Areas: @FLOW([id number])@  Patient Stated Goals: @FLOW([id number])@  Recent ED/IP Admission or Discharge?   None  Recent ED/IP Admission or Discharge?   None    Patient Goals:  Goal Areas: Health; Financial and Social Service Benefits  Patient stated goals: getting teeth fixed, getting connected with community resources and applying for disabiility        [facility] Core Service Provided:  Comprehensive Care Management: utilized the electronic medical record / patient registry to identify and support patient's health conditions / needs more effectively   Care Transitions: focused on the coordinated and seamless movement of patient between or within different levels of care or settings  Care Coordination: provided care management services/referrals necessary to ensure patient and their identified supports have access to medical, behavioral health, pharmacology and recovery support services.  Ensured that patient's care is integrated across all settings and services.   Health and Wellness Promotion    Current Stressors / Issues / Care Plan Objective Addressed Today:  Disability     Intervention:  Motivational Interviewing: Expressed Empathy/Understanding, Supported Autonomy, Collaboration, Evocation, Permission to raise concern or advise, Open-ended questions and Reflections: simple and complex   Target Behavior(s): n/a    Assessment: (Progress on Goals / Homework):  MIMI CC talked  with patient's significant other who states patient received call from disability specialists and they are needing a letter of support of disability from PCP and therapist to move forward with his claim. In the letter they need support of moving forward of his disability claim and reasons as to why he is unable to work. MIMI PANIAGUA will route note to PCP and therapist to see if they are able to writer a letter.     After completing SNAP application last week with patient, this writer was updated that they were approved of SNAP in the amount of $115.      Plan: (Homework, other):  Patient was encouraged to continue to seek condition-related information and education.      Scheduled a Phone follow up appointment with MIMI PANIAGUA in 2 weeks     Patient has set self-identified goals and will monitor progress until the next appointment.   CORNELIUS Enciso, Social Work Care Coordinator             Referrals/other:   [unfilled]      JACQUELYN Enciso LSW     Next 5 appointments (look out 90 days)    Nov 18, 2021 11:00 AM  (Arrive by 10:45 AM)  Return Visit with KATI Toro  [facility] [place] ([facility] [place] ) [address] [place], [place] [address] [place] [address]  [phone]   Nov 23, 2021 10:00 AM  (Arrive by 9:45 AM)  Return Visit with KATI Toro  [facility] [place] ([facility] [place] ) [address] [place], [place] [address] [place] [address]  [phone]   Dec 02, 2021 11:15 AM  (Arrive by 11:00 AM)  SHORT with ELODIA Tomlin  [facility] [place] ([facility] [place] ) [address] [place], [place] [address] [place] [address]  [phone]

## 2021-11-18 ENCOUNTER — OFFICE VISIT (OUTPATIENT)
Dept: BEHAVIORAL HEALTH | Facility: OTHER | Age: 46
End: 2021-11-18
Attending: SOCIAL WORKER
Payer: COMMERCIAL

## 2021-11-18 DIAGNOSIS — F11.20 MODERATE OPIOID USE DISORDER ON MAINTENANCE THERAPY (H): ICD-10-CM

## 2021-11-18 DIAGNOSIS — F32.1 MAJOR DEPRESSIVE DISORDER, SINGLE EPISODE, MODERATE (H): Primary | ICD-10-CM

## 2021-11-18 PROCEDURE — 90837 PSYTX W PT 60 MINUTES: CPT | Performed by: SOCIAL WORKER

## 2021-11-22 NOTE — CONFIDENTIAL NOTE
[facility]  November 18, 2021    Behavioral Health Clinician Progress Note    Patient Name: Micah Rosenthal         Service Type: Individual           Service Location:  Face to Face in Clinic      Session Start Time:  11:00am  Session End Time: 12:00 pm      Session Length: 53 - 60      Attendees: Client    Visit Activities (Refresh list every visit): NEW      DATA  State mental [facility] facility Patient Yes, addressed the follow [facility] facility Core Component(s):                          Comprehensive Care Management: utilized the electronic medical record / patient registry to identify and support patient's health conditions / needs more effectively   Care Transitions: focused on the coordinated and seamless movement of patient between or within different levels of care or settings  Health and Wellness Promotion  Individual and Family Support: aimed to help clients reduce barriers to achieving goals, increase health literacy and knowledge about chronic condition(s), increase self-efficacy skills, and improve health outcomes          Micah Rosenthal complains of    Chief Complaint   Patient presents with     Counseling       I have reviewed and updated the patient's Past Medical History, Social History, Family History and Medication List.    Previous PHQ-9:   PHQ 8/19/2021 10/14/2021 10/27/2021   PHQ-9 Total Score 0 12 8   Q9: Thoughts of better off dead/self-harm past 2 weeks Not at all Several days Not at all     Previous YURI-7:   YURI-7 SCORE 8/19/2021 10/14/2021 10/27/2021   Total Score 0 7 4         Treatment Objective(s) Addressed in This Session:  Target Behavior(s):  Depressed Mood: Increase interest, engagement, and pleasure in doing things  Improve quantity and quality of night time sleep / decrease daytime naps  Feel less tired and more energy during the day   Improve diet, appetite, mindful eating, and / or meal planning  Identify negative self-talk and behaviors: challenge core beliefs, myths, and actions  Anxiety: will  "experience a reduction in anxiety, will develop more effective coping skills to manage anxiety symptoms, will develop healthy cognitive patterns and beliefs and will increase ability to function adaptively  Mood Instability: will develop better understanding of triggers and coping strategies to stabilize mood  Grief / Loss: will increase understanding of normal grieving process  Alcohol / Substance Use: continue to make healthy choices regarding substance use and engage in activities / supportive services that promote sobriety  Anger Management: will learn strategies to resolve conflict adaptively and will learn and practice positive anger management skills   Psychological distress related to Sleep Disturbance          Current Stressors / Issues:  Met with client 1-1 for individual therapy.Client states that he continues to struggle with his anger. Therapist asked him to elaborate more on what triggers this and what he notices. He explained a recent issue regarding his  grandmother's house and the \"neighbors taking advantage of her land\". Talked about interpersonal skills and effective communication strategies to work on. Reminded client to not focus on his anger and getting rid of the anger but rather re framing his response to his anger and focusing on healthy behaviors instead of harmful ones. Discussed ongoing grief associated with the loss of his grandmother. Client was crying throughout the session when discussing this. Reminded him that it is important for him to release this grief and not hold it in. Started some psycho education around grief stages and process.       Progress on Treatment Objective(s) / Homework:  First session together; will continue working on establishing treatment goals and objectives together. Client appears motivated.          Interventions used: CBT, DBT, psychodynamic and attachment theory, and relational approach      Medication Review:  No changes to current psychiatric " medication(s)    Medication Compliance:  Yes    Changes in Health Issues:   None reported    Chemical Use Review:   Substance Use: Chemical use reviewed, no active concerns identified      Tobacco Use: No change in amount of tobacco use since last session.  Patient declined discussion at this time        Assessment: Current Emotional / Mental Status (status of significant symptoms):    Risk status (Self / Other harm or suicidal ideation)  Patient denies current fears or concerns for personal safety.  Patient denies current or recent suicidal ideation or behaviors.  Patient denies current or recent homicidal ideation or behaviors.  Patient denies current or recent self injurious behavior or ideation.  Patient denies other safety concerns.  A safety and risk management plan has not been developed at this time, however patient was encouraged to call [place] [phone] should there be a change in any of these risk factors.      Attitude:   Cooperative   Orientation:   All  Speech   Rate / Production: Normal/ Responsive   Volume:  Soft   Mood:    Angry  Anxious  Depressed  Normal  Affect:    Appropriate  Tearful  Thought Content:  Clear   Thought Form:  Coherent  Logical   Insight:    Good  and client has some learning disabilities which may impact insight.    Diagnoses:  1. Major depressive disorder, single episode, moderate (H)    2. Moderate opioid use disorder on maintenance therapy (H)            Collateral Reports Completed:  Communicated with: partnerHuong          Plan: (Homework, other):  Planning to meet with weekly for individual therapy.          KATI Toro

## 2021-11-23 ENCOUNTER — OFFICE VISIT (OUTPATIENT)
Dept: BEHAVIORAL HEALTH | Facility: OTHER | Age: 46
End: 2021-11-23
Attending: SOCIAL WORKER
Payer: COMMERCIAL

## 2021-11-23 DIAGNOSIS — F11.20 MODERATE OPIOID USE DISORDER ON MAINTENANCE THERAPY (H): ICD-10-CM

## 2021-11-23 DIAGNOSIS — F32.1 MAJOR DEPRESSIVE DISORDER, SINGLE EPISODE, MODERATE (H): Primary | ICD-10-CM

## 2021-11-23 PROCEDURE — 90834 PSYTX W PT 45 MINUTES: CPT | Performed by: SOCIAL WORKER

## 2021-11-23 PROCEDURE — 90837 PSYTX W PT 60 MINUTES: CPT | Performed by: SOCIAL WORKER

## 2021-11-23 ASSESSMENT — PATIENT HEALTH QUESTIONNAIRE - PHQ9: 5. POOR APPETITE OR OVEREATING: MORE THAN HALF THE DAYS

## 2021-11-23 ASSESSMENT — ANXIETY QUESTIONNAIRES
6. BECOMING EASILY ANNOYED OR IRRITABLE: MORE THAN HALF THE DAYS
7. FEELING AFRAID AS IF SOMETHING AWFUL MIGHT HAPPEN: SEVERAL DAYS
1. FEELING NERVOUS, ANXIOUS, OR ON EDGE: NEARLY EVERY DAY
3. WORRYING TOO MUCH ABOUT DIFFERENT THINGS: NEARLY EVERY DAY
5. BEING SO RESTLESS THAT IT IS HARD TO SIT STILL: SEVERAL DAYS
2. NOT BEING ABLE TO STOP OR CONTROL WORRYING: NEARLY EVERY DAY
GAD7 TOTAL SCORE: 15

## 2021-11-24 ASSESSMENT — PATIENT HEALTH QUESTIONNAIRE - PHQ9: SUM OF ALL RESPONSES TO PHQ QUESTIONS 1-9: 14

## 2021-11-24 ASSESSMENT — ANXIETY QUESTIONNAIRES: GAD7 TOTAL SCORE: 15

## 2021-11-24 NOTE — CONFIDENTIAL NOTE
[facility]  November 23, 2021    Behavioral Health Clinician Progress Note    Patient Name: Micah Rosenthal         Service Type: Individual           Service Location:  Face to Face in Clinic      Session Start Time:  10:05am  Session End Time: 10:50am      Session Length: 38 - 52      Attendees: Client    Visit Activities (Refresh list every visit): NEW      DATA  [facility] Patient Yes, addressed the follow [facility] Core Component(s):                          Comprehensive Care Management: utilized the electronic medical record / patient registry to identify and support patient's health conditions / needs more effectively   Care Transitions: focused on the coordinated and seamless movement of patient between or within different levels of care or settings  Health and Wellness Promotion  Individual and Family Support: aimed to help clients reduce barriers to achieving goals, increase health literacy and knowledge about chronic condition(s), increase self-efficacy skills, and improve health outcomes          Micah Rosenthal complains of    Chief Complaint   Patient presents with     Counseling       I have reviewed and updated the patient's Past Medical History, Social History, Family History and Medication List.    Previous PHQ-9:   PHQ 10/14/2021 10/27/2021 11/23/2021   PHQ-9 Total Score 12 8 14   Q9: Thoughts of better off dead/self-harm past 2 weeks Several days Not at all Not at all     Previous YURI-7:   YURI-7 SCORE 10/14/2021 10/27/2021 11/23/2021   Total Score 7 4 15         Treatment Objective(s) Addressed in This Session:  Target Behavior(s):  Depressed Mood: Increase interest, engagement, and pleasure in doing things  Improve quantity and quality of night time sleep / decrease daytime naps  Feel less tired and more energy during the day   Improve diet, appetite, mindful eating, and / or meal planning  Identify negative self-talk and behaviors: challenge core beliefs, myths, and actions  Anxiety:  "will experience a reduction in anxiety, will develop more effective coping skills to manage anxiety symptoms, will develop healthy cognitive patterns and beliefs and will increase ability to function adaptively  Mood Instability: will develop better understanding of triggers and coping strategies to stabilize mood  Grief / Loss: will increase understanding of normal grieving process  Alcohol / Substance Use: continue to make healthy choices regarding substance use and engage in activities / supportive services that promote sobriety  Anger Management: will learn strategies to resolve conflict adaptively and will learn and practice positive anger management skills   Psychological distress related to Sleep Disturbance          Current Stressors / Issues:  Met with client 1-1 for individual therapy.Discussed client's ongoing observations around his anger and triggers for this. Discussed overall mood and reviewed screening tools for PHQ-9 and YURI-7. Client talked in detail about him feeling like a failure and that he is not \"bringing anything to the table\" financially right now. Discussed his efforts to get on disability and getting denied in the past. Reviewed ongoing symptoms that interfere with his ability to work. Client reports that he \"reads at a 4th grade level\" and has always struggled with reading, writing, and comprehension. States that he never got the support that he needed at school and does not have confidence in ever being able to learn those skills that he missed out on. Client also shared more detail about his physical health issues and degenerative disc disease. Client shared more about his ongoing sobriety. 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 Currently struggling with sleep and pain at night and managing with medication - though states that it does not seem effective.     Progress on Treatment Objective(s) / Homework:  Attending sessions regularly and participating in " therapy; making progress towards identified treatment goals.           Interventions used: CBT, DBT, psychodynamic and attachment theory, and relational approach      Medication Review:  No changes to current psychiatric medication(s)    Medication Compliance:  Yes    Changes in Health Issues:   None reported    Chemical Use Review:   Substance Use: Chemical use reviewed, no active concerns identified      Tobacco Use: No change in amount of tobacco use since last session.  Patient declined discussion at this time        Assessment: Current Emotional / Mental Status (status of significant symptoms):    Risk status (Self / Other harm or suicidal ideation)  Patient denies current fears or concerns for personal safety.  Patient denies current or recent suicidal ideation or behaviors.  Patient denies current or recent homicidal ideation or behaviors.  Patient denies current or recent self injurious behavior or ideation.  Patient denies other safety concerns.  A safety and risk management plan has not been developed at this time, however patient was encouraged to call [place] [phone] should there be a change in any of these risk factors.      Attitude:   Cooperative   Orientation:   All  Speech   Rate / Production: Normal/ Responsive   Volume:  Soft   Mood:    Angry  Anxious  Depressed  Normal  Affect:    Appropriate  Tearful  Thought Content:  Clear   Thought Form:  Coherent  Logical   Insight:    Good  and client has some learning disabilities which may impact insight.    Diagnoses:  1. Major depressive disorder, single episode, moderate (H)    2. Moderate opioid use disorder on maintenance therapy (H)          Collateral Reports Completed:  None at this time.        Plan: (Homework, other):  Planning to meet with weekly for individual therapy.          KATI Toro

## 2021-11-29 ENCOUNTER — ANCILLARY PROCEDURE (OUTPATIENT)
Dept: GENERAL RADIOLOGY | Facility: OTHER | Age: 46
End: 2021-11-29
Attending: PHYSICIAN ASSISTANT
Payer: COMMERCIAL

## 2021-11-29 ENCOUNTER — OFFICE VISIT (OUTPATIENT)
Dept: FAMILY MEDICINE | Facility: OTHER | Age: 46
End: 2021-11-29
Attending: PHYSICIAN ASSISTANT
Payer: COMMERCIAL

## 2021-11-29 VITALS
DIASTOLIC BLOOD PRESSURE: 70 MMHG | TEMPERATURE: 96.9 F | BODY MASS INDEX: 25.05 KG/M2 | HEART RATE: 89 BPM | OXYGEN SATURATION: 94 % | WEIGHT: 175 LBS | HEIGHT: 70 IN | SYSTOLIC BLOOD PRESSURE: 108 MMHG

## 2021-11-29 DIAGNOSIS — M11.20 CHONDROCALCINOSIS: ICD-10-CM

## 2021-11-29 DIAGNOSIS — M25.562 PAIN AND SWELLING OF KNEE, LEFT: ICD-10-CM

## 2021-11-29 DIAGNOSIS — M25.562 PAIN AND SWELLING OF KNEE, LEFT: Primary | ICD-10-CM

## 2021-11-29 DIAGNOSIS — M25.462 PAIN AND SWELLING OF KNEE, LEFT: ICD-10-CM

## 2021-11-29 DIAGNOSIS — M25.462 PAIN AND SWELLING OF KNEE, LEFT: Primary | ICD-10-CM

## 2021-11-29 LAB
ANION GAP SERPL CALCULATED.3IONS-SCNC: 1 MMOL/L (ref 3–14)
BASOPHILS # BLD AUTO: 0 10E3/UL (ref 0–0.2)
BASOPHILS NFR BLD AUTO: 0 %
BUN SERPL-MCNC: 5 MG/DL (ref 7–30)
CALCIUM SERPL-MCNC: 8.7 MG/DL (ref 8.5–10.1)
CHLORIDE BLD-SCNC: 101 MMOL/L (ref 94–109)
CO2 SERPL-SCNC: 34 MMOL/L (ref 20–32)
CREAT SERPL-MCNC: 0.76 MG/DL (ref 0.66–1.25)
CRP SERPL-MCNC: 11.6 MG/L (ref 0–8)
EOSINOPHIL # BLD AUTO: 0.1 10E3/UL (ref 0–0.7)
EOSINOPHIL NFR BLD AUTO: 1 %
ERYTHROCYTE [DISTWIDTH] IN BLOOD BY AUTOMATED COUNT: 12.3 % (ref 10–15)
ERYTHROCYTE [SEDIMENTATION RATE] IN BLOOD BY WESTERGREN METHOD: 8 MM/HR (ref 0–15)
GFR SERPL CREATININE-BSD FRML MDRD: >90 ML/MIN/1.73M2
GLUCOSE BLD-MCNC: 91 MG/DL (ref 70–99)
HCT VFR BLD AUTO: 44.7 % (ref 40–53)
HGB BLD-MCNC: 15.4 G/DL (ref 13.3–17.7)
IMM GRANULOCYTES # BLD: 0 10E3/UL
IMM GRANULOCYTES NFR BLD: 0 %
LYMPHOCYTES # BLD AUTO: 1.7 10E3/UL (ref 0.8–5.3)
LYMPHOCYTES NFR BLD AUTO: 21 %
MCH RBC QN AUTO: 29.4 PG (ref 26.5–33)
MCHC RBC AUTO-ENTMCNC: 34.5 G/DL (ref 31.5–36.5)
MCV RBC AUTO: 85 FL (ref 78–100)
MONOCYTES # BLD AUTO: 0.6 10E3/UL (ref 0–1.3)
MONOCYTES NFR BLD AUTO: 8 %
NEUTROPHILS # BLD AUTO: 5.8 10E3/UL (ref 1.6–8.3)
NEUTROPHILS NFR BLD AUTO: 70 %
NRBC # BLD AUTO: 0 10E3/UL
NRBC BLD AUTO-RTO: 0 /100
PLATELET # BLD AUTO: 269 10E3/UL (ref 150–450)
POTASSIUM BLD-SCNC: 3.6 MMOL/L (ref 3.4–5.3)
RBC # BLD AUTO: 5.24 10E6/UL (ref 4.4–5.9)
SODIUM SERPL-SCNC: 136 MMOL/L (ref 133–144)
URATE SERPL-MCNC: 4.6 MG/DL (ref 3.5–7.2)
WBC # BLD AUTO: 8.2 10E3/UL (ref 4–11)

## 2021-11-29 PROCEDURE — 85025 COMPLETE CBC W/AUTO DIFF WBC: CPT | Mod: ZL | Performed by: PHYSICIAN ASSISTANT

## 2021-11-29 PROCEDURE — 36415 COLL VENOUS BLD VENIPUNCTURE: CPT | Mod: ZL | Performed by: PHYSICIAN ASSISTANT

## 2021-11-29 PROCEDURE — 73562 X-RAY EXAM OF KNEE 3: CPT | Mod: TC,LT

## 2021-11-29 PROCEDURE — G0463 HOSPITAL OUTPT CLINIC VISIT: HCPCS

## 2021-11-29 PROCEDURE — 85652 RBC SED RATE AUTOMATED: CPT | Mod: ZL | Performed by: PHYSICIAN ASSISTANT

## 2021-11-29 PROCEDURE — 80048 BASIC METABOLIC PNL TOTAL CA: CPT | Mod: ZL | Performed by: PHYSICIAN ASSISTANT

## 2021-11-29 PROCEDURE — 84550 ASSAY OF BLOOD/URIC ACID: CPT | Mod: ZL | Performed by: PHYSICIAN ASSISTANT

## 2021-11-29 PROCEDURE — 86140 C-REACTIVE PROTEIN: CPT | Mod: ZL | Performed by: PHYSICIAN ASSISTANT

## 2021-11-29 PROCEDURE — 99214 OFFICE O/P EST MOD 30 MIN: CPT | Performed by: PHYSICIAN ASSISTANT

## 2021-11-29 RX ORDER — NAPROXEN 500 MG/1
500 TABLET ORAL 2 TIMES DAILY WITH MEALS
Qty: 60 TABLET | Refills: 3 | Status: SHIPPED | OUTPATIENT
Start: 2021-11-29 | End: 2022-11-17

## 2021-11-29 ASSESSMENT — MIFFLIN-ST. JEOR: SCORE: 1680.04

## 2021-11-29 NOTE — PROGRESS NOTES
"  Assessment & Plan     Pain and swelling of knee, left  Acute onset of pain without trauma and some mild warmth.   - XR Knee Left 3 Views (Clinic Performed); Future  - Basic metabolic panel; Future  - CBC with platelets and differential; Future  - CRP, inflammation; Future  - ESR: Erythrocyte sedimentation rate; Future  - Uric acid; Future  - Basic metabolic panel  - CBC with platelets and differential  - CRP, inflammation  - ESR: Erythrocyte sedimentation rate  - Uric acid  - Rheumatology Referral; Future  - Rheumatoid factor; Future  - naproxen (NAPROSYN) 500 MG tablet; Take 1 tablet (500 mg) by mouth 2 times daily (with meals)    Chondrocalcinosis  Concern for Pseudogout.  Giving Naprosyn.  If not helpful will try to get into the Rheumatologist for fl;uid analysis and definitive dx.   - Rheumatology Referral; Future  - Rheumatoid factor; Future  - naproxen (NAPROSYN) 500 MG tablet; Take 1 tablet (500 mg) by mouth 2 times daily (with meals)    Review of external notes as documented elsewhere in note  Ordering of each unique test  Prescription drug management  5  minutes spent on the date of the encounter doing chart review, patient visit and documentation        Tobacco Cessation:   reports that he has been smoking cigarettes. He started smoking about 33 years ago. He has a 30.00 pack-year smoking history. He has never used smokeless tobacco.  Tobacco Cessation Action Plan: Information offered: Patient not interested at this time    BMI:   Estimated body mass index is 25.11 kg/m  as calculated from the following:    Height as of this encounter: 1.778 m (5' 10\").    Weight as of this encounter: 79.4 kg (175 lb).       See Patient Instructions    No follow-ups on file.    ELODIA Barrow  [facility]    Moraima Obrien is a 46 year old who presents for the following health issues     HPI     Concern - Left knee pain  Onset: went to bed Friday night and was fine, woke up on Saturday " "and knee was swollen  Description: let knee  Intensity: 5/10 if he is not moving his knee but its 10/10 if he is doing any walking  Progression of Symptoms:  worsening  Accompanying Signs & Symptoms: pain in left knee, no falls he is not sure what he did  Previous history of similar problem: none  Precipitating factors:        Worsened by: none  Alleviating factors:        Improved by: none  Therapies tried and outcome: None        Review of Systems   Constitutional, HEENT, cardiovascular, pulmonary, gi and gu systems are negative, except as otherwise noted.      Objective    /70 (BP Location: Left arm, Patient Position: Sitting, Cuff Size: Adult Regular)   Pulse 89   Temp 96.9  F (36.1  C) (Tympanic)   Ht 1.778 m (5' 10\")   Wt 79.4 kg (175 lb)   SpO2 94%   BMI 25.11 kg/m    Body mass index is 25.11 kg/m .  Physical Exam   GENERAL: healthy, alert and no distress  NECK: no adenopathy, no asymmetry, masses, or scars and thyroid normal to palpation  RESP: lungs clear to auscultation - no rales, rhonchi or wheezes  CV: regular rate and rhythm, normal S1 S2, no S3 or S4, no murmur, click or rub, no peripheral edema and peripheral pulses strong  MS: left knee is swollen and mild warmth. Pain all over knee, has bilateral swelling of both legs from sleeping in recliners.  He also has neck pain for a long time now. ? Relationship on this.     Results for orders placed or performed in visit on 11/29/21   XR Knee Left 3 Views (Clinic Performed)     Status: None    Narrative    PROCEDURE:  XR KNEE LEFT 3 VIEWS    HISTORY: Pain and swelling of knee, left; Pain and swelling of knee,  left    COMPARISON:  None.    TECHNIQUE:  3 views of the left knee were obtained.    FINDINGS:  There is chondrocalcinosis of the knee. There is a knee  effusion. The articular spaces are normal in height. The distal femur  patella proximal tibia and fibula are intact.       Impression    IMPRESSION: Chondrocalcinosis with a small knee " mary BENJAMIN MD         SYSTEM ID:  [id number]   Results for orders placed or performed in visit on 11/29/21   Basic metabolic panel     Status: Abnormal   Result Value Ref Range    Sodium 136 133 - 144 mmol/L    Potassium 3.6 3.4 - 5.3 mmol/L    Chloride 101 94 - 109 mmol/L    Carbon Dioxide (CO2) 34 (H) 20 - 32 mmol/L    Anion Gap 1 (L) 3 - 14 mmol/L    Urea Nitrogen 5 (L) 7 - 30 mg/dL    Creatinine 0.76 0.66 - 1.25 mg/dL    Calcium 8.7 8.5 - 10.1 mg/dL    Glucose 91 70 - 99 mg/dL    GFR Estimate >90 >60 mL/min/1.73m2   CRP, inflammation     Status: Abnormal   Result Value Ref Range    CRP Inflammation 11.6 (H) 0.0 - 8.0 mg/L   ESR: Erythrocyte sedimentation rate     Status: Normal   Result Value Ref Range    Erythrocyte Sedimentation Rate 8 0 - 15 mm/hr   Uric acid     Status: Normal   Result Value Ref Range    Uric Acid 4.6 3.5 - 7.2 mg/dL   CBC with platelets and differential     Status: None   Result Value Ref Range    WBC Count 8.2 4.0 - 11.0 10e3/uL    RBC Count 5.24 4.40 - 5.90 10e6/uL    Hemoglobin 15.4 13.3 - 17.7 g/dL    Hematocrit 44.7 40.0 - 53.0 %    MCV 85 78 - 100 fL    MCH 29.4 26.5 - 33.0 pg    MCHC 34.5 31.5 - 36.5 g/dL    RDW 12.3 10.0 - 15.0 %    Platelet Count 269 150 - 450 10e3/uL    % Neutrophils 70 %    % Lymphocytes 21 %    % Monocytes 8 %    % Eosinophils 1 %    % Basophils 0 %    % Immature Granulocytes 0 %    NRBCs per 100 WBC 0 <1 /100    Absolute Neutrophils 5.8 1.6 - 8.3 10e3/uL    Absolute Lymphocytes 1.7 0.8 - 5.3 10e3/uL    Absolute Monocytes 0.6 0.0 - 1.3 10e3/uL    Absolute Eosinophils 0.1 0.0 - 0.7 10e3/uL    Absolute Basophils 0.0 0.0 - 0.2 10e3/uL    Absolute Immature Granulocytes 0.0 <=0.0 10e3/uL    Absolute NRBCs 0.0 10e3/uL   CBC with platelets and differential     Status: None    Narrative    The following orders were created for panel order CBC with platelets and differential.  Procedure                               Abnormality         Status                      ---------                               -----------         ------                     CBC with platelets and d...[[id number]]                      Final result                 Please view results for these tests on the individual orders.

## 2021-11-29 NOTE — PATIENT INSTRUCTIONS
Patient Education     Treating Gout Attacks     Raising the joint above the level of your heart can help reduce gout symptoms.     Gout is a disease that affects the joints. It is caused by excess uric acid in your blood that may lead to crystals forming in your joints. Left untreated, it can lead to painful foot and joint deformities and even kidney problems. But, by treating gout early, you can relieve pain and help prevent future problems. Gout can usually be treated with medicine and proper diet. In severe cases, surgery may be needed.  Gout attacks are painful and often happen more than once. Taking medicines may reduce pain and prevent attacks in the future. There are also some things you can do at home to relieve symptoms.  Medicines for gout  Your healthcare provider may prescribe a daily medicine to reduce levels of uric acid. Reducing your uric acid levels may help prevent gout attacks. Allopurinol is one commonly used medicine taken daily to reduce uric acid levels. Other daily medicines used to reduce uric acid levels include febuxostat, lesinurad, and probencid. Other medicines can help relieve pain and swelling during an acute attack. Medicines such as NSAIDs (nonsteroidal anti-inflammatory medicines), steroids, and colchicine may be prescribed for intermittent use to relieve an acute gout attack. Be sure to take your medicine as directed.  What you can do  Below are some things you can do at home to relieve gout symptoms. Your healthcare provider may have other tips.    Rest the painful joint as much as you can.    Raise the painful joint so it is at a level higher than your heart.    Use ice for 10 minutes every 1 to 2 hours as possible.  How can I prevent gout?  With a little effort, you may be able to prevent gout attacks in the future. Here are some things you can do:    Don't eat foods high in purines  ? Certain meats (red meat, processed meat, turkey)  ? Organ meats (kidney, liver,  sweetbread)  ? Shellfish (lobster, crab, shrimp, scallop, mussel)  ? Certain fish (anchovy, sardine, herring, mackerel)    Take any medicines prescribed by your healthcare provider.    Lose weight if you need to.    Reduce high fructose corn syrup in meals and drinks.    Reduce or cut out alcohol, particularly beer, but also red wine and spirits.    Control blood pressure, diabetes, and cholesterol.    Drink plenty of water to help flush uric acid from your body.  [employer] last reviewed this educational content on 4/1/2018 2000-2021 [employer]. All rights reserved. This information is not intended as a substitute for professional medical care. Always follow your healthcare professional's instructions.

## 2021-11-29 NOTE — NURSING NOTE
"Chief Complaint   Patient presents with     left knee pain       Initial /70 (BP Location: Left arm, Patient Position: Sitting, Cuff Size: Adult Regular)   Pulse 89   Temp 96.9  F (36.1  C) (Tympanic)   Ht 1.778 m (5' 10\")   Wt 79.4 kg (175 lb)   SpO2 94%   BMI 25.11 kg/m   Estimated body mass index is 25.11 kg/m  as calculated from the following:    Height as of this encounter: 1.778 m (5' 10\").    Weight as of this encounter: 79.4 kg (175 lb).  Medication Reconciliation: complete  Trista Brown LPN  "

## 2021-12-01 ENCOUNTER — TELEPHONE (OUTPATIENT)
Dept: FAMILY MEDICINE | Facility: OTHER | Age: 46
End: 2021-12-01
Payer: COMMERCIAL

## 2021-12-01 NOTE — TELEPHONE ENCOUNTER
Received a call from [facility] to see if provider Florence Sneed is willing to switch Micah insurance referral from [facility] to [facility]. [facility] is in network and would be covered with no insurance referral. If you could please respond so I can let them know. Thank you!

## 2021-12-01 NOTE — PROGRESS NOTES
"Follow-up Buprenorphine Visit           HPI       Micah Rosenthal is here for f/u on buprenophine/naloxone (Suboxone) therapy for opioid use disorder.  No chief complaint on file.      Micah is currently taking 8/2mg in the AM and 12/3mg in the PM of Buprenorphine/Naloxone  daily.     Status since last visit:    Since last visit patient has been: {DOING WELL suboxone:334226}. ***    Intensity:     There has been: {craving suboxone:808651}.  ***    Suboxone Dose: {suboxone dosin}.  ***    When did you take your last dose? ***  Progression of Symptoms:     Cues to use and relapse ***    Recovery program has been: {rec prog suboxone:199976}. ***  Accompanying Signs & Symptoms:    Side Effects: {side effects suboxone:869212:x}.  ***  Sobriety:     Status: {sobriety suboxone:713320}.  ***    Drug Screen: obtained.    Precipitating factors:    Triggers have been: {relapse triggers suboxone:905645}. ***  Alleviating factors:    Contact with sponsor has been: no sponsor.     Family and support system has been: helpful.   Other Therapies Tried :     Patient has been going to recovery meetings:regularly.  Is doing counseling with Nikole Solano regularly     Other medical concerns: { :810989::\"No \"}   Any ED visits? No       History   Smoking Status     Current Every Day Smoker     Packs/day: 1.50     Years: 20.00     Types: Cigarettes     Start date: 1988   Smokeless Tobacco     Never Used       Problem, Medication and Allergy Lists were reviewed and updated if needed.  reviewed and are current..    Pharmacy Database reviewed? Yes, 21, as expected.  Last fill 21 #30 of each.    Patient is an established patient of this clinic..         Review of Systems:   Review of Systems  CONSTITUTIONAL: NEGATIVE for fever, chills, change in weight  INTEGUMENTARY/SKIN: NEGATIVE for worrisome rashes, moles or lesions  EYES: NEGATIVE for vision changes or irritation  ENT/MOUTH: NEGATIVE for ear, mouth and throat " "problems  RESP: NEGATIVE for significant cough or SOB  CV: NEGATIVE for chest pain, palpitations or peripheral edema  GI: NEGATIVE for nausea, abdominal pain, heartburn, or change in bowel habits  : NEGATIVE for frequency, dysuria, or hematuria  MUSCULOSKELETAL: NEGATIVE for significant arthralgias or myalgia  NEURO: NEGATIVE for weakness, dizziness or paresthesias  ENDOCRINE: NEGATIVE for temperature intolerance, skin/hair changes  PSYCHIATRIC: NEGATIVE for changes in mood or affect          Physical Exam:   There were no vitals filed for this visit.  There is no height or weight on file to calculate BMI.  {[facility] VITALS OPTIONS:417640::\"Vitals were reviewed\"}   Physical Exam  GENERAL APPEARANCE: {OUD PE General appearance:94558432::\"alert, comfortable appearing\"}  EYES: {OUD  - EYES:3337379::\"Eyes grossly normal to inspection\"}  HENT:  nose no rhinorrhea  RESP: lungs clear to auscultation - no rales, rhonchi or wheezes and no resp distress  CV: regular rates and rhythm, normal S1 S2,no murmur   ABDOMEN:soft, non-tender, non-distended, no hepatosplenomegaly or masses  SKIN: no rashes, no jaundice, no obvious masses.   NEURO:  no tremor  MENTAL STATUS EXAM:  Appearance/Behavior:{ :79208044::\"No apparent distress\"}  Speech: {OUD Speech coherence:69100693::\"Normal\"}  Mood/Affect: {OUD Mood/Affect:08838609::\"normal affect\"}  Insight: {INSIGHT:972140}      Results:    Results from the last 24 hoursNo results found for this or any previous visit (from the past 24 hour(s)).    Assessment and Plan     Was naloxone nasal spray prescribed? {UMP FM Yes No-Red:043363::\"Yes\"}.  {Assessment/Plan  Pick List UMP FM :445842}    Dosage {will/will not:654094::\"will\"} need adjustment today.  {UMP  Suboxone Dosage:82569234} buprenorphine/naloxone (Suboxone).     Follow up Plan  { Follow Up Buprenorphine:3386071}    Greater than *** minutes was spent with this patient, over half of which was on counseling and coordination of care which " includes importance of recovery activities,which may include  attendance at NA/AA meetings, sober support network, and the importance of not isolating, being open, honest, and willing to change, possible triggers and how to avoid them, and managing cravings.    There are no discontinued medications.    Options for treatment and follow-up care were reviewed with the patient. Micah engaged in the decision making process and verbalized understanding of the options discussed and agreed with the final plan.    ELODIA Barrow

## 2021-12-02 ENCOUNTER — OFFICE VISIT (OUTPATIENT)
Dept: FAMILY MEDICINE | Facility: OTHER | Age: 46
End: 2021-12-02
Attending: PHYSICIAN ASSISTANT
Payer: COMMERCIAL

## 2021-12-02 ENCOUNTER — LAB (OUTPATIENT)
Dept: LAB | Facility: OTHER | Age: 46
End: 2021-12-02
Payer: COMMERCIAL

## 2021-12-02 ENCOUNTER — OFFICE VISIT (OUTPATIENT)
Dept: BEHAVIORAL HEALTH | Facility: OTHER | Age: 46
End: 2021-12-02
Attending: PHYSICIAN ASSISTANT
Payer: COMMERCIAL

## 2021-12-02 DIAGNOSIS — Z91.199 NO-SHOW FOR APPOINTMENT: Primary | ICD-10-CM

## 2021-12-02 DIAGNOSIS — F32.1 MAJOR DEPRESSIVE DISORDER, SINGLE EPISODE, MODERATE (H): Primary | ICD-10-CM

## 2021-12-02 DIAGNOSIS — M11.20 CHONDROCALCINOSIS: ICD-10-CM

## 2021-12-02 DIAGNOSIS — G47.01 INSOMNIA DUE TO MEDICAL CONDITION: ICD-10-CM

## 2021-12-02 DIAGNOSIS — M25.462 PAIN AND SWELLING OF KNEE, LEFT: ICD-10-CM

## 2021-12-02 DIAGNOSIS — M25.562 PAIN AND SWELLING OF KNEE, LEFT: ICD-10-CM

## 2021-12-02 DIAGNOSIS — F11.90 OPIOID USE DISORDER: ICD-10-CM

## 2021-12-02 DIAGNOSIS — M50.90 CERVICAL DISC DISEASE: ICD-10-CM

## 2021-12-02 DIAGNOSIS — F11.20 MODERATE OPIOID USE DISORDER ON MAINTENANCE THERAPY (H): ICD-10-CM

## 2021-12-02 PROCEDURE — 80306 DRUG TEST PRSMV INSTRMNT: CPT | Mod: ZL | Performed by: PHYSICIAN ASSISTANT

## 2021-12-02 PROCEDURE — 90837 PSYTX W PT 60 MINUTES: CPT | Performed by: SOCIAL WORKER

## 2021-12-02 PROCEDURE — 36415 COLL VENOUS BLD VENIPUNCTURE: CPT | Mod: ZL

## 2021-12-02 PROCEDURE — 86431 RHEUMATOID FACTOR QUANT: CPT | Mod: ZL

## 2021-12-02 RX ORDER — BUPRENORPHINE HYDROCHLORIDE, NALOXONE HYDROCHLORIDE 8; 2 MG/1; MG/1
1 FILM, SOLUBLE BUCCAL; SUBLINGUAL DAILY
Qty: 30 EACH | Refills: 1 | Status: SHIPPED | OUTPATIENT
Start: 2021-12-02 | End: 2022-02-03

## 2021-12-02 RX ORDER — ZOLPIDEM TARTRATE 5 MG/1
5 TABLET ORAL
Qty: 30 TABLET | Refills: 2 | Status: SHIPPED | OUTPATIENT
Start: 2021-12-02 | End: 2022-02-03

## 2021-12-02 RX ORDER — CYCLOBENZAPRINE HCL 10 MG
TABLET ORAL
Qty: 90 TABLET | Refills: 0 | Status: SHIPPED | OUTPATIENT
Start: 2021-12-02 | End: 2022-01-20

## 2021-12-02 RX ORDER — BUPRENORPHINE HYDROCHLORIDE, NALOXONE HYDROCHLORIDE 12; 3 MG/1; MG/1
1 FILM, SOLUBLE BUCCAL; SUBLINGUAL DAILY
Qty: 30 EACH | Refills: 1 | Status: SHIPPED | OUTPATIENT
Start: 2021-12-02 | End: 2022-02-03

## 2021-12-02 NOTE — CONFIDENTIAL NOTE
[facility]  December 2, 2021    Behavioral Health Clinician Progress Note    Patient Name: Micah Rosenthal         Service Type: Individual           Service Location:  Face to Face in Clinic      Session Start Time:  10:05am  Session End Time: 11:00am      Session Length: 53 - 60      Attendees: Client    Visit Activities (Refresh list every visit): NEW      DATA  [facility] Patient Yes, addressed the follow [facility] Core Component(s):                          Comprehensive Care Management: utilized the electronic medical record / patient registry to identify and support patient's health conditions / needs more effectively   Care Transitions: focused on the coordinated and seamless movement of patient between or within different levels of care or settings  Health and Wellness Promotion  Individual and Family Support: aimed to help clients reduce barriers to achieving goals, increase health literacy and knowledge about chronic condition(s), increase self-efficacy skills, and improve health outcomes          Micah Rosenthal complains of    Chief Complaint   Patient presents with     Counseling       I have reviewed and updated the patient's Past Medical History, Social History, Family History and Medication List.    Previous PHQ-9:   PHQ 10/14/2021 10/27/2021 11/23/2021   PHQ-9 Total Score 12 8 14   Q9: Thoughts of better off dead/self-harm past 2 weeks Several days Not at all Not at all     Previous YURI-7:   YURI-7 SCORE 10/14/2021 10/27/2021 11/23/2021   Total Score 7 4 15         Treatment Objective(s) Addressed in This Session:  Target Behavior(s):  Depressed Mood: Increase interest, engagement, and pleasure in doing things  Improve quantity and quality of night time sleep / decrease daytime naps  Feel less tired and more energy during the day   Improve diet, appetite, mindful eating, and / or meal planning  Identify negative self-talk and behaviors: challenge core beliefs, myths, and actions  Anxiety: will  "experience a reduction in anxiety, will develop more effective coping skills to manage anxiety symptoms, will develop healthy cognitive patterns and beliefs and will increase ability to function adaptively  Mood Instability: will develop better understanding of triggers and coping strategies to stabilize mood  Grief / Loss: will increase understanding of normal grieving process  Alcohol / Substance Use: continue to make healthy choices regarding substance use and engage in activities / supportive services that promote sobriety  Anger Management: will learn strategies to resolve conflict adaptively and will learn and practice positive anger management skills   Psychological distress related to Sleep Disturbance          Current Stressors / Issues:  Met with client 1-1 for individual therapy. His partner, Huong, was present for the first five minutes of the session. Huong reports that it is important for writer to know that client just had to get blood taken for medical issues and he \"freaked out\". She elaborates that she believes that he has developed a phobia of needles and shots as of relatively recent. Discussed this further with client and his observations around patterns with this and the ongoing feeling of being out of control in his body. Highlighted his strengths and ability to positively talk to himself when in high levels of distress. Talked about his application to disability and reviewed ways that his mental health symptoms have impacted his day to day functioning, social situation, and ability to function in a work setting at this point.   Progress on Treatment Objective(s) / Homework:  Attending sessions regularly and participating in therapy; making progress towards identified treatment goals.           Interventions used: CBT, DBT, psychodynamic and attachment theory, and relational approach      Medication Review:  No changes to current psychiatric medication(s)    Medication " Compliance:  Yes    Changes in Health Issues:   None reported    Chemical Use Review:   Substance Use: Chemical use reviewed, no active concerns identified      Tobacco Use: No change in amount of tobacco use since last session.  Patient declined discussion at this time        Assessment: Current Emotional / Mental Status (status of significant symptoms):    Risk status (Self / Other harm or suicidal ideation)  Patient denies current fears or concerns for personal safety.  Patient denies current or recent suicidal ideation or behaviors.  Patient denies current or recent homicidal ideation or behaviors.  Patient denies current or recent self injurious behavior or ideation.  Patient denies other safety concerns.  A safety and risk management plan has not been developed at this time, however patient was encouraged to call [place] [phone] should there be a change in any of these risk factors.      Attitude:   Cooperative   Orientation:   All  Speech   Rate / Production: Normal/ Responsive   Volume:  Soft   Mood:    Angry  Anxious  Depressed  Normal  Affect:    Appropriate  Tearful  Thought Content:  Clear   Thought Form:  Coherent  Logical   Insight:    Good  and client has some learning disabilities which may impact insight.    Diagnoses:  1. Major depressive disorder, single episode, moderate (H)    2. Moderate opioid use disorder on maintenance therapy (H)          Collateral Reports Completed:  None at this time.        Plan: (Homework, other):  Planning to meet with weekly for individual therapy.          KATI Toro

## 2021-12-03 LAB — RHEUMATOID FACT SER NEPH-ACNC: <7 IU/ML

## 2021-12-06 ENCOUNTER — TELEPHONE (OUTPATIENT)
Dept: BEHAVIORAL HEALTH | Facility: OTHER | Age: 46
End: 2021-12-06
Payer: COMMERCIAL

## 2021-12-06 NOTE — TELEPHONE ENCOUNTER
[facility]  @FLOW([id number])@      Social Work Care Navigator Note      Patient: Micah Rosenthal  Date: December 6, 2021  Preferred Name: Micah    Previous PHQ-9:   PHQ-9 SCORE 10/14/2021 10/27/2021 11/23/2021   PHQ-9 Total Score 12 8 14     Previous YURI-7:   YURI-7 SCORE 10/14/2021 10/27/2021 11/23/2021   Total Score 7 4 15     ROXANA LEVEL:  No flowsheet data found.    Preferred Contact: @KARIME([id number])@  Type of Contact Today: Phone call (patient / identified key support person reached)      Data: (subjective / Objective):  Patient Goals Areas: @FLOW([id number])@  Patient Stated Goals: @FLOW([id number])@  Recent ED/IP Admission or Discharge?   None  Recent ED/IP Admission or Discharge?   None    Patient Goals:  Goal Areas: Health; Financial and Social Service Benefits  Patient stated goals: getting teeth fixed, getting connected with community resources and applying for disabiility        [facility] Core Service Provided:  Comprehensive Care Management: utilized the electronic medical record / patient registry to identify and support patient's health conditions / needs more effectively   Care Transitions: focused on the coordinated and seamless movement of patient between or within different levels of care or settings  Care Coordination: provided care management services/referrals necessary to ensure patient and their identified supports have access to medical, behavioral health, pharmacology and recovery support services.  Ensured that patient's care is integrated across all settings and services.   Health and Wellness Promotion    Current Stressors / Issues / Care Plan Objective Addressed Today:  Disability phone call     Intervention:  Motivational Interviewing: Expressed Empathy/Understanding, Supported Autonomy, Collaboration, Evocation, Permission to raise concern or advise, Open-ended questions and Reflections: simple and complex   Target Behavior(s): n/a    Assessment: (Progress on Goals /  Homework):  MIMI PANIAGUA received messages from patient's significant other, Huong. Huong reports patient has a phone call scheduled with [facility] [facility] on 12/16/21 at 9:30 and asked if this writer was available to be present during call. This writer scheduled to meet with patient on 12/16/21 to attend phone call.    Plan: (Homework, other):  Patient was encouraged to continue to seek condition-related information and education.      Scheduled a Phone follow up appointment with MIMI PANIAGUA in 3 weeks     Patient has set self-identified goals and will monitor progress until the next appointment.   CORNELIUS Enciso, Social Work Care Coordinator             Referrals/other:   [unfilled]      JACQUELYN Enciso, CORNELIUS     Next 5 appointments (look out 90 days)    Dec 09, 2021 10:00 AM  (Arrive by 9:45 AM)  [facility] [place] [facility] [place] [facility] [address]  [place] [address]  [phone]   Dec 16, 2021  9:15 AM  (Arrive by 9:00 AM)  Return Visit with CORNELIUS Enciso  [facility] [place] ([facility] [place] ) [address]  [place] [address]  [phone]   Dec 16, 2021 10:00 AM  (Arrive by 9:45 AM)  Return Visit with KATI Toro  [facility] [place] ([facility] [place] [facility] [address]  [place] [address]  [phone]   Dec 23, 2021 10:00 AM  (Arrive by 9:45 AM)  Return Visit with KATI Toro  [facility] [place] ([facility] [place] [facility] [address]  [place] [address]  [phone]   Dec 30, 2021 10:00 AM  (Arrive by 9:45 AM)  Return Visit with KATI Toro  [facility] [place] ([facility] [place] ) [address]  [place] [address]  [phone]

## 2021-12-08 DIAGNOSIS — M25.462 EFFUSION OF LEFT KNEE: Primary | ICD-10-CM

## 2021-12-09 ENCOUNTER — OFFICE VISIT (OUTPATIENT)
Dept: BEHAVIORAL HEALTH | Facility: OTHER | Age: 46
End: 2021-12-09
Attending: PHYSICIAN ASSISTANT
Payer: COMMERCIAL

## 2021-12-09 DIAGNOSIS — K08.89 PAIN, DENTAL: ICD-10-CM

## 2021-12-09 DIAGNOSIS — F32.1 MAJOR DEPRESSIVE DISORDER, SINGLE EPISODE, MODERATE (H): Primary | ICD-10-CM

## 2021-12-09 DIAGNOSIS — M11.20 CHONDROCALCINOSIS: Primary | ICD-10-CM

## 2021-12-09 DIAGNOSIS — F11.20 MODERATE OPIOID USE DISORDER ON MAINTENANCE THERAPY (H): ICD-10-CM

## 2021-12-09 PROCEDURE — 90837 PSYTX W PT 60 MINUTES: CPT | Performed by: SOCIAL WORKER

## 2021-12-09 RX ORDER — GABAPENTIN 300 MG/1
CAPSULE ORAL
Qty: 180 CAPSULE | Refills: 1 | Status: SHIPPED | OUTPATIENT
Start: 2021-12-09 | End: 2022-02-03

## 2021-12-09 NOTE — CONFIDENTIAL NOTE
[facility]  December 9, 2021    Behavioral Health Clinician Progress Note    Patient Name: Micah Rosenthal         Service Type: Individual           Service Location:  Face to Face in Clinic      Session Start Time:  10:00am  Session End Time: 11:00am      Session Length: 53 - 60      Attendees: Client    Visit Activities (Refresh list every visit): NEW      DATA  [facility] Patient Yes, addressed the follow [facility] Core Component(s):                          Comprehensive Care Management: utilized the electronic medical record / patient registry to identify and support patient's health conditions / needs more effectively   Care Transitions: focused on the coordinated and seamless movement of patient between or within different levels of care or settings  Health and Wellness Promotion  Individual and Family Support: aimed to help clients reduce barriers to achieving goals, increase health literacy and knowledge about chronic condition(s), increase self-efficacy skills, and improve health outcomes          Micah Rosenthal complains of    Chief Complaint   Patient presents with     Counseling       I have reviewed and updated the patient's Past Medical History, Social History, Family History and Medication List.    Previous PHQ-9:   PHQ 10/14/2021 10/27/2021 11/23/2021   PHQ-9 Total Score 12 8 14   Q9: Thoughts of better off dead/self-harm past 2 weeks Several days Not at all Not at all     Previous YURI-7:   YURI-7 SCORE 10/14/2021 10/27/2021 11/23/2021   Total Score 7 4 15         Treatment Objective(s) Addressed in This Session:  Target Behavior(s):  Depressed Mood: Increase interest, engagement, and pleasure in doing things  Improve quantity and quality of night time sleep / decrease daytime naps  Feel less tired and more energy during the day   Improve diet, appetite, mindful eating, and / or meal planning  Identify negative self-talk and behaviors: challenge core beliefs, myths, and actions  Anxiety: will  "experience a reduction in anxiety, will develop more effective coping skills to manage anxiety symptoms, will develop healthy cognitive patterns and beliefs and will increase ability to function adaptively  Mood Instability: will develop better understanding of triggers and coping strategies to stabilize mood  Grief / Loss: will increase understanding of normal grieving process  Alcohol / Substance Use: continue to make healthy choices regarding substance use and engage in activities / supportive services that promote sobriety  Anger Management: will learn strategies to resolve conflict adaptively and will learn and practice positive anger management skills   Psychological distress related to Sleep Disturbance          Current Stressors / Issues:  Met with client 1-1 for individual therapy. At the start of the session client was visably anxious and states that later today he has his first COVID shot and is feeling extremely anxious about it. Shortly after the start of the session his partner, Huong, entered the room to inform client that she rescheduled the shot. Client states that he feels better now; explored ongoing anxieties about shots and \"worst case scenario\" examples he plays out in his head. Talked about the feeling that he does not have control over his thoughts and feelings. Discussed how this tends to play out with his anger. Highlighted themes that client tends to get angry in response to a feeling of hopelessness. Discussed history around this and origins of feeling this way which traced back to his early childhood experience of being bullied at school. Continued with psycho-education and CBT strategies to help client feel more empowered and skilled to work on adjusting his thoughts and behaviors.   Progress on Treatment Objective(s) / Homework:  Attending sessions regularly and participating in therapy; making progress towards identified treatment goals.           Interventions used: CBT, DBT, " psychodynamic and attachment theory, and relational approach      Medication Review:  No changes to current psychiatric medication(s)    Medication Compliance:  Yes    Changes in Health Issues:   None reported    Chemical Use Review:   Substance Use: Chemical use reviewed, no active concerns identified      Tobacco Use: No change in amount of tobacco use since last session.  Patient declined discussion at this time        Assessment: Current Emotional / Mental Status (status of significant symptoms):    Risk status (Self / Other harm or suicidal ideation)  Patient denies current fears or concerns for personal safety.  Patient denies current or recent suicidal ideation or behaviors.  Patient denies current or recent homicidal ideation or behaviors.  Patient denies current or recent self injurious behavior or ideation.  Patient denies other safety concerns.  A safety and risk management plan has not been developed at this time, however patient was encouraged to call [place] [phone] should there be a change in any of these risk factors.      Attitude:   Cooperative   Orientation:   All  Speech   Rate / Production: Normal/ Responsive   Volume:  Soft   Mood:    Angry  Anxious  Depressed  Normal  Affect:    Appropriate  Tearful  Thought Content:  Clear   Thought Form:  Coherent  Logical   Insight:    Good  and client has some learning disabilities which may impact insight.    Diagnoses:  1. Major depressive disorder, single episode, moderate (H)    2. Moderate opioid use disorder on maintenance therapy (H)          Collateral Reports Completed:  None at this time.        Plan: (Homework, other):  Planning to meet with weekly for individual therapy.          KATI Toro

## 2021-12-09 NOTE — TELEPHONE ENCOUNTER
Gabapentin      Last Written Prescription Date:  9.12.21  Last Fill Quantity: #180,   # refills: 1  Last Office Visit: 12.2.21  Future Office visit:    Next 5 appointments (look out 90 days)    Dec 16, 2021  9:15 AM  (Arrive by 9:00 AM)  Return Visit with CORNELIUS Enciso  [facility] ([facility] ) [address] [place], [place] [address] [place] [address]  [phone]   Dec 16, 2021 10:00 AM  (Arrive by 9:45 AM)  Return Visit with KATI Toro  [facility] ([facility] ) [address] [place], [place] [address] [place] [address]  [phone]   Dec 22, 2021 10:00 AM  (Arrive by 9:45 AM)  Return Visit with KATI Toro  [facility] ([facility] ) [address] [place], [place] [address] [place] [address]  [phone]   Dec 29, 2021 10:00 AM  (Arrive by 9:45 AM)  Return Visit with KATI Toro  [facility] ([facility] ) [address] [place] [address]  [phone]           Routing refill request to provider for review/approval because:  Drug not on the [facility], [facility] or [facility] refill protocol or controlled substance

## 2021-12-16 ENCOUNTER — OFFICE VISIT (OUTPATIENT)
Dept: BEHAVIORAL HEALTH | Facility: OTHER | Age: 46
End: 2021-12-16
Attending: PHYSICIAN ASSISTANT
Payer: COMMERCIAL

## 2021-12-16 DIAGNOSIS — R69 DIAGNOSIS DEFERRED: Primary | ICD-10-CM

## 2021-12-16 NOTE — Clinical Note
Could you write a letter for patient supporting him moving forward with disability claim and reasons why he cannot not work due to physical limitations?   Once letter is written I can fax to disability specialists.  Thank you! CORNELIUS Batres

## 2021-12-17 NOTE — CONFIDENTIAL NOTE
[facility]         Social Work Care Navigator Note      Patient: Micah Rosenthal  Date: December 17, 2021  Preferred Name: Micah    Previous PHQ-9:   PHQ-9 SCORE 10/14/2021 10/27/2021 11/23/2021   PHQ-9 Total Score 12 8 14     Previous YURI-7:   YURI-7 SCORE 10/14/2021 10/27/2021 11/23/2021   Total Score 7 4 15     ROXANA LEVEL:  No flowsheet data found.    Preferred Contact: @[facility]([phone])@  Type of Contact Today: Face to Face in Clinic      Data: (subjective / Objective):  Patient Goals Areas:    Patient Stated Goals:    Recent ED/IP Admission or Discharge?   None  Recent ED/IP Admission or Discharge?   None    Patient Goals:  Goal Areas: Health; Financial and Social Service Benefits  Patient stated goals: getting teeth fixed, getting connected with community resources and applying for disabiility        [facility] Core Service Provided:  Comprehensive Care Management: utilized the electronic medical record / patient registry to identify and support patient's health conditions / needs more effectively   Care Transitions: focused on the coordinated and seamless movement of patient between or within different levels of care or settings  Care Coordination: provided care management services/referrals necessary to ensure patient and their identified supports have access to medical, behavioral health, pharmacology and recovery support services.  Ensured that patient's care is integrated across all settings and services.   Health and Wellness Promotion    Current Stressors / Issues / Care Plan Objective Addressed Today:  Disability     Intervention:  Motivational Interviewing: Expressed Empathy/Understanding, Supported Autonomy, Collaboration, Evocation, Permission to raise concern or advise, Open-ended questions and Reflections: simple and complex   Target Behavior(s): n/a    Assessment: (Progress on Goals / Homework):  MIMI PANIAGUA met with patient today in clinic, during visit we did phone interview with Disability  specialists. Disability specialists will be sending patient POLLY's to sign for them to obtain all medical records to begin their case for patient.     Disability specialists suggested patient talk with PCP about medication for depression, patient has appointment on 1/6/22. They also would like a letter from PCP supporting patient moving forward with disability due to physical limitations and not being able to work.   Patient shared that Dr. Tesfaye had wrote a letter a few years back supporting his claim but will need an updated letter from PCP.     Plan: (Homework, other):  Patient was encouraged to continue to seek condition-related information and education.      Scheduled a Phone follow up appointment with MIMI PANIAGUA in 3 weeks     Patient has set self-identified goals and will monitor progress until the next appointment.   CORNELIUS Enciso, Social Work Care Coordinator             Referrals/other:   The author of this note documented a reason for not sharing it with the patient.      JACQUELYN Enciso, LSW     Next 5 appointments (look out 90 days)    Dec 22, 2021 10:00 AM  (Arrive by 9:45 AM)  Return Visit with KATI Toro  [facility] [place] ([facility] [place] ) [address] [place], [place] [address] [place] [address]  [phone]   Dec 29, 2021 10:00 AM  (Arrive by 9:45 AM)  Return Visit with KATI Toro  [facility] [place] ([facility] [place] ) [address] [place] [address]  [place] [address]  [phone]   Jan 06, 2022  9:45 AM  (Arrive by 9:30 AM)  SHORT with ELODIA Tomlin  [facility] [place] ([facility] [place] ) [address] [place] [address]  [place] [address]  [phone]   Jan 06, 2022 10:00 AM  (Arrive by 9:45 AM)  Return Visit with KATI Toro  [facility] [place] ([facility] [place] ) [address] [place] [address]  [place] [address]  [phone]

## 2021-12-22 ENCOUNTER — OFFICE VISIT (OUTPATIENT)
Dept: BEHAVIORAL HEALTH | Facility: OTHER | Age: 46
End: 2021-12-22
Attending: PHYSICIAN ASSISTANT
Payer: COMMERCIAL

## 2021-12-22 DIAGNOSIS — F32.1 MAJOR DEPRESSIVE DISORDER, SINGLE EPISODE, MODERATE (H): Primary | ICD-10-CM

## 2021-12-22 DIAGNOSIS — F11.20 MODERATE OPIOID USE DISORDER ON MAINTENANCE THERAPY (H): ICD-10-CM

## 2021-12-22 PROCEDURE — 90834 PSYTX W PT 45 MINUTES: CPT | Performed by: SOCIAL WORKER

## 2021-12-22 NOTE — CONFIDENTIAL NOTE
[facility]  December 22nd, 2021    Behavioral Health Clinician Progress Note    Patient Name: Micah Rosenthal         Service Type: Individual           Service Location:  Face to Face in Clinic      Session Start Time:  10:00am  Session End Time: 10:45am      Session Length: 38 - 52      Attendees: Client    Visit Activities (Refresh list every visit): NEW      DATA  [facility] Patient Yes, addressed the follow [facility] Core Component(s):                          Comprehensive Care Management: utilized the electronic medical record / patient registry to identify and support patient's health conditions / needs more effectively   Care Transitions: focused on the coordinated and seamless movement of patient between or within different levels of care or settings  Health and Wellness Promotion  Individual and Family Support: aimed to help clients reduce barriers to achieving goals, increase health literacy and knowledge about chronic condition(s), increase self-efficacy skills, and improve health outcomes          Micah Rosenthal complains of    Chief Complaint   Patient presents with     Counseling       I have reviewed and updated the patient's Past Medical History, Social History, Family History and Medication List.    Previous PHQ-9:   PHQ 10/14/2021 10/27/2021 11/23/2021   PHQ-9 Total Score 12 8 14   Q9: Thoughts of better off dead/self-harm past 2 weeks Several days Not at all Not at all     Previous YURI-7:   YURI-7 SCORE 10/14/2021 10/27/2021 11/23/2021   Total Score 7 4 15         Treatment Objective(s) Addressed in This Session:  Target Behavior(s):  Depressed Mood: Increase interest, engagement, and pleasure in doing things  Improve quantity and quality of night time sleep / decrease daytime naps  Feel less tired and more energy during the day   Improve diet, appetite, mindful eating, and / or meal planning  Identify negative self-talk and behaviors: challenge core beliefs, myths, and actions  Anxiety:  will experience a reduction in anxiety, will develop more effective coping skills to manage anxiety symptoms, will develop healthy cognitive patterns and beliefs and will increase ability to function adaptively  Mood Instability: will develop better understanding of triggers and coping strategies to stabilize mood  Grief / Loss: will increase understanding of normal grieving process  Alcohol / Substance Use: continue to make healthy choices regarding substance use and engage in activities / supportive services that promote sobriety  Anger Management: will learn strategies to resolve conflict adaptively and will learn and practice positive anger management skills   Psychological distress related to Sleep Disturbance          Current Stressors / Issues:  Met with client 1-1 for individual therapy. Client reports that he continues to struggle with his anger and elaborated about a recent issue he has been having with another parent every time he drops his daughter off at school. Explored origins of this anger including the ongoing feeling of being helpless or powerless. Discussed aspects within his control - including calling the school to advocate for the children's safety. Talked about the overall lack of confidence in himself and how he tends to engage in a self-fulfilling prophesy around his anger and acting out. Encouraged client to set a timer on his ruminating on his anger and to also start working on positive affirmations around himself and how he is capable of working through difficult emotions.   Progress on Treatment Objective(s) / Homework:  Attending sessions regularly and participating in therapy; making progress towards identified treatment goals.           Interventions used: CBT, DBT, psychodynamic and attachment theory, and relational approach      Medication Review:  No changes to current psychiatric medication(s)    Medication Compliance:  Yes    Changes in Health Issues:   None reported    Chemical  Use Review:   Substance Use: Chemical use reviewed, no active concerns identified      Tobacco Use: No change in amount of tobacco use since last session.  Patient declined discussion at this time        Assessment: Current Emotional / Mental Status (status of significant symptoms):    Risk status (Self / Other harm or suicidal ideation)  Patient denies current fears or concerns for personal safety.  Patient denies current or recent suicidal ideation or behaviors.  Patient denies current or recent homicidal ideation or behaviors.  Patient denies current or recent self injurious behavior or ideation.  Patient denies other safety concerns.  A safety and risk management plan has not been developed at this time, however patient was encouraged to call [place] [phone] should there be a change in any of these risk factors.      Attitude:   Cooperative   Orientation:   All  Speech   Rate / Production: Normal/ Responsive   Volume:  Soft   Mood:    Angry  Anxious  Depressed  Normal  Affect:    Appropriate   Thought Content:  Clear   Thought Form:  Coherent  Logical   Insight:    Good  and client has some learning disabilities which may impact insight.    Diagnoses:  1. Major depressive disorder, single episode, moderate (H)    2. Moderate opioid use disorder on maintenance therapy (H)          Collateral Reports Completed:  None at this time.        Plan: (Homework, other):  Planning to meet with weekly for individual therapy.          KATI Toro

## 2022-01-06 ENCOUNTER — OFFICE VISIT (OUTPATIENT)
Dept: BEHAVIORAL HEALTH | Facility: OTHER | Age: 47
End: 2022-01-06
Attending: PHYSICIAN ASSISTANT
Payer: COMMERCIAL

## 2022-01-06 DIAGNOSIS — F32.1 MAJOR DEPRESSIVE DISORDER, SINGLE EPISODE, MODERATE (H): Primary | ICD-10-CM

## 2022-01-06 DIAGNOSIS — F11.20 MODERATE OPIOID USE DISORDER ON MAINTENANCE THERAPY (H): ICD-10-CM

## 2022-01-06 PROCEDURE — 90837 PSYTX W PT 60 MINUTES: CPT | Performed by: SOCIAL WORKER
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
## 2022-01-14 DIAGNOSIS — J40 BRONCHITIS: ICD-10-CM

## 2022-01-14 RX ORDER — AMOXICILLIN 500 MG/1
500 CAPSULE ORAL 3 TIMES DAILY
Qty: 30 CAPSULE | Refills: 1 | Status: SHIPPED | OUTPATIENT
Start: 2022-01-14 | End: 2022-05-26

## 2022-01-14 NOTE — TELEPHONE ENCOUNTER
Amoxicillin - working with [facility] to get into dentist      Last Written Prescription Date:  10.7.21  Last Fill Quantity: #30,   # refills: 1  Last Office Visit: 11.29.21  Future Office visit:    Next 5 appointments (look out 90 days)    Jan 20, 2022 10:00 AM  (Arrive by 9:45 AM)  Return Visit with KATI Toro  [facility] [place] ([facility] [place] ) [address] [place] [address]  [place] [address]  [phone]   Jan 27, 2022 10:00 AM  (Arrive by 9:45 AM)  Return Visit with KATI Toro  [facility] [place] ([facility] [place] ) [address] [place] [address]  [place] [address]  [phone]   Feb 03, 2022  1:00 PM  (Arrive by 12:45 PM)  SHORT with ELODIA Tomlin  [facility] [place] ([facility] [place] ) [address] [place] [address]  [place] [address]  [phone]   Feb 10, 2022 10:00 AM  (Arrive by 9:45 AM)  Return Visit with KATI Toro  [facility] [place] ([facility] [place] ) [address] [place] [address]  [place] [address]  [phone]   Feb 17, 2022 10:00 AM  (Arrive by 9:45 AM)  Return Visit with KATI Toro  [facility] [place] ([facility] [place] ) [address] [place] [address]  [place] [address]  [phone]           Routing refill request to provider for review/approval because:  Drug not on the [facility], P or [facility] refill protocol or controlled substance

## 2022-01-19 NOTE — PROGRESS NOTES
"  SUBJECTIVE:                                                    Micah Rosenthal is a 43 year old male who presents to clinic today for the following health issues:      Musculoskeletal problem/pain      Duration: years    Description  Location: neck    Intensity:  moderate    Accompanying signs and symptoms: numbness    History  Previous similar problem: YES  Previous evaluation:  x-ray    Precipitating or alleviating factors:  Trauma or overuse: no   Aggravating factors include: overuse    Therapies tried and outcome: flexeril, percocet, gabapentin      PROBLEMS TO ADD ON...    Problem list and histories reviewed & adjusted, as indicated.  Additional history: having mild w/d sx.  Doing ok.  It has been really hard but he has stuck with it.     Patient Active Problem List   Diagnosis     Cervical disc disease     Pain management contract signed     Positive urine drug screen     ACP (advance care planning)     Past Surgical History:   Procedure Laterality Date     BACK SURGERY      fusion of C5 and C6     IR CAUDAL EPIDURAL INJECTION SINGLE  12/2012     OTHER SURGICAL HISTORY  03/2013    Cervical Fusion     wisdom teeth extracted         Social History   Substance Use Topics     Smoking status: Current Every Day Smoker     Packs/day: 1.00     Years: 15.00     Types: Cigarettes     Smokeless tobacco: Never Used      Comment: Tried to Quit: Yes; Passive Exposure: Yes; Longest Tobacco free: 8 years     Alcohol use No     Family History   Problem Relation Age of Onset     Unknown/Adopted Father      CANCER Maternal Uncle      throat ca     CANCER Maternal Uncle      lung ca     Cardiovascular Maternal Grandfather      Cardiovascular Paternal Grandfather            ROS:  Constitutional, HEENT, cardiovascular, pulmonary, gi and gu systems are negative, except as otherwise noted.    OBJECTIVE:                                                    /68  Pulse 100  Temp 97.8  F (36.6  C)  Ht 5' 10.75\" (1.797 m)  Wt 176 " Problem: PAIN - ADULT  Goal: Verbalizes/displays adequate comfort level or baseline comfort level  Description: Interventions:  - Encourage patient to monitor pain and request assistance  - Assess pain using appropriate pain scale  - Administer analgesics based on type and severity of pain and evaluate response  - Implement non-pharmacological measures as appropriate and evaluate response  - Consider cultural and social influences on pain and pain management  - Notify physician/advanced practitioner if interventions unsuccessful or patient reports new pain  Outcome: Progressing lb (79.8 kg)  SpO2 99%  BMI 24.72 kg/m2  Body mass index is 24.72 kg/(m^2).  GENERAL APPEARANCE: Alert, no acute distress  MSK:  Moving neck minimally.   SKIN: no suspicious lesions or rashes to visualized skin  NEURO: Alert, oriented x 3, speech and mentation normal           ASSESSMENT/PLAN:                                                    1. Tobacco abuse  Recommend cessation.   - Tobacco Cessation - Order to Satisfy Health Maintenance    2. Tobacco abuse counseling  As above.     3. DDD (degenerative disc disease), cervical  Ongoing ween of percocet.  1 month f/u.  30 pills this month and next.  It's tough, but he is getting it done and I told him he should be proud.    - oxyCODONE-acetaminophen (PERCOCET)  MG per tablet; TAKE 1 TABLET BY MOUTH daily as needed for severe pain  Dispense: 30 tablet; Refill: 0          Dariel Tesfaye MD  [facility]

## 2022-01-20 DIAGNOSIS — M50.90 CERVICAL DISC DISEASE: ICD-10-CM

## 2022-01-20 RX ORDER — CYCLOBENZAPRINE HCL 10 MG
TABLET ORAL
Qty: 90 TABLET | Refills: 3 | Status: SHIPPED | OUTPATIENT
Start: 2022-01-20 | End: 2022-09-27

## 2022-01-20 NOTE — TELEPHONE ENCOUNTER
Flexeril      Last Written Prescription Date:  12.2.21  Last Fill Quantity: #90,   # refills: 0  Last Office Visit: 11.29.21  Future Office visit:    Next 5 appointments (look out 90 days)    Jan 27, 2022 10:00 AM  (Arrive by 9:45 AM)  Return Visit with KATI Toro  [facility] [place] ([facility] [place] ) [address] [facility]  [place] [address]  [phone]   Feb 02, 2022 10:00 AM  (Arrive by 9:45 AM)  Return Visit with CORNELIUS Enciso  [facility] [place] ([facility] [place] ) [address] [place] [address]  [place] [address]  [phone]   Feb 03, 2022  1:00 PM  (Arrive by 12:45 PM)  SHORT with ELODIA Tomlin  [facility] [place] ([facility] [place] ) [address] [place] [address]  [place] [address]  [phone]   Feb 10, 2022 10:00 AM  (Arrive by 9:45 AM)  Return Visit with KATI Toro  [facility] [place] ([facility] [place] ) [address] [place] [address]  [place] [address]  [phone]   Feb 17, 2022 10:00 AM  (Arrive by 9:45 AM)  Return Visit with KATI Toro  [facility] [place] ([facility] [place] ) [address] [place] [address]  [phone]           Routing refill request to provider for review/approval because:  Drug not on the [facility], P or [facility] refill protocol or controlled substance

## 2022-01-23 ENCOUNTER — HEALTH MAINTENANCE LETTER (OUTPATIENT)
Age: 47
End: 2022-01-23

## 2022-02-02 NOTE — PROGRESS NOTES
Follow-up Buprenorphine Visit           HPI       Micah Rosenthal is here for f/u on buprenophine/naloxone (Suboxone) therapy for opioid use disorder.  Recheck Medication      Micah is currently taking 8/2mg in the AM and 12/3mg in the PM of Buprenorphine/Naloxone  daily.     Status since last visit:    Since last visit patient has been: stable.     Intensity:     There has been: moderate craving.      Suboxone Dose: adequate.      When did you take your last dose? Today   Progression of Symptoms:     Cues to use and relapse     Recovery program has been: solid.   Accompanying Signs & Symptoms:    Side Effects: none.  None      Sobriety:     Status: no use since last visit.      Drug Screen: obtained.    Precipitating factors:    Triggers have been: mild. Feeling overwhelmed.   Alleviating factors:    Contact with sponsor has been: regular.     Family and support system has been: helpful.   Other Therapies Tried :     Patient has been going to recovery meetings:not at all.      Other medical concerns: No    Any ED visits? No       History   Smoking Status     Current Every Day Smoker     Packs/day: 1.50     Years: 20.00     Types: Cigarettes     Start date: 1/1/1988   Smokeless Tobacco     Never Used       Problem, Medication and Allergy Lists were reviewed and updated if needed.  reviewed and are current..    Pharmacy Database reviewed? Yes, 2.3.22, as expected.  Last fill 1.6.22 #30 of each.    Patient is an established patient of this clinic..         Review of Systems:   Review of Systems  CONSTITUTIONAL: NEGATIVE for fever, chills, change in weight  INTEGUMENTARY/SKIN: NEGATIVE for worrisome rashes, moles or lesions  EYES: NEGATIVE for vision changes or irritation  ENT/MOUTH: NEGATIVE for ear, mouth and throat problems  RESP: NEGATIVE for significant cough or SOB  CV: NEGATIVE for chest pain, palpitations or peripheral edema  GI: NEGATIVE for nausea, abdominal pain, heartburn, or change in bowel habits  :  NEGATIVE for frequency, dysuria, or hematuria  MUSCULOSKELETAL: NEGATIVE for significant arthralgias or myalgia  NEURO: NEGATIVE for weakness, dizziness or paresthesias  ENDOCRINE: NEGATIVE for temperature intolerance, skin/hair changes  PSYCHIATRIC: NEGATIVE for changes in mood or affect          Physical Exam:     Vitals:    02/03/22 1304   BP: 120/64   Pulse: 102   Resp: 20   Temp: 97.8  F (36.6  C)   TempSrc: Tympanic   SpO2: 95%   Weight: 80.7 kg (178 lb)     Body mass index is 25.54 kg/m .  Vitals were reviewed   Physical Exam  GENERAL APPEARANCE: alert, comfortable appearing  EYES: Eyes grossly normal to inspection  HENT:  nose no rhinorrhea  RESP: lungs clear to auscultation - no rales, rhonchi or wheezes and no resp distress  CV: regular rates and rhythm, normal S1 S2,no murmur   ABDOMEN:soft, non-tender, non-distended, no hepatosplenomegaly or masses  SKIN: no rashes, no jaundice, no obvious masses.   NEURO:  no tremor  MENTAL STATUS EXAM:  Appearance/Behavior:No apparent distress  Speech: Normal  Mood/Affect: normal affect  Insight: Adequate      Results:    Results from the last 24 hoursNo results found for this or any previous visit (from the past 24 hour(s)).    Assessment and Plan     Was naloxone nasal spray prescribed? Yes.  (F11.90) Opioid use disorder  (primary encounter diagnosis)  Comment: he is doing well on his dose     Plan: Urine Drugs of Abuse Screen (Tox13)        He is dong well with his drug use.  He is not falling down, he has not used any illicit medication.      - Urine Drugs of Abuse Screen (Tox13)  - SUBOXONE 8-2 MG per film; Place 1 Film under the tongue daily AM  Dispense: 30 each; Refill: 1  - SUBOXONE 12-3 MG FILM per film; Place 1 Film under the tongue daily PM  Dispense: 30 each; Refill: 1  - ramelteon (ROZEREM) 8 MG tablet; Take 1 tablet (8 mg) by mouth At Bedtime  Dispense: 30 tablet; Refill: 3    2. Dental disease  Given a refill and this seems to help him.     -  amoxicillin (AMOXIL) 500 MG capsule; Take 1 capsule (500 mg) by mouth 3 times daily  Dispense: 30 capsule; Refill: 4    3. Pain, dental  Helps his pain he uses it PRN.   - gabapentin (NEURONTIN) 300 MG capsule; TAKE 2 CAPSULES BY MOUTH 3 TIMES DAILY  Dispense: 180 capsule; Refill: 1      Continue at 8 mg  1 time(s) a day of   And 12 mg at bedtime buprenorphine/naloxone (Suboxone).     Follow up Plan  Stage 3 (4 weeks): Please make a clinic appointment for follow up with me (BLANCHE GAONA) or another Suboxone provider in 1 month for suboxone follow up.    Greater than 5 minutes    minutes was spent with this patient, over half of which was on counseling and coordination of care which includes importance of recovery activities,which may include  attendance at NA/AA meetings, sober support network, and the importance of not isolating, being open, honest, and willing to change, possible triggers and how to avoid them, and managing cravings.    Will stop the Ambien and change to Rozerem .    Options for treatment and follow-up care were reviewed with the patient. Micah engaged in the decision making process and verbalized understanding of the options discussed and agreed with the final plan.    ELODIA Barrow

## 2022-02-03 ENCOUNTER — OFFICE VISIT (OUTPATIENT)
Dept: FAMILY MEDICINE | Facility: OTHER | Age: 47
End: 2022-02-03
Attending: PHYSICIAN ASSISTANT
Payer: COMMERCIAL

## 2022-02-03 ENCOUNTER — PATIENT OUTREACH (OUTPATIENT)
Dept: CARE COORDINATION | Facility: OTHER | Age: 47
End: 2022-02-03

## 2022-02-03 ENCOUNTER — APPOINTMENT (OUTPATIENT)
Dept: LAB | Facility: OTHER | Age: 47
End: 2022-02-03
Attending: PHYSICIAN ASSISTANT
Payer: COMMERCIAL

## 2022-02-03 VITALS
BODY MASS INDEX: 25.54 KG/M2 | HEART RATE: 102 BPM | DIASTOLIC BLOOD PRESSURE: 64 MMHG | WEIGHT: 178 LBS | SYSTOLIC BLOOD PRESSURE: 120 MMHG | RESPIRATION RATE: 20 BRPM | TEMPERATURE: 97.8 F | OXYGEN SATURATION: 95 %

## 2022-02-03 DIAGNOSIS — F11.90 OPIOID USE DISORDER: Primary | ICD-10-CM

## 2022-02-03 DIAGNOSIS — K08.89 PAIN, DENTAL: ICD-10-CM

## 2022-02-03 DIAGNOSIS — K08.9 DENTAL DISEASE: ICD-10-CM

## 2022-02-03 LAB
AMPHETAMINES UR QL: NOT DETECTED
BARBITURATES UR QL SCN: NOT DETECTED
BENZODIAZ UR QL SCN: NOT DETECTED
BUPRENORPHINE UR QL: DETECTED
CANNABINOIDS UR QL: DETECTED
COCAINE UR QL SCN: NOT DETECTED
D-METHAMPHET UR QL: NOT DETECTED
METHADONE UR QL SCN: NOT DETECTED
OPIATES UR QL SCN: NOT DETECTED
OXYCODONE UR QL SCN: NOT DETECTED
PCP UR QL SCN: NOT DETECTED
PROPOXYPH UR QL: NOT DETECTED
TRICYCLICS UR QL SCN: NOT DETECTED

## 2022-02-03 PROCEDURE — 99213 OFFICE O/P EST LOW 20 MIN: CPT | Performed by: PHYSICIAN ASSISTANT

## 2022-02-03 PROCEDURE — 80306 DRUG TEST PRSMV INSTRMNT: CPT | Mod: ZL | Performed by: PHYSICIAN ASSISTANT

## 2022-02-03 PROCEDURE — G0463 HOSPITAL OUTPT CLINIC VISIT: HCPCS

## 2022-02-03 RX ORDER — AMOXICILLIN 500 MG/1
500 CAPSULE ORAL 3 TIMES DAILY
Qty: 30 CAPSULE | Refills: 4 | Status: SHIPPED | OUTPATIENT
Start: 2022-02-03 | End: 2022-11-17

## 2022-02-03 RX ORDER — BUPRENORPHINE HYDROCHLORIDE, NALOXONE HYDROCHLORIDE 12; 3 MG/1; MG/1
1 FILM, SOLUBLE BUCCAL; SUBLINGUAL DAILY
Qty: 30 EACH | Refills: 1 | Status: SHIPPED | OUTPATIENT
Start: 2022-02-03 | End: 2022-03-31

## 2022-02-03 RX ORDER — GABAPENTIN 300 MG/1
CAPSULE ORAL
Qty: 180 CAPSULE | Refills: 1 | Status: SHIPPED | OUTPATIENT
Start: 2022-02-03 | End: 2022-03-31

## 2022-02-03 RX ORDER — RAMELTEON 8 MG/1
8 TABLET ORAL AT BEDTIME
Qty: 30 TABLET | Refills: 3 | Status: SHIPPED | OUTPATIENT
Start: 2022-02-03 | End: 2022-03-31

## 2022-02-03 RX ORDER — BUPRENORPHINE HYDROCHLORIDE, NALOXONE HYDROCHLORIDE 8; 2 MG/1; MG/1
1 FILM, SOLUBLE BUCCAL; SUBLINGUAL DAILY
Qty: 30 EACH | Refills: 1 | Status: SHIPPED | OUTPATIENT
Start: 2022-02-03 | End: 2022-03-31

## 2022-02-03 ASSESSMENT — PAIN SCALES - GENERAL: PAINLEVEL: SEVERE PAIN (7)

## 2022-02-03 NOTE — PROGRESS NOTES
Clinic Care Coordination Contact  Care Team Conversations    CC attended office visit with pt and Florence Sneed this date.  Please refer to Florence's note for plan of care.   Doing well in recovery.  Fighting with disability right now.  Will help him appeal if need be.  Meeting regularly with Nikole.  Suboxone dose is adequate and wants to continue on current dose.   Stopped taking Ambien.   Will switch to Rozerem.   All questions answered.  Encouraged him to call/text CC with any questions/concerns/problems.  Verbalized understanding.    Yoli Kowalski RN-BSN, [facility] Care Coordinator  [phone] opt. #3

## 2022-02-03 NOTE — NURSING NOTE
"Chief Complaint   Patient presents with     Recheck Medication       Initial /64   Pulse 102   Temp 97.8  F (36.6  C) (Tympanic)   Resp 20   Wt 80.7 kg (178 lb)   SpO2 95%   BMI 25.54 kg/m   Estimated body mass index is 25.54 kg/m  as calculated from the following:    Height as of 11/29/21: 1.778 m (5' 10\").    Weight as of this encounter: 80.7 kg (178 lb).  Medication Reconciliation: complete  Nathalie Day LPN    "

## 2022-02-03 NOTE — PATIENT INSTRUCTIONS
Thank you for choosing [facility].   I have office hours 8:00 am to 4:30 pm on Monday's, Wednesday's, Thursday's and Friday's. My nurse and I are out of the office every Tuesday.    Following your visit, when your labs and diagnostic testing have returned, I will review then and you will be contacted by my nurse.  If you are on My Chart, you can also view results there.    For refills, notify your pharmacy regarding what you need and the pharmacy will generate a refill request. Do not call my nurse as she is unable to process refill request. Please plan ahead and allow 3-5 days for refill requests.    You will generally receive a reminder call the day prior to your appointment.  If you cannot attend your appointment, please cancel your appointment with as much notice as possible.  If there is a pattern of failure to present for your appointments, I cannot provide consistent, meaningful, ongoing care for you. It is very important to me that you come in for your care, so we can best assist you with your health care needs.    IMPORTANT:  Please note that it is my standard of practice to NOT participate in prescribing ongoing requested Narcotic Analgesic therapy, and/or participate in the prescribing of other controlled substances.  My nurse and I am happy to assist you with the process of referral for alternative pain management as needed, and other treatment modalities including but not limited to:  Physical Therapy, Physical Medicine and Rehab, Counseling, Chiropractic Care, Orthopedic Care, and non-narcotic medication management.     In the event that you need to be seen for emergent concerns and I am out of office,  please see one of my colleagues for acute concerns.  You may also present to  or ER.  I appreciate the opportunity to serve you and look forward to supporting your healthcare needs in the future. Please contact me with any questions or concerns that you may  have.    Sincerely,      Florence Sneed RN, PA-C

## 2022-02-15 ENCOUNTER — OFFICE VISIT (OUTPATIENT)
Dept: BEHAVIORAL HEALTH | Facility: OTHER | Age: 47
End: 2022-02-15
Attending: PHYSICIAN ASSISTANT
Payer: COMMERCIAL

## 2022-02-15 DIAGNOSIS — R69 DIAGNOSIS DEFERRED: Primary | ICD-10-CM

## 2022-02-15 NOTE — CONFIDENTIAL NOTE
Behavioral Health Home Services         Social Work Care Navigator Note      Patient: Micah Rosenthal  Date: February 15, 2022  Preferred Name: Miach    Previous PHQ-9:   PHQ-9 SCORE 10/14/2021 10/27/2021 11/23/2021   PHQ-9 Total Score 12 8 14     Previous YURI-7:   YURI-7 SCORE 10/14/2021 10/27/2021 11/23/2021   Total Score 7 4 15     ROXANA LEVEL:  No flowsheet data found.    Preferred Contact: @[facility]([phone])@  Type of Contact Today: Face to Face in Clinic      Data: (subjective / Objective):  Patient Goals Areas:    Patient Stated Goals:    Recent ED/IP Admission or Discharge?   None  Recent ED/IP Admission or Discharge?   None    Patient Goals:  Goal Areas: Health; Financial and Social Service Benefits  Patient stated goals: getting teeth fixed, getting connected with community resources and applying for disabiility        [facility] Core Service Provided:  Comprehensive Care Management: utilized the electronic medical record / patient registry to identify and support patient's health conditions / needs more effectively   Care Transitions: focused on the coordinated and seamless movement of patient between or within different levels of care or settings  Care Coordination: provided care management services/referrals necessary to ensure patient and their identified supports have access to medical, behavioral health, pharmacology and recovery support services.  Ensured that patient's care is integrated across all settings and services.   Health and Wellness Promotion    Current Stressors / Issues / Care Plan Objective Addressed Today:  Social security paperwork     Intervention:  Motivational Interviewing: Expressed Empathy/Understanding, Supported Autonomy, Collaboration, Evocation, Permission to raise concern or advise, Open-ended questions and Reflections: simple and complex   Target Behavior(s): n/a    Assessment: (Progress on Goals / Homework):  MIMI PANIAGUA met with patient today in clinic, patient brought in social security  paperwork for this writer to go over with him. Corrections were made and patient will send out in today's mail. Patient states he or significant will reach out to this writer if any other needs or paperwork. Patient also will reach out to schedule future appts with KATI Toro once he knows what days he is able to come to clinic.     Plan: (Homework, other):  Patient was encouraged to continue to seek condition-related information and education.      Scheduled a Phone follow up appointment with MIMI PANIAGUA in 4 weeks     Patient has set self-identified goals and will monitor progress until the next appointment.    CORNELIUS Enciso, Social Work Care Coordinator             Referrals/other:   The author of this note documented a reason for not sharing it with the patient.      JACQUELYN Enciso, LSW     Next 5 appointments (look out 90 days)    Mar 31, 2022  1:00 PM  (Arrive by 12:45 PM)  SHORT with ELODIA Tomlin  [facility] [place] ([facility] [place] ) [facility] [address]  [place] [address]  [phone]

## 2022-03-22 ENCOUNTER — DOCUMENTATION ONLY (OUTPATIENT)
Dept: BEHAVIORAL HEALTH | Facility: OTHER | Age: 47
End: 2022-03-22
Payer: COMMERCIAL

## 2022-03-22 NOTE — CONFIDENTIAL NOTE
Behavioral Health Home Services         [facility] Contact      Patient: Micah Rosenthal  Date: March 21, 2022  Preferred Name: Micah    Previous PHQ-9:   PHQ-9 SCORE 10/14/2021 10/27/2021 11/23/2021   PHQ-9 Total Score 12 8 14     Previous YURI-7:   YURI-7 SCORE 10/14/2021 10/27/2021 11/23/2021   Total Score 7 4 15     ROXANA LEVEL:  No flowsheet data found.    Preferred Contact: @[facility]([phone])@  Type of Contact Today: Phone call (patient / identified key support person reached)      Data: (subjective / Objective):  Patient Goals Areas:    Patient Stated Goals:    Recent ED/IP Admission or Discharge?   None  Patient Goals  Goal Areas: Health; Financial and Social Service Benefits  Patient stated goals: getting teeth fixed, getting connected with community resources and applying for disabiility      Data:  Writer reached out to Huong (client's wife) to cancel appointment due to illness. Huong responded that they were also needing to reschedule due to having a sick kid at home.     Plan:  Will connect with Huong 3/22 to reschedule appointment for next week.  Nikole Solano [facility], Behavioral Health Clinician           The author of this note documented a reason for not sharing it with the patient.      Delia Connelly, BSW, LSW     Next 5 appointments (look out 90 days)    Mar 31, 2022  1:00 PM  (Arrive by 12:45 PM)  SHORT with ELODIA Tomlin  [facility] [place] ([facility] [place] ) [address]  [place] [address]  [phone]

## 2022-03-30 NOTE — PROGRESS NOTES
Follow-up Buprenorphine Visit           HPI       Micah Rosenthal is here for f/u on buprenophine/naloxone (Suboxone) therapy for opioid use disorder.  Recheck Medication      Micah is currently taking 8/2mg in the AM and 12/3mg in the PM of Buprenorphine/Naloxone  daily.     Status since last visit:    Since last visit patient has been: doing well.     Intensity:     There has been: no craving.      Suboxone Dose: adequate.    Progression of Symptoms:     Cues to use and relapse None    Recovery program has been: solid.   Accompanying Signs & Symptoms:    Side Effects: none.    Sobriety:     Status: no use since last visit.      Drug Screen: obtained.    Precipitating factors:    Triggers have been: non-existent.   Alleviating factors:    Contact with sponsor has been: no sponsor.     Family and support system has been: helpful.   Other Therapies Tried :     Patient has been going to recovery meetings:not at all.      Other medical concerns:  YES Chronic pain, insomnia    Any ED visits? No       History   Smoking Status     Current Every Day Smoker     Packs/day: 1.50     Years: 20.00     Types: Cigarettes     Start date: 1/1/1988   Smokeless Tobacco     Never Used       Problem, Medication and Allergy Lists were reviewed and updated if needed.  reviewed and are current..    Pharmacy Database reviewed? Yes, 3.31.22, as expected.  Last fill 3.2.22 #30 of each.    Patient is an established patient of this clinic..         Review of Systems:   Review of Systems  CONSTITUTIONAL: NEGATIVE for fever, chills, change in weight  INTEGUMENTARY/SKIN: NEGATIVE for worrisome rashes, moles or lesions  EYES: NEGATIVE for vision changes or irritation  ENT/MOUTH: NEGATIVE for ear, mouth and throat problems  RESP: NEGATIVE for significant cough or SOB  CV: NEGATIVE for chest pain, palpitations or peripheral edema  GI: NEGATIVE for nausea, abdominal pain, heartburn, or change in bowel habits  : NEGATIVE for frequency, dysuria, or  hematuria  MUSCULOSKELETAL: NEGATIVE for significant arthralgias or myalgia  NEURO: NEGATIVE for weakness, dizziness or paresthesias  ENDOCRINE: NEGATIVE for temperature intolerance, skin/hair changes  PSYCHIATRIC: NEGATIVE for changes in mood or affect          Physical Exam:     Vitals:    03/31/22 1307   BP: 130/60   Pulse: 110   Temp: (!) 96.7  F (35.9  C)   TempSrc: Tympanic   SpO2: 95%   Weight: 78.9 kg (174 lb)     Body mass index is 24.97 kg/m .  Vitals were reviewed   Physical Exam  GENERAL APPEARANCE: alert, comfortable appearing  EYES: Eyes grossly normal to inspection  HENT:  nose no rhinorrhea  RESP: lungs clear to auscultation - no rales, rhonchi or wheezes and no resp distress  CV: regular rates and rhythm, normal S1 S2,no murmur   ABDOMEN:soft, non-tender, non-distended, no hepatosplenomegaly or masses  SKIN: no rashes, no jaundice, no obvious masses.   NEURO:  no tremor  MENTAL STATUS EXAM:  Appearance/Behavior:No apparent distress  Speech: Normal  Mood/Affect: depressed affect  Insight: Adequate      Results:    Results from the last 24 hours  Results for orders placed or performed in visit on 03/31/22 (from the past 24 hour(s))   Urine Drugs of Abuse Screen (Tox13)   Result Value Ref Range    Cannabinoids (91-iny-6-carboxy-9-THC) Detected (A) Not Detected, Indeterminate    Phencyclidine Not Detected Not Detected, Indeterminate    Cocaine (Benzoylecgonine) Not Detected Not Detected, Indeterminate    Methamphetamine (d-Methamphetamine) Not Detected Not Detected, Indeterminate    Opiates (Morphine) Not Detected Not Detected, Indeterminate    Amphetamine (d-Amphetamine) Not Detected Not Detected, Indeterminate    Benzodiazepines (Nordiazepam) Not Detected Not Detected, Indeterminate    Tricyclic Antidepressants (Desipramine) Detected (A) Not Detected, Indeterminate    Methadone Not Detected Not Detected, Indeterminate    Barbiturates (Butalbital) Not Detected Not Detected, Indeterminate    Oxycodone  Not Detected Not Detected, Indeterminate    Propoxyphene (Norpropoxyphene) Not Detected Not Detected, Indeterminate    Buprenorphine Detected (A) Not Detected, Indeterminate       Assessment and Plan     Was naloxone nasal spray prescribed? Yes - previously.  (F11.90) Opioid use disorder  (primary encounter diagnosis)  Comment: he is given a refill and he will be seeing us back in amonth. Solid on his recovery. Good home support.   Plan: Urine Drugs of Abuse Screen (Tox13), SUBOXONE         8-2 MG per film, SUBOXONE 12-3 MG FILM per film            (G47.09) Other insomnia  Comment: try Seroquel. Just not sleeping feels pain in his neck is why. Working with pain center for possible injection but that just causes him more anxiety.   Plan: QUEtiapine (SEROQUEL) 25 MG tablet        Was too strong for him and he is not willing to continue     (K08.89) Pain, dental  Comment: new for him treat him with gabapentin. Has worked in the past. We are working on dental [facility] at [facility] or in [place] for pulling of his tooth.   Plan: gabapentin (NEURONTIN) 300 MG capsule              Dosage will not need adjustment today.  Continue on 8/2mg in the AM and 12/3mg in the PM buprenorphine/naloxone (Suboxone).     Follow up Plan  Stage 4 (2 months): Please make a clinic appointment for follow up with me (BLANCHE GAONA) or another Suboxone provider in 2 months for suboxone follow up.    Greater than 15  minutes was spent with this patient, over half of which was on counseling and coordination of care which includes importance of recovery activities,which may include  attendance at NA/AA meetings, sober support network, and the importance of not isolating, being open, honest, and willing to change, possible triggers and how to avoid them, and managing cravings.    Medications Discontinued During This Encounter   Medication Reason     ramelteon (ROZEREM) 8 MG tablet        Options for treatment and follow-up care were reviewed with  the patient. Micah engaged in the decision making process and verbalized understanding of the options discussed and agreed with the final plan.    ELODIA Barrow

## 2022-03-31 ENCOUNTER — PATIENT OUTREACH (OUTPATIENT)
Dept: CARE COORDINATION | Facility: OTHER | Age: 47
End: 2022-03-31

## 2022-03-31 ENCOUNTER — OFFICE VISIT (OUTPATIENT)
Dept: FAMILY MEDICINE | Facility: OTHER | Age: 47
End: 2022-03-31
Attending: PHYSICIAN ASSISTANT
Payer: COMMERCIAL

## 2022-03-31 ENCOUNTER — APPOINTMENT (OUTPATIENT)
Dept: LAB | Facility: OTHER | Age: 47
End: 2022-03-31
Attending: PHYSICIAN ASSISTANT
Payer: COMMERCIAL

## 2022-03-31 VITALS
DIASTOLIC BLOOD PRESSURE: 60 MMHG | SYSTOLIC BLOOD PRESSURE: 130 MMHG | WEIGHT: 174 LBS | HEART RATE: 110 BPM | TEMPERATURE: 96.7 F | BODY MASS INDEX: 24.97 KG/M2 | OXYGEN SATURATION: 95 %

## 2022-03-31 DIAGNOSIS — F11.90 OPIOID USE DISORDER: Primary | ICD-10-CM

## 2022-03-31 DIAGNOSIS — G47.09 OTHER INSOMNIA: ICD-10-CM

## 2022-03-31 DIAGNOSIS — K08.89 PAIN, DENTAL: ICD-10-CM

## 2022-03-31 PROCEDURE — 80306 DRUG TEST PRSMV INSTRMNT: CPT | Mod: ZL | Performed by: PHYSICIAN ASSISTANT

## 2022-03-31 PROCEDURE — 99213 OFFICE O/P EST LOW 20 MIN: CPT | Performed by: PHYSICIAN ASSISTANT

## 2022-03-31 PROCEDURE — G0463 HOSPITAL OUTPT CLINIC VISIT: HCPCS

## 2022-03-31 PROCEDURE — G0463 HOSPITAL OUTPT CLINIC VISIT: HCPCS | Mod: 25

## 2022-03-31 RX ORDER — GABAPENTIN 300 MG/1
CAPSULE ORAL
Qty: 180 CAPSULE | Refills: 1 | Status: SHIPPED | OUTPATIENT
Start: 2022-03-31 | End: 2022-05-27

## 2022-03-31 RX ORDER — QUETIAPINE FUMARATE 25 MG/1
12.5 TABLET, FILM COATED ORAL AT BEDTIME
Qty: 15 TABLET | Refills: 0 | Status: SHIPPED | OUTPATIENT
Start: 2022-03-31 | End: 2022-05-26

## 2022-03-31 RX ORDER — BUPRENORPHINE HYDROCHLORIDE, NALOXONE HYDROCHLORIDE 8; 2 MG/1; MG/1
1 FILM, SOLUBLE BUCCAL; SUBLINGUAL DAILY
Qty: 30 EACH | Refills: 1 | Status: SHIPPED | OUTPATIENT
Start: 2022-03-31 | End: 2022-05-26

## 2022-03-31 RX ORDER — BUPRENORPHINE HYDROCHLORIDE, NALOXONE HYDROCHLORIDE 12; 3 MG/1; MG/1
1 FILM, SOLUBLE BUCCAL; SUBLINGUAL DAILY
Qty: 30 EACH | Refills: 1 | Status: SHIPPED | OUTPATIENT
Start: 2022-03-31 | End: 2022-05-26

## 2022-03-31 ASSESSMENT — PAIN SCALES - GENERAL: PAINLEVEL: SEVERE PAIN (7)

## 2022-03-31 NOTE — NURSING NOTE
"Chief Complaint   Patient presents with     Opioid Refill       Initial /60   Pulse 110   Temp (!) 96.7  F (35.9  C) (Tympanic)   Wt 78.9 kg (174 lb)   SpO2 95%   BMI 24.97 kg/m   Estimated body mass index is 24.97 kg/m  as calculated from the following:    Height as of 11/29/21: 1.778 m (5' 10\").    Weight as of this encounter: 78.9 kg (174 lb).  Medication Reconciliation: complete  Zoë Arguello LPN  "

## 2022-04-04 ENCOUNTER — VIRTUAL VISIT (OUTPATIENT)
Dept: BEHAVIORAL HEALTH | Facility: OTHER | Age: 47
End: 2022-04-04
Attending: SOCIAL WORKER
Payer: COMMERCIAL

## 2022-04-04 DIAGNOSIS — F11.20 MODERATE OPIOID USE DISORDER ON MAINTENANCE THERAPY (H): ICD-10-CM

## 2022-04-04 DIAGNOSIS — F32.1 MAJOR DEPRESSIVE DISORDER, SINGLE EPISODE, MODERATE (H): Primary | ICD-10-CM

## 2022-04-04 PROCEDURE — 90834 PSYTX W PT 45 MINUTES: CPT | Performed by: SOCIAL WORKER

## 2022-04-04 NOTE — CONFIDENTIAL NOTE
[facility]  April 4th, 2022    Behavioral Health Clinician Progress Note    Patient Name: Micah Rosenthal         Service Type: Individual           Service Location:  Phone call (patient / identified key support person reached)      Session Start Time:  10:15am  Session End Time: 11:00am      Session Length: 38 - 52      Attendees: Client , therapist, and intern, Christina Montalvo present for the entire session    Visit Activities (Refresh list every visit): [facility] Only    Client consented to billable telephone visit  DATA  [facility] Patient Yes, addressed the follow [facility] Core Component(s):                          Comprehensive Care Management: utilized the electronic medical record / patient registry to identify and support patient's health conditions / needs more effectively   Care Transitions: focused on the coordinated and seamless movement of patient between or within different levels of care or settings  Health and Wellness Promotion  Individual and Family Support: aimed to help clients reduce barriers to achieving goals, increase health literacy and knowledge about chronic condition(s), increase self-efficacy skills, and improve health outcomes          Micah Rosenthal complains of    Chief Complaint   Patient presents with     Counseling       I have reviewed and updated the patient's Past Medical History, Social History, Family History and Medication List.    Previous PHQ-9:   PHQ 10/14/2021 10/27/2021 11/23/2021   PHQ-9 Total Score 12 8 14   Q9: Thoughts of better off dead/self-harm past 2 weeks Several days Not at all Not at all     Previous YURI-7:   YURI-7 SCORE 10/14/2021 10/27/2021 11/23/2021   Total Score 7 4 15         Treatment Objective(s) Addressed in This Session:  Target Behavior(s):  Depressed Mood: Increase interest, engagement, and pleasure in doing things  Improve quantity and quality of night time sleep / decrease daytime naps  Feel less tired and more energy during the day   Improve  "diet, appetite, mindful eating, and / or meal planning  Identify negative self-talk and behaviors: challenge core beliefs, myths, and actions  Anxiety: will experience a reduction in anxiety, will develop more effective coping skills to manage anxiety symptoms, will develop healthy cognitive patterns and beliefs and will increase ability to function adaptively  Mood Instability: will develop better understanding of triggers and coping strategies to stabilize mood  Grief / Loss: will increase understanding of normal grieving process  Alcohol / Substance Use: continue to make healthy choices regarding substance use and engage in activities / supportive services that promote sobriety  Anger Management: will learn strategies to resolve conflict adaptively and will learn and practice positive anger management skills   Psychological distress related to Sleep Disturbance          Current Stressors / Issues:  Met with client 1-1 for individual therapy. Discussed relevant changes in client's life the last 3 months and contributors to the gap in therapy services. Discussed her daughter's schedule for student teaching which has impacted his therapy. Client reports overall that he has been doing well; no major issues with anger or outbursts that he has experienced. States that he has felt more in control and \"not letting the little stuff get to me\". Talked about ongoing anxiety he has around getting the COVID-19 vaccine and uncertainty about getting it for his daughters. Discussed pros/cons of receiving or not receiving the vaccine. Client shared about ongoing worry and fear about his oldest daughter leaving for college; normalized feelings of grief/sadness around this transition. Discussed his anticipation of this and highlighted the closeness of their relationship. Agreed to continue discussing and processing in future sessions.   Progress on Treatment Objective(s) / Homework:  Attending sessions regularly and participating " in therapy; making progress towards identified treatment goals.           Interventions used: CBT, DBT, psychodynamic and attachment theory, and relational approach      Medication Review:  No changes to current psychiatric medication(s)    Medication Compliance:  Yes    Changes in Health Issues:   None reported    Chemical Use Review:   Substance Use: Chemical use reviewed, no active concerns identified      Tobacco Use: No change in amount of tobacco use since last session.  Patient declined discussion at this time        Assessment: Current Emotional / Mental Status (status of significant symptoms):    Risk status (Self / Other harm or suicidal ideation)  Patient denies current fears or concerns for personal safety.  Patient denies current or recent suicidal ideation or behaviors.  Patient denies current or recent homicidal ideation or behaviors.  Patient denies current or recent self injurious behavior or ideation.  Patient denies other safety concerns.  A safety and risk management plan has not been developed at this time, however patient was encouraged to call [place] [phone] should there be a change in any of these risk factors.      Attitude:   Cooperative   Orientation:   All  Speech   Rate / Production: Normal/ Responsive   Volume:  Soft   Mood:    Angry  Anxious  Depressed  Normal  Affect:    Appropriate   Thought Content:  Clear   Thought Form:  Coherent  Logical   Insight:    Good  and client has some learning disabilities which may impact insight.    Diagnoses:  1. Major depressive disorder, single episode, moderate (H)    2. Moderate opioid use disorder on maintenance therapy (H)          Collateral Reports Completed:  None at this time.        Plan: (Homework, other):  Planning to meet with bi- weekly for individual therapy.          KATI Toro

## 2022-04-25 ENCOUNTER — VIRTUAL VISIT (OUTPATIENT)
Dept: BEHAVIORAL HEALTH | Facility: OTHER | Age: 47
End: 2022-04-25
Attending: SOCIAL WORKER
Payer: COMMERCIAL

## 2022-04-25 DIAGNOSIS — F32.1 MAJOR DEPRESSIVE DISORDER, SINGLE EPISODE, MODERATE (H): Primary | ICD-10-CM

## 2022-04-25 DIAGNOSIS — F11.20 MODERATE OPIOID USE DISORDER ON MAINTENANCE THERAPY (H): ICD-10-CM

## 2022-04-25 PROCEDURE — 90834 PSYTX W PT 45 MINUTES: CPT | Performed by: SOCIAL WORKER

## 2022-04-25 NOTE — CONFIDENTIAL NOTE
[facility]  April 25th, 2022    Behavioral Health Clinician Progress Note    Patient Name: Micah Rosenthal         Service Type: Individual           Service Location:  Phone call (patient / identified key support person reached)      Session Start Time:  10:05am  Session End Time: 10:55am      Session Length: 38 - 52      Attendees: Client , therapist, and interns, Christina Montalvo and Shabnam Kerns, present for the entire session    Visit Activities (Refresh list every visit): [facility] Only    Client consented to billable telephone visit  [facility]  [facility], [facility] the follow [facility] Component[facility]s):                          Comprehensive Care Management: utilized the electronic medical record / patient registry to identify and support patient's health conditions / needs more effectively   Care Transitions: focused on the coordinated and seamless movement of patient between or within different levels of care or settings  Health and Wellness Promotion  Individual and Family Support: aimed to help clients reduce barriers to achieving goals, increase health literacy and knowledge about chronic condition(s), increase self-efficacy skills, and improve health outcomes          Micah Rosenthal complains of    Chief Complaint   Patient presents with     Counseling       I have reviewed and updated the patient's Past Medical History, Social History, Family History and Medication List.    Previous PHQ-9:   PHQ 10/14/2021 10/27/2021 11/23/2021   PHQ-9 Total Score 12 8 14   Q9: Thoughts of better off dead/self-harm past 2 weeks Several days Not at all Not at all     Previous YURI-7:   YURI-7 SCORE 10/14/2021 10/27/2021 11/23/2021   Total Score 7 4 15         Treatment Objective(s) Addressed in This Session:  Target Behavior(s):  Depressed Mood: Increase interest, engagement, and pleasure in doing things  Improve quantity and quality of night time sleep / decrease daytime naps  Feel less tired and more energy  during the day   Improve diet, appetite, mindful eating, and / or meal planning  Identify negative self-talk and behaviors: challenge core beliefs, myths, and actions  Anxiety: will experience a reduction in anxiety, will develop more effective coping skills to manage anxiety symptoms, will develop healthy cognitive patterns and beliefs and will increase ability to function adaptively  Mood Instability: will develop better understanding of triggers and coping strategies to stabilize mood  Grief / Loss: will increase understanding of normal grieving process  Alcohol / Substance Use: continue to make healthy choices regarding substance use and engage in activities / supportive services that promote sobriety  Anger Management: will learn strategies to resolve conflict adaptively and will learn and practice positive anger management skills   Psychological distress related to Sleep Disturbance          Current Stressors / Issues:  Met with client 1-1 for individual therapy, phone session. Interns present per client's consent. Client states that overall he is doing ok. Client shared about ongoing worry and fear about his oldest daughter leaving for college in May and anticipation around her orientation next week; normalized feelings of grief/sadness around this transition. Discussed his anticipation of this and highlighted the closeness of their relationship. Client shared his fears of not being physically close to his daughter and some fears regarding her mental health. Shared relevant background info surrounding her mental health and family experiences with leaving for college.Provided encouragement around effective open communication with her daughter about his fears/concerns while modeling an experience of having multiple emotions surrounding this large change in the family system. Agreed to continue discussing and processing in future sessions.   Progress on Treatment Objective(s) / Homework:  Attending sessions  regularly and participating in therapy; making progress towards identified treatment goals.           Interventions used: CBT, DBT, psychodynamic and attachment theory, and relational approach      Medication Review:  No changes to current psychiatric medication(s)    Medication Compliance:  Yes    Changes in Health Issues:   None reported    Chemical Use Review:   Substance Use: Chemical use reviewed, no active concerns identified      Tobacco Use: No change in amount of tobacco use since last session.  Patient declined discussion at this time        Assessment: Current Emotional / Mental Status (status of significant symptoms):    Risk status (Self / Other harm or suicidal ideation)  Patient denies current fears or concerns for personal safety.  Patient denies current or recent suicidal ideation or behaviors.  Patient denies current or recent homicidal ideation or behaviors.  Patient denies current or recent self injurious behavior or ideation.  Patient denies other safety concerns.  A safety and risk management plan has not been developed at this time, however patient was encouraged to call [place] [phone] should there be a change in any of these risk factors.      Attitude:   Cooperative   Orientation:   All  Speech   Rate / Production: Normal/ Responsive   Volume:  Soft   Mood:    Angry  Anxious  Depressed  Normal  Affect:    Appropriate   Thought Content:  Clear   Thought Form:  Coherent  Logical   Insight:    Good  and client has some learning disabilities which may impact insight.    Diagnoses:  1. Major depressive disorder, single episode, moderate (H)    2. Moderate opioid use disorder on maintenance therapy (H)          Collateral Reports Completed:  None at this time.        Plan: (Homework, other):  Planning to meet with bi- weekly for individual therapy.          KATI Toro

## 2022-04-27 ENCOUNTER — TELEPHONE (OUTPATIENT)
Dept: BEHAVIORAL HEALTH | Facility: OTHER | Age: 47
End: 2022-04-27
Payer: COMMERCIAL

## 2022-04-27 NOTE — TELEPHONE ENCOUNTER
[facility]  @FLOW([id number])@      Social Work Care Navigator Note      Patient: Micah Rosenthal  Date: April 27, 2022  Preferred Name: Micah    Previous PHQ-9:   PHQ-9 SCORE 10/14/2021 10/27/2021 11/23/2021   PHQ-9 Total Score 12 8 14     Previous YURI-7:   YURI-7 SCORE 10/14/2021 10/27/2021 11/23/2021   Total Score 7 4 15     ROXANA LEVEL:  No flowsheet data found.    Preferred Contact: @KARIME([id number])@  Type of Contact Today: Phone call (patient / identified key support person reached)      Data: (subjective / Objective):  Patient Goals Areas: @FLOW([id number])@  Patient Stated Goals: @FLOW([id number])@  Recent ED/IP Admission or Discharge?   None  Recent ED/IP Admission or Discharge?   None    Patient Goals:  Goal Areas: Health; Financial and Social Service Benefits  Patient stated goals: getting teeth fixed, getting connected with community resources and applying for disabiility        [facility] Core Service Provided:  Comprehensive Care Management: utilized the electronic medical record / patient registry to identify and support patient's health conditions / needs more effectively   Care Transitions: focused on the coordinated and seamless movement of patient between or within different levels of care or settings  Care Coordination: provided care management services/referrals necessary to ensure patient and their identified supports have access to medical, behavioral health, pharmacology and recovery support services.  Ensured that patient's care is integrated across all settings and services.   Health and Wellness Promotion    Current Stressors / Issues / Care Plan Objective Addressed Today:  Denied for SSDI     Intervention:  Motivational Interviewing: Expressed Empathy/Understanding, Supported Autonomy, Collaboration, Evocation, Permission to raise concern or advise, Open-ended questions and Reflections: simple and complex   Target Behavior(s): n/a    Assessment: (Progress on Goals / Homework):  4/21/22  MIMI PANIAGUA received phone call from [facility] at [employer] [facility] with an update that patient was denied for SSDI, she shared that they looked at records from [id number] and did not feel that his claim was severe enough. Elodia did say they will move forward with the S.S.I claim however they will need proof that patient cannot access his pension funds before they can approve s.s.i.     4/25/22 This writer spoke with patient's significant other and gave update, Huong faxed and mailed proof to [facility] for pension information.     Plan: (Homework, other):  Patient was encouraged to continue to seek condition-related information and education.      Scheduled a Phone follow up appointment with MIMI PANIAGUA in 4 weeks     Patient has set self-identified goals and will monitor progress until the next appointment.    CORNELIUS Enciso, Social Work Care Coordinator             Referrals/other:   [unfilled]      JACQUELYN Enciso LSW     Next 5 appointments (look out 90 days)    May 26, 2022  1:00 PM  (Arrive by 12:45 PM)  SHORT with ELODIA Tomlin  [facility] [place] ([facility] [place] ) [address]  [place] [address]  [phone]

## 2022-05-02 ENCOUNTER — VIRTUAL VISIT (OUTPATIENT)
Dept: BEHAVIORAL HEALTH | Facility: OTHER | Age: 47
End: 2022-05-02
Attending: SOCIAL WORKER
Payer: COMMERCIAL

## 2022-05-02 DIAGNOSIS — F11.20 MODERATE OPIOID USE DISORDER ON MAINTENANCE THERAPY (H): ICD-10-CM

## 2022-05-02 DIAGNOSIS — F32.1 MAJOR DEPRESSIVE DISORDER, SINGLE EPISODE, MODERATE (H): Primary | ICD-10-CM

## 2022-05-02 PROCEDURE — 90834 PSYTX W PT 45 MINUTES: CPT | Performed by: SOCIAL WORKER

## 2022-05-02 NOTE — CONFIDENTIAL NOTE
[facility]  May 2nd, 2022    Behavioral Health Clinician Progress Note    Patient Name: Micah Rosenthal         Service Type: Individual           Service Location:  Phone call (patient / identified key support person reached)      Session Start Time:  10:00am  Session End Time: 10:50 am      Session Length: 38 - 52      Attendees: Client , therapist, and intern, Christina Montalvo and Shabnam Kerns,  present for the entire session    Visit Activities (Refresh list every visit): [facility] Only    Client consented to billable telephone visit  DATA  [facility] Patient Yes, addressed the follow [facility] Core Component(s):                          Comprehensive Care Management: utilized the electronic medical record / patient registry to identify and support patient's health conditions / needs more effectively   Care Transitions: focused on the coordinated and seamless movement of patient between or within different levels of care or settings  Health and Wellness Promotion  Individual and Family Support: aimed to help clients reduce barriers to achieving goals, increase health literacy and knowledge about chronic condition(s), increase self-efficacy skills, and improve health outcomes          Micah Rosenthal complains of    Chief Complaint   Patient presents with     Counseling       I have reviewed and updated the patient's Past Medical History, Social History, Family History and Medication List.    Previous PHQ-9:   PHQ 10/14/2021 10/27/2021 11/23/2021   PHQ-9 Total Score 12 8 14   Q9: Thoughts of better off dead/self-harm past 2 weeks Several days Not at all Not at all     Previous YURI-7:   YURI-7 SCORE 10/14/2021 10/27/2021 11/23/2021   Total Score 7 4 15         Treatment Objective(s) Addressed in This Session:  Target Behavior(s):  Depressed Mood: Increase interest, engagement, and pleasure in doing things  Improve quantity and quality of night time sleep / decrease daytime naps  Feel less tired and more energy  "during the day   Improve diet, appetite, mindful eating, and / or meal planning  Identify negative self-talk and behaviors: challenge core beliefs, myths, and actions  Anxiety: will experience a reduction in anxiety, will develop more effective coping skills to manage anxiety symptoms, will develop healthy cognitive patterns and beliefs and will increase ability to function adaptively  Mood Instability: will develop better understanding of triggers and coping strategies to stabilize mood  Grief / Loss: will increase understanding of normal grieving process  Alcohol / Substance Use: continue to make healthy choices regarding substance use and engage in activities / supportive services that promote sobriety  Anger Management: will learn strategies to resolve conflict adaptively and will learn and practice positive anger management skills   Psychological distress related to Sleep Disturbance          Current Stressors / Issues:  Met with client 1-1 for individual therapy. Interns present throughout the entire session per client's agreement. Continued ongoing discussion around his daughter going off to college, ongoing anticipatory sadness and anxiety around this change, and excitement he has for her. Reports that his emotions have been feeling regulated lately, with little \"issues\" with his anger reported the last few months. Highlighted growth in this area and contributors to this improvement. Discussed day to day activities - including visiting his mother once a day for coffee. Explored his ongoing sobriety and triggers for use that he avoids, such as spending extended time with other people who use. Provided ongoing support around his sobriety and encouraged self-care and reflection.       Progress on Treatment Objective(s) / Homework:  Attending sessions regularly and participating in therapy; making progress towards identified treatment goals.           Interventions used: CBT, DBT, psychodynamic and attachment " theory, and relational approach      Medication Review:  No changes to current psychiatric medication(s)    Medication Compliance:  Yes    Changes in Health Issues:   None reported    Chemical Use Review:   Substance Use: Chemical use reviewed, no active concerns identified      Tobacco Use: No change in amount of tobacco use since last session.  Patient declined discussion at this time        Assessment: Current Emotional / Mental Status (status of significant symptoms):    Risk status (Self / Other harm or suicidal ideation)  Patient denies current fears or concerns for personal safety.  Patient denies current or recent suicidal ideation or behaviors.  Patient denies current or recent homicidal ideation or behaviors.  Patient denies current or recent self injurious behavior or ideation.  Patient denies other safety concerns.  A safety and risk management plan has not been developed at this time, however patient was encouraged to call [place] [phone] should there be a change in any of these risk factors.      Attitude:   Cooperative   Orientation:   All  Speech   Rate / Production: Normal/ Responsive   Volume:  Soft   Mood:    Angry  Anxious  Depressed  Normal  Affect:    Appropriate   Thought Content:  Clear   Thought Form:  Coherent  Logical   Insight:    Good  and client has some learning disabilities which may impact insight.    Diagnoses:  1. Major depressive disorder, single episode, moderate (H)    2. Moderate opioid use disorder on maintenance therapy (H)          Collateral Reports Completed:  None at this time.        Plan: (Homework, other):  Planning to meet with bi- weekly for individual therapy.          KATI Toro

## 2022-05-04 ENCOUNTER — OFFICE VISIT (OUTPATIENT)
Dept: BEHAVIORAL HEALTH | Facility: OTHER | Age: 47
End: 2022-05-04
Attending: PHYSICIAN ASSISTANT
Payer: COMMERCIAL

## 2022-05-04 DIAGNOSIS — R69 DIAGNOSIS DEFERRED: Primary | ICD-10-CM

## 2022-05-04 NOTE — CONFIDENTIAL NOTE
[facility]         Social Work Care Navigator Note      Patient: Micah Rosenthal  Date: May 4, 2022  Preferred Name: Micah    Previous PHQ-9:   PHQ-9 SCORE 10/14/2021 10/27/2021 11/23/2021   PHQ-9 Total Score 12 8 14     Previous YURI-7:   YURI-7 SCORE 10/14/2021 10/27/2021 11/23/2021   Total Score 7 4 15     ROXANA LEVEL:  No flowsheet data found.    Preferred Contact: @[facility]([phone])@  Type of Contact Today: Face to Face in Clinic      Data: (subjective / Objective):  Patient Goals Areas:    Patient Stated Goals:    Recent ED/IP Admission or Discharge?   None  Recent ED/IP Admission or Discharge?   None    Patient Goals:  Goal Areas: Health; Financial and Social Service Benefits  Patient stated goals: getting teeth fixed, getting connected with community resources and applying for disabiility        [facility] Core Service Provided:  Comprehensive Care Management: utilized the electronic medical record / patient registry to identify and support patient's health conditions / needs more effectively   Care Transitions: focused on the coordinated and seamless movement of patient between or within different levels of care or settings  Care Coordination: provided care management services/referrals necessary to ensure patient and their identified supports have access to medical, behavioral health, pharmacology and recovery support services.  Ensured that patient's care is integrated across all settings and services.   Health and Wellness Promotion    Current Stressors / Issues / Care Plan Objective Addressed Today:  Disability appeal     Intervention:  Motivational Interviewing: Expressed Empathy/Understanding, Supported Autonomy, Collaboration, Evocation, Permission to raise concern or advise, Open-ended questions and Reflections: simple and complex   Target Behavior(s): n/a    Assessment: (Progress on Goals / Homework):  MIMI PANIAGUA met with patient today in clinic for a phone call with [facility]. Phone  call was completed with Yash at [facility] and patient updated information, Disability appeal decision can take up to 6 months before a determination.     Patient denies any other needs from this writer and will reach out or have significant other reach out as needed.     Plan: (Homework, other):  Patient was encouraged to continue to seek condition-related information and education.      Scheduled a Phone follow up appointment with MIMI PANIAGUA in 4 weeks     Patient has set self-identified goals and will monitor progress until the next appointment.   CORNELIUS Enciso, Social Work Care Coordinator             Referrals/other:   The author of this note documented a reason for not sharing it with the patient.      JACQUELYN Enciso, LSW     Next 5 appointments (look out 90 days)    May 26, 2022  1:00 PM  (Arrive by 12:45 PM)  SHORT with ELODIA Tomlin  [facility] [place] ([facility] [place] ) [address]  [place] [address]  [phone]

## 2022-05-16 ENCOUNTER — VIRTUAL VISIT (OUTPATIENT)
Dept: BEHAVIORAL HEALTH | Facility: OTHER | Age: 47
End: 2022-05-16
Attending: SOCIAL WORKER
Payer: COMMERCIAL

## 2022-05-16 DIAGNOSIS — F32.1 MAJOR DEPRESSIVE DISORDER, SINGLE EPISODE, MODERATE (H): Primary | ICD-10-CM

## 2022-05-16 DIAGNOSIS — F11.20 MODERATE OPIOID USE DISORDER ON MAINTENANCE THERAPY (H): ICD-10-CM

## 2022-05-16 DIAGNOSIS — F90.2 ADHD (ATTENTION DEFICIT HYPERACTIVITY DISORDER), COMBINED TYPE: ICD-10-CM

## 2022-05-16 PROCEDURE — 90834 PSYTX W PT 45 MINUTES: CPT | Performed by: SOCIAL WORKER
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
## 2022-05-19 ENCOUNTER — DOCUMENTATION ONLY (OUTPATIENT)
Dept: BEHAVIORAL HEALTH | Facility: OTHER | Age: 47
End: 2022-05-19
Payer: COMMERCIAL

## 2022-05-19 NOTE — CONFIDENTIAL NOTE
Behavioral Health Treatment Plan      Client's Name: Micah Rosenthal  YOB: 1975    Date: 5/19/22    DSM-V Diagnoses:   1. Major depressive disorder, single episode, moderate (H)    2. Moderate opioid use disorder on maintenance therapy (H)        Referral / Collaboration:  Will collaborate with care team as indicated during treatment.    Anticipated number of session or this episode of care: 52      MeasurableTreatment Goal(s) related to diagnosis / functional impairment(s)    Goal 1:  Client will abstain from all mood-altering drugs/substances for a period of 6 months.     Objective #A: Client will utilize new coping strategies when faced with stressors instead of engaging in using patterns/behaviors.   Status: Continued - Date(s): 5/19/22    Objective #C: Client will make connections between drug/alcohol use and abuse to areas of pain and discomfort for them in order to address the root cause of their use.   Status: Continued - Date(s): 5/19/22    Goal 2:  Patient will experience a reduction in depressive and anxious symptoms, along with a corresponding increase in positive emotion and life satisfaction.    Objective #A: Patient will experience a reduction in depressed mood, will develop more effective coping skills to manage depressive symptoms, will develop healthy cognitive patterns and beliefs, will increase ability to function adaptively and will continue to take medications as prescribed / participate in supportive activities and services    Status: Continued - Date(s): 5/19/22    Objective #B: Patient will experience a reduction in anxiety, will develop more effective coping skills to manage anxiety symptoms, will develop healthy cognitive patterns and beliefs and will increase ability to function adaptively  Status: Continued - Date(s): 5/19/22    Objective #C: Patient will develop better understanding of triggers and coping strategies to stabilize mood  Status: Continued - Date(s):  5/19/22    Possible Therapeutic Intervention(s)  Psycho-education regarding mental health diagnoses and treatment options    Eye-Movement Desensitization and Reprocessing Therapy    Clinical Hypnosis    Skills training    Explore skills useful to client in current situation.    Skills include assertiveness, communication, conflict management, problem-solving, relaxation, etc.    Solution-Focused Therapy    Explore patterns in patient's relationships and discuss options for new behaviors.    Explore patterns in patient's actions and choices and discuss options for new behaviors.    Cognitive-behavioral Therapy    Discuss common cognitive distortions, identify them in patient's life.    Explore ways to challenge, replace, and act against these cognitions.    Acceptance and Commitment Therapy    Explore and identify important values in patient's life.    Discuss ways to commit to behavioral activation around these values.    Psychodynamic psychotherapy    Discuss patient's emotional dynamics and issues and how they impact behaviors.    Explore patient's history of relationships and how they impact present behaviors.    Explore how to work with and make changes in these schemas and patterns.    Narrative Therapy    Explore the patient's story of his/her life from his/her perspective.    Explore alternate ways of understanding their experience, identifying exceptions, developing new themes.    Interpersonal Psychotherapy    Explore patterns in relationships that are effective or ineffective at helping patient reach their goals, find satisfying experience.    Discuss new patterns or behaviors to engage in for improved social functioning.    Behavioral Activation    Discuss steps patient can take to become more involved in meaningful activity.    Identify barriers to these activities and explore possible solutions.    Mindfulness-Based Strategies    Discuss skills based on development and application of  mindfulness.    Skills drawn from compassion-focused therapy, dialectical behavior therapy, mindfulness-based stress reduction, mindfulness-based cognitive therapy, etc.      We have developed these goals together during our work to this point. Patient has assisted in the development of these goals and has agreed to this treatment plan.     KATI Toro   May 19, 2022

## 2022-05-25 NOTE — PROGRESS NOTES
Follow-up Buprenorphine Visit           HPI       Micah Rosenthal is here for f/u on buprenophine/naloxone (Suboxone) therapy for opioid use disorder.  Recheck Medication      Micah is currently taking 8/2mg in the AM and 12/3mg in the PM of Buprenorphine/Naloxone  daily.     Status since last visit:    Since last visit patient has been: doing well.     Intensity:     There has been: no craving.      Suboxone Dose: adequate.      When did you take your last dose? today  Progression of Symptoms:     Cues to use and relapse None    Recovery program has been: solid.   Accompanying Signs & Symptoms:    Side Effects: none.    Sobriety:     Status: no use since last visit.      Drug Screen: obtained.    Precipitating factors:    Triggers have been: non-existent.   Alleviating factors:    Contact with sponsor has been: no sponsor.     Family and support system has been: helpful.   Other Therapies Tried :     Patient has been going to recovery meetings:  Meets with Nikole Solano for counseling regularly    Other medical concerns: No    Any ED visits? No       History   Smoking Status     Current Every Day Smoker     Packs/day: 1.50     Years: 20.00     Types: Cigarettes     Start date: 1/1/1988   Smokeless Tobacco     Never Used       Problem, Medication and Allergy Lists were reviewed and updated if needed.  reviewed and are current..    Pharmacy Database reviewed? Yes, 5.26.22. as expected.  Last fill 5.2.22 #30 of each.    Patient is an established patient of this clinic..         Review of Systems:   Review of Systems  CONSTITUTIONAL: NEGATIVE for fever, chills, change in weight  INTEGUMENTARY/SKIN: NEGATIVE for worrisome rashes, moles or lesions  EYES: NEGATIVE for vision changes or irritation  ENT/MOUTH: NEGATIVE for ear, mouth and throat problems  RESP: NEGATIVE for significant cough or SOB  CV: NEGATIVE for chest pain, palpitations or peripheral edema  GI: NEGATIVE for nausea, abdominal pain, heartburn, or change in  bowel habits  : NEGATIVE for frequency, dysuria, or hematuria  MUSCULOSKELETAL: NEGATIVE for significant arthralgias or myalgia  NEURO: NEGATIVE for weakness, dizziness or paresthesias  ENDOCRINE: NEGATIVE for temperature intolerance, skin/hair changes  PSYCHIATRIC: NEGATIVE for changes in mood or affect          Physical Exam:     Vitals:    05/26/22 1318   BP: 110/60   Pulse: 99   Resp: 20   Temp: 96.9  F (36.1  C)   TempSrc: Tympanic   SpO2: 97%   Weight: 73.8 kg (162 lb 11.2 oz)     Body mass index is 23.35 kg/m .  Vitals were reviewed   Physical Exam  GENERAL APPEARANCE: alert, comfortable appearing  EYES: Eyes grossly normal to inspection  HENT:  nose no rhinorrhea  RESP: lungs clear to auscultation - no rales, rhonchi or wheezes and no resp distress  CV: regular rates and rhythm, normal S1 S2,no murmur   ABDOMEN:soft, non-tender, non-distended, no hepatosplenomegaly or masses  SKIN: no rashes, no jaundice, no obvious masses.   NEURO:  no tremor  MENTAL STATUS EXAM:  Appearance/Behavior:No apparent distress  Speech: Normal  Mood/Affect: normal affect  Insight: Adequate      Results:    Results from the last 24 hours  Results for orders placed or performed in visit on 05/26/22 (from the past 24 hour(s))   Urine Drugs of Abuse Screen (Tox13)   Result Value Ref Range    Cannabinoids (38-pqf-5-carboxy-9-THC) Detected (A) Not Detected, Indeterminate    Phencyclidine Not Detected Not Detected, Indeterminate    Cocaine (Benzoylecgonine) Not Detected Not Detected, Indeterminate    Methamphetamine (d-Methamphetamine) Not Detected Not Detected, Indeterminate    Opiates (Morphine) Not Detected Not Detected, Indeterminate    Amphetamine (d-Amphetamine) Not Detected Not Detected, Indeterminate    Benzodiazepines (Nordiazepam) Not Detected Not Detected, Indeterminate    Tricyclic Antidepressants (Desipramine) Detected (A) Not Detected, Indeterminate    Methadone Not Detected Not Detected, Indeterminate    Barbiturates  (Butalbital) Not Detected Not Detected, Indeterminate    Oxycodone Not Detected Not Detected, Indeterminate    Propoxyphene (Norpropoxyphene) Not Detected Not Detected, Indeterminate    Buprenorphine Detected (A) Not Detected, Indeterminate       Assessment and Plan     Was naloxone nasal spray prescribed? Yes- previously.  (F11.90) Opioid use disorder  (primary encounter diagnosis)  Comment: He is on a good dose. Feeling it is helping has maintained his sobriety.  No concerns and will see him back in 2 months.   Plan: Urine Drugs of Abuse Screen (Tox13)         2. Dental disease  Needs teeth pulled. See us back as recommended.   - Oral Surgery Referral    3. Persistent insomnia  He is doing well. But not sleeping still, Melatonin.    - melatonin 10 MG TABS tablet; Take 1 tablet (10 mg) by mouth nightly as needed for sleep  Dispense: 90 tablet; Refill: 3    Dosage will not need adjustment today.  Continue on 8/2mg in the AM and 12/3mg in the PM buprenorphine/naloxone (Suboxone).     Follow up Plan  Stage 4 (2 months): Please make a clinic appointment for follow up with me (BLANCHE GAONA) or another Suboxone provider in 2 months for suboxone follow up.    Greater than 15 minutes was spent with this patient, over half of which was on counseling and coordination of care which includes importance of recovery activities,which may include  attendance at NA/AA meetings, sober support network, and the importance of not isolating, being open, honest, and willing to change, possible triggers and how to avoid them, and managing cravings.    There are no discontinued medications.    Options for treatment and follow-up care were reviewed with the patient. Micah engaged in the decision making process and verbalized understanding of the options discussed and agreed with the final plan.    ELODIA Barrow

## 2022-05-26 ENCOUNTER — OFFICE VISIT (OUTPATIENT)
Dept: FAMILY MEDICINE | Facility: OTHER | Age: 47
End: 2022-05-26
Attending: PHYSICIAN ASSISTANT
Payer: COMMERCIAL

## 2022-05-26 ENCOUNTER — PATIENT OUTREACH (OUTPATIENT)
Dept: CARE COORDINATION | Facility: OTHER | Age: 47
End: 2022-05-26

## 2022-05-26 ENCOUNTER — APPOINTMENT (OUTPATIENT)
Dept: LAB | Facility: OTHER | Age: 47
End: 2022-05-26
Attending: PHYSICIAN ASSISTANT
Payer: COMMERCIAL

## 2022-05-26 VITALS
WEIGHT: 162.7 LBS | DIASTOLIC BLOOD PRESSURE: 60 MMHG | OXYGEN SATURATION: 97 % | SYSTOLIC BLOOD PRESSURE: 110 MMHG | HEART RATE: 99 BPM | BODY MASS INDEX: 23.35 KG/M2 | RESPIRATION RATE: 20 BRPM | TEMPERATURE: 96.9 F

## 2022-05-26 DIAGNOSIS — K08.9 DENTAL DISEASE: ICD-10-CM

## 2022-05-26 DIAGNOSIS — F11.90 OPIOID USE DISORDER: Primary | ICD-10-CM

## 2022-05-26 DIAGNOSIS — G47.00 PERSISTENT INSOMNIA: ICD-10-CM

## 2022-05-26 PROCEDURE — 99214 OFFICE O/P EST MOD 30 MIN: CPT | Performed by: PHYSICIAN ASSISTANT

## 2022-05-26 PROCEDURE — G0463 HOSPITAL OUTPT CLINIC VISIT: HCPCS | Performed by: PHYSICIAN ASSISTANT

## 2022-05-26 PROCEDURE — 80306 DRUG TEST PRSMV INSTRMNT: CPT | Mod: ZL | Performed by: PHYSICIAN ASSISTANT

## 2022-05-26 RX ORDER — BUPRENORPHINE HYDROCHLORIDE, NALOXONE HYDROCHLORIDE 12; 3 MG/1; MG/1
1 FILM, SOLUBLE BUCCAL; SUBLINGUAL DAILY
Qty: 30 EACH | Refills: 1 | Status: SHIPPED | OUTPATIENT
Start: 2022-05-26 | End: 2022-07-21

## 2022-05-26 RX ORDER — BUPRENORPHINE HYDROCHLORIDE, NALOXONE HYDROCHLORIDE 8; 2 MG/1; MG/1
1 FILM, SOLUBLE BUCCAL; SUBLINGUAL DAILY
Qty: 30 EACH | Refills: 1 | Status: SHIPPED | OUTPATIENT
Start: 2022-05-26 | End: 2022-07-21

## 2022-05-26 RX ORDER — PHENOL 1.4 %
10 AEROSOL, SPRAY (ML) MUCOUS MEMBRANE
Qty: 90 TABLET | Refills: 3 | Status: SHIPPED | OUTPATIENT
Start: 2022-05-26 | End: 2022-05-27

## 2022-05-26 ASSESSMENT — PAIN SCALES - GENERAL: PAINLEVEL: SEVERE PAIN (7)

## 2022-05-26 NOTE — NURSING NOTE
"Chief Complaint   Patient presents with     Recheck Medication       Initial /60   Pulse 99   Temp 96.9  F (36.1  C) (Tympanic)   Resp 20   Wt 73.8 kg (162 lb 11.2 oz)   SpO2 97%   BMI 23.35 kg/m   Estimated body mass index is 23.35 kg/m  as calculated from the following:    Height as of 11/29/21: 1.778 m (5' 10\").    Weight as of this encounter: 73.8 kg (162 lb 11.2 oz).  Medication Reconciliation: complete  Nathalie Day LPN    "

## 2022-05-26 NOTE — PATIENT INSTRUCTIONS
Thank you for choosing [facility].   I have office hours 8:00 am to 4:30 pm on Monday's, Wednesday's, Thursday's and Friday's. My nurse and I are out of the office every Tuesday.    Following your visit, when your labs and diagnostic testing have returned, I will review then and you will be contacted by my nurse.  If you are on My Chart, you can also view results there.    For refills, notify your pharmacy regarding what you need and the pharmacy will generate a refill request. Do not call my nurse as she is unable to process refill request. Please plan ahead and allow 3-5 days for refill requests.    You will generally receive a reminder call the day prior to your appointment.  If you cannot attend your appointment, please cancel your appointment with as much notice as possible.  If there is a pattern of failure to present for your appointments, I cannot provide consistent, meaningful, ongoing care for you. It is very important to me that you come in for your care, so we can best assist you with your health care needs.    IMPORTANT:  Please note that it is my standard of practice to NOT participate in prescribing ongoing requested Narcotic Analgesic therapy, and/or participate in the prescribing of other controlled substances.  My nurse and I am happy to assist you with the process of referral for alternative pain management as needed, and other treatment modalities including but not limited to:  Physical Therapy, Physical Medicine and Rehab, Counseling, Chiropractic Care, Orthopedic Care, and non-narcotic medication management.     In the event that you need to be seen for emergent concerns and I am out of office,  please see one of my colleagues for acute concerns.  You may also present to  or ER.  I appreciate the opportunity to serve you and look forward to supporting your healthcare needs in the future. Please contact me with any questions or concerns that you may  have.    Sincerely,      Florence Sneed RN, PA-C

## 2022-05-26 NOTE — PROGRESS NOTES
Clinic Care Coordination Contact  Care Team Conversations    CC attended office visit with pt and Florence Sneed this date.  Please refer to Florence's note for plan of care.  Doing well in recovery.  No thoughts or desire to use any substances.  Suboxone dose is adequate and wants to continue on current dose.  He is meeting with Nikole Solano regularly for counseling and he is finding it helpful.  Medical cannabis is helping with sleep and pain.  Has great support at home.  No questions or concerns.  Encouraged him to call/text CC with any questions/concerns/problems.  Verbalized understanding.    Yoli Kowalski RN-BSN, [facility] Care Coordinator  [phone] opt. #3

## 2022-05-27 DIAGNOSIS — G47.00 PERSISTENT INSOMNIA: ICD-10-CM

## 2022-05-27 DIAGNOSIS — K08.89 PAIN, DENTAL: ICD-10-CM

## 2022-05-27 RX ORDER — GABAPENTIN 300 MG/1
CAPSULE ORAL
Qty: 180 CAPSULE | Refills: 1 | Status: SHIPPED | OUTPATIENT
Start: 2022-05-27 | End: 2023-01-12

## 2022-05-27 RX ORDER — PHENOL 1.4 %
10 AEROSOL, SPRAY (ML) MUCOUS MEMBRANE
Qty: 90 TABLET | Refills: 3 | Status: SHIPPED | OUTPATIENT
Start: 2022-05-27 | End: 2022-11-17

## 2022-05-31 ENCOUNTER — VIRTUAL VISIT (OUTPATIENT)
Dept: BEHAVIORAL HEALTH | Facility: OTHER | Age: 47
End: 2022-05-31
Attending: SOCIAL WORKER
Payer: COMMERCIAL

## 2022-05-31 DIAGNOSIS — F11.20 MODERATE OPIOID USE DISORDER ON MAINTENANCE THERAPY (H): ICD-10-CM

## 2022-05-31 DIAGNOSIS — F32.1 MAJOR DEPRESSIVE DISORDER, SINGLE EPISODE, MODERATE (H): Primary | ICD-10-CM

## 2022-05-31 DIAGNOSIS — F90.2 ADHD (ATTENTION DEFICIT HYPERACTIVITY DISORDER), COMBINED TYPE: ICD-10-CM

## 2022-05-31 PROCEDURE — 90834 PSYTX W PT 45 MINUTES: CPT | Performed by: SOCIAL WORKER

## 2022-05-31 NOTE — CONFIDENTIAL NOTE
[facility]  May 31st, 2022    Behavioral Health Clinician Progress Note    Patient Name: Micah Rosenthal         Service Type: Individual           Service Location:  Phone call (patient / identified key support person reached)      Session Start Time:  10:00am  Session End Time: 10:45 am      Session Length: 38 - 52      Attendees: Client , therapist    Visit Activities (Refresh list every visit): [facility] Only    Client consented to billable telephone visit  DATA  [facility] Patient Yes, addressed the follow [facility] Core Component(s):                          Comprehensive Care Management: utilized the electronic medical record / patient registry to identify and support patient's health conditions / needs more effectively   Care Transitions: focused on the coordinated and seamless movement of patient between or within different levels of care or settings  Health and Wellness Promotion  Individual and Family Support: aimed to help clients reduce barriers to achieving goals, increase health literacy and knowledge about chronic condition(s), increase self-efficacy skills, and improve health outcomes          Micah Rosenthal complains of    Chief Complaint   Patient presents with     Counseling       I have reviewed and updated the patient's Past Medical History, Social History, Family History and Medication List.    Previous PHQ-9:   PHQ 10/14/2021 10/27/2021 11/23/2021   PHQ-9 Total Score 12 8 14   Q9: Thoughts of better off dead/self-harm past 2 weeks Several days Not at all Not at all     Previous YURI-7:   YURI-7 SCORE 10/14/2021 10/27/2021 11/23/2021   Total Score 7 4 15         Treatment Objective(s) Addressed in This Session:  Target Behavior(s):  Depressed Mood: Increase interest, engagement, and pleasure in doing things  Improve quantity and quality of night time sleep / decrease daytime naps  Feel less tired and more energy during the day   Improve diet, appetite, mindful eating, and / or meal  "planning  Identify negative self-talk and behaviors: challenge core beliefs, myths, and actions  Anxiety: will experience a reduction in anxiety, will develop more effective coping skills to manage anxiety symptoms, will develop healthy cognitive patterns and beliefs and will increase ability to function adaptively  Mood Instability: will develop better understanding of triggers and coping strategies to stabilize mood  Grief / Loss: will increase understanding of normal grieving process  Alcohol / Substance Use: continue to make healthy choices regarding substance use and engage in activities / supportive services that promote sobriety  Anger Management: will learn strategies to resolve conflict adaptively and will learn and practice positive anger management skills   Psychological distress related to Sleep Disturbance          Current Stressors / Issues:  Met with client 1-1 for individual therapy. Client reports that overall he is doing well. States that over the weekend he had a difficult encounter with a stranger at the gas station. He explains that after getting gas he went in to the store to buy something and when he came out a man was yelling/swearing at him for being \"inconsiderate\" of others. Client states that this was extremely upsetting/aggrevating and embarrassing to him, but that he \"didn't want to hit him and go to intermediate\" so he ignored him and went home. Highlighted his major progress in recognizing his emotions and not reacting impulsively to his anger. Client acknowledged this was progress and that he was proud that he did not react but still felt embarrassed. Discussed his ongoing work with his daughter heading off to college in a few months and \"bitter-sweet\" feelings surrounding her graduation on Friday. Normalized his response and reminded him that this is very natural to feel that way; encouraged ongoing self-reflection and non-judgmental stance.       Progress on Treatment Objective(s) / " Homework:  Attending sessions regularly and participating in therapy; making progress towards identified treatment goals.           Interventions used: CBT, DBT, psychodynamic and attachment theory, and relational approach      Medication Review:  No changes to current psychiatric medication(s)    Medication Compliance:  Yes    Changes in Health Issues:   None reported    Chemical Use Review:   Substance Use: Chemical use reviewed, no active concerns identified      Tobacco Use: No change in amount of tobacco use since last session.  Patient declined discussion at this time        Assessment: Current Emotional / Mental Status (status of significant symptoms):    Risk status (Self / Other harm or suicidal ideation)  Patient denies current fears or concerns for personal safety.  Patient denies current or recent suicidal ideation or behaviors.  Patient denies current or recent homicidal ideation or behaviors.  Patient denies current or recent self injurious behavior or ideation.  Patient denies other safety concerns.  A safety and risk management plan has not been developed at this time, however patient was encouraged to call [place] [phone] should there be a change in any of these risk factors.      Attitude:   Cooperative   Orientation:   All  Speech   Rate / Production: Normal/ Responsive   Volume:  Soft   Mood:    Angry  Anxious  Depressed  Normal  Affect:    Appropriate   Thought Content:  Clear   Thought Form:  Coherent  Logical   Insight:    Good  and client has some learning disabilities which may impact insight.    Diagnoses:  1. Major depressive disorder, single episode, moderate (H)    2. Moderate opioid use disorder on maintenance therapy (H)          Collateral Reports Completed:  None at this time.        Plan: (Homework, other):  Planning to meet with bi- weekly for individual therapy.          CARRIE ToroSW

## 2022-06-28 ENCOUNTER — TELEPHONE (OUTPATIENT)
Dept: BEHAVIORAL HEALTH | Facility: OTHER | Age: 47
End: 2022-06-28

## 2022-06-28 NOTE — TELEPHONE ENCOUNTER
[facility]  @FLOW([id number])@      Social Work Care Navigator Note      Patient: Micah Rosenthal  Date: June 28, 2022  Preferred Name: Micah    Previous PHQ-9:   PHQ-9 SCORE 10/14/2021 10/27/2021 11/23/2021   PHQ-9 Total Score 12 8 14     Previous YURI-7:   YURI-7 SCORE 10/14/2021 10/27/2021 11/23/2021   Total Score 7 4 15     ROXANA LEVEL:  No flowsheet data found.    Preferred Contact: @KARIME([id number])@  Type of Contact Today: Phone call (patient / identified key support person reached)      Data: (subjective / Objective):  Patient Goals Areas: @FLOW([id number])@  Patient Stated Goals: @FLOW([id number])@  Recent ED/IP Admission or Discharge?   None  Recent ED/IP Admission or Discharge?   None    Patient Goals:  Goal Areas: Health; Financial and Social Service Benefits  Patient stated goals: getting teeth fixed, getting connected with community resources and applying for disabiility        [facility] Core Service Provided:  Comprehensive Care Management: utilized the electronic medical record / patient registry to identify and support patient's health conditions / needs more effectively   Care Transitions: focused on the coordinated and seamless movement of patient between or within different levels of care or settings  Care Coordination: provided care management services/referrals necessary to ensure patient and their identified supports have access to medical, behavioral health, pharmacology and recovery support services.  Ensured that patient's care is integrated across all settings and services.   Health and Wellness Promotion    Current Stressors / Issues / Care Plan Objective Addressed Today:  Check in   Upcoming appt with oral maxofacial     Intervention:  Motivational Interviewing: Expressed Empathy/Understanding, Supported Autonomy, Collaboration, Evocation, Permission to raise concern or advise, Open-ended questions and Reflections: simple and complex   Target Behavior(s): n/a    Assessment: (Progress on  Goals / Homework):  MIMI PANIAGUA attempted to reach patient to check in, left message.     Received messages back from patient's significant other, Huong. Huong reports he is finally getting to feeling some what normal after having Covid. Patient has a consult with oral maxofavial on July 13, 2022 for his teeth and they are hoping they are able to remove his teeth. Patient has been losing weight due to not being able to each much.     MIMI PANIAGUA inquired about disability/disabilit specialists. Per Huong they are still looking for the letter on patient's pension, patient did not receive a letter about pension but rather a copied part about the pension which was the same thing Huong/Patient had sent. Disability specialists ladmor was rude to them a if they were lying and do not have access to letter. Offered to call disability specialists to see if there is anything else we can do to move forward with social security claim.     This writer called [employer] and was told they need the letter from the [email or web address] company stating patient cannot draw from it until at least the age of 55. Let [employer] know that they have tried but have been given a different letter when requested, asked if there was any option to move forward without letter. [employer] states it depends who you get at social security if any wiggle room but likely will need letter to get S.S.I          Plan: (Homework, other):  Patient was encouraged to continue to seek condition-related information and education.      Scheduled a Phone follow up appointment with MIMI PANIAGUA in 4 weeks     Patient has set self-identified goals and will monitor progress until the next appointment.   CORNELIUS Enciso, Social Work Care Coordinator             Referrals/other:   [unfilled]      JACQUELYN Enciso, CORNELIUS     Next 5 appointments (look out 90 days)    Jul 21, 2022  1:00 PM  (Arrive by 12:45 PM)  SHORT with ELODIA Tomlin  [facility] [place] ([facility] [place] ) [address]  [place] [address]  [phone]

## 2022-07-20 NOTE — PROGRESS NOTES
Follow-up Buprenorphine Visit           HPI       Micah Rosenthal is here for f/u on buprenophine/naloxone (Suboxone) therapy for opioid use disorder.  Recheck Medication      Micah is currently taking 8/2mg in the AM and 12/3mg in the PM of Buprenorphine/Naloxone  daily.     Status since last visit:    Since last visit patient has been: doing well.     Intensity:     There has been: no craving.      Suboxone Dose: adequate.      When did you take your last dose? This AM  Progression of Symptoms:     Cues to use and relapse none    Recovery program has been: solid.   Accompanying Signs & Symptoms:    Side Effects: none.    Sobriety:     Status: no use since last visit.      Drug Screen: obtained.    Precipitating factors:    Triggers have been: non-existent.   Alleviating factors:    Contact with sponsor has been: no sponsor.     Family and support system has been: helpful.   Other Therapies Tried :     Patient has been going to recovery meetings:not at all.  Is actively enrolled in [facility]    Other medical concerns:  YES Dental pain - trying to save enough $ to get his teeth taken care of.  Will have to pay cash up front.  - will see if patient crisis fund can help.  Oral surgeon in our facility.  Once surgery is scheduled, will send letter to oral surgeon about periop pain management.     Any ED visits? No       History   Smoking Status     Current Every Day Smoker     Packs/day: 1.50     Years: 20.00     Types: Cigarettes     Start date: 1/1/1988   Smokeless Tobacco     Never Used       Problem, Medication and Allergy Lists were reviewed and updated if needed.  reviewed and are current..    Pharmacy Database reviewed? Yes, 7.21.22, as expected.  Last fill 6.27.22 #30 of each.    Patient is an established patient of this clinic..         Review of Systems:   Review of Systems  CONSTITUTIONAL: NEGATIVE for fever, chills, change in weight  INTEGUMENTARY/SKIN: NEGATIVE for worrisome rashes, moles or lesions  EYES: NEGATIVE  "for vision changes or irritation  ENT/MOUTH: NEGATIVE for ear, mouth and throat problems  RESP: NEGATIVE for significant cough or SOB  CV: NEGATIVE for chest pain, palpitations or peripheral edema  GI: NEGATIVE for nausea, abdominal pain, heartburn, or change in bowel habits  : NEGATIVE for frequency, dysuria, or hematuria  MUSCULOSKELETAL: NEGATIVE for significant arthralgias or myalgia  NEURO: NEGATIVE for weakness, dizziness or paresthesias  ENDOCRINE: NEGATIVE for temperature intolerance, skin/hair changes  PSYCHIATRIC: NEGATIVE for changes in mood or affect          Physical Exam:     Vitals:    07/21/22 1305   BP: 112/64   BP Location: Right arm   Patient Position: Sitting   Cuff Size: Adult Regular   Pulse: 100   Resp: 18   Temp: 98.9  F (37.2  C)   TempSrc: Tympanic   SpO2: 95%   Weight: 78.4 kg (172 lb 12.8 oz)   Height: 1.803 m (5' 11\")     Body mass index is 24.1 kg/m .  Vitals were reviewed   Physical Exam  GENERAL APPEARANCE: alert, comfortable appearing  EYES: Eyes grossly normal to inspection  HENT:  nose no rhinorrhea  RESP: lungs clear to auscultation - no rales, rhonchi or wheezes and no resp distress  CV: regular rates and rhythm, normal S1 S2,no murmur   ABDOMEN:soft, non-tender, non-distended, no hepatosplenomegaly or masses  SKIN: no rashes, no jaundice, no obvious masses.   NEURO:  no tremor  MENTAL STATUS EXAM:  Appearance/Behavior:No apparent distress  Speech: Normal  Mood/Affect: normal affect  Insight: Adequate      Results:    Results from the last 24 hours  Results for orders placed or performed in visit on 07/21/22 (from the past 24 hour(s))   Urine Drugs of Abuse Screen (Tox13)   Result Value Ref Range    Cannabinoids (89-ygi-2-carboxy-9-THC) Detected (A) Not Detected, Indeterminate    Phencyclidine Not Detected Not Detected, Indeterminate    Cocaine (Benzoylecgonine) Not Detected Not Detected, Indeterminate    Methamphetamine (d-Methamphetamine) Not Detected Not Detected, " Indeterminate    Opiates (Morphine) Not Detected Not Detected, Indeterminate    Amphetamine (d-Amphetamine) Not Detected Not Detected, Indeterminate    Benzodiazepines (Nordiazepam) Not Detected Not Detected, Indeterminate    Tricyclic Antidepressants (Desipramine) Not Detected Not Detected, Indeterminate    Methadone Not Detected Not Detected, Indeterminate    Barbiturates (Butalbital) Not Detected Not Detected, Indeterminate    Oxycodone Not Detected Not Detected, Indeterminate    Propoxyphene (Norpropoxyphene) Not Detected Not Detected, Indeterminate    Buprenorphine Detected (A) Not Detected, Indeterminate       Assessment and Plan     Was naloxone nasal spray prescribed? Yes - previously.  (F11.90) Opioid use disorder  (primary encounter diagnosis)  Comment: he is doing very well. No use of any thing not scripted    Plan: Urine Drugs of Abuse Screen (Tox13)            Dosage will not need adjustment today.  Continue on 8/2mg in the AM and 12/3mg in the PM buprenorphine/naloxone (Suboxone).     Follow up Plan  Stage 4 (2 months): Please make a clinic appointment for follow up with me (BLANCHE GAONA) or another Suboxone provider in 2 months for suboxone follow up.    Greater than 10  minutes was spent with this patient, over half of which was on counseling and coordination of care which includes importance of recovery activities,which may include  attendance at NA/AA meetings, sober support network, and the importance of not isolating, being open, honest, and willing to change, possible triggers and how to avoid them, and managing cravings.    There are no discontinued medications.    Options for treatment and follow-up care were reviewed with the patient. Micah engaged in the decision making process and verbalized understanding of the options discussed and agreed with the final plan.    ELODIA Barrow

## 2022-07-21 ENCOUNTER — APPOINTMENT (OUTPATIENT)
Dept: LAB | Facility: OTHER | Age: 47
End: 2022-07-21
Payer: COMMERCIAL

## 2022-07-21 ENCOUNTER — OFFICE VISIT (OUTPATIENT)
Dept: FAMILY MEDICINE | Facility: OTHER | Age: 47
End: 2022-07-21
Attending: PHYSICIAN ASSISTANT
Payer: COMMERCIAL

## 2022-07-21 ENCOUNTER — PATIENT OUTREACH (OUTPATIENT)
Dept: CARE COORDINATION | Facility: OTHER | Age: 47
End: 2022-07-21

## 2022-07-21 VITALS
OXYGEN SATURATION: 95 % | RESPIRATION RATE: 18 BRPM | WEIGHT: 172.8 LBS | SYSTOLIC BLOOD PRESSURE: 112 MMHG | BODY MASS INDEX: 24.19 KG/M2 | HEART RATE: 100 BPM | TEMPERATURE: 98.9 F | HEIGHT: 71 IN | DIASTOLIC BLOOD PRESSURE: 64 MMHG

## 2022-07-21 DIAGNOSIS — F11.90 OPIOID USE DISORDER: Primary | ICD-10-CM

## 2022-07-21 PROCEDURE — 99213 OFFICE O/P EST LOW 20 MIN: CPT | Performed by: PHYSICIAN ASSISTANT

## 2022-07-21 PROCEDURE — 80306 DRUG TEST PRSMV INSTRMNT: CPT | Mod: ZL | Performed by: PHYSICIAN ASSISTANT

## 2022-07-21 PROCEDURE — G0463 HOSPITAL OUTPT CLINIC VISIT: HCPCS | Performed by: PHYSICIAN ASSISTANT

## 2022-07-21 RX ORDER — BUPRENORPHINE HYDROCHLORIDE, NALOXONE HYDROCHLORIDE 8; 2 MG/1; MG/1
1 FILM, SOLUBLE BUCCAL; SUBLINGUAL DAILY
Qty: 30 EACH | Refills: 1 | Status: SHIPPED | OUTPATIENT
Start: 2022-07-21 | End: 2022-09-15

## 2022-07-21 RX ORDER — BUPRENORPHINE HYDROCHLORIDE, NALOXONE HYDROCHLORIDE 12; 3 MG/1; MG/1
1 FILM, SOLUBLE BUCCAL; SUBLINGUAL DAILY
Qty: 30 EACH | Refills: 1 | Status: SHIPPED | OUTPATIENT
Start: 2022-07-21 | End: 2022-09-15

## 2022-07-21 ASSESSMENT — PAIN SCALES - GENERAL: PAINLEVEL: SEVERE PAIN (6)

## 2022-07-21 NOTE — PROGRESS NOTES
Clinic Care Coordination Contact  Care Team Conversations    CC attended office visit with pt and Florence Sneed this date.  Please refer to Florence's note for plan of care.   Doing well in recovery.  Suboxone dose is adequate and wants to continue on current dose.  Is planning on oral surgery in the near future.  His SO, Huong, will update CC on when they have enough money saved.  Also discussed periop pain management. Will apply for the patient crisis fund to see if we can get some financial assistance for him.  He is appreciative of this.  All questions answered.  Encouraged him to call CC with any questions/concerns/problems.  Verbalized understanding.    Yoli Kowalski RN-BSN, [facility] Care Coordinator  [phone] opt. #3

## 2022-07-21 NOTE — NURSING NOTE
"Chief Complaint   Patient presents with     Recheck Medication       Initial /64 (BP Location: Right arm, Patient Position: Sitting, Cuff Size: Adult Regular)   Pulse 100   Temp 98.9  F (37.2  C) (Tympanic)   Resp 18   Ht 1.803 m (5' 11\")   Wt 78.4 kg (172 lb 12.8 oz)   SpO2 95%   BMI 24.10 kg/m   Estimated body mass index is 24.1 kg/m  as calculated from the following:    Height as of this encounter: 1.803 m (5' 11\").    Weight as of this encounter: 78.4 kg (172 lb 12.8 oz).  Medication Reconciliation: complete  Ursula Fields MA  "

## 2022-08-23 ENCOUNTER — PATIENT OUTREACH (OUTPATIENT)
Dept: CARE COORDINATION | Facility: OTHER | Age: 47
End: 2022-08-23

## 2022-08-31 ENCOUNTER — TELEPHONE (OUTPATIENT)
Dept: BEHAVIORAL HEALTH | Facility: OTHER | Age: 47
End: 2022-08-31

## 2022-08-31 NOTE — TELEPHONE ENCOUNTER
[facility]  @FLOW([id number])@      Social Work Care Navigator Note      Patient: Micah Rosenthal  Date: August 31, 2022  Preferred Name: Micah    Previous PHQ-9:   PHQ-9 SCORE 10/14/2021 10/27/2021 11/23/2021   PHQ-9 Total Score 12 8 14     Previous YURI-7:   YURI-7 SCORE 10/14/2021 10/27/2021 11/23/2021   Total Score 7 4 15     ROXANA LEVEL:  No flowsheet data found.    Preferred Contact: @KARIME([id number])@  Type of Contact Today: Phone call (patient / identified key support person reached)      Data: (subjective / Objective):  Patient Goals Areas: @FLOW([id number])@  Patient Stated Goals: @FLOW([id number])@  Recent ED/IP Admission or Discharge?   None  Patient Goals  Goal Areas: Health; Financial and Social Service Benefits  Patient stated goals: getting teeth fixed, getting connected with community resources and applying for disabiility      [facility] Service Provided:  Comprehensive Care Management: utilized the electronic medical record / patient registry to identify and support patient's health conditions / needs more effectively   Care Transitions: focused on the coordinated and seamless movement of patient between or within different levels of care or settings  Care Coordination: provided care management services/referrals necessary to ensure patient and their identified supports have access to medical, behavioral health, pharmacology and recovery support services.  Ensured that patient's care is integrated across all settings and services.   Health and Wellness Promotion         Plan:  MIMI PANIAGUA reached out to patient's s/oHuong to follow up on any updates on patient's disability claim. Huong shared that patient talked to disability specialists on Monday about his claim but she is unsure if they are going to file again or if they have already.   -Huong also shared that Micah will be getting his teeth pulled on October 5th!     Currently denies any current needs but will reach out as needed.     Delia  CORNELIUS Connelly, Social Work Care Coordinator             Referrals/other:   [unfilled]      JACQUELYN Enciso, CORNELIUS     Next 5 appointments (look out 90 days)    Sep 15, 2022  1:00 PM  (Arrive by 12:45 PM)  SHORT with ELODIA Tomlin  [facility] [place] ([facility] [place] ) [address]  [place] [address]  [phone]

## 2022-09-04 ENCOUNTER — HEALTH MAINTENANCE LETTER (OUTPATIENT)
Age: 47
End: 2022-09-04

## 2022-09-14 NOTE — PROGRESS NOTES
Follow-up Buprenorphine Visit           HPI       Micah Rosenthal is here for f/u on buprenophine/naloxone (Suboxone) therapy for opioid use disorder.  Recheck Medication      Micah is currently taking 8/2mg in the AM and 12/3mg in the PM of Buprenorphine/Naloxone  daily.     Status since last visit:    Since last visit patient has been: doing well.     Intensity:     There has been: no craving.      Suboxone Dose: adequate.      When did you take your last dose? today  Progression of Symptoms:     Cues to use and relapse None    Recovery program has been: solid.   Accompanying Signs & Symptoms:    Side Effects: none.    Sobriety:     Status: no use since last visit.      Drug Screen: obtained.    Precipitating factors:    Triggers have been: non-existent.   Alleviating factors:    Contact with sponsor has been: no sponsor.     Family and support system has been: helpful.   Other Therapies Tried :     Patient has been going to recovery meetings:not at all.      Other medical concerns:  YES skin rash    Any ED visits? No       History   Smoking Status     Current Every Day Smoker     Packs/day: 1.50     Years: 20.00     Types: Cigarettes     Start date: 1/1/1988   Smokeless Tobacco     Never Used       Problem, Medication and Allergy Lists were reviewed and updated if needed.  reviewed and are current..    Pharmacy Database reviewed? Yes, 9.15.22, as expected.  Last fill 8.25.22 #30 of each.    Patient is an established patient of this clinic..         Review of Systems:   Review of Systems  CONSTITUTIONAL: NEGATIVE for fever, chills, change in weight  INTEGUMENTARY/SKIN: NEGATIVE for worrisome rashes, moles or lesions  EYES: NEGATIVE for vision changes or irritation  ENT/MOUTH: NEGATIVE for ear, mouth and throat problems  RESP: NEGATIVE for significant cough or SOB  CV: NEGATIVE for chest pain, palpitations or peripheral edema  GI: NEGATIVE for nausea, abdominal pain, heartburn, or change in bowel habits  : NEGATIVE  for frequency, dysuria, or hematuria  MUSCULOSKELETAL: NEGATIVE for significant arthralgias or myalgia  NEURO: NEGATIVE for weakness, dizziness or paresthesias  ENDOCRINE: NEGATIVE for temperature intolerance, skin/hair changes  PSYCHIATRIC: NEGATIVE for changes in mood or affect          Physical Exam:     Vitals:    09/15/22 1303   BP: 105/65   BP Location: Left arm   Patient Position: Sitting   Cuff Size: Adult Large   Pulse: 96   Resp: 16   Temp: 97.4  F (36.3  C)   TempSrc: Tympanic   SpO2: 98%   Weight: 85.3 kg (188 lb)     Body mass index is 26.22 kg/m .  Vitals were reviewed   Physical Exam  GENERAL APPEARANCE: alert, comfortable appearing  EYES: Eyes grossly normal to inspection  HENT:  nose no rhinorrhea  RESP: lungs clear to auscultation - no rales, rhonchi or wheezes and no resp distress  CV: regular rates and rhythm, normal S1 S2,no murmur   ABDOMEN:soft, non-tender, non-distended, no hepatosplenomegaly or masses  SKIN: no rashes, no jaundice, no obvious masses.   NEURO:  no tremor  MENTAL STATUS EXAM:  Appearance/Behavior:No apparent distress  Speech: Normal  Mood/Affect: normal affect  Insight: Adequate      Results:    Results from the last 24 hours  Results for orders placed or performed in visit on 09/15/22 (from the past 24 hour(s))   Urine Drugs of Abuse Screen (Tox13)   Result Value Ref Range    Cannabinoids (19-rat-3-carboxy-9-THC) Detected (A) Not Detected, Indeterminate    Phencyclidine Not Detected Not Detected, Indeterminate    Cocaine (Benzoylecgonine) Not Detected Not Detected, Indeterminate    Methamphetamine (d-Methamphetamine) Not Detected Not Detected, Indeterminate    Opiates (Morphine) Not Detected Not Detected, Indeterminate    Amphetamine (d-Amphetamine) Not Detected Not Detected, Indeterminate    Benzodiazepines (Nordiazepam) Not Detected Not Detected, Indeterminate    Tricyclic Antidepressants (Desipramine) Detected (A) Not Detected, Indeterminate    Methadone Not Detected Not  Detected, Indeterminate    Barbiturates (Butalbital) Not Detected Not Detected, Indeterminate    Oxycodone Not Detected Not Detected, Indeterminate    Propoxyphene (Norpropoxyphene) Not Detected Not Detected, Indeterminate    Buprenorphine Detected (A) Not Detected, Indeterminate       Assessment and Plan     Was naloxone nasal spray prescribed? Yes.  (F11.90) Opioid use disorder  (primary encounter diagnosis)  Comment: doing really really well.  He is having all his teeth removed. He will be having them removed in October and Dilaudid is sent in post op for pain.   Plan: Urine Drugs of Abuse Screen (Tox13), SUBOXONE         8-2 MG per film, SUBOXONE 12-3 MG FILM per film            (K08.89) Pain, dental  Comment: getting a refill   Plan: HYDROmorphone (DILAUDID) 4 MG tablet,         terbinafine (LAMISIL) 1 % external cream            (B36.9) Fungal dermatitis  Comment: not a hope of taking oral every day so 400 mg once a week x4. Given script.   Plan: ketoconazole (NIZORAL) 200 MG tablet              Dosage will not need adjustment today.  Continue on 8/2mg in the AM and 12/3mg in the PM buprenorphine/naloxone (Suboxone).     Follow up Plan  Stage 4 (2 months): Please make a clinic appointment for follow up with me (BLANCHE GAONA) or another Suboxone provider in 2 months for suboxone follow up.    Greater than 10 minutes was spent with this patient, over half of which was on counseling and coordination of care which includes importance of recovery activities,which may include  attendance at NA/AA meetings, sober support network, and the importance of not isolating, being open, honest, and willing to change, possible triggers and how to avoid them, and managing cravings.    There are no discontinued medications.    Options for treatment and follow-up care were reviewed with the patient. Micah engaged in the decision making process and verbalized understanding of the options discussed and agreed with the final  plan.    ELODIA Barrow

## 2022-09-15 ENCOUNTER — PATIENT OUTREACH (OUTPATIENT)
Dept: CARE COORDINATION | Facility: OTHER | Age: 47
End: 2022-09-15

## 2022-09-15 ENCOUNTER — APPOINTMENT (OUTPATIENT)
Dept: LAB | Facility: OTHER | Age: 47
End: 2022-09-15
Payer: COMMERCIAL

## 2022-09-15 ENCOUNTER — OFFICE VISIT (OUTPATIENT)
Dept: FAMILY MEDICINE | Facility: OTHER | Age: 47
End: 2022-09-15
Attending: PHYSICIAN ASSISTANT
Payer: COMMERCIAL

## 2022-09-15 VITALS
BODY MASS INDEX: 26.22 KG/M2 | SYSTOLIC BLOOD PRESSURE: 105 MMHG | WEIGHT: 188 LBS | HEART RATE: 96 BPM | RESPIRATION RATE: 16 BRPM | DIASTOLIC BLOOD PRESSURE: 65 MMHG | OXYGEN SATURATION: 98 % | TEMPERATURE: 97.4 F

## 2022-09-15 DIAGNOSIS — F11.90 OPIOID USE DISORDER: Primary | ICD-10-CM

## 2022-09-15 DIAGNOSIS — K08.89 PAIN, DENTAL: ICD-10-CM

## 2022-09-15 DIAGNOSIS — B36.9 FUNGAL DERMATITIS: ICD-10-CM

## 2022-09-15 PROCEDURE — 99213 OFFICE O/P EST LOW 20 MIN: CPT | Performed by: PHYSICIAN ASSISTANT

## 2022-09-15 PROCEDURE — G0463 HOSPITAL OUTPT CLINIC VISIT: HCPCS

## 2022-09-15 PROCEDURE — 80306 DRUG TEST PRSMV INSTRMNT: CPT | Mod: ZL | Performed by: PHYSICIAN ASSISTANT

## 2022-09-15 RX ORDER — HYDROMORPHONE HYDROCHLORIDE 4 MG/1
4 TABLET ORAL EVERY 6 HOURS PRN
Qty: 10 TABLET | Refills: 0 | Status: SHIPPED | OUTPATIENT
Start: 2022-09-15 | End: 2022-09-18

## 2022-09-15 RX ORDER — BUPRENORPHINE HYDROCHLORIDE, NALOXONE HYDROCHLORIDE 8; 2 MG/1; MG/1
1 FILM, SOLUBLE BUCCAL; SUBLINGUAL DAILY
Qty: 30 EACH | Refills: 1 | Status: SHIPPED | OUTPATIENT
Start: 2022-09-15 | End: 2022-11-17

## 2022-09-15 RX ORDER — KETOCONAZOLE 200 MG/1
400 TABLET ORAL DAILY
Qty: 8 TABLET | Refills: 0 | Status: SHIPPED | OUTPATIENT
Start: 2022-09-15 | End: 2022-11-17

## 2022-09-15 RX ORDER — BUPRENORPHINE HYDROCHLORIDE, NALOXONE HYDROCHLORIDE 12; 3 MG/1; MG/1
1 FILM, SOLUBLE BUCCAL; SUBLINGUAL DAILY
Qty: 30 EACH | Refills: 1 | Status: SHIPPED | OUTPATIENT
Start: 2022-09-15 | End: 2022-11-17

## 2022-09-15 RX ORDER — PRENATAL VIT 91/IRON/FOLIC/DHA 28-975-200
COMBINATION PACKAGE (EA) ORAL 2 TIMES DAILY
Qty: 42 G | Refills: 11 | Status: SHIPPED | OUTPATIENT
Start: 2022-09-15 | End: 2024-03-11

## 2022-09-15 SDOH — ECONOMIC STABILITY: FOOD INSECURITY: WITHIN THE PAST 12 MONTHS, YOU WORRIED THAT YOUR FOOD WOULD RUN OUT BEFORE YOU GOT MONEY TO BUY MORE.: NEVER TRUE

## 2022-09-15 SDOH — ECONOMIC STABILITY: FOOD INSECURITY: WITHIN THE PAST 12 MONTHS, THE FOOD YOU BOUGHT JUST DIDN'T LAST AND YOU DIDN'T HAVE MONEY TO GET MORE.: NEVER TRUE

## 2022-09-15 SDOH — ECONOMIC STABILITY: TRANSPORTATION INSECURITY
IN THE PAST 12 MONTHS, HAS THE LACK OF TRANSPORTATION KEPT YOU FROM MEDICAL APPOINTMENTS OR FROM GETTING MEDICATIONS?: NO

## 2022-09-15 SDOH — ECONOMIC STABILITY: INCOME INSECURITY: IN THE LAST 12 MONTHS, WAS THERE A TIME WHEN YOU WERE NOT ABLE TO PAY THE MORTGAGE OR RENT ON TIME?: NO

## 2022-09-15 SDOH — ECONOMIC STABILITY: TRANSPORTATION INSECURITY
IN THE PAST 12 MONTHS, HAS LACK OF TRANSPORTATION KEPT YOU FROM MEETINGS, WORK, OR FROM GETTING THINGS NEEDED FOR DAILY LIVING?: NO

## 2022-09-15 ASSESSMENT — SOCIAL DETERMINANTS OF HEALTH (SDOH): HOW HARD IS IT FOR YOU TO PAY FOR THE VERY BASICS LIKE FOOD, HOUSING, MEDICAL CARE, AND HEATING?: SOMEWHAT HARD

## 2022-09-15 ASSESSMENT — PAIN SCALES - GENERAL: PAINLEVEL: SEVERE PAIN (6)

## 2022-09-15 ASSESSMENT — ACTIVITIES OF DAILY LIVING (ADL): DEPENDENT_IADLS:: INDEPENDENT

## 2022-09-15 NOTE — PROGRESS NOTES
Clinic Care Coordination Contact    Follow Up Progress Note      Assessment: CC attended office visit with pt, pt's spouse, and Florence Sneed this date.  Please refer to Florence's note for plan of care.   Doing well in recovery.  Suboxone dose is adequate and wants to continue on current dose.  Oral surgery is on October 5.  Periop Suboxone dosing went over.  Dilaudid sent in today - to get PA completed prior to surgery.     Care Gaps:    Health Maintenance Due   Topic Date Due     PREVENTIVE CARE VISIT  Never done     COVID-19 Vaccine (1) Never done     Pneumococcal Vaccine: Pediatrics (0 to 5 Years) and At-Risk Patients (6 to 64 Years) (1 - PCV) Never done     COLORECTAL CANCER SCREENING  Never done     HEPATITIS B IMMUNIZATION (1 of 3 - Risk 3-dose series) Never done     LIPID  Never done     TREATMENT AGREEMENT FOR CHRONIC PAIN MANAGEMENT  02/24/2021     DTAP/TDAP/TD IMMUNIZATION (6 - Td or Tdap) 12/30/2021     ADVANCE CARE PLANNING  01/17/2022     INFLUENZA VACCINE (1) Never done         Care Plans    Intervention/Education provided during outreach: Oral surgery coming up - October 5.  Dilaudid prescribed- will most likely need a PA.  Will continue to take Suboxone as prescribed.  Was given $500 from the [facility] [employer].       Outreach Frequency: 2 months      Plan:   No change in treatment plan.  Will update CC with any questions/concerns/problems.  Care Coordinator will follow up in 8 weeks, sooner if he has concerns.    Yoli Kowalski, RN-BSN, [facility] Care Coordinator  [phone] opt. #3

## 2022-09-15 NOTE — NURSING NOTE
"No chief complaint on file.      Initial /65 (BP Location: Left arm, Patient Position: Sitting, Cuff Size: Adult Large)   Pulse 96   Temp 97.4  F (36.3  C) (Tympanic)   Resp 16   Wt 85.3 kg (188 lb)   SpO2 98%   BMI 26.22 kg/m   Estimated body mass index is 26.22 kg/m  as calculated from the following:    Height as of 7/21/22: 1.803 m (5' 11\").    Weight as of this encounter: 85.3 kg (188 lb).  Medication Reconciliation: complete  Maryjane Carlson LPN    "

## 2022-09-16 ENCOUNTER — TELEPHONE (OUTPATIENT)
Dept: FAMILY MEDICINE | Facility: OTHER | Age: 47
End: 2022-09-16

## 2022-09-16 DIAGNOSIS — B36.9 FUNGAL DERMATITIS: Primary | ICD-10-CM

## 2022-09-16 NOTE — TELEPHONE ENCOUNTER
Received a PA from [facility] for Ketoconazole 200MG tablets. Submitted on CMM. Waiting for a response.

## 2022-09-19 NOTE — TELEPHONE ENCOUNTER
I called [facility] to get the status of this PA and was told that it didn't need a PA. No further work needs to be done.

## 2022-09-19 NOTE — TELEPHONE ENCOUNTER
[facility] called me to let me know that the provider sent a different script to the pharmacy and they no longer need the one for Ketoconazole. PA has been withdrawn.

## 2022-09-25 RX ORDER — ITRACONAZOLE 100 MG/1
200 CAPSULE ORAL DAILY
Qty: 28 CAPSULE | Refills: 1 | Status: SHIPPED | OUTPATIENT
Start: 2022-09-25 | End: 2022-11-17

## 2022-09-27 ENCOUNTER — TELEPHONE (OUTPATIENT)
Dept: BEHAVIORAL HEALTH | Facility: OTHER | Age: 47
End: 2022-09-27

## 2022-09-27 DIAGNOSIS — M50.90 CERVICAL DISC DISEASE: ICD-10-CM

## 2022-09-27 RX ORDER — CYCLOBENZAPRINE HCL 10 MG
TABLET ORAL
Qty: 90 TABLET | Refills: 0 | Status: SHIPPED | OUTPATIENT
Start: 2022-09-27 | End: 2022-11-09

## 2022-09-27 NOTE — TELEPHONE ENCOUNTER
Flexeril      Last Written Prescription Date:  8.10.22  Last Fill Quantity: #90,   # refills: 0  Last Office Visit: 9.15.22  Future Office visit:    Next 5 appointments (look out 90 days)    Nov 17, 2022  1:00 PM  (Arrive by 12:45 PM)  SHORT with ELODIA Tomlin  [facility] [place] ([facility] [place] ) [address]  [place] [address]  [phone]           Routing refill request to provider for review/approval because:  Drug not on the [facility], [facility] or [facility] refill protocol or controlled substance

## 2022-09-27 NOTE — TELEPHONE ENCOUNTER
[facility]  @FLOW([id number])@      Social Work Care Navigator Note      Patient: Micah Rosenthal  Date: September 27, 2022  Preferred Name: Micah    Previous PHQ-9:   PHQ-9 SCORE 10/14/2021 10/27/2021 11/23/2021   PHQ-9 Total Score 12 8 14     Previous YURI-7:   YURI-7 SCORE 10/14/2021 10/27/2021 11/23/2021   Total Score 7 4 15     ROXANA LEVEL:  No flowsheet data found.    Preferred Contact: @KARIME([id number])@  Type of Contact Today: Phone call (patient / identified key support person reached)      Data: (subjective / Objective):  Patient Goals Areas: @FLOW([id number])@  Patient Stated Goals: @FLOW([id number])@  Recent ED/IP Admission or Discharge?   None  Recent ED/IP Admission or Discharge?   None    Patient Goals:  Goal Areas: Health; Financial and Social Service Benefits  Patient stated goals: getting teeth fixed, getting connected with community resources and applying for disabiility        [facility] Core Service Provided:  Comprehensive Care Management: utilized the electronic medical record / patient registry to identify and support patient's health conditions / needs more effectively   Care Transitions: focused on the coordinated and seamless movement of patient between or within different levels of care or settings  Care Coordination: provided care management services/referrals necessary to ensure patient and their identified supports have access to medical, behavioral health, pharmacology and recovery support services.  Ensured that patient's care is integrated across all settings and services.   Health and Wellness Promotion    Current Stressors / Issues / Care Plan Objective Addressed Today:  Snap re certification     Intervention:  Motivational Interviewing: Expressed Empathy/Understanding, Supported Autonomy, Collaboration, Evocation, Permission to raise concern or advise, Open-ended questions and Reflections: simple and complex   Target Behavior(s): n/a    Assessment: (Progress on Goals /  Homework):  MIMI PANIAGUA received paperwork in the mail for patient's renewal/re certification for SNAP benefits. Reached out to patient's s/o Huong, Huong states she did not receive the application only a notice that they need to renew. Provided paperwork to Huong, encouraged her to reach out if needing help completing or other needs for patient.     S/o Huong also mentioned disability specialists called to appeal patient's denial through social security, Huong is wanting disability specialists to call this writer to coordinate a date/time for phone appeal. MIMI PANIAGUA also received call from Yenny at [employer], phone appointment scheduled on 10/18/22 at 11:00am. Updated patient's s/o, Huong, she will confirm this works for patient and if he is wanting to appeal.     Plan: (Homework, other):  Patient was encouraged to continue to seek condition-related information and education.      Scheduled a Phone follow up appointment with MIMI PANIAGUA in 4 weeks     Patient has set self-identified goals and will monitor progress until the next appointment.   CORNELIUS Enciso, Social Work Care Coordinator             Referrals/other: snap renewal   [unfilled]      JACQUELYN Enciso, CORNELIUS     Next 5 appointments (look out 90 days)    Nov 17, 2022  1:00 PM  (Arrive by 12:45 PM)  SHORT with ELODIA Tomlin  [facility] [place] ([facility] [place] ) [address]  [place] [address]  [phone]

## 2022-10-18 ENCOUNTER — TELEPHONE (OUTPATIENT)
Dept: BEHAVIORAL HEALTH | Facility: OTHER | Age: 47
End: 2022-10-18

## 2022-10-19 NOTE — TELEPHONE ENCOUNTER
[facility]  @FLOW([id number])@      Social Work Care Navigator Note      Patient: Micah Rosenthal  Date: October 19, 2022  Preferred Name: Micah    Previous PHQ-9:   PHQ-9 SCORE 10/14/2021 10/27/2021 11/23/2021   PHQ-9 Total Score 12 8 14     Previous YURI-7:   YRUI-7 SCORE 10/14/2021 10/27/2021 11/23/2021   Total Score 7 4 15     ROXANA LEVEL:  No flowsheet data found.    Preferred Contact: @KARIME([id number])@  Type of Contact Today: Phone call (patient / identified key support person reached)      Data: (subjective / Objective):  Patient Goals Areas: @FLOW([id number])@  Patient Stated Goals: @FLOW([id number])@  Recent ED/IP Admission or Discharge?   None  Recent ED/IP Admission or Discharge?   None    Patient Goals:  Goal Areas: Health; Financial and Social Service Benefits  Patient stated goals: getting teeth fixed, getting connected with community resources and applying for disabiility        [facility] Core Service Provided:  Comprehensive Care Management: utilized the electronic medical record / patient registry to identify and support patient's health conditions / needs more effectively   Care Transitions: focused on the coordinated and seamless movement of patient between or within different levels of care or settings  Care Coordination: provided care management services/referrals necessary to ensure patient and their identified supports have access to medical, behavioral health, pharmacology and recovery support services.  Ensured that patient's care is integrated across all settings and services.   Health and Wellness Promotion    Current Stressors / Issues / Care Plan Objective Addressed Today:  Vehicle issues  Rescheduling disability specialists appt     Intervention:  Motivational Interviewing: Expressed Empathy/Understanding, Supported Autonomy, Collaboration, Evocation, Permission to raise concern or advise, Open-ended questions and Reflections: simple and complex   Target Behavior(s): n/a    Assessment:  (Progress on Goals / Homework):  Mimi PANIAGUA received messages from patient's s/oHuong. Huong states that they are down to one vehicle and patient is unable to make it to his appointment with this writer to talk with disability specialists to update patient's information. This writer called disability specialists and was able to reschedule phone appointment for November 1, 2022 at 11:00am. [employer] also shared that November 5th was the deadline to appeal denial. Updated s/oHuong.      Plan: (Homework, other):  Patient was encouraged to continue to seek condition-related information and education.      Scheduled a Phone follow up appointment with MIMI PANIAGUA in 4 weeks     Patient has set self-identified goals and will monitor progress until the next appointment.   CORNELIUS Enciso, Social Work Care Coordinator             Referrals/other:   [unfilled]      JACQUELYN Enciso LSW     Next 5 appointments (look out 90 days)    Nov 01, 2022 11:00 AM  (Arrive by 10:45 AM)  Return Visit with CORNELIUS Enciso  [facility] [place] ([facility] [place] ) [address] [place], [place] [address] [address]  [phone]   Nov 17, 2022  1:00 PM  (Arrive by 12:45 PM)  SHORT with ELODIA Tomlin  [facility] [place] ([facility] [place] ) [address] [place], [place] [address] [address]  [phone]

## 2022-10-21 DIAGNOSIS — L21.0 PITYRIASIS IN ADULT: Primary | ICD-10-CM

## 2022-10-21 RX ORDER — TRIAMCINOLONE ACETONIDE 1 MG/G
OINTMENT TOPICAL 2 TIMES DAILY
Qty: 30 G | Refills: 1 | Status: SHIPPED | OUTPATIENT
Start: 2022-10-21

## 2022-11-01 ENCOUNTER — VIRTUAL VISIT (OUTPATIENT)
Dept: BEHAVIORAL HEALTH | Facility: OTHER | Age: 47
End: 2022-11-01
Attending: SOCIAL WORKER
Payer: COMMERCIAL

## 2022-11-01 DIAGNOSIS — R69 DIAGNOSIS DEFERRED: Primary | ICD-10-CM

## 2022-11-01 NOTE — CONFIDENTIAL NOTE
[facility]         Social Work Care Navigator Note      Patient: Micah Rosenthal  Date: November 1, 2022  Preferred Name: Micah    Previous PHQ-9:   PHQ-9 SCORE 10/14/2021 10/27/2021 11/23/2021   PHQ-9 Total Score 12 8 14     Previous YURI-7:   YURI-7 SCORE 10/14/2021 10/27/2021 11/23/2021   Total Score 7 4 15     ROXANA LEVEL:  No flowsheet data found.    Preferred Contact: @[facility]([id number])@  Type of Contact Today: Phone call (patient / identified key support person reached)      Data: (subjective / Objective):  Patient Goals Areas:    Patient Stated Goals:    Recent ED/IP Admission or Discharge?   None  Recent ED/IP Admission or Discharge?   None    Patient Goals:  Goal Areas: Health; Financial and Social Service Benefits  Patient stated goals: getting teeth fixed, getting connected with community resources and applying for disabiility        [facility] Core Service Provided:  Comprehensive Care Management: utilized the electronic medical record / patient registry to identify and support patient's health conditions / needs more effectively   Care Transitions: focused on the coordinated and seamless movement of patient between or within different levels of care or settings  Care Coordination: provided care management services/referrals necessary to ensure patient and their identified supports have access to medical, behavioral health, pharmacology and recovery support services.  Ensured that patient's care is integrated across all settings and services.   Health and Wellness Promotion    Current Stressors / Issues / Care Plan Objective Addressed Today:  Disability appeal     Intervention:  Motivational Interviewing: Expressed Empathy/Understanding, Supported Autonomy, Collaboration, Evocation, Permission to raise concern or advise, Open-ended questions and Reflections: simple and complex   Target Behavior(s): n/a    Assessment: (Progress on Goals / Homework):  MIMI PANIAGUA talked with patient, and Yenny frank  [employer] for a phone interview to appeal denial for disability. Patient aware if any new changes with health or providers to update disability specialists. Patient will have a court hearing via telephone in roughly 6-9 months, patient asked that this writer be on phone appointment as well. Patient will receive letter in the mail one month prior for court date and will let this writer know. This writer also updated s/o Huong on today's phone visit.     Plan: (Homework, other):  Patient was encouraged to continue to seek condition-related information and education.      Scheduled a Phone follow up appointment with MIMI PANIAGUA in 4 weeks     Patient has set self-identified goals and will monitor progress until the next appointment.   CORNELIUS Enciso, Social Work Care Coordinator             Referrals/other:   The author of this note documented a reason for not sharing it with the patient.      JACQUELYN Enciso, LSW     Next 5 appointments (look out 90 days)    Nov 17, 2022  1:00 PM  (Arrive by 12:45 PM)  SHORT with ELODIA Tomlin  [facility] [place] ([facility] [place] ) [address]  [place] [address]  [phone]

## 2022-11-09 DIAGNOSIS — M50.90 CERVICAL DISC DISEASE: ICD-10-CM

## 2022-11-09 RX ORDER — CYCLOBENZAPRINE HCL 10 MG
10 TABLET ORAL 3 TIMES DAILY PRN
Qty: 90 TABLET | Refills: 2 | Status: SHIPPED | OUTPATIENT
Start: 2022-11-09 | End: 2023-03-23

## 2022-11-09 NOTE — TELEPHONE ENCOUNTER
Flexeril      Last Written Prescription Date:  9.27.22  Last Fill Quantity: #90,   # refills: 0  Last Office Visit: 9.15.22  Future Office visit:    Next 5 appointments (look out 90 days)    Nov 17, 2022  1:00 PM  (Arrive by 12:45 PM)  SHORT with ELODIA Tomlin  [facility] [place] ([facility] [place] ) [address]  [place] [address]  [phone]           Routing refill request to provider for review/approval because:  Drug not on the [facility], [facility] or [facility] refill protocol or controlled substance

## 2022-11-16 ENCOUNTER — TELEPHONE (OUTPATIENT)
Dept: BEHAVIORAL HEALTH | Facility: OTHER | Age: 47
End: 2022-11-16

## 2022-11-16 NOTE — PROGRESS NOTES
Follow-up Buprenorphine Visit           HPI       Micah Rosenthal is here for f/u on buprenophine/naloxone (Suboxone) therapy for opioid use disorder.  Recheck Medication      Micah is currently taking 8/2mg in the AM and 12/3mg in the PM Buprenorphine/Naloxone daily.     Status since last visit:    Since last visit patient has been: doing well.     Intensity:     There has been: no craving.      Suboxone Dose: adequate.      When did you take your last dose? This morning.   Progression of Symptoms:     Cues to use and relapse none     Recovery program has been: solid.   Accompanying Signs & Symptoms:    Side Effects: none.    Sobriety:     Status: no use since last visit.      Drug Screen: obtained.    Precipitating factors:    Triggers have been: non-existent. Had his teeth removed.   Alleviating factors:    Contact with sponsor has been: regular.     Family and support system has been: helpful.   Other Therapies Tried :     Patient has been going to recovery meetings:not at all.      Other medical concerns: No    Any ED visits? No       History   Smoking Status     Every Day     Packs/day: 1.00     Years: 34.00     Types: Cigarettes     Start date: 1/1/1988   Smokeless Tobacco     Never       Problem, Medication and Allergy Lists were reviewed and updated if needed.  reviewed and are current..    Pharmacy Database reviewed? Yes, 11.17.22, as expected.  Last fill 10.20.22 #30 of each.    Patient is an established patient of this clinic..         Review of Systems:   Review of Systems  CONSTITUTIONAL: NEGATIVE for fever, chills, change in weight  INTEGUMENTARY/SKIN: NEGATIVE for worrisome rashes, moles or lesions  EYES: NEGATIVE for vision changes or irritation  ENT/MOUTH: NEGATIVE for ear, mouth and throat problems  RESP: NEGATIVE for significant cough or SOB  CV: NEGATIVE for chest pain, palpitations or peripheral edema  GI: NEGATIVE for nausea, abdominal pain, heartburn, or change in bowel habits  : NEGATIVE for  frequency, dysuria, or hematuria  MUSCULOSKELETAL: NEGATIVE for significant arthralgias or myalgia  NEURO: NEGATIVE for weakness, dizziness or paresthesias  ENDOCRINE: NEGATIVE for temperature intolerance, skin/hair changes  PSYCHIATRIC: NEGATIVE for changes in mood or affect          Physical Exam:     Vitals:    11/17/22 1305   BP: 128/70   Pulse: 103   Temp: 98.6  F (37  C)   TempSrc: Tympanic   SpO2: 96%   Weight: 93 kg (205 lb)     Body mass index is 28.59 kg/m .  Vitals were reviewed   Physical Exam  GENERAL APPEARANCE: alert, comfortable appearing  EYES: Eyes grossly normal to inspection  HENT:  nose no rhinorrhea  RESP: lungs clear to auscultation - no rales, rhonchi or wheezes and no resp distress  CV: regular rates and rhythm, normal S1 S2,no murmur   ABDOMEN:soft, non-tender, non-distended, no hepatosplenomegaly or masses  SKIN: no rashes, no jaundice, no obvious masses.   NEURO:  no tremor  MENTAL STATUS EXAM:  Appearance/Behavior:No apparent distress  Speech: Normal  Mood/Affect: normal affect  Insight: Adequate      Results:    Results from the last 24 hours  Results for orders placed or performed in visit on 11/17/22 (from the past 24 hour(s))   Urine Drugs of Abuse Screen (Tox13)   Result Value Ref Range    Cannabinoids (31-yej-4-carboxy-9-THC) Detected (A) Not Detected, Indeterminate    Phencyclidine Not Detected Not Detected, Indeterminate    Cocaine (Benzoylecgonine) Not Detected Not Detected, Indeterminate    Methamphetamine (d-Methamphetamine) Not Detected Not Detected, Indeterminate    Opiates (Morphine) Not Detected Not Detected, Indeterminate    Amphetamine (d-Amphetamine) Not Detected Not Detected, Indeterminate    Benzodiazepines (Nordiazepam) Not Detected Not Detected, Indeterminate    Tricyclic Antidepressants (Desipramine) Not Detected Not Detected, Indeterminate    Methadone Not Detected Not Detected, Indeterminate    Barbiturates (Butalbital) Not Detected Not Detected, Indeterminate     Oxycodone Not Detected Not Detected, Indeterminate    Propoxyphene (Norpropoxyphene) Not Detected Not Detected, Indeterminate    Buprenorphine Not Detected Not Detected, Indeterminate       Assessment and Plan     Was naloxone nasal spray prescribed? Yes Details: has some .  (F11.90) Opioid use disorder  (primary encounter diagnosis)  Comment: he is given a refill. He is doing really good.   Plan: Urine Drugs of Abuse Screen (Tox13), SUBOXONE         12-3 MG FILM per film, SUBOXONE 8-2 MG per film        Refill for 2 months see him back after that.     (Z12.11) Screen for colon cancer  Comment: colon cancer screening.   Plan: he laughs today as he just had his teeth removed. Will discuss again.     (Z13.220) Screening for hyperlipidemia  Comment: next time we do labs will be fasting for him   Plan:     Dosage will not need adjustment today.  Continue at 8 mg in morning and then 12 mg in evening.  mg  per our directions.  time(s) a day of  buprenorphine/naloxone (Suboxone).     Follow up Plan  2 months.     Greater than 15 minutes was spent with this patient, over half of which was on counseling and coordination of care which includes importance of recovery activities,which may include  attendance at NA/AA meetings, sober support network, and the importance of not isolating, being open, honest, and willing to change, possible triggers and how to avoid them, and managing cravings.    Medications Discontinued During This Encounter   Medication Reason     SUBOXONE 8-2 MG per film Reorder     SUBOXONE 12-3 MG FILM per film Reorder     naproxen (NAPROSYN) 500 MG tablet Medication Reconciliation Clean Up     amoxicillin (AMOXIL) 500 MG capsule Medication Reconciliation Clean Up     itraconazole (SPORANOX) 100 MG capsule Medication Reconciliation Clean Up     ketoconazole (NIZORAL) 200 MG tablet Medication Reconciliation Clean Up     naloxone 4 MG/0.1ML NA nasal spray Medication Reconciliation Clean Up     doxylamine  (UNISOM) 25 MG TABS tablet Medication Reconciliation Clean Up     Melatonin 10 MG TABS tablet Medication Reconciliation Clean Up       Options for treatment and follow-up care were reviewed with the patient. Micah engaged in the decision making process and verbalized understanding of the options discussed and agreed with the final plan.    ELODIA Barrow

## 2022-11-16 NOTE — TELEPHONE ENCOUNTER
[facility]  @FLOW([id number])@      Social Work Care Navigator Note      Patient: Micah Rosenthal  Date: November 16, 2022  Preferred Name: Micah    Previous PHQ-9:   PHQ-9 SCORE 10/14/2021 10/27/2021 11/23/2021   PHQ-9 Total Score 12 8 14     Previous YURI-7:   YURI-7 SCORE 10/14/2021 10/27/2021 11/23/2021   Total Score 7 4 15     ROXANA LEVEL:  No flowsheet data found.    Preferred Contact: @KARIME([id number])@  Type of Contact Today: Phone call (patient / identified key support person reached)      Data: (subjective / Objective):  Patient Goals Areas: @FLOW([id number])@  Patient Stated Goals: @FLOW([id number])@  Recent ED/IP Admission or Discharge?   None  Recent ED/IP Admission or Discharge?   None    Patient Goals:  No data recorded      [facility] Core Service Provided:  Comprehensive Care Management: utilized the electronic medical record / patient registry to identify and support patient's health conditions / needs more effectively   Care Transitions: focused on the coordinated and seamless movement of patient between or within different levels of care or settings  Care Coordination: provided care management services/referrals necessary to ensure patient and their identified supports have access to medical, behavioral health, pharmacology and recovery support services.  Ensured that patient's care is integrated across all settings and services.   Health and Wellness Promotion    Current Stressors / Issues / Care Plan Objective Addressed Today:  Appointment with      Intervention:  Motivational Interviewing: Expressed Empathy/Understanding, Supported Autonomy, Collaboration, Evocation, Permission to raise concern or advise, Open-ended questions and Reflections: simple and complex   Target Behavior(s): n/a    Assessment: (Progress on Goals / Homework):  MIMI CC received phone call from Disability specialists that they are wanting to schedule another phone interview with patient and this writer.  Spoke with patient's significant other who agreed for this writer to move forward and schedule but to not schedule between the 22nd and the 27th as they will not be available.     Called and scheduled phone appointment for December 1st at 2:15pm with She, patient's significant other updated and will let patient know.     Plan: (Homework, other):  Patient was encouraged to continue to seek condition-related information and education.      Scheduled a Phone follow up appointment with MIMI PANIAGUA in 4 weeks     Patient has set self-identified goals and will monitor progress until the next appointment.   CORNELIUS Enciso, Social Work Care Coordinator             Referrals/other:   [unfilled]      JACQUELYN Enciso, CORNELIUS     Next 5 appointments (look out 90 days)    Nov 17, 2022  1:00 PM  (Arrive by 12:45 PM)  SHORT with ELODIA Tomlin  [facility] [place] ([facility] [place] ) [address]  [place] [address]  [phone]   Dec 01, 2022  2:00 PM  (Arrive by 1:45 PM)  Return Visit with CORNELIUS Enciso  [facility] [place] ([facility] [place] ) [address] [place] [id number] [phone]

## 2022-11-17 ENCOUNTER — APPOINTMENT (OUTPATIENT)
Dept: LAB | Facility: OTHER | Age: 47
End: 2022-11-17
Attending: PHYSICIAN ASSISTANT
Payer: COMMERCIAL

## 2022-11-17 ENCOUNTER — OFFICE VISIT (OUTPATIENT)
Dept: FAMILY MEDICINE | Facility: OTHER | Age: 47
End: 2022-11-17
Attending: PHYSICIAN ASSISTANT
Payer: COMMERCIAL

## 2022-11-17 ENCOUNTER — PATIENT OUTREACH (OUTPATIENT)
Dept: CARE COORDINATION | Facility: OTHER | Age: 47
End: 2022-11-17

## 2022-11-17 VITALS
TEMPERATURE: 98.6 F | DIASTOLIC BLOOD PRESSURE: 70 MMHG | OXYGEN SATURATION: 96 % | WEIGHT: 205 LBS | SYSTOLIC BLOOD PRESSURE: 128 MMHG | HEART RATE: 103 BPM | BODY MASS INDEX: 28.59 KG/M2

## 2022-11-17 DIAGNOSIS — Z12.11 SCREEN FOR COLON CANCER: ICD-10-CM

## 2022-11-17 DIAGNOSIS — Z13.220 SCREENING FOR HYPERLIPIDEMIA: ICD-10-CM

## 2022-11-17 DIAGNOSIS — F11.90 OPIOID USE DISORDER: Primary | ICD-10-CM

## 2022-11-17 LAB
AMPHETAMINES UR QL: NOT DETECTED
BARBITURATES UR QL SCN: NOT DETECTED
BENZODIAZ UR QL SCN: NOT DETECTED
BUPRENORPHINE UR QL: NOT DETECTED
CANNABINOIDS UR QL: DETECTED
COCAINE UR QL SCN: NOT DETECTED
D-METHAMPHET UR QL: NOT DETECTED
METHADONE UR QL SCN: NOT DETECTED
OPIATES UR QL SCN: NOT DETECTED
OXYCODONE UR QL SCN: NOT DETECTED
PCP UR QL SCN: NOT DETECTED
PROPOXYPH UR QL: NOT DETECTED
TRICYCLICS UR QL SCN: NOT DETECTED

## 2022-11-17 PROCEDURE — 99213 OFFICE O/P EST LOW 20 MIN: CPT | Performed by: PHYSICIAN ASSISTANT

## 2022-11-17 PROCEDURE — 80306 DRUG TEST PRSMV INSTRMNT: CPT | Mod: ZL | Performed by: PHYSICIAN ASSISTANT

## 2022-11-17 PROCEDURE — G0463 HOSPITAL OUTPT CLINIC VISIT: HCPCS

## 2022-11-17 RX ORDER — BUPRENORPHINE HYDROCHLORIDE, NALOXONE HYDROCHLORIDE 8; 2 MG/1; MG/1
1 FILM, SOLUBLE BUCCAL; SUBLINGUAL DAILY
Qty: 30 EACH | Refills: 1 | Status: SHIPPED | OUTPATIENT
Start: 2022-11-17 | End: 2023-01-12

## 2022-11-17 RX ORDER — BUPRENORPHINE HYDROCHLORIDE, NALOXONE HYDROCHLORIDE 12; 3 MG/1; MG/1
1 FILM, SOLUBLE BUCCAL; SUBLINGUAL DAILY
Qty: 30 EACH | Refills: 1 | Status: SHIPPED | OUTPATIENT
Start: 2022-11-17 | End: 2023-01-12

## 2022-11-17 ASSESSMENT — ANXIETY QUESTIONNAIRES
6. BECOMING EASILY ANNOYED OR IRRITABLE: NOT AT ALL
7. FEELING AFRAID AS IF SOMETHING AWFUL MIGHT HAPPEN: NOT AT ALL
4. TROUBLE RELAXING: NEARLY EVERY DAY
GAD7 TOTAL SCORE: 4
3. WORRYING TOO MUCH ABOUT DIFFERENT THINGS: SEVERAL DAYS
5. BEING SO RESTLESS THAT IT IS HARD TO SIT STILL: NOT AT ALL
IF YOU CHECKED OFF ANY PROBLEMS ON THIS QUESTIONNAIRE, HOW DIFFICULT HAVE THESE PROBLEMS MADE IT FOR YOU TO DO YOUR WORK, TAKE CARE OF THINGS AT HOME, OR GET ALONG WITH OTHER PEOPLE: NOT DIFFICULT AT ALL
2. NOT BEING ABLE TO STOP OR CONTROL WORRYING: NOT AT ALL
GAD7 TOTAL SCORE: 4
1. FEELING NERVOUS, ANXIOUS, OR ON EDGE: NOT AT ALL

## 2022-11-17 ASSESSMENT — PAIN SCALES - GENERAL: PAINLEVEL: SEVERE PAIN (7)

## 2022-11-17 ASSESSMENT — PATIENT HEALTH QUESTIONNAIRE - PHQ9: SUM OF ALL RESPONSES TO PHQ QUESTIONS 1-9: 6

## 2022-11-17 NOTE — PROGRESS NOTES
Clinic Care Coordination Contact    Follow Up Progress Note      Assessment: CC attended office visit with pt and Florence Sneed this date.  Please refer to Florence's note for plan of care.   Doing really great in recovery.   Suboxone dose is adequate and wants to continue on current dose.  Recently had oral surgery to remove all of his teeth - went well.  Tolerated procedure.  Took Dilaudid as prescribed postoperatively - no issues.  Sleep is still and issue, as well as his chronic pain.    Care Gaps:    Health Maintenance Due   Topic Date Due     YEARLY PREVENTIVE VISIT  Never done     HEPATITIS B IMMUNIZATION (1 of 3 - 3-dose series) Never done     COVID-19 Vaccine (1) Never done     Pneumococcal Vaccine: Pediatrics (0 to 5 Years) and At-Risk Patients (6 to 64 Years) (1 - PCV) Never done     COLORECTAL CANCER SCREENING  Never done     LIPID  Never done     TREATMENT AGREEMENT FOR CHRONIC PAIN MANAGEMENT  02/24/2021     DTAP/TDAP/TD IMMUNIZATION (6 - Td or Tdap) 12/30/2021     ADVANCE CARE PLANNING  01/17/2022     INFLUENZA VACCINE (1) Never done     NICOTINE/TOBACCO CESSATION COUNSELING Q 1 YR  11/29/2022       Did inform him that he is due for a complete physical    Care Plans  Care Plan: Sleep     Problem: Impaired Sleep Pattern     Goal: Improve Sleep Habits                   Care Plan: Chronic Pain     Problem: Chronic Pain is not self-managed     Goal: Patient will maintain consistent outpatient follow up to manage Chronic Pain and avoid hospitalizations for the next 6 months                 Problem: Inability to perform ADLs     Goal: Maintain ability to perform ADLs without difficulty                 Problem: Medication adherence     Goal: Patient will adhere to medication regimen                       Intervention/Education provided during outreach: Continue current plan.  Commended him on how well he is doing.     Outreach Frequency: 2 months      Plan:   Continue current dose of Suboxone.  Care  Coordinator will follow up in 8 weeks, sooner if he has concerns.    Yoli Kowalski RN-BSN, [facility] Care Coordinator  [phone] opt. #3

## 2022-12-01 ENCOUNTER — OFFICE VISIT (OUTPATIENT)
Dept: BEHAVIORAL HEALTH | Facility: OTHER | Age: 47
End: 2022-12-01
Attending: SOCIAL WORKER
Payer: COMMERCIAL

## 2022-12-01 DIAGNOSIS — R69 DIAGNOSIS DEFERRED: Primary | ICD-10-CM

## 2022-12-01 NOTE — CONFIDENTIAL NOTE
[facility]         Social Work Care Navigator Note      Patient: Micah Rosenthal  Date: December 1, 2022  Preferred Name: Micah    Previous PHQ-9:   PHQ-9 SCORE 10/27/2021 11/23/2021 11/17/2022   PHQ-9 Total Score 8 14 6     Previous YURI-7:   YURI-7 SCORE 10/27/2021 11/23/2021 11/17/2022   Total Score 4 15 4     ROXANA LEVEL:  No flowsheet data found.    Preferred Contact: @[facility]([id number])@  Type of Contact Today: Phone call (patient / identified key support person reached)      Data: (subjective / Objective):  Patient Goals Areas:    Patient Stated Goals:    Recent ED/IP Admission or Discharge?   None  Recent ED/IP Admission or Discharge?   None    Patient Goals:  No data recorded      [facility] Core Service Provided:  Care Coordination: provided care management services/referrals necessary to ensure patient and their identified supports have access to medical, behavioral health, pharmacology and recovery support services.  Ensured that patient's care is integrated across all settings and services.   Health and Wellness Promotion    Current Stressors / Issues / Care Plan Objective Addressed Today:  Disability     Intervention:  Motivational Interviewing: Expressed Empathy/Understanding, Supported Autonomy, Collaboration, Evocation, Permission to raise concern or advise, Open-ended questions and Reflections: simple and complex   Target Behavior(s): n/a    Assessment: (Progress on Goals / Homework):  MIMI PANIAGUA received phone call from She through disability and added patient to call. She went over final things before patient's disability hearing. She did bring up that patient was denied at first for s.s.i due to having a pension, let She know back in April that patient and his significant other were able to get a letter that stated that patient could not draw from pension until 55. She plans to look into this and will reach out if any other questions.     Patient will receive a letter in the mail stating  when his disability hearing is and will update writer once he receives this.    Plan: (Homework, other):  Patient was encouraged to continue to seek condition-related information and education.      Scheduled a Phone follow up appointment with MIMI PANIAGUA in 3 weeks     Patient has set self-identified goals and will monitor progress until the next appointment.   CORNELIUS Enciso, Social Work Care Coordinator             Referrals/other:   The author of this note documented a reason for not sharing it with the patient.      JACQUELYN Enciso, PHILW     Next 5 appointments (look out 90 days)    Jan 12, 2023  1:30 PM  (Arrive by 1:15 PM)  SHORT with ELODIA Tomlin  [facility] ([facility] [place] ) [address]  [place] [address]  [phone]

## 2022-12-30 ENCOUNTER — TELEPHONE (OUTPATIENT)
Dept: FAMILY MEDICINE | Facility: OTHER | Age: 47
End: 2022-12-30

## 2022-12-30 DIAGNOSIS — K04.7 DENTAL INFECTION: Primary | ICD-10-CM

## 2022-12-30 RX ORDER — AMOXICILLIN 500 MG/1
500 CAPSULE ORAL 3 TIMES DAILY
Qty: 30 CAPSULE | Refills: 4 | Status: SHIPPED | OUTPATIENT
Start: 2022-12-30 | End: 2023-07-27

## 2023-01-11 ENCOUNTER — PATIENT OUTREACH (OUTPATIENT)
Dept: CARE COORDINATION | Facility: OTHER | Age: 48
End: 2023-01-11

## 2023-01-11 NOTE — PROGRESS NOTES
Follow-up Buprenorphine Visit           HPI       Micah Rosenthal is here for f/u on buprenophine/naloxone (Suboxone) therapy for opioid use disorder.  Recheck Medication and suboxone      Micah is currently taking 8/2mg in the AM and 12/3mg in the PM of Buprenorphine/Naloxone  daily.     Status since last visit:    Since last visit patient has been: doing well.     Intensity:     There has been: no craving.      Suboxone Dose: adequate.   Progression of Symptoms:     Cues to use and relapse None    Recovery program has been: solid.   Accompanying Signs & Symptoms:    Side Effects: none.    Sobriety:     Status: no use since last visit.      Drug Screen: obtained.    Precipitating factors:    Triggers have been: non-existent.   Alleviating factors:    Contact with sponsor has been: no sponsor.     Family and support system has been: helpful.   Other Therapies Tried :     Patient has been going to recovery meetings:not at all.      Other medical concerns:  YES dermatitis on his face spreading all over    Any ED visits? No       History   Smoking Status     Every Day     Packs/day: 1.00     Years: 34.00     Types: Cigarettes     Start date: 1/1/1988   Smokeless Tobacco     Never       Problem, Medication and Allergy Lists were reviewed and updated if needed.  reviewed and are current..    Pharmacy Database reviewed? Yes, 1.12.23, as expected.  Last fill 12.15.22 #30 of each.    Patient is an established patient of this clinic..         Review of Systems:   Review of Systems  CONSTITUTIONAL: NEGATIVE for fever, chills, change in weight  INTEGUMENTARY/SKIN: NEGATIVE for worrisome rashes, moles or lesions  EYES: NEGATIVE for vision changes or irritation  ENT/MOUTH: NEGATIVE for ear, mouth and throat problems  RESP: NEGATIVE for significant cough or SOB  CV: NEGATIVE for chest pain, palpitations or peripheral edema  GI: NEGATIVE for nausea, abdominal pain, heartburn, or change in bowel habits  : NEGATIVE for frequency,  dysuria, or hematuria  MUSCULOSKELETAL: NEGATIVE for significant arthralgias or myalgia  NEURO: NEGATIVE for weakness, dizziness or paresthesias  ENDOCRINE: NEGATIVE for temperature intolerance, skin/hair changes  PSYCHIATRIC: NEGATIVE for changes in mood or affect          Physical Exam:     Vitals:    01/12/23 1333   BP: 120/68   BP Location: Right arm   Patient Position: Sitting   Cuff Size: Adult Regular   Pulse: 95   Resp: 16   Temp: 98.1  F (36.7  C)   TempSrc: Tympanic   SpO2: 97%   Weight: 95.7 kg (211 lb)     Body mass index is 29.43 kg/m .  Vitals were reviewed   Physical Exam  GENERAL APPEARANCE: alert, comfortable appearing  EYES: Eyes grossly normal to inspection  HENT:  nose no rhinorrhea  RESP: lungs clear to auscultation - no rales, rhonchi or wheezes and no resp distress  CV: regular rates and rhythm, normal S1 S2,no murmur   ABDOMEN:soft, non-tender, non-distended, no hepatosplenomegaly or masses  SKIN: no rashes, no jaundice, no obvious masses.   NEURO:  no tremor  MENTAL STATUS EXAM:  Appearance/Behavior:No apparent distress  Speech: Normal  Mood/Affect: normal affect  Insight: Adequate      Results:    Results from the last 24 hoursNo results found for this or any previous visit (from the past 24 hour(s)).    Assessment and Plan     Was naloxone nasal spray prescribed? Yes - previously.  (F11.90) Opioid use disorder  (primary encounter diagnosis)  Comment: his dose is good. He going to be given a refill.   Plan: Urine Drugs of Abuse Screen (Tox13), SUBOXONE         12-3 MG FILM per film, SUBOXONE 8-2 MG per film            (R21) Facial rash  Comment: worsening facial rash.   Plan: Adult Dermatology Referral              Dosage will not need adjustment today.  Continue at 20/5 mg  1 time(s) a day of  buprenorphine/naloxone (Suboxone).     Follow up Plan  Stage 4 (3 months): Please make a clinic appointment for follow up with me (BLANCHE GAONA) or another Suboxone provider in 3 months for  suboxone follow up.    Greater than 15  minutes was spent with this patient, over half of which was on counseling and coordination of care which includes importance of recovery activities,which may include  attendance at NA/AA meetings, sober support network, and the importance of not isolating, being open, honest, and willing to change, possible triggers and how to avoid them, and managing cravings.    There are no discontinued medications.    Options for treatment and follow-up care were reviewed with the patient. Micah engaged in the decision making process and verbalized understanding of the options discussed and agreed with the final plan.    ELODIA Barrow

## 2023-01-12 ENCOUNTER — APPOINTMENT (OUTPATIENT)
Dept: LAB | Facility: OTHER | Age: 48
End: 2023-01-12
Attending: PHYSICIAN ASSISTANT
Payer: COMMERCIAL

## 2023-01-12 ENCOUNTER — PATIENT OUTREACH (OUTPATIENT)
Dept: CARE COORDINATION | Facility: OTHER | Age: 48
End: 2023-01-12

## 2023-01-12 ENCOUNTER — OFFICE VISIT (OUTPATIENT)
Dept: FAMILY MEDICINE | Facility: OTHER | Age: 48
End: 2023-01-12
Attending: PHYSICIAN ASSISTANT
Payer: COMMERCIAL

## 2023-01-12 VITALS
BODY MASS INDEX: 29.43 KG/M2 | RESPIRATION RATE: 16 BRPM | SYSTOLIC BLOOD PRESSURE: 120 MMHG | TEMPERATURE: 98.1 F | OXYGEN SATURATION: 97 % | WEIGHT: 211 LBS | HEART RATE: 95 BPM | DIASTOLIC BLOOD PRESSURE: 68 MMHG

## 2023-01-12 DIAGNOSIS — F11.90 OPIOID USE DISORDER: Primary | ICD-10-CM

## 2023-01-12 DIAGNOSIS — R21 FACIAL RASH: ICD-10-CM

## 2023-01-12 DIAGNOSIS — M54.2 NECK PAIN: ICD-10-CM

## 2023-01-12 PROCEDURE — G0463 HOSPITAL OUTPT CLINIC VISIT: HCPCS

## 2023-01-12 PROCEDURE — 99213 OFFICE O/P EST LOW 20 MIN: CPT | Performed by: PHYSICIAN ASSISTANT

## 2023-01-12 PROCEDURE — 80306 DRUG TEST PRSMV INSTRMNT: CPT | Mod: ZL | Performed by: PHYSICIAN ASSISTANT

## 2023-01-12 RX ORDER — BUPRENORPHINE HYDROCHLORIDE, NALOXONE HYDROCHLORIDE 8; 2 MG/1; MG/1
1 FILM, SOLUBLE BUCCAL; SUBLINGUAL DAILY
Qty: 30 EACH | Refills: 2 | Status: SHIPPED | OUTPATIENT
Start: 2023-01-12 | End: 2023-04-03

## 2023-01-12 RX ORDER — BUPRENORPHINE HYDROCHLORIDE, NALOXONE HYDROCHLORIDE 12; 3 MG/1; MG/1
1 FILM, SOLUBLE BUCCAL; SUBLINGUAL DAILY
Qty: 30 EACH | Refills: 2 | Status: SHIPPED | OUTPATIENT
Start: 2023-01-12 | End: 2023-04-03

## 2023-01-12 ASSESSMENT — ANXIETY QUESTIONNAIRES
6. BECOMING EASILY ANNOYED OR IRRITABLE: NOT AT ALL
GAD7 TOTAL SCORE: 0
3. WORRYING TOO MUCH ABOUT DIFFERENT THINGS: NOT AT ALL
4. TROUBLE RELAXING: NOT AT ALL
7. FEELING AFRAID AS IF SOMETHING AWFUL MIGHT HAPPEN: NOT AT ALL
1. FEELING NERVOUS, ANXIOUS, OR ON EDGE: NOT AT ALL
2. NOT BEING ABLE TO STOP OR CONTROL WORRYING: NOT AT ALL
5. BEING SO RESTLESS THAT IT IS HARD TO SIT STILL: NOT AT ALL
GAD7 TOTAL SCORE: 0

## 2023-01-12 ASSESSMENT — PATIENT HEALTH QUESTIONNAIRE - PHQ9: SUM OF ALL RESPONSES TO PHQ QUESTIONS 1-9: 5

## 2023-01-12 ASSESSMENT — PAIN SCALES - GENERAL: PAINLEVEL: EXTREME PAIN (8)

## 2023-01-12 NOTE — PROGRESS NOTES
Clinic Care Coordination Contact    Follow Up Progress Note      Assessment: CC attended office visit with pt and Florence Sneed this date.  Please refer to Florence's note for plan of care.  Doing great in recovery.  Suboxone dose is adequate and wants to continue on current dose.    Sleep is still an issue due to chronic pain.  Will update imaging, as it has been over 2 years.      Care Gaps:    Health Maintenance Due   Topic Date Due     YEARLY PREVENTIVE VISIT  Never done     HEPATITIS B IMMUNIZATION (1 of 3 - 3-dose series) Never done     COVID-19 Vaccine (1) Never done     Pneumococcal Vaccine: Pediatrics (0 to 5 Years) and At-Risk Patients (6 to 64 Years) (1 - PCV) Never done     COLORECTAL CANCER SCREENING  Never done     LIPID  Never done     TREATMENT AGREEMENT FOR CHRONIC PAIN MANAGEMENT  02/24/2021     DTAP/TDAP/TD IMMUNIZATION (6 - Td or Tdap) 12/30/2021     ADVANCE CARE PLANNING  01/17/2022     INFLUENZA VACCINE (1) Never done     NICOTINE/TOBACCO CESSATION COUNSELING Q 1 YR  11/29/2022         Care Plans  Care Plan: Sleep     Problem: Impaired Sleep Pattern     Goal: Improve Sleep Habits     This Visit's Progress: 30%    Note:     Due to pain - Florence ordered CT scan of neck this date                    Care Plan: Chronic Pain     Problem: Chronic Pain is not self-managed     Goal: Patient will maintain consistent outpatient follow up to manage Chronic Pain and avoid hospitalizations for the next 6 months     This Visit's Progress: On track                Problem: Inability to perform ADLs     Goal: Maintain ability to perform ADLs without difficulty                 Problem: Medication adherence     Goal: Patient will adhere to medication regimen                       Intervention/Education provided during outreach: No change in Suboxone dosing.   Florence did order a CT of his neck.  DI will call him to schedule this - maybe want to consider IR consult - injection, rhizotomy, etc?     Outreach  Frequency: 3 months      Plan:   No change.  Care Coordinator will follow up in 3 weeks, sooner if he has concerns.     Pupils equal, round and reactive to light, Extra-ocular movement intact, eyes are clear b/l

## 2023-01-16 ENCOUNTER — HOSPITAL ENCOUNTER (OUTPATIENT)
Dept: CT IMAGING | Facility: HOSPITAL | Age: 48
Discharge: HOME | End: 2023-01-16
Attending: PHYSICIAN ASSISTANT | Admitting: PHYSICIAN ASSISTANT
Payer: COMMERCIAL

## 2023-01-16 DIAGNOSIS — M54.2 NECK PAIN: ICD-10-CM

## 2023-01-16 PROCEDURE — 72125 CT NECK SPINE W/O DYE: CPT

## 2023-01-19 ENCOUNTER — PATIENT OUTREACH (OUTPATIENT)
Dept: OTHER | Facility: HOSPITAL | Age: 48
End: 2023-01-19

## 2023-01-19 NOTE — TELEPHONE ENCOUNTER
Patient Quality Outreach    Patient is due for the following:   Depression  -  PHQ-9 needed    Next Steps:   Patient was assigned appropriate questionnaire to complete    Type of outreach:    Sent Measy message.      Questions for provider review:    None     Vera Ojeda RN

## 2023-01-20 DIAGNOSIS — M50.90 CERVICAL DISC DISEASE: Primary | ICD-10-CM

## 2023-01-27 ENCOUNTER — TELEPHONE (OUTPATIENT)
Dept: BEHAVIORAL HEALTH | Facility: OTHER | Age: 48
End: 2023-01-27

## 2023-01-27 NOTE — TELEPHONE ENCOUNTER
[facility]  @FLOW([id number])@      Social Work Care Navigator Note      Patient: Micah Rosenthal  Date: January 27, 2023  Preferred Name: Micah    Previous PHQ-9:   PHQ-9 SCORE 11/23/2021 11/17/2022 1/12/2023   PHQ-9 Total Score 14 6 5     Previous YURI-7:   YURI-7 SCORE 11/23/2021 11/17/2022 1/12/2023   Total Score 15 4 0     ROXANA LEVEL:  No flowsheet data found.    Preferred Contact: @KARIME([id number])@  Type of Contact Today: Phone call (patient / identified key support person reached)      Data: (subjective / Objective):  Patient Goals Areas: @FLOW([id number])@  Patient Stated Goals: @FLOW([id number])@  Recent ED/IP Admission or Discharge?   None  Recent ED/IP Admission or Discharge?   None    Patient Goals:  No data recorded      [facility] Core Service Provided:  Comprehensive Care Management: utilized the electronic medical record / patient registry to identify and support patient's health conditions / needs more effectively   Care Transitions: focused on the coordinated and seamless movement of patient between or within different levels of care or settings  Care Coordination: provided care management services/referrals necessary to ensure patient and their identified supports have access to medical, behavioral health, pharmacology and recovery support services.  Ensured that patient's care is integrated across all settings and services.   Health and Wellness Promotion    Current Stressors / Issues / Care Plan Objective Addressed Today:  Check in   Gas mileage reimbursement     Intervention:  Motivational Interviewing: Expressed Empathy/Understanding, Supported Autonomy, Collaboration, Evocation, Permission to raise concern or advise, Open-ended questions and Reflections: simple and complex   Target Behavior(s): n/a    Assessment: (Progress on Goals / Homework):  MIMI PANIAGUA talked with patient's significant other, Huong. She reports things are going. Patient got his teeth two days ago but they are making him gag  and will need to get them adjusted.   Patient got a new CT scan done about a week ago, he will have an appointment at [facility] in May. She states he will most likely need another surgery as the CT scan showed lots of changes.   This writer did let Huong know patient could get gas mileage reimbursement for medical appointments including the appointment in May in the [facility]. Huong will talk with patient and get back to this writer, did let her know I would assist with setting this up if he does decide to use this through his insurance.     Plan: (Homework, other):  Patient was encouraged to continue to seek condition-related information and education.      Scheduled a Phone follow up appointment with MIMI PANIAGUA in 4 weeks     Patient has set self-identified goals and will monitor progress until the next appointment.     CORNELIUS Enciso, Social Work Care Coordinator             Referrals/other:   [unfilled]      JACQUELYN Enciso, CORNELIUS     Next 5 appointments (look out 90 days)    Apr 06, 2023  1:30 PM  (Arrive by 1:15 PM)  SHORT with ELODIA Tomlin  [facility] [place] ([facility] [place] ) [address]  [place] [address]  [phone]

## 2023-02-23 ENCOUNTER — TELEPHONE (OUTPATIENT)
Dept: BEHAVIORAL HEALTH | Facility: OTHER | Age: 48
End: 2023-02-23

## 2023-02-23 NOTE — TELEPHONE ENCOUNTER
[facility]  @FLOW([id number])@      Social Work Care Navigator Note      Patient: Micah Rosenthal  Date: February 23, 2023  Preferred Name: Micah    Previous PHQ-9:   PHQ-9 SCORE 11/23/2021 11/17/2022 1/12/2023   PHQ-9 Total Score 14 6 5     Previous YURI-7:   YURI-7 SCORE 11/23/2021 11/17/2022 1/12/2023   Total Score 15 4 0     ROXANA LEVEL:  No flowsheet data found.    Preferred Contact: @KARIME([id number])@  Type of Contact Today: Phone call (patient / identified key support person reached)      Data: (subjective / Objective):  Patient Goals Areas: @FLOW([id number])@  Patient Stated Goals: @FLOW([id number])@  Recent ED/IP Admission or Discharge?   None  Recent ED/IP Admission or Discharge?   None    Patient Goals:  No data recorded      [facility] Core Service Provided:  Comprehensive Care Management: utilized the electronic medical record / patient registry to identify and support patient's health conditions / needs more effectively   Care Transitions: focused on the coordinated and seamless movement of patient between or within different levels of care or settings  Care Coordination: provided care management services/referrals necessary to ensure patient and their identified supports have access to medical, behavioral health, pharmacology and recovery support services.  Ensured that patient's care is integrated across all settings and services.   Health and Wellness Promotion    Current Stressors / Issues / Care Plan Objective Addressed Today:  Check in     Intervention:  Motivational Interviewing: Expressed Empathy/Understanding, Supported Autonomy, Collaboration, Evocation, Permission to raise concern or advise, Open-ended questions and Reflections: simple and complex   Target Behavior(s): n/a    Assessment: (Progress on Goals / Homework):  MIMI PANIAGUA connected with patient's significant other, Huong to check in. She reports overall things have been the same.     Patient did receive a letter from disability  specialists that they will file ssi if they get a letter about patient's pension, unfortunately patient and significant other are unable to get the letter that is needed.     Significant other was unable to talk about mileage reimbursement with patient, but will mention this to him for future appointments.    Plan: (Homework, other):  Patient was encouraged to continue to seek condition-related information and education.      Scheduled a Phone follow up appointment with MIMI PANIAGUA in 3 weeks     Patient has set self-identified goals and will monitor progress until the next appointment.   CORNELIUS Enciso, Social Work Care Coordinator             Referrals/other:   [unfilled]      JACQUELYN Enciso, CORNELIUS     Next 5 appointments (look out 90 days)    Apr 06, 2023  1:30 PM  (Arrive by 1:15 PM)  SHORT with ELODIA Tomlin  [facility] [place] ([facility] [place] ) [address]  [place] [address]  [phone]

## 2023-03-08 DIAGNOSIS — M79.89 LEG SWELLING: ICD-10-CM

## 2023-03-08 RX ORDER — FUROSEMIDE 20 MG
20 TABLET ORAL DAILY
Qty: 30 TABLET | Refills: 1 | Status: SHIPPED | OUTPATIENT
Start: 2023-03-08 | End: 2023-04-20

## 2023-03-08 NOTE — TELEPHONE ENCOUNTER
Lasix - experiencing BLE edema.  You have given him this in the past.  Would like refill      Last Written Prescription Date:  4.15.20  Last Fill Quantity: #30,   # refills: 1  Last Office Visit: 1.12.23  Future Office visit:    Next 5 appointments (look out 90 days)    Apr 06, 2023  1:30 PM  (Arrive by 1:15 PM)  SHORT with ELODIA Tomlin  [facility] [place] ([facility] [place] ) [address]  [place] [address]  [phone]           Routing refill request to provider for review/approval because:  Drug not active on patient's medication list

## 2023-03-23 DIAGNOSIS — M50.90 CERVICAL DISC DISEASE: ICD-10-CM

## 2023-03-23 RX ORDER — CYCLOBENZAPRINE HCL 10 MG
10 TABLET ORAL 3 TIMES DAILY PRN
Qty: 90 TABLET | Refills: 2 | Status: SHIPPED | OUTPATIENT
Start: 2023-03-23 | End: 2023-08-25

## 2023-03-23 NOTE — TELEPHONE ENCOUNTER
Flexeril      Last Written Prescription Date:  11.9.22  Last Fill Quantity: #90,   # refills: 2  Last Office Visit: 1.12.23  Future Office visit:    Next 5 appointments (look out 90 days)    Apr 20, 2023  1:30 PM  (Arrive by 1:15 PM)  SHORT with ELODIA Tomlin  [facility] [place] ([facility] [place] ) [address]  [place] [address]  [phone]           Routing refill request to provider for review/approval because:  Drug not on the [facility], P or [facility] refill protocol or controlled substance

## 2023-03-29 NOTE — TELEPHONE ENCOUNTER
----- Message from Trista Awad LPN sent at 12/1/2021  4:34 PM CST -----  Kinga received this from Torsten Walker.   Received a call from [facility] to see if provider Florence Sneed is willing to switch Micah insurance referral from [facility] to [facility]. [facility] is in network and would be covered with no insurance referral. If you could please respond so I can let them know. Thank you!    Florence is okay with this. Not sure if she has to cancel this referral and place one to go to [facility] instead.      Thank you so much for allowing me to care for you today!!    Take your medications as directed.  Please call your primary care physician during next business day to inform about your urgent care visit.      Please note:  This evaluation only concerns your current health status.  Your health status could change over the next several hours or days.  This means you could feel worse with similar symptoms, or another condition that is not yet observable may develop either from this current concern or develop entirely independently.      Use ibuprofen or naproxen (Alleve) as directed on bottle for discomfort.  Please take with food or drink.      SEEK CARE IMMEDIATELY IF:   Increased pain or swelling  Foot or toes become cold, blue, numb or tingly  Signs of infection: Warmth, drainage, or increased redness or pain around the injury  Inability to move the injured area, or any joints below the injured area  Frequent bruising for unknown reasons

## 2023-03-30 ENCOUNTER — TELEPHONE (OUTPATIENT)
Dept: BEHAVIORAL HEALTH | Facility: OTHER | Age: 48
End: 2023-03-30

## 2023-03-30 NOTE — TELEPHONE ENCOUNTER
[facility]  @FLOW([id number])@      Social Work Care Navigator Note      Patient: Micah Rosenthal  Date: March 30, 2023  Preferred Name: Micah    Previous PHQ-9:   PHQ-9 SCORE 11/23/2021 11/17/2022 1/12/2023   PHQ-9 Total Score 14 6 5     Previous YURI-7:   YURI-7 SCORE 11/23/2021 11/17/2022 1/12/2023   Total Score 15 4 0     ROXANA LEVEL:  No flowsheet data found.    Preferred Contact: @KARIME([id number])@  Type of Contact Today: Phone call (patient / identified key support person reached)      Data: (subjective / Objective):  Patient Goals Areas: @FLOW([id number])@  Patient Stated Goals: @FLOW([id number])@  Recent ED/IP Admission or Discharge?   None  Recent ED/IP Admission or Discharge?   None    Patient Goals:  No data recorded      [facility] Core Service Provided:  Comprehensive Care Management: utilized the electronic medical record / patient registry to identify and support patient's health conditions / needs more effectively   Care Transitions: focused on the coordinated and seamless movement of patient between or within different levels of care or settings  Care Coordination: provided care management services/referrals necessary to ensure patient and their identified supports have access to medical, behavioral health, pharmacology and recovery support services.  Ensured that patient's care is integrated across all settings and services.   Health and Wellness Promotion    Current Stressors / Issues / Care Plan Objective Addressed Today:  Snap renewal     Intervention:  Motivational Interviewing: Expressed Empathy/Understanding, Supported Autonomy, Collaboration, Evocation, Permission to raise concern or advise, Open-ended questions and Reflections: simple and complex   Target Behavior(s): n/a    Assessment: (Progress on Goals / Homework):  Connected with patient's significant other, Huong. Let Huong know this writer received SNAP renewal letter/application that is due by April 8, 2023. Huong unsure if she has  this application or not and will look later, let her know if she does not have this writer will send over.     No other needs/concerns     Plan: (Homework, other):  Patient was encouraged to continue to seek condition-related information and education.      Scheduled a Phone follow up appointment with MIMI PANIAGUA in 3 weeks     Patient has set self-identified goals and will monitor progress until the next appointment.   CORNELIUS Enciso, Social Work Care Coordinator             Referrals/other:   [unfilled]      JACQUELYN Enciso, CORNELIUS     Next 5 appointments (look out 90 days)    Apr 20, 2023  1:30 PM  (Arrive by 1:15 PM)  SHORT with ELODIA Tomlin  [facility] [place] ([facility] [place] ) [address]  [place] [address]  [phone]

## 2023-04-03 DIAGNOSIS — F11.90 OPIOID USE DISORDER: ICD-10-CM

## 2023-04-03 RX ORDER — BUPRENORPHINE HYDROCHLORIDE, NALOXONE HYDROCHLORIDE 12; 3 MG/1; MG/1
1 FILM, SOLUBLE BUCCAL; SUBLINGUAL DAILY
Qty: 30 EACH | Refills: 0 | Status: SHIPPED | OUTPATIENT
Start: 2023-04-03 | End: 2023-04-20

## 2023-04-03 RX ORDER — BUPRENORPHINE HYDROCHLORIDE, NALOXONE HYDROCHLORIDE 8; 2 MG/1; MG/1
1 FILM, SOLUBLE BUCCAL; SUBLINGUAL DAILY
Qty: 30 EACH | Refills: 0 | Status: SHIPPED | OUTPATIENT
Start: 2023-04-03 | End: 2023-04-20

## 2023-04-03 NOTE — TELEPHONE ENCOUNTER
Suboxone    - had to move his appt to a later date, as you will be on vacation.    Last Written Prescription Date:  3.9.23  Last Fill Quantity: #30, #30,   # refills: 0  Last Office Visit: 1.12.23  Future Office visit:    Next 5 appointments (look out 90 days)    Apr 20, 2023  1:30 PM  (Arrive by 1:15 PM)  SHORT with ELODIA Tomlin  [facility] [place] ([facility] [place] ) [address]  [place] [address]  [phone]           Routing refill request to provider for review/approval because:  Drug not on the G, P or  Health refill protocol or controlled substance

## 2023-04-19 ENCOUNTER — PATIENT OUTREACH (OUTPATIENT)
Dept: CARE COORDINATION | Facility: OTHER | Age: 48
End: 2023-04-19

## 2023-04-19 NOTE — PROGRESS NOTES
Assessment & Plan     Opioid use disorder  She is going to be given a refill. Just had one will not need ot see us for at least 2 months.    - Urine Drugs of Abuse Screen (Tox13)  - SUBOXONE 12-3 MG FILM per film; Place 1 Film under the tongue daily PM  - SUBOXONE 8-2 MG per film; Place 1 Film under the tongue daily AM    Leg swelling  He is swelling into his legs. Sleeping in his chair. Has ALLY and will check RUCHI, double his lasix.   - furosemide (LASIX) 20 MG tablet; Take 1 tablet (20 mg) by mouth 2 times daily  - US RUCHI Doppler No Exercise; Future    Review of external notes as documented elsewhere in note  Ordering of each unique test  Prescription drug management  10  minutes spent by me on the date of the encounter doing chart review, history and exam, documentation and further activities per the note       Nicotine/Tobacco Cessation:  He reports that he has been smoking cigarettes. He started smoking about 35 years ago. He has a 34.00 pack-year smoking history. He has never used smokeless tobacco.  Nicotine/Tobacco Cessation Plan:   Information offered: Patient not interested at this time      See Patient Instructions    No follow-ups on file.    ELODIA Barrow  [facility] [place]   Micah is a 48 year old, presenting for the following health issues:  Recheck Medication         View : No data to display.              HPI       He is currently taking 20/5 mg of buprenorphine daily.   Last fill 4.4.23 #30 of each.    Status Since Last Visit:    Have you used any opioids since your last visit?: no use since last visit    Do you feel that your dose of suboxone is too high or too low? Adequate    Have there been cravings for opioids? No     Any withdrawal symptoms? None      Any side effects from the medication? None     Any alcohol use? No        Any other recreational drug use? None       Precipitating Factors:    Triggers have been: non-existent   Other Supports:    Do you  attend counseling or meet with a therapist? No    Do you attend NA or AA meetings? No    Do you have/meet with a sponsor? No    Family and support systems have been: Helpful  What other goals have you been working on (job, family, relationships, etc)? Got his dental work complete.  Will be seeing Dr. Martinez next month     Will be seeing Dr. Martinez on May 22 - neck pain          11/23/2021    10:06 AM 11/17/2022     1:07 PM 1/12/2023     1:32 PM   PHQ-9 SCORE   PHQ-9 Total Score 14 6 5     PDMP Review       Value Time User    State PDMP site checked  Yes 12/11/2020 12:15 PM Florence Sneed PA          Concern - Bilateral LE edema   Onset: he has issues wthat are getting worse.   Previous history of similar problem: getting worse in upper legs.   Precipitating factors:        Worsened by: sitting all day.   Alleviating factors:        Improved by: elevating her legs.   Therapies tried and outcome:  none         Review of Systems   Constitutional, HEENT, cardiovascular, pulmonary, gi and gu systems are negative, except as otherwise noted.      Objective    /80 (BP Location: Left arm, Patient Position: Sitting, Cuff Size: Adult Regular)   Pulse 103   Temp 97.6  F (36.4  C) (Tympanic)   Resp 18   Wt 96.6 kg (213 lb)   SpO2 96%   BMI 29.71 kg/m    Body mass index is 29.71 kg/m .  Physical Exam   GENERAL: healthy, alert and no distress  EYES: Eyes grossly normal to inspection, PERRL and conjunctivae and sclerae normal  HENT: ear canals and TM's normal, nose and mouth without ulcers or lesions  NECK: no adenopathy, no asymmetry, masses, or scars and thyroid normal to palpation  RESP: lungs clear to auscultation - no rales, rhonchi or wheezes  CV: regular rate and rhythm, normal S1 S2, no S3 or S4, no murmur, click or rub, no peripheral edema and peripheral pulses strong  ABDOMEN: soft, nontender, no hepatosplenomegaly, no masses and bowel sounds normal  MS: no gross musculoskeletal defects noted, no  edema    No results found for this or any previous visit (from the past 24 hour(s)).

## 2023-04-19 NOTE — PROGRESS NOTES
Clinic Care Coordination Contact  Care Team Conversations    CC notified Ronaldo's SO that Ronaldo has an appointment tomorrow and to arrive at 1:15PM.    Yoli Kowalski RN-BSN, [facility] Care Coordinator  [phone] opt. #[phone]

## 2023-04-20 ENCOUNTER — PATIENT OUTREACH (OUTPATIENT)
Dept: CARE COORDINATION | Facility: OTHER | Age: 48
End: 2023-04-20

## 2023-04-20 ENCOUNTER — OFFICE VISIT (OUTPATIENT)
Dept: FAMILY MEDICINE | Facility: OTHER | Age: 48
End: 2023-04-20
Attending: PHYSICIAN ASSISTANT
Payer: COMMERCIAL

## 2023-04-20 ENCOUNTER — APPOINTMENT (OUTPATIENT)
Dept: LAB | Facility: OTHER | Age: 48
End: 2023-04-20
Attending: PHYSICIAN ASSISTANT
Payer: COMMERCIAL

## 2023-04-20 VITALS
TEMPERATURE: 97.6 F | DIASTOLIC BLOOD PRESSURE: 80 MMHG | RESPIRATION RATE: 18 BRPM | BODY MASS INDEX: 29.71 KG/M2 | WEIGHT: 213 LBS | SYSTOLIC BLOOD PRESSURE: 120 MMHG | OXYGEN SATURATION: 96 % | HEART RATE: 103 BPM

## 2023-04-20 DIAGNOSIS — K29.00 ACUTE SUPERFICIAL GASTRITIS WITHOUT HEMORRHAGE: ICD-10-CM

## 2023-04-20 DIAGNOSIS — F11.90 OPIOID USE DISORDER: Primary | ICD-10-CM

## 2023-04-20 DIAGNOSIS — M79.89 LEG SWELLING: ICD-10-CM

## 2023-04-20 PROCEDURE — 80306 DRUG TEST PRSMV INSTRMNT: CPT | Mod: ZL | Performed by: PHYSICIAN ASSISTANT

## 2023-04-20 PROCEDURE — G0463 HOSPITAL OUTPT CLINIC VISIT: HCPCS

## 2023-04-20 PROCEDURE — 99213 OFFICE O/P EST LOW 20 MIN: CPT | Performed by: PHYSICIAN ASSISTANT

## 2023-04-20 RX ORDER — BUPRENORPHINE HYDROCHLORIDE, NALOXONE HYDROCHLORIDE 8; 2 MG/1; MG/1
1 FILM, SOLUBLE BUCCAL; SUBLINGUAL DAILY
Qty: 30 EACH | Refills: 2 | Status: SHIPPED | OUTPATIENT
Start: 2023-04-20 | End: 2023-07-27

## 2023-04-20 RX ORDER — FUROSEMIDE 20 MG
20 TABLET ORAL 2 TIMES DAILY
Qty: 60 TABLET | Refills: 1 | Status: SHIPPED | OUTPATIENT
Start: 2023-04-20 | End: 2023-07-06

## 2023-04-20 RX ORDER — BUPRENORPHINE HYDROCHLORIDE, NALOXONE HYDROCHLORIDE 12; 3 MG/1; MG/1
1 FILM, SOLUBLE BUCCAL; SUBLINGUAL DAILY
Qty: 30 EACH | Refills: 2 | Status: SHIPPED | OUTPATIENT
Start: 2023-04-20 | End: 2023-07-27

## 2023-04-20 ASSESSMENT — PAIN SCALES - GENERAL: PAINLEVEL: SEVERE PAIN (7)

## 2023-04-20 NOTE — PROGRESS NOTES
Clinic Care Coordination Contact  Care Team Conversations    CC unable to attend visit this date with pt and Florence Sneed.  CC did update Florence Sneed that Ronaldo will be seeing Dr. Martinez on May 22.  Has also been having BLE.  Will schedule 3 month follow up.  Ronaldo knows to reach out to CC with any questions/concerns/problems.  Please refer to Florence's note for plan of care.    Yoli Kowalski RN-BSN, [facility] Care Coordinator  [phone] opt. #[phone]

## 2023-04-21 RX ORDER — OMEPRAZOLE 40 MG/1
40 CAPSULE, DELAYED RELEASE ORAL DAILY
Qty: 90 CAPSULE | Refills: 3 | Status: SHIPPED | OUTPATIENT
Start: 2023-04-21 | End: 2023-12-18

## 2023-04-29 ENCOUNTER — HEALTH MAINTENANCE LETTER (OUTPATIENT)
Age: 48
End: 2023-04-29

## 2023-05-01 ENCOUNTER — OFFICE VISIT (OUTPATIENT)
Dept: DERMATOLOGY | Facility: OTHER | Age: 48
End: 2023-05-01
Attending: DERMATOLOGY
Payer: COMMERCIAL

## 2023-05-01 VITALS
HEART RATE: 114 BPM | SYSTOLIC BLOOD PRESSURE: 110 MMHG | HEIGHT: 71 IN | DIASTOLIC BLOOD PRESSURE: 70 MMHG | WEIGHT: 213 LBS | RESPIRATION RATE: 16 BRPM | OXYGEN SATURATION: 96 % | BODY MASS INDEX: 29.82 KG/M2

## 2023-05-01 DIAGNOSIS — L21.9 DERMATITIS, SEBORRHEIC: Primary | ICD-10-CM

## 2023-05-01 DIAGNOSIS — R21 FACIAL RASH: ICD-10-CM

## 2023-05-01 PROCEDURE — G0463 HOSPITAL OUTPT CLINIC VISIT: HCPCS

## 2023-05-01 PROCEDURE — 99202 OFFICE O/P NEW SF 15 MIN: CPT | Performed by: DERMATOLOGY

## 2023-05-01 RX ORDER — KETOCONAZOLE 20 MG/G
CREAM TOPICAL
Qty: 30 G | Refills: 3 | Status: SHIPPED | OUTPATIENT
Start: 2023-05-01 | End: 2024-06-03

## 2023-05-01 ASSESSMENT — PAIN SCALES - GENERAL: PAINLEVEL: SEVERE PAIN (6)

## 2023-05-01 NOTE — PROGRESS NOTES
Visit Date: 2023    SUBJECTIVE:  First visit for Micah, who comes in with his wife.   He has, over the last few months, developed scaling in his beard area and, also, some scaling in the right ear canal.  No particular scaling of the eyebrows or the scalp.      OBJECTIVE: He is a healthy gentleman, in no distress.  There is, indeed, scale within the beard area, with slight redness.  The right ear canal looks classic for seborrheic dermatitis.    ASSESSMENT:  Probable seborrheic dermatitis.    PLAN:  Ketoconazole cream at night to affected sites, especially the ear canal and within the beard if possible.  He could add water to the ketoconazole  to make it more of a lotion that would be easier to apply to the beard.  Hydrocortisone 1% cream at night, which his wife already tried, and that seemed to be somewhat helpful for him.  I advised it is unusual not to have any scalp or eyebrows activity.       Recheck if not improved in a few months.    MEDICATIONS AND ALLERGIES:  Reviewed.    ALBER Lemos MD        D: 2023   T: 2023   MT: jose    Name:     MICAH BILLINGS  MRN:      [id number]        Account:    [id number]   :      1975           Visit Date: 2023     Document: [id number]

## 2023-05-01 NOTE — PATIENT INSTRUCTIONS
The condition you have is likely seborrheic dermatitis.  We will use a product called ketoconazole in the AM and use hydrocortisone cream at night.

## 2023-05-01 NOTE — LETTER
2023       RE: Micah Billings  [address]  [address]  [place] [address]     Dear Colleague,    Thank you for referring your patient, Micah Billings, to the [facility]. Please see a copy of my visit note below.    Visit Date: 2023    SUBJECTIVE:  First visit for Micah, who comes in with his wife.   He has, over the last few months, developed scaling in his beard area and, also, some scaling in the right ear canal.  No particular scaling of the eyebrows or the scalp.      OBJECTIVE: He is a healthy gentleman, in no distress.  There is, indeed, scale within the beard area, with slight redness.  The right ear canal looks classic for seborrheic dermatitis.    ASSESSMENT:  Probable seborrheic dermatitis.    PLAN:  Ketoconazole cream at night to affected sites, especially the ear canal and within the beard if possible.  He could add water to the ketoconazole  to make it more of a lotion that would be easier to apply to the beard.  Hydrocortisone 1% cream at night, which his wife already tried, and that seemed to be somewhat helpful for him.  I advised it is unusual not to have any scalp or eyebrows activity.       Recheck if not improved in a few months.    MEDICATIONS AND ALLERGIES:  Reviewed.    ALBER Lemos MD        D: 2023   T: 2023   MT: jose    Name:     MICAH BILLINGS  MRN:      [id number]        Account:    [id number]   :      1975           Visit Date: 2023     Document: [id number]      Again, thank you for allowing me to participate in the care of your patient.      Sincerely,    ALBER Lemos MD

## 2023-05-22 ENCOUNTER — TRANSFERRED RECORDS (OUTPATIENT)
Dept: HEALTH INFORMATION MANAGEMENT | Facility: CLINIC | Age: 48
End: 2023-05-22

## 2023-05-30 ENCOUNTER — TELEPHONE (OUTPATIENT)
Dept: BEHAVIORAL HEALTH | Facility: OTHER | Age: 48
End: 2023-05-30

## 2023-05-30 NOTE — TELEPHONE ENCOUNTER
[facility]  @FLOW([id number])@      Social Work Care Navigator Note      Patient: Micah Rosenthal  Date: May 30, 2023  Preferred Name: Micah    Previous PHQ-9:     11/23/2021    10:06 AM 11/17/2022     1:07 PM 1/12/2023     1:32 PM   PHQ-9 SCORE   PHQ-9 Total Score 14 6 5     Previous YURI-7:       11/23/2021    10:06 AM 11/17/2022     1:08 PM 1/12/2023     1:33 PM   YURI-7 SCORE   Total Score 15 4 0     ROXANA LEVEL:       View : No data to display.                Preferred Contact: @KARIME([id number])@  Type of Contact Today: Phone call (not reached/unavailable)      Data: (subjective / Objective):  Patient Goals Areas: @FLOW([id number])@  Patient Stated Goals: @FLOW([id number])@  Recent ED/IP Admission or Discharge?   None  Attempted to reach patient, but was unsuccessful.  Plan to attempt again.  CORNELIUS Enciso      Referrals/other:   [unfilled]      JACQUELYN Enciso, CORNELIUS     Next 5 appointments (look out 90 days)    Jun 02, 2023  1:00 PM  (Arrive by 12:45 PM)  SHORT with ELODIA Tomlin  [facility] [place] ([facility] [place] ) [address]  [place] [address]  [phone]   Jul 27, 2023  1:30 PM  (Arrive by 1:15 PM)  SHORT with ELODIA Tomlin  [facility] [place] ([facility] [place] [facility] [place] ) [address]  [place] [address]  [phone]

## 2023-06-01 ENCOUNTER — HOSPITAL ENCOUNTER (OUTPATIENT)
Dept: ULTRASOUND IMAGING | Facility: HOSPITAL | Age: 48
Discharge: HOME | End: 2023-06-01
Attending: PHYSICIAN ASSISTANT | Admitting: PHYSICIAN ASSISTANT
Payer: COMMERCIAL

## 2023-06-01 DIAGNOSIS — M79.89 LEG SWELLING: ICD-10-CM

## 2023-06-01 PROCEDURE — 93922 UPR/L XTREMITY ART 2 LEVELS: CPT

## 2023-06-02 ENCOUNTER — ANCILLARY PROCEDURE (OUTPATIENT)
Dept: GENERAL RADIOLOGY | Facility: OTHER | Age: 48
End: 2023-06-02
Attending: PHYSICIAN ASSISTANT
Payer: COMMERCIAL

## 2023-06-02 ENCOUNTER — OFFICE VISIT (OUTPATIENT)
Dept: FAMILY MEDICINE | Facility: OTHER | Age: 48
End: 2023-06-02
Attending: PHYSICIAN ASSISTANT
Payer: COMMERCIAL

## 2023-06-02 VITALS
HEIGHT: 71 IN | DIASTOLIC BLOOD PRESSURE: 71 MMHG | TEMPERATURE: 96 F | HEART RATE: 100 BPM | OXYGEN SATURATION: 95 % | BODY MASS INDEX: 30.8 KG/M2 | SYSTOLIC BLOOD PRESSURE: 133 MMHG | WEIGHT: 220 LBS | RESPIRATION RATE: 20 BRPM

## 2023-06-02 DIAGNOSIS — R29.898 LEG WEAKNESS, BILATERAL: ICD-10-CM

## 2023-06-02 DIAGNOSIS — G89.29 CHRONIC BILATERAL LOW BACK PAIN, UNSPECIFIED WHETHER SCIATICA PRESENT: ICD-10-CM

## 2023-06-02 DIAGNOSIS — M54.50 CHRONIC BILATERAL LOW BACK PAIN, UNSPECIFIED WHETHER SCIATICA PRESENT: ICD-10-CM

## 2023-06-02 DIAGNOSIS — G89.29 CHRONIC BILATERAL LOW BACK PAIN, UNSPECIFIED WHETHER SCIATICA PRESENT: Primary | ICD-10-CM

## 2023-06-02 DIAGNOSIS — M54.50 CHRONIC BILATERAL LOW BACK PAIN, UNSPECIFIED WHETHER SCIATICA PRESENT: Primary | ICD-10-CM

## 2023-06-02 PROCEDURE — G0463 HOSPITAL OUTPT CLINIC VISIT: HCPCS | Mod: 25

## 2023-06-02 PROCEDURE — 99213 OFFICE O/P EST LOW 20 MIN: CPT | Performed by: PHYSICIAN ASSISTANT

## 2023-06-02 PROCEDURE — 72100 X-RAY EXAM L-S SPINE 2/3 VWS: CPT | Mod: TC

## 2023-06-02 PROCEDURE — G0463 HOSPITAL OUTPT CLINIC VISIT: HCPCS

## 2023-06-02 RX ORDER — METHYLPREDNISOLONE 4 MG
TABLET, DOSE PACK ORAL
Qty: 21 TABLET | Refills: 0 | Status: SHIPPED | OUTPATIENT
Start: 2023-06-02 | End: 2023-07-27

## 2023-06-02 ASSESSMENT — PAIN SCALES - GENERAL: PAINLEVEL: SEVERE PAIN (7)
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
## 2023-06-19 ENCOUNTER — HOSPITAL ENCOUNTER (OUTPATIENT)
Dept: MRI IMAGING | Facility: HOSPITAL | Age: 48
Discharge: HOME | End: 2023-06-19
Attending: PHYSICIAN ASSISTANT | Admitting: PHYSICIAN ASSISTANT
Payer: COMMERCIAL

## 2023-06-19 DIAGNOSIS — M54.50 CHRONIC BILATERAL LOW BACK PAIN, UNSPECIFIED WHETHER SCIATICA PRESENT: ICD-10-CM

## 2023-06-19 DIAGNOSIS — R29.898 LEG WEAKNESS, BILATERAL: ICD-10-CM

## 2023-06-19 DIAGNOSIS — G89.29 CHRONIC BILATERAL LOW BACK PAIN, UNSPECIFIED WHETHER SCIATICA PRESENT: ICD-10-CM

## 2023-06-19 PROCEDURE — 72148 MRI LUMBAR SPINE W/O DYE: CPT

## 2023-06-30 ENCOUNTER — TELEPHONE (OUTPATIENT)
Dept: BEHAVIORAL HEALTH | Facility: OTHER | Age: 48
End: 2023-06-30

## 2023-06-30 NOTE — TELEPHONE ENCOUNTER
[facility] CC will disenroll patient from [facility] services today's date. Patient/significant other have not had any needs from [facility] services in a few months, updated RN Chronic Pain Care Coordinator, Yoli Kowalski who agreed with disenrollment.     Huong Connelly, \[facility] [place] [facility]\""  Social Work Care Coordinator  [facility]   [phone]

## 2023-07-05 DIAGNOSIS — M79.89 LEG SWELLING: ICD-10-CM

## 2023-07-06 RX ORDER — FUROSEMIDE 20 MG
TABLET ORAL
Qty: 60 TABLET | Refills: 0 | Status: SHIPPED | OUTPATIENT
Start: 2023-07-06 | End: 2023-08-09

## 2023-07-06 NOTE — TELEPHONE ENCOUNTER
Lasix 20 mg      Last Written Prescription Date:  4/20/23  Last Fill Quantity: 60,   # refills: 1  Last Office Visit: 6/2/23  Future Office visit:    Next 5 appointments (look out 90 days)    Jul 27, 2023  1:30 PM  (Arrive by 1:15 PM)  SHORT with ELODIA Tomlin  [facility] ([facility] [place] ) [address]  [place] [address]  [phone]           Routing refill request to provider for review/approval because:    Diuretics (Including Combos) Protocol Failed      Normal serum creatinine on file in past 12 months        Recent Labs   Lab Test 11/29/21  1044   CR 0.76        Normal serum potassium on file in past 12 months        Recent Labs   Lab Test 11/29/21  1044   POTASSIUM 3.6                 Normal serum sodium on file in past 12 months        Recent Labs   Lab Test 11/29/21  1044

## 2023-07-26 ENCOUNTER — PATIENT OUTREACH (OUTPATIENT)
Dept: CARE COORDINATION | Facility: OTHER | Age: 48
End: 2023-07-26

## 2023-07-26 NOTE — PROGRESS NOTES
Clinic Care Coordination Contact  Care Team Conversations    CC sent Huong a message this date reminding her of Ronaldo's appointment tomorrow and to arrive at 1:15PM.    Yoli Kowalski RN-BSN, [facility] Care Coordinator  [phone] opt. #3

## 2023-07-26 NOTE — PROGRESS NOTES
"  Assessment & Plan     Opioid use disorder  He is doing so well. He has been here with us over 3 years.  He has his new teeth and has stopped smoking  He is feeling good about getting his neck surgery done.  With this he should feel so much better.    - Urine Drugs of Abuse Screen (Tox13)  - SUBOXONE 12-3 MG FILM per film; Place 1 Film under the tongue daily PM  - SUBOXONE 8-2 MG per film; Place 1 Film under the tongue daily AM    Prescription drug management  10  minutes spent by me on the date of the encounter doing chart review, history and exam, documentation and further activities per the note       BMI:   Estimated body mass index is 31.66 kg/m  as calculated from the following:    Height as of 6/2/23: 1.803 m (5' 11\").    Weight as of this encounter: 103 kg (227 lb).   Weight management plan: he just quit smking we are not going to worry about this right now.     See Patient Instructions    No follow-ups on file.    ELODIA Barrow  [facility] - REVA Obrien is a 48 year old, presenting for the following health issues:  Recheck Medication        6/2/2023    12:52 PM   Additional Questions   Roomed by myranda navarrete   Accompanied by self     HPI       He is currently taking 20/5 mg of buprenorphine daily.   Last fill 6.27.23 #30 of each.     Status Since Last Visit:  Have you used any opioids since your last visit?: no use since last visit  Do you feel that your dose of suboxone is too high or too low? Adequate  Have there been cravings for opioids? No   Any withdrawal symptoms?  na     Any side effects from the medication? None   Any alcohol use? None   Any other recreational drug use? No and cristi smoking     Precipitating Factors:  Triggers have been: non-existent   Other Supports:  Do you attend counseling or meet with a therapist? No  Do you attend NA or AA meetings? No  Do you have/meet with a sponsor? No  Family and support systems have been: Helpful  What other goals have " you been working on (job, family, relationships, etc)? Working on getting his health back.             11/17/2022     1:07 PM 1/12/2023     1:32 PM 7/27/2023     1:32 PM   PHQ-9 SCORE   PHQ-9 Total Score 6 5 0           11/23/2021    10:06 AM 11/17/2022     1:08 PM 1/12/2023     1:33 PM   YURI-7 SCORE   Total Score 15 4 0     PDMP Review         Value Time User    State PDMP site checked  Yes 12/11/2020 12:15 PM Florence Sneed PA                    Review of Systems   Constitutional, HEENT, cardiovascular, pulmonary, gi and gu systems are negative, except as otherwise noted.      Objective    /60 (BP Location: Left arm, Patient Position: Sitting, Cuff Size: Adult Regular)   Pulse 83   Temp 97.5  F (36.4  C) (Tympanic)   Resp 18   Wt 103 kg (227 lb)   SpO2 94%   BMI 31.66 kg/m    Body mass index is 31.66 kg/m .  Physical Exam   GENERAL: healthy, alert and no distress  EYES: Eyes grossly normal to inspection, PERRL and conjunctivae and sclerae normal  HENT: ear canals and TM's normal, nose and mouth without ulcers or lesions  NECK: no adenopathy, no asymmetry, masses, or scars and thyroid normal to palpation  RESP: lungs clear to auscultation - no rales, rhonchi or wheezes  CV: regular rate and rhythm, normal S1 S2, no S3 or S4, no murmur, click or rub, no peripheral edema and peripheral pulses strong  MS: no gross musculoskeletal defects noted, no edema    No results found for this or any previous visit (from the past 24 hour(s)).

## 2023-07-27 ENCOUNTER — APPOINTMENT (OUTPATIENT)
Dept: LAB | Facility: OTHER | Age: 48
End: 2023-07-27
Attending: PHYSICIAN ASSISTANT
Payer: COMMERCIAL

## 2023-07-27 ENCOUNTER — PATIENT OUTREACH (OUTPATIENT)
Dept: CARE COORDINATION | Facility: OTHER | Age: 48
End: 2023-07-27

## 2023-07-27 ENCOUNTER — OFFICE VISIT (OUTPATIENT)
Dept: FAMILY MEDICINE | Facility: OTHER | Age: 48
End: 2023-07-27
Attending: PHYSICIAN ASSISTANT
Payer: COMMERCIAL

## 2023-07-27 VITALS
OXYGEN SATURATION: 94 % | SYSTOLIC BLOOD PRESSURE: 112 MMHG | WEIGHT: 227 LBS | HEART RATE: 83 BPM | RESPIRATION RATE: 18 BRPM | TEMPERATURE: 97.5 F | BODY MASS INDEX: 31.66 KG/M2 | DIASTOLIC BLOOD PRESSURE: 60 MMHG

## 2023-07-27 DIAGNOSIS — F11.90 OPIOID USE DISORDER: Primary | ICD-10-CM

## 2023-07-27 PROCEDURE — G0463 HOSPITAL OUTPT CLINIC VISIT: HCPCS

## 2023-07-27 PROCEDURE — 80306 DRUG TEST PRSMV INSTRMNT: CPT | Mod: ZL | Performed by: PHYSICIAN ASSISTANT

## 2023-07-27 PROCEDURE — 99213 OFFICE O/P EST LOW 20 MIN: CPT | Performed by: PHYSICIAN ASSISTANT

## 2023-07-27 RX ORDER — BUPRENORPHINE HYDROCHLORIDE, NALOXONE HYDROCHLORIDE 8; 2 MG/1; MG/1
1 FILM, SOLUBLE BUCCAL; SUBLINGUAL DAILY
Qty: 30 EACH | Refills: 2 | Status: SHIPPED | OUTPATIENT
Start: 2023-07-27 | End: 2023-10-19

## 2023-07-27 RX ORDER — BUPRENORPHINE HYDROCHLORIDE, NALOXONE HYDROCHLORIDE 12; 3 MG/1; MG/1
1 FILM, SOLUBLE BUCCAL; SUBLINGUAL DAILY
Qty: 30 EACH | Refills: 2 | Status: SHIPPED | OUTPATIENT
Start: 2023-07-27 | End: 2023-10-19

## 2023-07-27 ASSESSMENT — PAIN SCALES - GENERAL: PAINLEVEL: SEVERE PAIN (7)

## 2023-07-27 ASSESSMENT — PATIENT HEALTH QUESTIONNAIRE - PHQ9: SUM OF ALL RESPONSES TO PHQ QUESTIONS 1-9: 0

## 2023-07-27 NOTE — PROGRESS NOTES
Clinic Care Coordination Contact  Follow Up Progress Note      Assessment: CC attended office visit with pt and Florence Sneed this date.  Ronaldo is doing very well in recovery.  Suboxone dose is adequate and would like to continue on current dose.  Quit smoking cigarettes 7 weeks ago!  Commended him on this.  He needed to quit smoking in order for Dr. Martinez to operate on his neck.  Ronaldo reports he will call Dr. Martinez's office within the next 2 weeks to get another appointment with him to discuss surgery more in depth.   Requested that he let CC know if/when surgery is scheduled so we can plan for postop pain management.    Care Gaps:    Health Maintenance Due   Topic Date Due    YEARLY PREVENTIVE VISIT  Never done    HEPATITIS B IMMUNIZATION (1 of 3 - 3-dose series) Never done    COVID-19 Vaccine (1) Never done    Pneumococcal Vaccine: Pediatrics (0 to 5 Years) and At-Risk Patients (6 to 64 Years) (1 - PCV) Never done    COLORECTAL CANCER SCREENING  Never done    LIPID  Never done    DTAP/TDAP/TD IMMUNIZATION (6 - Td or Tdap) 12/30/2021    ADVANCE CARE PLANNING  01/17/2022       Will continue to work on these    Care Plans  Care Plan: Sleep       Problem: Impaired Sleep Pattern       Goal: Improve Sleep Habits       This Visit's Progress: 30%    Note:     Due to pain - Florence ordered CT scan of neck this date                            Care Plan: Chronic Pain       Problem: Chronic Pain is not self-managed       Goal: Patient will maintain consistent outpatient follow up to manage Chronic Pain and avoid hospitalizations for the next 6 months       This Visit's Progress: On track                      Problem: Inability to perform ADLs       Goal: Maintain ability to perform ADLs without difficulty       This Visit's Progress: 90%                      Problem: Medication adherence       Goal: Patient will adhere to medication regimen       This Visit's Progress: 90%                            Intervention/Education  provided during outreach: Continue meds as prescribed.  Keep CC informed on if/when surgery is scheduled.  Will need preop.  Commended him on smoking cessation!     Outreach Frequency: 3 months        Plan:   No change in treatment plan.  Care Coordinator will follow up in 12 weeks, sooner if he has concerns.    Yoli Kowalski RN-BSN, [facility] Care Coordinator  [phone] opt. #3

## 2023-08-09 DIAGNOSIS — M79.89 LEG SWELLING: ICD-10-CM

## 2023-08-09 RX ORDER — FUROSEMIDE 20 MG
20 TABLET ORAL 2 TIMES DAILY
Qty: 60 TABLET | Refills: 5 | Status: SHIPPED | OUTPATIENT
Start: 2023-08-09 | End: 2024-03-11

## 2023-08-09 NOTE — TELEPHONE ENCOUNTER
Lasix  Last Written Prescription Date:  7.6.23  Last Fill Quantity: #60,   # refills: 0  Last Office Visit: 7.27.23  Future Office visit:    Next 5 appointments (look out 90 days)      Oct 19, 2023  1:30 PM  (Arrive by 1:15 PM)  SHORT with ELODIA Tomlin  [facility] [place] ([facility] [place] ) [address]  [place] [address]  [phone]             Routing refill request to provider for review/approval because:

## 2023-08-24 DIAGNOSIS — M50.90 CERVICAL DISC DISEASE: ICD-10-CM

## 2023-08-25 RX ORDER — CYCLOBENZAPRINE HCL 10 MG
TABLET ORAL
Qty: 90 TABLET | Refills: 0 | Status: SHIPPED | OUTPATIENT
Start: 2023-08-25 | End: 2023-10-11

## 2023-08-25 NOTE — TELEPHONE ENCOUNTER
cyclobenzaprine (FLEXERIL) 10 MG tablet       Last Written Prescription Date:  3/23/23  Last Fill Quantity: 90,   # refills: 2  Last Office Visit: 7/27/23  Future Office visit:    Next 5 appointments (look out 90 days)      Oct 19, 2023  1:30 PM  (Arrive by 1:15 PM)  SHORT with ELODIA Tomlin  [facility] [place] ([facility] [place] ) [address]  [place] [address]  [phone]

## 2023-09-14 NOTE — PROGRESS NOTES
Clinic Care Coordination Contact  Care Team Conversations    CC let Huong know that Ronaldo has an appointment tomorrow and to arrive at 1:15PM.    Yoli Kowalski RN-BSN, [facility] Care Coordinator  [phone] opt. #3           Consent: Written consent was obtained and risks were reviewed including but not limited to scarring, infection, bleeding, scabbing, incomplete removal, nerve damage and allergy to anesthesia.

## 2023-10-11 DIAGNOSIS — M50.90 CERVICAL DISC DISEASE: ICD-10-CM

## 2023-10-11 RX ORDER — CYCLOBENZAPRINE HCL 10 MG
TABLET ORAL
Qty: 90 TABLET | Refills: 3 | Status: SHIPPED | OUTPATIENT
Start: 2023-10-11 | End: 2023-12-18

## 2023-10-11 NOTE — TELEPHONE ENCOUNTER
Flexeril      Last Written Prescription Date:  8.25.23  Last Fill Quantity: #90,   # refills: 0  Last Office Visit: 7.27.23  Future Office visit:    Next 5 appointments (look out 90 days)      Nov 02, 2023  9:45 AM  (Arrive by 9:30 AM)  SHORT with ELODIA Tomlin  [facility] ([facility] [place] ) [address]  [place] [address]  [phone]             Routing refill request to provider for review/approval because:  Drug not on the [facility], [facility] or [facility] refill protocol or controlled substance

## 2023-10-19 DIAGNOSIS — F11.90 OPIOID USE DISORDER: ICD-10-CM

## 2023-10-19 NOTE — TELEPHONE ENCOUNTER
Suboxone    - had to move his appointment back a few weeks.  Will need refill early  next week.   Last Written Prescription Date:  9.25.23  Last Fill Quantity: #30, #30,   # refills: 0  Last Office Visit: 7.27.23  Future Office visit:    Next 5 appointments (look out 90 days)      Nov 02, 2023  9:45 AM  (Arrive by 9:30 AM)  SHORT with ELODIA Tomlin  [facility] [place] ([facility] [place] ) [address]  [place] [address]  [phone]             Routing refill request to provider for review/approval because:  Drug not on the [facility], [facility] or [facility] refill protocol or controlled substance

## 2023-10-20 RX ORDER — BUPRENORPHINE HYDROCHLORIDE, NALOXONE HYDROCHLORIDE 8; 2 MG/1; MG/1
1 FILM, SOLUBLE BUCCAL; SUBLINGUAL DAILY
Qty: 30 EACH | Refills: 0 | Status: SHIPPED | OUTPATIENT
Start: 2023-10-20 | End: 2023-11-20

## 2023-10-20 RX ORDER — BUPRENORPHINE HYDROCHLORIDE, NALOXONE HYDROCHLORIDE 12; 3 MG/1; MG/1
1 FILM, SOLUBLE BUCCAL; SUBLINGUAL DAILY
Qty: 30 EACH | Refills: 0 | Status: SHIPPED | OUTPATIENT
Start: 2023-10-20 | End: 2023-11-20

## 2023-11-01 ENCOUNTER — PATIENT OUTREACH (OUTPATIENT)
Dept: CARE COORDINATION | Facility: OTHER | Age: 48
End: 2023-11-01

## 2023-11-01 NOTE — PROGRESS NOTES
Clinic Care Coordination Contact  Care Team Conversations    CC sent Ronaldo a text message this date reminding him of his appointment tomorrow and to arrive at 9:30AM.    Yoli Kowalski RN-BSN, [facility] Care Coordinator  [phone] opt. #3

## 2023-11-01 NOTE — PROGRESS NOTES
Ronaldo is unable to make it tomorrow.  Asked to reschedule.  Appt rescheduled for Thursday, Nov 16, arrive at 9:30AM.  He is happy and agreeable with this.    Yoli Kowalski RN-BSN, [facility] Care Coordinator  [phone] opt. #3

## 2023-11-20 DIAGNOSIS — F11.90 OPIOID USE DISORDER: ICD-10-CM

## 2023-11-20 RX ORDER — BUPRENORPHINE HYDROCHLORIDE, NALOXONE HYDROCHLORIDE 12; 3 MG/1; MG/1
1 FILM, SOLUBLE BUCCAL; SUBLINGUAL DAILY
Qty: 30 EACH | Refills: 0 | Status: SHIPPED | OUTPATIENT
Start: 2023-11-20 | End: 2023-12-18

## 2023-11-20 RX ORDER — BUPRENORPHINE HYDROCHLORIDE, NALOXONE HYDROCHLORIDE 8; 2 MG/1; MG/1
1 FILM, SOLUBLE BUCCAL; SUBLINGUAL DAILY
Qty: 30 EACH | Refills: 0 | Status: SHIPPED | OUTPATIENT
Start: 2023-11-20 | End: 2023-12-18

## 2023-11-20 NOTE — TELEPHONE ENCOUNTER
Suboxone      Last Written Prescription Date:  10.21.23  Last Fill Quantity: #30, #30,   # refills: 0  Last Office Visit: 7.27.23  Future Office visit:    Next 5 appointments (look out 90 days)      Dec 18, 2023  9:45 AM  (Arrive by 9:30 AM)  SHORT with ELODIA Tomlin  [facility] ([facility] [place] ) [address]  [place] [address]  [phone]             Routing refill request to provider for review/approval because:  Drug not on the [facility], [facility] or [facility] refill protocol or controlled substance

## 2023-12-15 ENCOUNTER — PATIENT OUTREACH (OUTPATIENT)
Dept: CARE COORDINATION | Facility: OTHER | Age: 48
End: 2023-12-15

## 2023-12-15 NOTE — PROGRESS NOTES
Clinic Care Coordination Contact  Care Team Conversations    CC sent Ronaldo a message this date reminding him of his appointment on Monday, December 18, and to arrive at 9:30AM.    Yoli Kowalski RN-BSN, [facility] Care Coordinator  [phone] opt. #3

## 2023-12-15 NOTE — PROGRESS NOTES
Assessment & Plan     Opioid use disorder  He is sober.  Having some issues with pain in his back. He will be getting scripts today see below for other issues. See us back in 3 months.   - Urine Drug Screen Buprenorphine Urine Qualitative [id number], Ethanol Urine Qualitative IHM039, Methadone Urine Qualitative [id number], Oxycodone Urine Qualitative [id number], Creatinine Urine Random [id number]  - SUBOXONE 12-3 MG FILM per film; Place 1 Film under the tongue daily PM  - SUBOXONE 8-2 MG per film; Place 1 Film under the tongue daily AM    Cervical disc disease  Seeing Dr. Velazquez, won;t do surgery until his eczema and seborrhea is clear.  Discussion on how this rash thing is chronic.   - cyclobenzaprine (FLEXERIL) 10 MG tablet; TAKE 1 TABLET BY MOUTH 3 TIMES A DAYAS NEEDED    Acute superficial gastritis without hemorrhage  Refill is given.   - omeprazole (PRILOSEC) 40 MG DR capsule; Take 1 capsule (40 mg) by mouth daily    Chronic midline low back pain with bilateral sciatica  He needs to see IR for injection. Swelling in his legs as he is in the chair more than he would like.  Causes leg swelling.    - IR Consultation for IR Exam (Pain Consult); Future  - HYDROmorphone (DILAUDID) 2 MG tablet; Take 1 tablet (2 mg) by mouth every 6 hours as needed for pain    Other eczema  He will try the shampoo.  If this helps let us know. Discussion on use of oral with a sauna to try to get him from inside out.  LAMP with SAD lights . This might help.   - ketoconazole (NIZORAL) 2 % external shampoo; Apply topically daily as needed for itching or irritation    Review of external notes as documented elsewhere in note  Ordering of each unique test  Prescription drug management  15  minutes spent by me on the date of the encounter doing chart review, history and exam, documentation and further activities per the note       See Patient Instructions    No follow-ups on file.    Florence Sneed, PA  [facility] - [place]    Subjective    Micah is a 48 year old, presenting for the following health issues:  Recheck Medication        12/18/2023     9:36 AM   Additional Questions   Roomed by jericho arrieta   Accompanied by self       HPI       He is currently taking 20/5 mg of buprenorphine daily.   Last fill 11.21.23 #30 of each.    Status Since Last Visit:  Have you used any opioids since your last visit?: no use since last visit  Do you feel that your dose of suboxone is too high or too low? Adequate  Have there been cravings for opioids? No   Any withdrawal symptoms?  None     Any side effects from the medication?  None  Any alcohol use? None  Any other recreational drug use? THC - medical cannabis     Precipitating Factors:  Triggers have been: non-existent   Other Supports:  Do you attend counseling or meet with a therapist? No  Do you attend NA or AA meetings? No  Do you have/meet with a sponsor? No  Family and support systems have been: Helpful   What other goals have you been working on (job, family, relationships, etc)? Attending scheduled appointments  Has seen Dr. Martinez - there is a surgical option for his neck, but his skin issue needs to be clear prior to that   Has seen Derm in the past - given cream - will clear up, but it always comes back if he stops using the cream  Low back pain - MRI in 2023 - PT offered, which he declines - consider IR consult   Does not want to take the water pills daily - feels like the swelling only occurs when he is unable to lay down  Asking for a small amount of pain pills per month for an increase in pain - discussion on older             11/17/2022     1:07 PM 1/12/2023     1:32 PM 7/27/2023     1:32 PM   PHQ-9 SCORE   PHQ-9 Total Score 6 5 0           11/23/2021    10:06 AM 11/17/2022     1:08 PM 1/12/2023     1:33 PM   YURI-7 SCORE   Total Score 15 4 0     PDMP Review         Value Time User    State PDMP site checked  Yes 7/27/2023  1:47 PM Florence Sneed PA              Nicotine/Tobacco  Cessation  Micah Rosenthal presents to discuss nicotine/tobacco cessation.    Tobacco Use    Smoking status: Former     Packs/day: 1.00     Years: 34.00     Additional pack years: 0.00     Total pack years: 34.00     Types: Cigarettes     Start date: 1988     Quit date: 2023     Years since quittin.4     Passive exposure: Current    Smokeless tobacco: Never   Vaping Use    Vaping Use: Never used   Substance and Sexual Activity    Alcohol use: No     Alcohol/week: 0.0 standard drinks of alcohol    Drug use: Not Currently    Sexual activity: Yes     Partners: Female         2023    12:46 PM   Smoking Cessation - Willing To Make Quit Attempt   If I could quit smoking, I would.  5   I want to quit smoking because I worry about how smoking affects my health.   5   I would be willing to make a plan to quit smoking.  4   I would be willing to cut down my number of cigarettes before quiting.  5   Total Score 19     Prior treatments tried: None        Review of Systems   CONSTITUTIONAL: NEGATIVE for fever, chills, change in weight  INTEGUMENTARY/SKIN: NEGATIVE for worrisome rashes, moles or lesions  EYES: NEGATIVE for vision changes or irritation  ENT/MOUTH: NEGATIVE for ear, mouth and throat problems  RESP: NEGATIVE for significant cough or SOB  BREAST: NEGATIVE for masses, tenderness or discharge  CV: NEGATIVE for chest pain, palpitations or peripheral edema  GI: NEGATIVE for nausea, abdominal pain, heartburn, or change in bowel habits  : NEGATIVE for frequency, dysuria, or hematuria  MUSCULOSKELETAL: NEGATIVE for significant arthralgias or myalgia  NEURO: NEGATIVE for weakness, dizziness or paresthesias  ENDOCRINE: NEGATIVE for temperature intolerance, skin/hair changes  HEME: NEGATIVE for bleeding problems  PSYCHIATRIC: NEGATIVE for changes in mood or affect      Objective    /79 (BP Location: Left arm, Patient Position: Sitting, Cuff Size: Adult Regular)   Pulse 99   Temp 97.5  F (36.4  C)  "(Tympanic)   Ht 1.803 m (5' 11\")   Wt 89 kg (196 lb 3.2 oz)   SpO2 97%   BMI 27.36 kg/m    Body mass index is 27.36 kg/m .  Physical Exam   GENERAL: healthy, alert and no distress  EYES: Eyes grossly normal to inspection, PERRL and conjunctivae and sclerae normal  HENT: ear canals and TM's normal, nose and mouth without ulcers or lesions  NECK: no adenopathy, no asymmetry, masses, or scars and thyroid normal to palpation  RESP: lungs clear to auscultation - no rales, rhonchi or wheezes  CV: regular rate and rhythm, normal S1 S2, no S3 or S4, no murmur, click or rub, no peripheral edema and peripheral pulses strong  ABDOMEN: soft, nontender, no hepatosplenomegaly, no masses and bowel sounds normal  MS: no gross musculoskeletal defects noted, no edema  Skin: red raised patches , skin is irritated. Around scalp line. Kenalog  And ketoconazole.  .   Results for orders placed or performed in visit on 12/18/23 (from the past 24 hour(s))   Urine Drug Screen Buprenorphine Urine Qualitative [id number], Ethanol Urine Qualitative [id number], Methadone Urine Qualitative [id number], Oxycodone Urine Qualitative [id number], Creatinine Urine Random [id number]    Narrative    The following orders were created for panel order Urine Drug Screen Buprenorphine Urine Qualitative [id number], Ethanol Urine Qualitative EEA634, Methadone Urine Qualitative [id number], Oxycodone Urine Qualitative [id number], Creatinine Urine Random [id number].  Procedure                               Abnormality         Status                     ---------                               -----------         ------                     Urine Drug Screen Panel[[id number]]                          In process                 Buprenorphine Urine, Mata...[[id number]]                      In process                 Ethanol urine[[id number]]                                    In process                 Methadone Qual Urine[[id number]]                             In process                 Oxycodone " Urine, Qualita...[[id number]]                      In process                 Creatinine random urine[[id number]]                          In process                   Please view results for these tests on the individual orders.

## 2023-12-18 ENCOUNTER — OFFICE VISIT (OUTPATIENT)
Dept: FAMILY MEDICINE | Facility: OTHER | Age: 48
End: 2023-12-18
Attending: PHYSICIAN ASSISTANT
Payer: COMMERCIAL

## 2023-12-18 ENCOUNTER — APPOINTMENT (OUTPATIENT)
Dept: LAB | Facility: OTHER | Age: 48
End: 2023-12-18
Attending: PHYSICIAN ASSISTANT
Payer: COMMERCIAL

## 2023-12-18 ENCOUNTER — PATIENT OUTREACH (OUTPATIENT)
Dept: CARE COORDINATION | Facility: OTHER | Age: 48
End: 2023-12-18

## 2023-12-18 VITALS
WEIGHT: 196.2 LBS | HEIGHT: 71 IN | HEART RATE: 99 BPM | SYSTOLIC BLOOD PRESSURE: 135 MMHG | OXYGEN SATURATION: 97 % | TEMPERATURE: 97.5 F | BODY MASS INDEX: 27.47 KG/M2 | DIASTOLIC BLOOD PRESSURE: 79 MMHG

## 2023-12-18 DIAGNOSIS — K29.00 ACUTE SUPERFICIAL GASTRITIS WITHOUT HEMORRHAGE: ICD-10-CM

## 2023-12-18 DIAGNOSIS — M54.41 CHRONIC MIDLINE LOW BACK PAIN WITH BILATERAL SCIATICA: ICD-10-CM

## 2023-12-18 DIAGNOSIS — M54.42 CHRONIC MIDLINE LOW BACK PAIN WITH BILATERAL SCIATICA: ICD-10-CM

## 2023-12-18 DIAGNOSIS — F11.90 OPIOID USE DISORDER: Primary | ICD-10-CM

## 2023-12-18 DIAGNOSIS — G89.29 CHRONIC MIDLINE LOW BACK PAIN WITH BILATERAL SCIATICA: ICD-10-CM

## 2023-12-18 DIAGNOSIS — M50.90 CERVICAL DISC DISEASE: ICD-10-CM

## 2023-12-18 DIAGNOSIS — L30.8 OTHER ECZEMA: ICD-10-CM

## 2023-12-18 LAB
AMPHETAMINES UR QL SCN: ABNORMAL
BARBITURATES UR QL SCN: ABNORMAL
BENZODIAZ UR QL SCN: ABNORMAL
BUPRENORPHINE UR QL: ABNORMAL
BZE UR QL SCN: ABNORMAL
CANNABINOIDS UR QL SCN: ABNORMAL
CREAT UR-MCNC: 40.5 MG/DL
ETHANOL UR QL SCN: NORMAL
FENTANYL UR QL: ABNORMAL
METHADONE UR QL SCN: NORMAL
OPIATES UR QL SCN: ABNORMAL
OXYCODONE UR QL: NORMAL
PCP QUAL URINE (ROCHE): ABNORMAL

## 2023-12-18 PROCEDURE — 80307 DRUG TEST PRSMV CHEM ANLYZR: CPT | Mod: ZL | Performed by: PHYSICIAN ASSISTANT

## 2023-12-18 PROCEDURE — 96372 THER/PROPH/DIAG INJ SC/IM: CPT | Performed by: PHYSICIAN ASSISTANT

## 2023-12-18 PROCEDURE — G0463 HOSPITAL OUTPT CLINIC VISIT: HCPCS

## 2023-12-18 PROCEDURE — 250N000011 HC RX IP 250 OP 636: Mod: JZ | Performed by: PHYSICIAN ASSISTANT

## 2023-12-18 PROCEDURE — 82570 ASSAY OF URINE CREATININE: CPT | Mod: ZL | Performed by: PHYSICIAN ASSISTANT

## 2023-12-18 PROCEDURE — G0463 HOSPITAL OUTPT CLINIC VISIT: HCPCS | Mod: 25

## 2023-12-18 PROCEDURE — 99214 OFFICE O/P EST MOD 30 MIN: CPT | Performed by: PHYSICIAN ASSISTANT

## 2023-12-18 RX ORDER — HYDROMORPHONE HYDROCHLORIDE 2 MG/1
2 TABLET ORAL EVERY 6 HOURS PRN
Qty: 10 TABLET | Refills: 0 | Status: SHIPPED | OUTPATIENT
Start: 2023-12-18 | End: 2023-12-21

## 2023-12-18 RX ORDER — METHYLPREDNISOLONE SOD SUCC 125 MG
125 VIAL (EA) INJECTION ONCE
Status: COMPLETED | OUTPATIENT
Start: 2023-12-18 | End: 2023-12-18

## 2023-12-18 RX ORDER — BUPRENORPHINE HYDROCHLORIDE, NALOXONE HYDROCHLORIDE 12; 3 MG/1; MG/1
1 FILM, SOLUBLE BUCCAL; SUBLINGUAL DAILY
Qty: 30 EACH | Refills: 2 | Status: SHIPPED | OUTPATIENT
Start: 2023-12-18 | End: 2024-03-11

## 2023-12-18 RX ORDER — BUPRENORPHINE HYDROCHLORIDE, NALOXONE HYDROCHLORIDE 8; 2 MG/1; MG/1
1 FILM, SOLUBLE BUCCAL; SUBLINGUAL DAILY
Qty: 30 EACH | Refills: 2 | Status: SHIPPED | OUTPATIENT
Start: 2023-12-18 | End: 2024-03-11

## 2023-12-18 RX ORDER — KETOCONAZOLE 20 MG/ML
SHAMPOO TOPICAL DAILY PRN
Qty: 240 ML | Refills: 3 | Status: SHIPPED | OUTPATIENT
Start: 2023-12-18 | End: 2024-06-03

## 2023-12-18 RX ORDER — CYCLOBENZAPRINE HCL 10 MG
TABLET ORAL
Qty: 90 TABLET | Refills: 3 | Status: SHIPPED | OUTPATIENT
Start: 2023-12-18 | End: 2024-06-03

## 2023-12-18 RX ORDER — OMEPRAZOLE 40 MG/1
40 CAPSULE, DELAYED RELEASE ORAL DAILY
Qty: 90 CAPSULE | Refills: 3 | Status: SHIPPED | OUTPATIENT
Start: 2023-12-18

## 2023-12-18 RX ADMIN — METHYLPREDNISOLONE SODIUM SUCCINATE 125 MG: 125 INJECTION, POWDER, FOR SOLUTION INTRAMUSCULAR; INTRAVENOUS at 11:03

## 2023-12-18 ASSESSMENT — PAIN SCALES - GENERAL: PAINLEVEL: SEVERE PAIN (7)

## 2023-12-18 ASSESSMENT — ACTIVITIES OF DAILY LIVING (ADL): DEPENDENT_IADLS:: INDEPENDENT

## 2023-12-18 NOTE — PROGRESS NOTES
Clinic Care Coordination Contact  Follow Up Progress Note      Assessment: CC attended office visit with pt and Florence Sneed this date.  Ronaldo is doing very well from a recovery standpoint.  Has been in our program for almost 4 years now.  Suboxone dose is adequate and would like to continue on current dose.  Neck pain and LBP are a chronic issue for him.  He was able to see Dr. Martinez this past summer in regards to his neck pain.  There is a surgical option that may provide some pain relief for him, but he has a derm issue that needs to be taken care of first.  He has seen derm in the past which diagnosed him with - likely seborrheic dermatitis.  He was prescribed ketoconazole in the AM and hydrocortisone at night.  These help, but the condition always returns.  Florence did send in Nizoral shampoo for him.  He will apply this every other day. LBP - did have MRI in June 2023 - PT was offered, which he declines at this time.  He is willing to go for an IR consult.  Order was placed today by Florence Sneed.       Care Gaps:    Health Maintenance Due   Topic Date Due    YEARLY PREVENTIVE VISIT  Never done    HEPATITIS B IMMUNIZATION (1 of 3 - 3-dose series) Never done    COVID-19 Vaccine (1) Never done    COLORECTAL CANCER SCREENING  Never done    LIPID  Never done    DTAP/TDAP/TD IMMUNIZATION (6 - Td or Tdap) 12/30/2021    ADVANCE CARE PLANNING  01/17/2022    INFLUENZA VACCINE (1) Never done       Will continue to work on these    Care Plans  Care Plan: Sleep       Problem: Impaired Sleep Pattern       Goal: Improve Sleep Habits       This Visit's Progress: 40% Recent Progress: 30%    Note:     Due to neck pain and LBP.  IR consult referral placed this date.  Neck on hold right now due to derm issue.                              Care Plan: Chronic Pain       Problem: Chronic Pain is not self-managed       Goal: Patient will maintain consistent outpatient follow up to manage Chronic Pain and avoid hospitalizations for  the next 6 months       This Visit's Progress: On track                      Problem: Inability to perform ADLs       Goal: Maintain ability to perform ADLs without difficulty       This Visit's Progress: 90%                      Problem: Medication adherence       Goal: Patient will adhere to medication regimen       This Visit's Progress: 90%                            Intervention/Education provided during outreach: Continue current meds as prescribed.  IR consult placed this date - you will get a phone call to schedule.   Florence has agreed to send in #10 Dilaudid 2mg tabs for his acute pain issues - #10 should last 4-6 months.   No concerns with this.  Use your Ketoconazole shampoo every other day.  Try not to shower every day, as moisture can promote outbreaks.  Look into a SAD lamp as opposed to a tanning bad.     Outreach Frequency: 3 months, more frequently as needed        Plan:   No change in treatment plan at this time.  Care Coordinator will follow up in 12 weeks, sooner if he has concerns.    Yoli Kowalski RN-BSN, [facility] Care Coordinator  [phone] opt. #3

## 2023-12-27 ENCOUNTER — TELEPHONE (OUTPATIENT)
Dept: FAMILY MEDICINE | Facility: OTHER | Age: 48
End: 2023-12-27

## 2023-12-27 ENCOUNTER — TELEPHONE (OUTPATIENT)
Dept: INTERVENTIONAL RADIOLOGY/VASCULAR | Facility: HOSPITAL | Age: 48
End: 2023-12-27

## 2023-12-27 DIAGNOSIS — R29.898 LEG WEAKNESS, BILATERAL: Primary | ICD-10-CM

## 2023-12-27 NOTE — TELEPHONE ENCOUNTER
Received call from [facility] for PA submitted on the injection you requested for this patient.     Patient is not meeting policy due to having NO evidence of nerve root compression in most recent MRI. The reviewer is stating that patient has to have nerve root compression in order to consider coverage for the XR LUMBAR TRANSLAMINAR INJ INCL IMAGING, otherwise auth can be denied.     Is this something that may have changed in the last 6 months for patient?    Insurance is going to keep the case open for another few days, in hopes that I can get some answers for them. If this is something you think has changed, please let me know so I can report back to insurance to complete review.    Thank you  Kaleigh- PA Team  [phone]

## 2023-12-28 NOTE — TELEPHONE ENCOUNTER
I think you are right, I will route this message to Florence Nedra is hopes she is able to order patient another MRI Lumbar spine, to see if there are any changes since then.     Thank you for your advice and response in this!     Florence, would you be able to order patient another MRI Lumbar to access for any new nerve root compression? I Only have until tomorrow to get back to reviewer to cancel this current case, but if this is something you are willing to do, I will cancel case are resubmit once patient has new MRI.    Thanks in advance!  Kaleigh

## 2024-01-09 ENCOUNTER — PATIENT OUTREACH (OUTPATIENT)
Dept: CARE COORDINATION | Facility: OTHER | Age: 49
End: 2024-01-09

## 2024-01-09 NOTE — PROGRESS NOTES
Clinic Care Coordination Contact  Care Team Conversations    CC sent Ronaldo a text message this date reminding him of his MRI tomorrow at 5PM.    Yoli Kowalski RN-BSN, [facility] Care Coordinator  [phone] opt. #3

## 2024-01-10 ENCOUNTER — HOSPITAL ENCOUNTER (OUTPATIENT)
Dept: MRI IMAGING | Facility: HOSPITAL | Age: 49
Discharge: HOME | End: 2024-01-10
Attending: PHYSICIAN ASSISTANT | Admitting: PHYSICIAN ASSISTANT
Payer: COMMERCIAL

## 2024-01-10 DIAGNOSIS — R29.898 LEG WEAKNESS, BILATERAL: ICD-10-CM

## 2024-01-10 PROCEDURE — 72148 MRI LUMBAR SPINE W/O DYE: CPT

## 2024-01-18 NOTE — RESULT ENCOUNTER NOTE
Degeneration is stable. Some edema in lower back. Over use or mis use. ?  I would like aggressive PT and possible dry needling in his back for pain relief.

## 2024-01-23 DIAGNOSIS — M50.90 CERVICAL DISC DISEASE: Primary | ICD-10-CM

## 2024-01-26 DIAGNOSIS — M50.30 DDD (DEGENERATIVE DISC DISEASE), CERVICAL: Primary | ICD-10-CM

## 2024-01-31 NOTE — PROGRESS NOTES
Bed: EXAM 09  Expected date:   Expected time:   Means of arrival:   Comments:   Micah Rosenthal    November 22, 2017    Chief Complaint   Patient presents with     Recheck Medication     Pt is here for f/u of oxycodone and needs refill       SUBJECTIVE:  Here for f/u.  Had a couple of days of taking 4 pills but overall did really well.  We are doing another month of 3 pills.  He is struggling but overall doing exactly what is needed here.  Pain is a little increased, but tolerable overall.      Past Medical History:   Diagnosis Date     Cervical disc disease        Past Surgical History:   Procedure Laterality Date     BACK SURGERY      fusion of C5 and C6     IR CAUDAL EPIDURAL INJECTION SINGLE  12/2012     OTHER SURGICAL HISTORY  03/2013    Cervical Fusion     wisdom teeth extracted         Current Outpatient Prescriptions   Medication Sig Dispense Refill     oxyCODONE-acetaminophen (PERCOCET)  MG per tablet TAKE 1 TABLET BY MOUTH EVERY 8 HOURS AS NEEDED FOR MODERATE TO SEVEREPAIN 90 tablet 0     albuterol (PROAIR HFA/PROVENTIL HFA/VENTOLIN HFA) 108 (90 BASE) MCG/ACT Inhaler Inhale 2 puffs into the lungs every 6 hours as needed for shortness of breath / dyspnea 1 Inhaler 1     cyclobenzaprine (FLEXERIL) 10 MG tablet TAKE 1/2 TO 1 TABLET BY MOUTH 3 TIMES DAILY AS NEEDED FOR MUSCLE SPASMS 90 tablet 0       Allergies   Allergen Reactions     Cranberry      Strawberry      Doxycycline Nausea and Vomiting     Zithromax [Azithromycin Dihydrate] Rash       Family History   Problem Relation Age of Onset     Unknown/Adopted Father      CANCER Maternal Uncle      throat ca     CANCER Maternal Uncle      lung ca     Cardiovascular Maternal Grandfather      Cardiovascular Paternal Grandfather        Social History     Social History     Marital status:      Spouse name: N/A     Number of children: N/A     Years of education: N/A     Occupational History           Full-time      Social History Main Topics     Smoking status: Current Every Day Smoker     Packs/day: 1.00     Years: 15.00      Types: Cigarettes     Smokeless tobacco: Never Used      Comment: Tried to Quit: Yes; Passive Exposure: Yes; Longest Tobacco free: 8 years     Alcohol use No     Drug use: No     Sexual activity: Yes     Partners: Female     Other Topics Concern      Service No     Blood Transfusions Yes     Caffeine Concern Yes     Soda, up to 5 cans per day      Occupational Exposure No     Hobby Hazards No     Sleep Concern Yes     Sleeps only 3-4 hours every night     Stress Concern No     Weight Concern No     Special Diet No     Back Care Yes     [facility]     Seat Belt Yes     Parent/Sibling W/ Cabg, Mi Or Angioplasty Before 65f 55m? No     Social History Narrative       5 point ROS negative except as noted above in HPI, including Gen., Resp., CV, GI &  system review.     OBJECTIVE:  B/P: 126/72, T: 98.7, P: 114, R: Data Unavailable    GENERAL APPEARANCE: Alert, no acute distress  SKIN: no suspicious lesions or rashes to visualized skin  NEURO: Alert, oriented x 3, speech and mentation normal    ASSESSMENT and PLAN:  (M50.30) DDD (degenerative disc disease), cervical  (primary encounter diagnosis)  Comment: reviewed.   Plan: oxyCODONE-acetaminophen (PERCOCET)  MG         per tablet        One more month of 90, then 60, 60, 30,30, and 15 if needed at the end.  He is doing really well and I told him so.  He should be proud.      (Z72.0) Tobacco abuse  Comment: reviewed.   Plan: Tobacco Cessation - Order to Satisfy Health         Maintenance        I am not even encouraging him to quit right now.     (Z71.6) Tobacco abuse counseling  Comment: as above.   Plan: as above.

## 2024-02-15 ENCOUNTER — PATIENT OUTREACH (OUTPATIENT)
Dept: CARE COORDINATION | Facility: OTHER | Age: 49
End: 2024-02-15

## 2024-02-15 NOTE — PROGRESS NOTES
Clinic Care Coordination Contact  Care Team Conversations    CC sent Ronaldo a message this date reminding him of his PT appointment tomorrow and to arrive at 9:15AM.    Yloi Kowalski RN-BSN, [facility] Care Coordinator  [phone]

## 2024-03-08 NOTE — PROGRESS NOTES
"  Assessment & Plan     Opioid use disorder  No problems with Sobriety.  Woke up late today and not getting his kid to school.  No use of any substances at all. Working with us quiet well. We will see him back in 3 months.   - Urine Drug Screen Buprenorphine Urine Qualitative [id number], Ethanol Urine Qualitative [id number], Methadone Urine Qualitative [id number], Oxycodone Urine Qualitative [id number], Creatinine Urine Random [id number]  - SUBOXONE 8-2 MG per film; Place 1 Film under the tongue daily AM  - SUBOXONE 12-3 MG FILM per film; Place 1 Film under the tongue daily PM    Chronic midline low back pain with bilateral sciatica  Recurrent can't lay down legs swelling.  This is rare refill. Only uses this when he absolutely has to.    - HYDROmorphone (DILAUDID) 4 MG tablet; Take 1 tablet (4 mg) by mouth every 6 hours as needed for severe pain    Review of external notes as documented elsewhere in note  Ordering of each unique test  Prescription drug management  15  minutes spent by me on the date of the encounter doing chart review, history and exam, documentation and further activities per the note      BMI  Estimated body mass index is 27.46 kg/m  as calculated from the following:    Height as of 12/18/23: 1.803 m (5' 11\").    Weight as of this encounter: 89.3 kg (196 lb 14.4 oz).         See Patient Instructions    No follow-ups on file.    Moraima Obrien is a 49 year old, presenting for the following health issues:  Recheck Medication        3/11/2024    10:06 AM   Additional Questions   Accompanied by daughter         3/11/2024    10:02 AM   Patient Reported Additional Medications   Patient reports taking the following new medications none     HPI       He is currently taking 20/5 mg of buprenorphine daily.   Last fill 2.13.24 #30 of each.    Status Since Last Visit:  Have you used any opioids since your last visit?: no use since last visit - only the Dilaudid as prescribed  Do you feel that your dose of suboxone is " too high or too low? Adequate  Have there been cravings for opioids? No   Any withdrawal symptoms?  None     Any side effects from the medication?  None  Any alcohol use? None  Any other recreational drug use? THC    Precipitating Factors:  Triggers have been: non-existent   Other Supports:  Do you attend counseling or meet with a therapist? No  Do you attend NA or AA meetings? No  Do you have/meet with a sponsor? No  Family and support systems have been: Helpful   What other goals have you been working on (job, family, relationships, etc)? Had lumbar MRI  Will be starting outpatient PT on 3/27/2024  Daughter - Rosemary (4th grade)- present today   Neck pain - can see Dr. Martinez whenever - unsure if he wants to have another surgery  Taking the Dilaudid PRN for neck and back pain             1/12/2023     1:32 PM 7/27/2023     1:32 PM 3/11/2024    10:08 AM   PHQ-9 SCORE   PHQ-9 Total Score 5 0 0           11/17/2022     1:08 PM 1/12/2023     1:33 PM 3/11/2024    10:08 AM   YURI-7 SCORE   Total Score 4 0 0     PDMP Review         Value Time User    State PDMP site checked  Yes 7/27/2023  1:47 PM Florence Sneed, PA                    Review of Systems  Constitutional, HEENT, cardiovascular, pulmonary, gi and gu systems are negative, except as otherwise noted.      Objective    /60 (BP Location: Left arm, Patient Position: Sitting, Cuff Size: Adult Regular)   Pulse 106   Temp 97.5  F (36.4  C) (Tympanic)   Resp 18   Wt 89.3 kg (196 lb 14.4 oz)   SpO2 98%   BMI 27.46 kg/m    Body mass index is 27.46 kg/m .  Physical Exam   GENERAL: alert and no distress  NECK: no adenopathy, no asymmetry, masses, or scars  RESP: lungs clear to auscultation - no rales, rhonchi or wheezes  CV: regular rate and rhythm, normal S1 S2, no S3 or S4, no murmur, click or rub, no peripheral edema  MS: no gross musculoskeletal defects noted, no edema  SKIN: no suspicious lesions or rashes  PSYCH: mentation appears normal, affect  normal/bright    Results for orders placed or performed in visit on 03/11/24 (from the past 24 hour(s))   Urine Drug Screen Buprenorphine Urine Qualitative [id number], Ethanol Urine Qualitative [id number], Methadone Urine Qualitative [id number], Oxycodone Urine Qualitative [id number], Creatinine Urine Random [id number]    Narrative    The following orders were created for panel order Urine Drug Screen Buprenorphine Urine Qualitative [id number], Ethanol Urine Qualitative [id number], Methadone Urine Qualitative [id number], Oxycodone Urine Qualitative [id number], Creatinine Urine Random [id number].  Procedure                               Abnormality         Status                     ---------                               -----------         ------                     Urine Drug Screen Panel[[id number]]                          In process                 Buprenorphine Urine, Mata...[[id number]]                      In process                 Ethanol urine[[id number]]                                    In process                 Methadone Qual Urine[[id number]]                             In process                 Oxycodone Urine, Qualita...[[id number]]                      In process                 Creatinine random urine[[id number]]                          In process                   Please view results for these tests on the individual orders.           Signed Electronically by: ELODIA Barrow

## 2024-03-11 ENCOUNTER — OFFICE VISIT (OUTPATIENT)
Dept: FAMILY MEDICINE | Facility: OTHER | Age: 49
End: 2024-03-11
Attending: PHYSICIAN ASSISTANT
Payer: COMMERCIAL

## 2024-03-11 ENCOUNTER — APPOINTMENT (OUTPATIENT)
Dept: LAB | Facility: OTHER | Age: 49
End: 2024-03-11
Payer: COMMERCIAL

## 2024-03-11 ENCOUNTER — PATIENT OUTREACH (OUTPATIENT)
Dept: CARE COORDINATION | Facility: OTHER | Age: 49
End: 2024-03-11

## 2024-03-11 VITALS
WEIGHT: 196.9 LBS | BODY MASS INDEX: 27.46 KG/M2 | RESPIRATION RATE: 18 BRPM | SYSTOLIC BLOOD PRESSURE: 100 MMHG | DIASTOLIC BLOOD PRESSURE: 60 MMHG | TEMPERATURE: 97.5 F | HEART RATE: 106 BPM | OXYGEN SATURATION: 98 %

## 2024-03-11 DIAGNOSIS — M54.41 CHRONIC MIDLINE LOW BACK PAIN WITH BILATERAL SCIATICA: ICD-10-CM

## 2024-03-11 DIAGNOSIS — F11.90 OPIOID USE DISORDER: Primary | ICD-10-CM

## 2024-03-11 DIAGNOSIS — M54.42 CHRONIC MIDLINE LOW BACK PAIN WITH BILATERAL SCIATICA: ICD-10-CM

## 2024-03-11 DIAGNOSIS — G89.29 CHRONIC MIDLINE LOW BACK PAIN WITH BILATERAL SCIATICA: ICD-10-CM

## 2024-03-11 LAB
AMPHETAMINES UR QL SCN: ABNORMAL
BARBITURATES UR QL SCN: ABNORMAL
BENZODIAZ UR QL SCN: ABNORMAL
BUPRENORPHINE UR QL: ABNORMAL
BZE UR QL SCN: ABNORMAL
CANNABINOIDS UR QL SCN: ABNORMAL
CREAT UR-MCNC: 35.8 MG/DL
ETHANOL UR QL SCN: NORMAL
FENTANYL UR QL: ABNORMAL
METHADONE UR QL SCN: NORMAL
OPIATES UR QL SCN: ABNORMAL
OXYCODONE UR QL: NORMAL
PCP QUAL URINE (ROCHE): ABNORMAL

## 2024-03-11 PROCEDURE — 82570 ASSAY OF URINE CREATININE: CPT | Mod: ZL | Performed by: PHYSICIAN ASSISTANT

## 2024-03-11 PROCEDURE — 80307 DRUG TEST PRSMV CHEM ANLYZR: CPT | Mod: ZL | Performed by: PHYSICIAN ASSISTANT

## 2024-03-11 PROCEDURE — 99213 OFFICE O/P EST LOW 20 MIN: CPT | Performed by: PHYSICIAN ASSISTANT

## 2024-03-11 PROCEDURE — G0463 HOSPITAL OUTPT CLINIC VISIT: HCPCS

## 2024-03-11 RX ORDER — HYDROMORPHONE HYDROCHLORIDE 4 MG/1
4 TABLET ORAL EVERY 6 HOURS PRN
Qty: 10 TABLET | Refills: 0 | Status: SHIPPED | OUTPATIENT
Start: 2024-03-11 | End: 2024-06-03

## 2024-03-11 RX ORDER — HYDROMORPHONE HYDROCHLORIDE 2 MG/1
TABLET ORAL
COMMUNITY
Start: 2023-12-22 | End: 2024-03-11

## 2024-03-11 RX ORDER — BUPRENORPHINE HYDROCHLORIDE, NALOXONE HYDROCHLORIDE 12; 3 MG/1; MG/1
1 FILM, SOLUBLE BUCCAL; SUBLINGUAL DAILY
Qty: 30 EACH | Refills: 2 | Status: SHIPPED | OUTPATIENT
Start: 2024-03-11 | End: 2024-06-03

## 2024-03-11 RX ORDER — BUPRENORPHINE HYDROCHLORIDE, NALOXONE HYDROCHLORIDE 8; 2 MG/1; MG/1
1 FILM, SOLUBLE BUCCAL; SUBLINGUAL DAILY
Qty: 30 EACH | Refills: 2 | Status: SHIPPED | OUTPATIENT
Start: 2024-03-11 | End: 2024-06-03

## 2024-03-11 ASSESSMENT — ANXIETY QUESTIONNAIRES
7. FEELING AFRAID AS IF SOMETHING AWFUL MIGHT HAPPEN: NOT AT ALL
GAD7 TOTAL SCORE: 0
6. BECOMING EASILY ANNOYED OR IRRITABLE: NOT AT ALL
GAD7 TOTAL SCORE: 0
5. BEING SO RESTLESS THAT IT IS HARD TO SIT STILL: NOT AT ALL
1. FEELING NERVOUS, ANXIOUS, OR ON EDGE: NOT AT ALL
4. TROUBLE RELAXING: NOT AT ALL
2. NOT BEING ABLE TO STOP OR CONTROL WORRYING: NOT AT ALL
3. WORRYING TOO MUCH ABOUT DIFFERENT THINGS: NOT AT ALL

## 2024-03-11 ASSESSMENT — PAIN SCALES - GENERAL: PAINLEVEL: SEVERE PAIN (7)

## 2024-03-11 ASSESSMENT — PATIENT HEALTH QUESTIONNAIRE - PHQ9: SUM OF ALL RESPONSES TO PHQ QUESTIONS 1-9: 0

## 2024-03-11 NOTE — PROGRESS NOTES
Clinic Care Coordination Contact  Follow Up Progress Note      Assessment: CC met with Ronaldo prior to him seeing Florence Nedra this date.  Ronaldo is doing very well in recovery.  Suboxone dose is adequate and would like to continue on current dose.  Last visit Florence Sneed agreed to give him #10 Dilaudid for acute pain attacks.  These 10 tabs did last 12 weeks.  His neck pain is at his baseline - able to see Dr. Martinez whenever he chooses to; however, Ronaldo is unsure if he wants to have another neck surgery, which is understandable.  He did have a lumbar MRI - insurance will not cover any injections at this time.  He is going to start outpatient PT on 3/27/2024.  Highly encouraged him to follow through with this.  No other issues.    Care Gaps:    Health Maintenance Due   Topic Date Due    YEARLY PREVENTIVE VISIT  Never done    COLORECTAL CANCER SCREENING  Never done    HEPATITIS B IMMUNIZATION (1 of 3 - 19+ 3-dose series) Never done    LIPID  Never done    DTAP/TDAP/TD IMMUNIZATION (6 - Td or Tdap) 12/30/2021    ADVANCE CARE PLANNING  01/17/2022    INFLUENZA VACCINE (1) Never done    COVID-19 Vaccine (1 - 2023-24 season) Never done       Will continue to work on these    Care Plans  Care Plan: Sleep       Problem: Impaired Sleep Pattern       Goal: Improve Sleep Habits       This Visit's Progress: 50% Recent Progress: 40%    Note:     Due to neck pain and LBP.  Starting PT 3/27/2024  Dilaudid helpful for acute pain attacks                             Care Plan: Chronic Pain       Problem: Chronic Pain is not self-managed       Goal: Patient will maintain consistent outpatient follow up to manage Chronic Pain and avoid hospitalizations for the next 6 months       This Visit's Progress: On track Recent Progress: On track    Note:     Barriers: literacy barrier  Strengths: committed, open to change  Patient expressed understanding of goal: yes  Action steps to achieve this goal:  1. I will take my medications as  prescribed.  2. I will attend outpatient physical therapy starting on 3/27/2024  3. I will reach out if I am struggling.                          Problem: Inability to perform ADLs       Goal: Maintain ability to perform ADLs without difficulty       This Visit's Progress: 90%                      Problem: Medication adherence       Goal: Patient will adhere to medication regimen       This Visit's Progress: 90%                            Intervention/Education provided during outreach: Attend outpatient PT as scheduled.  Take medications as prescribed.  He is aware of his limits in regards to his chronic neck pain.         Plan:   No change in treatment plan at this time.    Care Coordinator will follow up in 12 weeks, sooner if he has concerns.    Yoli Kowalski RN-BSN, [facility] Care Coordinator  [phone]

## 2024-03-26 ENCOUNTER — PATIENT OUTREACH (OUTPATIENT)
Dept: CARE COORDINATION | Facility: OTHER | Age: 49
End: 2024-03-26

## 2024-03-26 NOTE — PROGRESS NOTES
Clinic Care Coordination Contact  Care Team Conversations    CC sent Ronaldo a text message this date reminding him of his PT appointment tomorrow and to arrive at 2:45PM.    Yoli Kowalski RN-BSN, [facility] Care Coordinator  [phone]

## 2024-04-03 ENCOUNTER — THERAPY VISIT (OUTPATIENT)
Dept: PHYSICAL THERAPY | Facility: HOSPITAL | Age: 49
End: 2024-04-03
Attending: PHYSICIAN ASSISTANT
Payer: COMMERCIAL

## 2024-04-03 DIAGNOSIS — M54.42 CHRONIC MIDLINE LOW BACK PAIN WITH BILATERAL SCIATICA: Primary | ICD-10-CM

## 2024-04-03 DIAGNOSIS — M54.41 CHRONIC MIDLINE LOW BACK PAIN WITH BILATERAL SCIATICA: Primary | ICD-10-CM

## 2024-04-03 DIAGNOSIS — G89.29 CHRONIC MIDLINE LOW BACK PAIN WITH BILATERAL SCIATICA: Primary | ICD-10-CM

## 2024-04-03 PROCEDURE — 999N000104 HC STATISTIC NO CHARGE: Mod: GP

## 2024-04-03 PROCEDURE — 97110 THERAPEUTIC EXERCISES: CPT | Mod: GP

## 2024-04-03 PROCEDURE — 97161 PT EVAL LOW COMPLEX 20 MIN: CPT | Mod: GP

## 2024-04-03 NOTE — PROGRESS NOTES
PHYSICAL THERAPY EVALUATION  Type of Visit: Evaluation    See electronic medical record for Abuse and Falls Screening details.    Subjective       Presenting condition or subjective complaint: low back pain.  Pt reports gradual onset of LBP starting about 1 year ago.  It has waxed and waned to some degree since onset.  Pt feels pain has maybe been improving recently.   Pt reports chronic neck pain as well, but feels there is nothing therapy can do for this currently while he contemplates having another surgery.  Date of onset: 01/26/24 (Date of PT referral)    Relevant medical history: Asthma; Cold or hot arm or leg; Migraines or headaches; Neck injury; Pain at night or rest; Significant weakness; Smoking; Substance use disorder   Dates & types of surgery: Neck fusion March 6 2013    Prior diagnostic imaging/testing results: MRI Lumbar spine 1/10/24: IMPRESSION: Degenerative disc disease at L4-L5. There is a mild  broad-based disc protrusion similar in severity to that seen  previously. There is no significant mass effect. There is interval  development of discogenic endplate edema at this level.  Degenerative disc disease at T11-T12. There is a mild broad-based disc  bulge with mild mass effect upon the ventral surface of the lower  thoracic spinal cord without significant change since 2012.  Prior therapy history for the same diagnosis, illness or injury:        Prior Level of Function  Transfers: Independent  Ambulation: Independent  ADL: Independent  IADL:  Generally independent    Living Environment  Social support: With a significant other or spouse   Type of home: House; 2-story; Basement   Stairs to enter the home: Yes 4 Is there a railing: No   Ramp: No   Stairs inside the home: Yes 15 Is there a railing: Yes   Help at home: None  Equipment owned:       Employment: No    Hobbies/Interests: tv.  Uses riding  in summer time.  Overall pt reports being quite sedentary.    Patient goals for therapy:  "sleep    Pain assessment: See objective evaluation for additional pain details     Objective   LUMBAR SPINE EVALUATION  PAIN: Pain Level at Rest: 2/10  Pain Level with Use: 10/10  Pain Location: Central low back pain over mid lumbar spine  Pain Quality: Sharp.  Pt also notes occasional N/T in bilat toes and feels this is new since onset of his back pain.  Pain Frequency: constant  Pain is Worst: No particular pattern - \"it's a elizabeth all the time\"; although later pt noted back is more stiff in the morning.  Pain is Exacerbated By: Pt generally seems to report worst with standing or walking - tolerance of 20 min and can only sit for 60 to 90 min before pain increases.  Pt avoids any heavy lifting d/t his chronic neck pain.  Pt also notes difficulty sleeping - often has to sleep sitting up in recliner.  Pain is Relieved By: heat, NSAIDs, and changing positions.  Pt also has dilaudid prescription - takes sparingly and typically just to help sleep at night.  Pain Progression: Possibly a little improved recently.    INTEGUMENTARY (edema, incisions): WFL throughout lumbar spine, but does exhibit known 1+ pitting edema bilat feet/ankles.  POSTURE: Standing Posture: Rounded shoulders, Lordosis decreased, Thoracic kyphosis increased, Mild/mod L shoulder elevation  GAIT: Pt amb into clinic independently w/o a device exhibiting a reciprocal step through pattern and overall non-antalgic gait.   BALANCE/PROPRIOCEPTION: WFL  ROM:   (Degrees) Left AROM Left PROM  Right AROM Right PROM   Hip Flexion  WFL  WFL   Hip Extension  WFL  WFL   Hip Abduction  WFL  WFL   Hip Adduction  WFL  WFL   Hip Internal Rotation  WFL  WFL   Hip External Rotation  Mildly limited  WFL   Knee Flexion  WFL  WFL   Knee Extension  WFL  WFL   Lumbar Side glide Initially 30% limited each to L and R with fingers 2\" above knees, but improved when repeated L rot = 60% limited and R rot = 50% limited and min improvement when repeated.   Lumbar Flexion 60% reduced " "initially with fingers 4\" above ankles, but improved noticeably when repeated.   Lumbar Extension 70% limited and improved mildly when repeated   Pt denied any change in pain with lumbar ROM testing during the testing, but after all completed he noted his concordant pain was mildly worse.  STRENGTH: Core stability appeared mod impaired with sit<->sup and sup<->prone transitions.  MYOTOMES:    Left Right   T12-L3 (Hip Flexion) 5 5   L2-4 (Quads)  5 5   L4 (Ankle DF) 5 5   L5 (Great Toe Ext) 4+ 5-   S1 (Toe Raise) 5 5   S2 (Knee flxn) 5- 5-   DTR S:    Left Right   L4 (Quad) 3 3   S1 (Achilles) 1 1     DERMATOMES: Light touch was WFL throughout BLEs  NEURAL TENSION:    Left Right   SLR Negative  Negative    SLR with DF Positive Positive   Femoral Nerve Negative  Negative      FLEXIBILITY: Decreased piriformis L, Decreased hip flexors L, Decreased IT band L, Decreased quadriceps L, Decreased hamstrings L, Decreased gastroc L, Decreased piriformis R, Decreased hip flexors R, Decreased IT band R, Decreased quadriceps R, Decreased hamstrings R, Decreased gastroc RL HS = 45*; R HS = 55*  LUMBAR/HIP Special Tests:    Left Right   MIL Negative  Min increased concordant pain   FADIR/Labrum/KATHERIN Negative  Negative    Hip scour Negative  Negative    Hip distraction Negative  Negative    Quadrant Testing Negative  Negative    SLR Negative  Negative    Prone press-up Stiff, but no change in pain Stiff, but no change in pain   Nachlas Negative  Negative       PELVIS/SI SPECIAL TESTS:    Left Right Additional Notes   Pelvic alignment   Level/symmetrical bilat   Pelvic Compression Negative Negative    Pelvic Gapping Negative Negative    Sacral Thrust Negative Negative      FUNCTIONAL TESTS: SLS: Negative  PALPATION:  Pt with mod increased muscle tension of L and R lumbar paraspinals and mildly L and R QL.  Pt mild/mod tender to palpation over L3 SP and a little less over L4 and L5  SPINAL SEGMENTAL CONCLUSIONS:  PA mobility min " reduced at L1-L2 and mild/mod reduced L3-L5, but no c/o increased pain.      Assessment & Plan   CLINICAL IMPRESSIONS  Medical Diagnosis: Low back pain radiating to lower extremity    Treatment Diagnosis: Low back pain radiating to lower extremity; lumbar stiffness; pelvic instability.   Impression/Assessment: Patient is a 49 year old male with primary c/o low back pain.  The following significant findings have been identified: Pain, Decreased ROM/flexibility, Decreased joint mobility, Decreased strength, Edema, Impaired muscle performance, Decreased activity tolerance, and Impaired posture. These impairments interfere with their ability to perform recreational activities, household chores, and community mobility as compared to previous level of function.     Clinical Decision Making (Complexity):  Clinical Presentation: Stable/Uncomplicated  Clinical Presentation Rationale: based on medical and personal factors listed in PT evaluation  Clinical Decision Making (Complexity): Low complexity    PLAN OF CARE  Treatment Interventions:  Modalities: Cryotherapy, E-stim, Hot Pack, Mechanical Traction, Ultrasound  Interventions: Manual Therapy, Neuromuscular Re-education, Therapeutic Activity, Therapeutic Exercise    Long Term Goals     PT Goal 1  Goal Identifier: STG1  Goal Description: Pt will report consistent compliance with HEP at least  70% of days to help maximize rehab potential.  Target Date: 04/24/24  PT Goal 2  Goal Identifier: [email or web address]  Goal Description: Pt will improve lumbar AROM to at least as follows for decreased stiffness and pain with community mobility and IADLs: Flxn = 30% limited; ext = 50% limited; L and R SB = 10% limited; L and R rot = 30% limited.  Target Date: 06/12/24  PT Goal 3  Goal Identifier: LTG2  Goal Description: Pt will improve pelvic stability to completing bridging with marching x 20 reps w/ Good- stability and w/o increased pain - this for decreased spinal loading in weight bearing to walk  around [place] at least 30 min without needing to sit/change position d/t pain.  Target Date: 06/12/24  PT Goal 4  Goal Identifier: LTG3  Goal Description: Pt will report at least 50% improvement in overall pain symptoms and improve KARI to at most 35% for >MCID for improved quality of life.  Target Date: 06/12/24      Frequency of Treatment: 1x/wk (May increase to 2x/wk if lumbar traction is initiated.)  Duration of Treatment: 10 wks    Recommended Referrals to Other Professionals: None at this time  Education Assessment:   Learner/Method: Patient;Listening  Education Comments: See Ther ex above    Risks and benefits of evaluation/treatment have been explained.   Patient/Family/caregiver agrees with Plan of Care.     Evaluation Time:     PT Eval, Low Complexity Minutes (12831): 32       Signing Clinician: Satish Chong PT

## 2024-04-04 DIAGNOSIS — M79.606 LOW BACK PAIN RADIATING TO LOWER EXTREMITY: Primary | ICD-10-CM

## 2024-04-04 DIAGNOSIS — M54.50 LOW BACK PAIN RADIATING TO LOWER EXTREMITY: Primary | ICD-10-CM

## 2024-04-09 ENCOUNTER — PATIENT OUTREACH (OUTPATIENT)
Dept: CARE COORDINATION | Facility: OTHER | Age: 49
End: 2024-04-09

## 2024-04-09 NOTE — PROGRESS NOTES
Clinic Care Coordination Contact  Care Team Conversations    CC sent Ronaldo a text message this date reminding him of his PT appointment tomorrow at 3:00PM.    Yoli Kowalski RN-BSN, [facility] Care Coordinator  [phone]

## 2024-04-10 ENCOUNTER — THERAPY VISIT (OUTPATIENT)
Dept: PHYSICAL THERAPY | Facility: HOSPITAL | Age: 49
End: 2024-04-10
Attending: PHYSICIAN ASSISTANT
Payer: COMMERCIAL

## 2024-04-10 DIAGNOSIS — M54.42 CHRONIC MIDLINE LOW BACK PAIN WITH BILATERAL SCIATICA: Primary | ICD-10-CM

## 2024-04-10 DIAGNOSIS — M54.41 CHRONIC MIDLINE LOW BACK PAIN WITH BILATERAL SCIATICA: Primary | ICD-10-CM

## 2024-04-10 DIAGNOSIS — G89.29 CHRONIC MIDLINE LOW BACK PAIN WITH BILATERAL SCIATICA: Primary | ICD-10-CM

## 2024-04-10 PROCEDURE — 97140 MANUAL THERAPY 1/> REGIONS: CPT | Mod: GP

## 2024-04-10 PROCEDURE — 97110 THERAPEUTIC EXERCISES: CPT | Mod: GP

## 2024-04-17 ENCOUNTER — PATIENT OUTREACH (OUTPATIENT)
Dept: CARE COORDINATION | Facility: OTHER | Age: 49
End: 2024-04-17

## 2024-04-17 NOTE — PROGRESS NOTES
Clinic Care Coordination Contact  Care Team Conversations    CC sent Ronaldo a text message this date reminding him of his PT appointment tomorrow at 4:30PM.    Yoli Kowalski RN-BSN, [facility] Care Coordinator  [phone]

## 2024-04-24 ENCOUNTER — THERAPY VISIT (OUTPATIENT)
Dept: PHYSICAL THERAPY | Facility: HOSPITAL | Age: 49
End: 2024-04-24
Attending: PHYSICIAN ASSISTANT
Payer: COMMERCIAL

## 2024-04-24 DIAGNOSIS — M54.41 CHRONIC MIDLINE LOW BACK PAIN WITH BILATERAL SCIATICA: Primary | ICD-10-CM

## 2024-04-24 DIAGNOSIS — G89.29 CHRONIC MIDLINE LOW BACK PAIN WITH BILATERAL SCIATICA: Primary | ICD-10-CM

## 2024-04-24 DIAGNOSIS — M54.42 CHRONIC MIDLINE LOW BACK PAIN WITH BILATERAL SCIATICA: Primary | ICD-10-CM

## 2024-04-24 PROCEDURE — 97140 MANUAL THERAPY 1/> REGIONS: CPT | Mod: GP

## 2024-04-24 PROCEDURE — 97110 THERAPEUTIC EXERCISES: CPT | Mod: GP

## 2024-05-24 NOTE — PROGRESS NOTES
Assessment & Plan     Opioid use disorder  He is going to be given a urine screening and refill of medications. He has been sober 4 years now.  We have seen great success.  He will see us back in 4 months for this.   - Urine Drug Screen Buprenorphine Urine Qualitative [id number], Ethanol Urine Qualitative [id number], Methadone Urine Qualitative [id number], Oxycodone Urine Qualitative [id number], Creatinine Urine Random [id number]  - SUBOXONE 12-3 MG FILM per film; Place 1 Film under the tongue daily PM  - SUBOXONE 8-2 MG per film; Place 1 Film under the tongue daily AM    Chronic midline low back pain with bilateral sciatica  Can't lay in a bed. Legs swollen due to this. Given 10 pills for severe pain if absolutely needed.   - HYDROmorphone (DILAUDID) 4 MG tablet; Take 1 tablet (4 mg) by mouth every 6 hours as needed for severe pain    Cervical disc disease  Refill of medications.   - cyclobenzaprine (FLEXERIL) 10 MG tablet; TAKE 1 TABLET BY MOUTH 3 TIMES A DAYAS NEEDED    Psoriasis  Severe. His scalp arms abdomen and legs. Large silver plaques. Dr Lemos did consult him in our office.  Recommendations are sent and will start on Otezla. He will see us back as recommended.   - ketoconazole (NIZORAL) 2 % external shampoo; Apply topically daily as needed for itching or irritation  - Apremilast (OTEZLA) 10 & 20 & 30 MG TBPK; Take 1 tablet in the morning on day 1, then take 1 tablet every 12 hours on day 2 through 14, according to the instructions on the packet.  - CBC with platelets and differential; Future  - Comprehensive metabolic panel (BMP + Alb, Alk Phos, ALT, AST, Total. Bili, TP); Future  - CRP, inflammation; Future  - UA Macroscopic with reflex to Microscopic and Culture; Future    Estimated Creatinine Clearance: 176.7 mL/min (A) (based on SCr of 0.63 mg/dL (L)).      Lipid screening  Due for this.   - Lipid Profile (Chol, Trig, HDL, LDL calc); Future            See Patient Instructions    No follow-ups on file.    Subjective    Micah is a 49 year old, presenting for the following health issues:  Follow Up        6/3/2024    10:57 AM   Additional Questions   Roomed by Colleen Arauz   Accompanied by self         6/3/2024    10:57 AM   Patient Reported Additional Medications   Patient reports taking the following new medications none     History of Present Illness       Reason for visit:  Follow up    He eats 0-1 servings of fruits and vegetables daily.He consumes 8 sweetened beverage(s) daily.He exercises with enough effort to increase his heart rate 10 to 19 minutes per day.  He exercises with enough effort to increase his heart rate 3 or less days per week.   He is taking medications regularly.         He is currently taking 20/5 mg of buprenorphine daily.   Last fill 5.7.24 #30 of each.    Status Since Last Visit:  Have you used any opioids since your last visit?: no use since last visit - prescribed Dilaudid PRN  Do you feel that your dose of suboxone is too high or too low? Adequate  Have there been cravings for opioids? No   Any withdrawal symptoms?  None     Any side effects from the medication?  None  Any alcohol use? None  Any other recreational drug use? THC - prescribed     Precipitating Factors:  Triggers have been: non-existent   Other Supports:  Do you attend counseling or meet with a therapist? No  Do you attend NA or AA meetings? No  Do you have/meet with a sponsor? No  Family and support systems have been: Helpful - very supportive family  What other goals have you been working on (job, family, relationships, etc)? Attending scheduled appointments   Has been in our program for over 4 years now  Went to PT for LBP - didn't find it helpful - made neck worse               1/12/2023     1:32 PM 7/27/2023     1:32 PM 3/11/2024    10:08 AM   PHQ-9 SCORE   PHQ-9 Total Score 5 0 0           11/17/2022     1:08 PM 1/12/2023     1:33 PM 3/11/2024    10:08 AM   YURI-7 SCORE   Total Score 4 0 0     PDMP Review         Value Time User  "   State [facility] site checked  Yes 7/27/2023  1:47 PM Florence Sneed PA              Skin Lesion  Onset/Duration: Started in January 2024  Description  Location: bilateral legs - abdomen, both arms  Color: White patchy  Border description: irregular border  Character: flakey  Itching: mild  Bleeding:  YES  Intensity:  moderate  Progression of Symptoms:  worsening  Accompanying signs and symptoms:   Bleeding: YES  Scaling: YES  Excessive sun exposure/tanning: No  Sunscreen used: N/A  History:           Any previous history of skin cancer: No  Any family history of melanoma: No  Previous episodes of similar lesion: No  Precipitating or alleviating factors: NA  Therapies tried and outcome: hydrocortisone cream -  not effective and topical steroid - not effective           Review of Systems  Constitutional, HEENT, cardiovascular, pulmonary, gi and gu systems are negative, except as otherwise noted.      Objective    /68 (BP Location: Left arm, Patient Position: Sitting, Cuff Size: Adult Regular)   Pulse 88   Temp 97.7  F (36.5  C) (Tympanic)   Resp 14   Ht 1.803 m (5' 11\")   Wt 88.1 kg (194 lb 3.2 oz)   SpO2 98%   BMI 27.09 kg/m    Body mass index is 27.09 kg/m .  Physical Exam   GENERAL: alert and no distress  EYES: Eyes grossly normal to inspection, PERRL and conjunctivae and sclerae normal  HENT: ear canals and TM's normal, nose and mouth without ulcers or lesions  NECK: no adenopathy, no asymmetry, masses, or scars  RESP: lungs clear to auscultation - no rales, rhonchi or wheezes  CV: regular rate and rhythm, normal S1 S2, no S3 or S4, no murmur, click or rub, no peripheral edema  ABDOMEN: soft, nontender, no hepatosplenomegaly, no masses and bowel sounds normal  MS: no gross musculoskeletal defects noted, no edema  SKIN: large lesions on his legs and small rounded areas on arms abdomen.   BACK: no CVA tenderness, no paralumbar tenderness  PSYCH: mentation appears normal, affect " normal/bright    Results for orders placed or performed in visit on 06/03/24 (from the past 24 hour(s))   Urine Drug Screen Buprenorphine Urine Qualitative [id number], Ethanol Urine Qualitative CSB251, Methadone Urine Qualitative [id number], Oxycodone Urine Qualitative [id number], Creatinine Urine Random [id number]    Narrative    The following orders were created for panel order Urine Drug Screen Buprenorphine Urine Qualitative [id number], Ethanol Urine Qualitative RHH101, Methadone Urine Qualitative [id number], Oxycodone Urine Qualitative [id number], Creatinine Urine Random UVZ170.  Procedure                               Abnormality         Status                     ---------                               -----------         ------                     Urine Drug Screen Panel[[id number]]      Abnormal            Final result               Buprenorphine Urine, Mata...[[id number]]  Abnormal            Final result               Ethanol urine[[id number]]                Normal              Final result               Methadone Qual Urine[[id number]]         Normal              Final result               Oxycodone Urine, Qualita...[[id number]]  Normal              Final result               Creatinine random urine[[id number]]                          Final result                 Please view results for these tests on the individual orders.   Urine Drug Screen Panel   Result Value Ref Range    Amphetamines Urine Screen Negative Screen Negative    Barbituates Urine Screen Negative Screen Negative    Benzodiazepine Urine Screen Negative Screen Negative    Cannabinoids Urine Screen Positive (A) Screen Negative    Cocaine Urine Screen Negative Screen Negative    Fentanyl Qual Urine Screen Negative Screen Negative    Opiates Urine Screen Negative Screen Negative    PCP Urine Screen Negative Screen Negative   Buprenorphine Urine, Qualitative   Result Value Ref Range    Buprenorphine Qual Urine Screen Positive (A) Screen Negative   Ethanol urine   Result  Value Ref Range    Ethanol Urine Screen Negative Screen Negative   Methadone Qual Urine   Result Value Ref Range    Methadone Urine Screen Negative Screen Negative   Oxycodone Urine, Qualitative   Result Value Ref Range    Oxycodone Urine Screen Negative Screen Negative   Creatinine random urine   Result Value Ref Range    Creatinine Urine mg/dL 60.8 mg/dL           Signed Electronically by: ELODIA Barrow

## 2024-06-03 ENCOUNTER — OFFICE VISIT (OUTPATIENT)
Dept: FAMILY MEDICINE | Facility: OTHER | Age: 49
End: 2024-06-03
Attending: PHYSICIAN ASSISTANT
Payer: COMMERCIAL

## 2024-06-03 ENCOUNTER — APPOINTMENT (OUTPATIENT)
Dept: LAB | Facility: OTHER | Age: 49
End: 2024-06-03
Attending: PHYSICIAN ASSISTANT
Payer: COMMERCIAL

## 2024-06-03 ENCOUNTER — PATIENT OUTREACH (OUTPATIENT)
Dept: CARE COORDINATION | Facility: OTHER | Age: 49
End: 2024-06-03

## 2024-06-03 VITALS
HEART RATE: 88 BPM | SYSTOLIC BLOOD PRESSURE: 104 MMHG | WEIGHT: 194.2 LBS | OXYGEN SATURATION: 98 % | HEIGHT: 71 IN | DIASTOLIC BLOOD PRESSURE: 68 MMHG | BODY MASS INDEX: 27.19 KG/M2 | TEMPERATURE: 97.7 F | RESPIRATION RATE: 14 BRPM

## 2024-06-03 DIAGNOSIS — M54.41 CHRONIC MIDLINE LOW BACK PAIN WITH BILATERAL SCIATICA: ICD-10-CM

## 2024-06-03 DIAGNOSIS — M50.90 CERVICAL DISC DISEASE: ICD-10-CM

## 2024-06-03 DIAGNOSIS — F11.20 CONTINUOUS OPIOID DEPENDENCE (H): ICD-10-CM

## 2024-06-03 DIAGNOSIS — M54.42 CHRONIC MIDLINE LOW BACK PAIN WITH BILATERAL SCIATICA: ICD-10-CM

## 2024-06-03 DIAGNOSIS — Z87.19 HISTORY OF ESOPHAGEAL STRICTURE: ICD-10-CM

## 2024-06-03 DIAGNOSIS — L40.9 PSORIASIS: ICD-10-CM

## 2024-06-03 DIAGNOSIS — G89.29 CHRONIC MIDLINE LOW BACK PAIN WITH BILATERAL SCIATICA: ICD-10-CM

## 2024-06-03 DIAGNOSIS — F11.90 OPIOID USE DISORDER: Primary | ICD-10-CM

## 2024-06-03 DIAGNOSIS — R13.19 ESOPHAGEAL DYSPHAGIA: ICD-10-CM

## 2024-06-03 DIAGNOSIS — Z13.220 LIPID SCREENING: ICD-10-CM

## 2024-06-03 LAB
ALBUMIN SERPL BCG-MCNC: 4 G/DL (ref 3.5–5.2)
ALBUMIN UR-MCNC: NEGATIVE MG/DL
ALP SERPL-CCNC: 169 U/L (ref 40–150)
ALT SERPL W P-5'-P-CCNC: 12 U/L (ref 0–70)
AMPHETAMINES UR QL SCN: ABNORMAL
ANION GAP SERPL CALCULATED.3IONS-SCNC: 9 MMOL/L (ref 7–15)
APPEARANCE UR: CLEAR
AST SERPL W P-5'-P-CCNC: 33 U/L (ref 0–45)
BARBITURATES UR QL SCN: ABNORMAL
BASOPHILS # BLD AUTO: 0 10E3/UL (ref 0–0.2)
BASOPHILS NFR BLD AUTO: 0 %
BENZODIAZ UR QL SCN: ABNORMAL
BILIRUB SERPL-MCNC: 0.6 MG/DL
BILIRUB UR QL STRIP: NEGATIVE
BUN SERPL-MCNC: 3.7 MG/DL (ref 6–20)
BUPRENORPHINE UR QL: ABNORMAL
BZE UR QL SCN: ABNORMAL
CALCIUM SERPL-MCNC: 8.9 MG/DL (ref 8.6–10)
CANNABINOIDS UR QL SCN: ABNORMAL
CHLORIDE SERPL-SCNC: 97 MMOL/L (ref 98–107)
CHOLEST SERPL-MCNC: 126 MG/DL
COLOR UR AUTO: YELLOW
CREAT SERPL-MCNC: 0.63 MG/DL (ref 0.67–1.17)
CREAT UR-MCNC: 60.8 MG/DL
CRP SERPL-MCNC: 9.44 MG/L
DEPRECATED HCO3 PLAS-SCNC: 31 MMOL/L (ref 22–29)
EGFRCR SERPLBLD CKD-EPI 2021: >90 ML/MIN/1.73M2
EOSINOPHIL # BLD AUTO: 0.1 10E3/UL (ref 0–0.7)
EOSINOPHIL NFR BLD AUTO: 2 %
ERYTHROCYTE [DISTWIDTH] IN BLOOD BY AUTOMATED COUNT: 12.4 % (ref 10–15)
ETHANOL UR QL SCN: NORMAL
FASTING STATUS PATIENT QL REPORTED: YES
FASTING STATUS PATIENT QL REPORTED: YES
FENTANYL UR QL: ABNORMAL
GLUCOSE SERPL-MCNC: 103 MG/DL (ref 70–99)
GLUCOSE UR STRIP-MCNC: NEGATIVE MG/DL
HCT VFR BLD AUTO: 37.6 % (ref 40–53)
HDLC SERPL-MCNC: 30 MG/DL
HGB BLD-MCNC: 13.1 G/DL (ref 13.3–17.7)
HGB UR QL STRIP: NEGATIVE
IMM GRANULOCYTES # BLD: 0 10E3/UL
IMM GRANULOCYTES NFR BLD: 0 %
KETONES UR STRIP-MCNC: NEGATIVE MG/DL
LDLC SERPL CALC-MCNC: 71 MG/DL
LEUKOCYTE ESTERASE UR QL STRIP: NEGATIVE
LYMPHOCYTES # BLD AUTO: 1.6 10E3/UL (ref 0.8–5.3)
LYMPHOCYTES NFR BLD AUTO: 24 %
MCH RBC QN AUTO: 29.7 PG (ref 26.5–33)
MCHC RBC AUTO-ENTMCNC: 34.8 G/DL (ref 31.5–36.5)
MCV RBC AUTO: 85 FL (ref 78–100)
METHADONE UR QL SCN: NORMAL
MONOCYTES # BLD AUTO: 0.7 10E3/UL (ref 0–1.3)
MONOCYTES NFR BLD AUTO: 10 %
NEUTROPHILS # BLD AUTO: 4.4 10E3/UL (ref 1.6–8.3)
NEUTROPHILS NFR BLD AUTO: 64 %
NITRATE UR QL: NEGATIVE
NONHDLC SERPL-MCNC: 96 MG/DL
NRBC # BLD AUTO: 0 10E3/UL
NRBC BLD AUTO-RTO: 0 /100
OPIATES UR QL SCN: ABNORMAL
OXYCODONE UR QL: NORMAL
PCP QUAL URINE (ROCHE): ABNORMAL
PH UR STRIP: 7.5 [PH] (ref 4.7–8)
PLATELET # BLD AUTO: 326 10E3/UL (ref 150–450)
POTASSIUM SERPL-SCNC: 3.4 MMOL/L (ref 3.4–5.3)
PROT SERPL-MCNC: 6.8 G/DL (ref 6.4–8.3)
RBC # BLD AUTO: 4.41 10E6/UL (ref 4.4–5.9)
RBC URINE: 1 /HPF
SODIUM SERPL-SCNC: 137 MMOL/L (ref 135–145)
SP GR UR STRIP: 1.01 (ref 1–1.03)
SQUAMOUS EPITHELIAL: 0 /HPF
TRIGL SERPL-MCNC: 123 MG/DL
UROBILINOGEN UR STRIP-MCNC: 2 MG/DL
WBC # BLD AUTO: 6.8 10E3/UL (ref 4–11)
WBC URINE: <1 /HPF

## 2024-06-03 PROCEDURE — G0463 HOSPITAL OUTPT CLINIC VISIT: HCPCS

## 2024-06-03 PROCEDURE — 80307 DRUG TEST PRSMV CHEM ANLYZR: CPT | Mod: ZL | Performed by: PHYSICIAN ASSISTANT

## 2024-06-03 PROCEDURE — 82570 ASSAY OF URINE CREATININE: CPT | Mod: ZL,XU | Performed by: PHYSICIAN ASSISTANT

## 2024-06-03 PROCEDURE — 99214 OFFICE O/P EST MOD 30 MIN: CPT | Performed by: PHYSICIAN ASSISTANT

## 2024-06-03 PROCEDURE — 36415 COLL VENOUS BLD VENIPUNCTURE: CPT | Mod: ZL | Performed by: PHYSICIAN ASSISTANT

## 2024-06-03 PROCEDURE — 81001 URINALYSIS AUTO W/SCOPE: CPT | Mod: ZL | Performed by: PHYSICIAN ASSISTANT

## 2024-06-03 PROCEDURE — 82247 BILIRUBIN TOTAL: CPT | Mod: ZL | Performed by: PHYSICIAN ASSISTANT

## 2024-06-03 PROCEDURE — 80061 LIPID PANEL: CPT | Mod: ZL | Performed by: PHYSICIAN ASSISTANT

## 2024-06-03 PROCEDURE — 86140 C-REACTIVE PROTEIN: CPT | Mod: ZL | Performed by: PHYSICIAN ASSISTANT

## 2024-06-03 PROCEDURE — 85004 AUTOMATED DIFF WBC COUNT: CPT | Mod: ZL | Performed by: PHYSICIAN ASSISTANT

## 2024-06-03 RX ORDER — HYDROMORPHONE HYDROCHLORIDE 4 MG/1
4 TABLET ORAL EVERY 6 HOURS PRN
Qty: 10 TABLET | Refills: 0 | Status: SHIPPED | OUTPATIENT
Start: 2024-06-03 | End: 2024-07-01

## 2024-06-03 RX ORDER — KETOCONAZOLE 20 MG/ML
SHAMPOO TOPICAL DAILY PRN
Qty: 240 ML | Refills: 3 | Status: SHIPPED | OUTPATIENT
Start: 2024-06-03

## 2024-06-03 RX ORDER — CYCLOBENZAPRINE HCL 10 MG
TABLET ORAL
Qty: 90 TABLET | Refills: 3 | Status: SHIPPED | OUTPATIENT
Start: 2024-06-03

## 2024-06-03 RX ORDER — BUPRENORPHINE HYDROCHLORIDE, NALOXONE HYDROCHLORIDE 12; 3 MG/1; MG/1
1 FILM, SOLUBLE BUCCAL; SUBLINGUAL DAILY
Qty: 30 EACH | Refills: 3 | Status: SHIPPED | OUTPATIENT
Start: 2024-06-03 | End: 2024-09-23

## 2024-06-03 RX ORDER — BUPRENORPHINE HYDROCHLORIDE, NALOXONE HYDROCHLORIDE 8; 2 MG/1; MG/1
1 FILM, SOLUBLE BUCCAL; SUBLINGUAL DAILY
Qty: 30 EACH | Refills: 3 | Status: SHIPPED | OUTPATIENT
Start: 2024-06-03 | End: 2024-09-23

## 2024-06-03 ASSESSMENT — PAIN SCALES - GENERAL: PAINLEVEL: SEVERE PAIN (6)

## 2024-06-03 NOTE — PROGRESS NOTES
Clinic Care Coordination Contact  Follow Up Progress Note      Assessment: CC attended office visit with pt, his SO, Huong, and Florence Sneed this date.  Ronaldo is doing great on Suboxone and would like to continue on current dose.  Is allowed #10 Dilaudid every 3-4 months for severe neck pain.  Main concern today is derm issue - multiple white, scaly, plaque areas - bilateral legs, bilateral arms, abdomen.  Has been getting worse since January 2024.  Flroence Sneed prescribed Otezla this date - dosing instructions per package insert.  Will most likely need a PA.  CC will monitor this.     Care Gaps:    Health Maintenance Due   Topic Date Due    YEARLY PREVENTIVE VISIT  Never done    COVID-19 Vaccine (1) Never done    Pneumococcal Vaccine: Pediatrics (0 to 5 Years) and At-Risk Patients (6 to 64 Years) (1 of 2 - PCV) Never done    COLORECTAL CANCER SCREENING  Never done    ZOSTER IMMUNIZATION (1 of 2) Never done    HEPATITIS B IMMUNIZATION (1 of 3 - 19+ 3-dose series) Never done    LIPID  Never done    DTAP/TDAP/TD IMMUNIZATION (6 - Td or Tdap) 12/30/2021    ADVANCE CARE PLANNING  01/17/2022       Will continue to work on these    Care Plans  Care Plan: Sleep       Problem: Impaired Sleep Pattern       Goal: Improve Sleep Habits       This Visit's Progress: 60% Recent Progress: 50%    Note:     Due to neck pain and LBP.  Dilaudid helpful for acute pain attacks                             Care Plan: Chronic Pain       Problem: Chronic Pain is not self-managed       Goal: Patient will maintain consistent outpatient follow up to manage Chronic Pain and avoid hospitalizations for the next 6 months       This Visit's Progress: On track Recent Progress: On track    Note:     Barriers: literacy barrier  Strengths: committed, open to change  Patient expressed understanding of goal: yes  Action steps to achieve this goal:  1. I will take my medications as prescribed.  2. I will attend outpatient physical therapy starting on  3/27/2024  3. I will reach out if I am struggling.                          Problem: Inability to perform ADLs       Goal: Maintain ability to perform ADLs without difficulty       This Visit's Progress: 90% Recent Progress: 90%                            Intervention/Education provided during outreach: Continue current meds as prescribed.  Will update on PA.     Outreach Frequency: 3 months, more frequently as needed        Plan:   No change in treatment plan at this time.    Care Coordinator will follow up in 12 weeks, sooner if he has concerns.    Yoli Kowalski RN-BSN, [facility] Care Coordinator  [phone]

## 2024-06-03 NOTE — RESULT ENCOUNTER NOTE
Has anemia  Guessing dietary from new dentures.  Start eating better meals.  Iron rich, green and leafy, red meat . Those things to help you.

## 2024-06-04 ENCOUNTER — TELEPHONE (OUTPATIENT)
Dept: FAMILY MEDICINE | Facility: OTHER | Age: 49
End: 2024-06-04

## 2024-06-04 DIAGNOSIS — L40.9 PSORIASIS: ICD-10-CM

## 2024-06-04 NOTE — TELEPHONE ENCOUNTER
Pt has tried topical corticosteroids with no relief.  Has also used a SAD lamp with no relief.      Does this need to be appealed then?

## 2024-06-04 NOTE — TELEPHONE ENCOUNTER
Per Malik Solis will go through now but the script needs to be sent to [facility]. Thanks!

## 2024-06-04 NOTE — TELEPHONE ENCOUNTER
Received a call from Karla at [facility] that the patient needs to try a formulary option prior to approval of Apremilast (OTEZLA) 10 & 20 & 30 MG TBPK. Formulary options include a topical corticosteroid, Calcineurin inhibitor, Cyclosporine, Acitretin, Phototerapy, or Methotrexate.

## 2024-06-04 NOTE — TELEPHONE ENCOUNTER
Florence Solis needs to go through [facility].  New RX is pended in this encounter.

## 2024-06-04 NOTE — TELEPHONE ENCOUNTER
Received a PA request from Richard for Apremilast (OTEZLA) 10 & 20 & 30 MG TBPK. Submitted on CMM. Waiting for a response.

## 2024-06-05 ENCOUNTER — TELEPHONE (OUTPATIENT)
Dept: FAMILY MEDICINE | Facility: OTHER | Age: 49
End: 2024-06-05

## 2024-06-05 NOTE — TELEPHONE ENCOUNTER
1:18 PM    Reason for Call: Phone Call    Description: [facility] called about sending in a maintance   prescription for Apremilast (OTEZLA) 10 & 20 & 30 MG TBPK for pt does not run out of medication    Was an appointment offered for this call? No  If yes : Appointment type              Date    Preferred method for responding to this message: Telephone Call  What is your phone number ? [phone]     If we cannot reach you directly, may we leave a detailed response at the number you provided? Yes    Can this message wait until your PCP/provider returns, if available today? Not applicable    Michelle Dhaliwal

## 2024-06-12 ENCOUNTER — PATIENT OUTREACH (OUTPATIENT)
Dept: CARE COORDINATION | Facility: OTHER | Age: 49
End: 2024-06-12

## 2024-06-12 DIAGNOSIS — L40.9 PSORIASIS: Primary | ICD-10-CM

## 2024-06-12 NOTE — PROGRESS NOTES
Clinic Care Coordination Contact  Care Team Conversations    RN CC sent Ronaldo a text message this date reminding him of his appointment with Dr. Quevedo tomorrow and to arrive at 2:15PM.    Yoli Kowalski RN-BSN, [facility] Care Coordinator  [phone]

## 2024-06-12 NOTE — TELEPHONE ENCOUNTER
Pharmacy asking for the maintenance dose to be sent in, so Ronaldo doesn't run out.  Maintenance dose is pended.  Please review and sign if appropriate.

## 2024-06-26 ENCOUNTER — PATIENT OUTREACH (OUTPATIENT)
Dept: CARE COORDINATION | Facility: OTHER | Age: 49
End: 2024-06-26

## 2024-06-26 NOTE — PROGRESS NOTES
Clinic Care Coordination Contact  Care Team Conversations    RN CC sent Ronaldo a message this date reminding him of his appointment with Dr. Quevedo tomorrow and to arrive at 3:15PM.    Yoli Kowalski RN-BSN, [facility] Care Coordinator  [phone]

## 2024-07-01 ENCOUNTER — OFFICE VISIT (OUTPATIENT)
Dept: FAMILY MEDICINE | Facility: OTHER | Age: 49
End: 2024-07-01
Attending: PHYSICIAN ASSISTANT
Payer: COMMERCIAL

## 2024-07-01 ENCOUNTER — PATIENT OUTREACH (OUTPATIENT)
Dept: CARE COORDINATION | Facility: OTHER | Age: 49
End: 2024-07-01

## 2024-07-01 VITALS
OXYGEN SATURATION: 97 % | RESPIRATION RATE: 17 BRPM | WEIGHT: 185.1 LBS | DIASTOLIC BLOOD PRESSURE: 72 MMHG | BODY MASS INDEX: 25.91 KG/M2 | HEART RATE: 119 BPM | HEIGHT: 71 IN | TEMPERATURE: 97.9 F | SYSTOLIC BLOOD PRESSURE: 100 MMHG

## 2024-07-01 DIAGNOSIS — G89.29 CHRONIC MIDLINE LOW BACK PAIN WITH BILATERAL SCIATICA: ICD-10-CM

## 2024-07-01 DIAGNOSIS — R11.2 DRUG-INDUCED NAUSEA AND VOMITING: Primary | ICD-10-CM

## 2024-07-01 DIAGNOSIS — M54.42 CHRONIC MIDLINE LOW BACK PAIN WITH BILATERAL SCIATICA: ICD-10-CM

## 2024-07-01 DIAGNOSIS — L40.9 PSORIASIS: ICD-10-CM

## 2024-07-01 DIAGNOSIS — M54.41 CHRONIC MIDLINE LOW BACK PAIN WITH BILATERAL SCIATICA: ICD-10-CM

## 2024-07-01 DIAGNOSIS — T50.905A DRUG-INDUCED NAUSEA AND VOMITING: Primary | ICD-10-CM

## 2024-07-01 PROCEDURE — G0463 HOSPITAL OUTPT CLINIC VISIT: HCPCS

## 2024-07-01 PROCEDURE — 99213 OFFICE O/P EST LOW 20 MIN: CPT | Performed by: PHYSICIAN ASSISTANT

## 2024-07-01 PROCEDURE — G0463 HOSPITAL OUTPT CLINIC VISIT: HCPCS | Mod: 25

## 2024-07-01 RX ORDER — TRIAMCINOLONE ACETONIDE 1 MG/G
CREAM TOPICAL 2 TIMES DAILY
Qty: 453 G | Refills: 4 | Status: SHIPPED | OUTPATIENT
Start: 2024-07-01

## 2024-07-01 RX ORDER — HYDROMORPHONE HYDROCHLORIDE 4 MG/1
4 TABLET ORAL EVERY 6 HOURS PRN
Qty: 10 TABLET | Refills: 0 | Status: SHIPPED | OUTPATIENT
Start: 2024-07-01 | End: 2024-07-25

## 2024-07-01 ASSESSMENT — PAIN SCALES - GENERAL: PAINLEVEL: SEVERE PAIN (7)

## 2024-07-01 NOTE — PROGRESS NOTES
Assessment & Plan     Psoriasis  He is cutting back down on his Otezla. Now is having joint pain.  He is feeling terrible. GI issues. Checking for pancrease and liver.  His mood has been down as well. Weight down 10 #.  He did the starter pack as he should have. But once he hit the 30 mg bid , he vomiting and ate nothing.   He feels much better on 30 mg an is now eating.    - apremilast (OTEZLA) 30 MG tablet; Take 1 tablet (30 mg) by mouth daily  - triamcinolone (KENALOG) 0.1 % external cream; Apply topically 2 times daily  - Lipase; Future  - Hepatic panel (Albumin, ALT, AST, Bili, Alk Phos, TP); Future  - Basic metabolic panel; Future  - CBC with platelets and differential; Future    Drug-induced nausea and vomiting  Rechecking his liver and kidneys.   - Lipase; Future  - Hepatic panel (Albumin, ALT, AST, Bili, Alk Phos, TP); Future  - Basic metabolic panel; Future  - CBC with platelets and differential; Future            MEDICATIONS:        - we have him communicating with specialty pharmacy.  He is worsening in his joint pain.  We will make a referral to Rheumatology.     Depression is common. And he is very tire and feels frustrated as he hasn't felt well despite all his progress. He seems to have good eye contact.  With his long term partner.      No follow-ups on file.    Moraima Obrien is a 49 year old, presenting for the following health issues:  No chief complaint on file.        7/1/2024     1:29 PM   Additional Questions   Roomed by Gladys Malcolm   Accompanied by friend     History of Present Illness       Reason for visit:  Skin condition    He eats 0-1 servings of fruits and vegetables daily.He consumes 4 sweetened beverage(s) daily.He exercises with enough effort to increase his heart rate 9 or less minutes per day.  He exercises with enough effort to increase his heart rate 3 or less days per week.   He is taking medications regularly.       Medication Followup of OTEZLA  Taking Medication  as prescribed: yes  Side Effects:  unable to take twice daily, currently taking once pill in the morning. Nausea and vomiting  Medication Helping Symptoms:  yes      Depression   How are you doing with your depression since your last visit? No change  Are you having other symptoms that might be associated with depression? Yes:  constipation  Have you had a significant life event?  No   Are you feeling anxious or having panic attacks?   No  Do you have any concerns with your use of alcohol or other drugs? No    Social History     Tobacco Use    Smoking status: Former     Current packs/day: 0.00     Average packs/day: 1 pack/day for 35.5 years (35.5 ttl pk-yrs)     Types: Cigarettes     Start date: 1988     Quit date: 2023     Years since quittin.0     Passive exposure: Current    Smokeless tobacco: Never   Vaping Use    Vaping status: Every Day    Substances: THC, CBD, Flavoring    Devices: Disposable   Substance Use Topics    Alcohol use: No    Drug use: Not Currently         2023     1:32 PM 2023     1:32 PM 3/11/2024    10:08 AM   PHQ   PHQ-9 Total Score 5 0 0   Q9: Thoughts of better off dead/self-harm past 2 weeks Not at all Not at all Not at all         2022     1:08 PM 2023     1:33 PM 3/11/2024    10:08 AM   YURI-7 SCORE   Total Score 4 0 0         3/11/2024    10:08 AM   Last PHQ-9   1.  Little interest or pleasure in doing things 0   2.  Feeling down, depressed, or hopeless 0   3.  Trouble falling or staying asleep, or sleeping too much 0   4.  Feeling tired or having little energy 0   5.  Poor appetite or overeating 0   6.  Feeling bad about yourself 0   7.  Trouble concentrating 0   8.  Moving slowly or restless 0   Q9: Thoughts of better off dead/self-harm past 2 weeks 0   PHQ-9 Total Score 0         3/11/2024    10:08 AM   YURI-7    1. Feeling nervous, anxious, or on edge 0   2. Not being able to stop or control worrying 0   3. Worrying too much about different things 0  "  4. Trouble relaxing 0   5. Being so restless that it is hard to sit still 0   6. Becoming easily annoyed or irritable 0   7. Feeling afraid, as if something awful might happen 0   YURI-7 Total Score 0       Suicide Assessment Five-step Evaluation and Treatment (SAFE-T)          Review of Systems  Constitutional, neuro, ENT, endocrine, pulmonary, cardiac, gastrointestinal, genitourinary, musculoskeletal, integument and psychiatric systems are negative, except as otherwise noted.      Objective    /72   Pulse 119   Temp 97.9  F (36.6  C) (Tympanic)   Resp 17   Ht 1.803 m (5' 11\")   Wt 84 kg (185 lb 1.6 oz)   SpO2 97%   BMI 25.82 kg/m    Body mass index is 25.82 kg/m .  Physical Exam   GENERAL: alert and no distress  EYES: Eyes grossly normal to inspection, PERRL and conjunctivae and sclerae normal  HENT: ear canals and TM's normal, nose and mouth without ulcers or lesions  NECK: no adenopathy, no asymmetry, masses, or scars  RESP: lungs clear to auscultation - no rales, rhonchi or wheezes  CV: regular rate and rhythm, normal S1 S2, no S3 or S4, no murmur, click or rub, no peripheral edema  MS: no gross musculoskeletal defects noted, no edema  SKIN: plaques on his legs have limited silver plaquing left. Swelling remains in legs. No sign of secondary infections.   PSYCH: mentation appears intact.  He is tired looking. Weight down from 227 in July of 2023.    LYMPH: no cervical, supraclavicular, axillary, or inguinal adenopathy    No results found for any visits on 07/01/24.        Signed Electronically by: ELODIA Barrow    "

## 2024-07-01 NOTE — PROGRESS NOTES
Clinic Care Coordination Contact  Follow Up Progress Note      Assessment: RN CC attended office visit with Huong Obrien, and Florence Sneed this date.  Ronaldo recently started Otezla.  He was unable to increase to the full maintenance dose of 30mg BID.  He had N/V, fatigue, joint pain, and increase in depression.  He has only been taking 30mg daily and side effects have subsided.  He did talk to [facility] about the dosing and side effects - and per pharmacy - he is ok to continue on the 30mg daily for now.  Pharmacy checks in monthly with him.  Plaques are getting better per Ronaldo and Huong.      Care Gaps:    Health Maintenance Due   Topic Date Due    YEARLY PREVENTIVE VISIT  Never done    COVID-19 Vaccine (1) Never done    Pneumococcal Vaccine: Pediatrics (0 to 5 Years) and At-Risk Patients (6 to 64 Years) (1 of 2 - PCV) Never done    COLORECTAL CANCER SCREENING  Never done    ZOSTER IMMUNIZATION (1 of 2) Never done    HEPATITIS A IMMUNIZATION (1 of 2 - Risk 2-dose series) Never done    HEPATITIS B IMMUNIZATION (1 of 3 - 19+ 3-dose series) Never done    CONTROLLED SUBSTANCE AGREEMENT FOR CHRONIC PAIN MANAGEMENT  02/24/2021    DTAP/TDAP/TD IMMUNIZATION (6 - Td or Tdap) 12/30/2021    ADVANCE CARE PLANNING  01/17/2022       Will continue to work on these    Care Plans  Care Plan: Sleep       Problem: Impaired Sleep Pattern       Goal: Improve Sleep Habits       This Visit's Progress: 60% Recent Progress: 50%    Note:     Due to neck pain and LBP.  Dilaudid helpful for acute pain attacks                             Care Plan: Chronic Pain       Problem: Chronic Pain is not self-managed       Goal: Patient will maintain consistent outpatient follow up to manage Chronic Pain and avoid hospitalizations for the next 6 months       Recent Progress: On track    Note:     Barriers: literacy barrier  Strengths: committed, open to change  Patient expressed understanding of goal: yes  Action steps to achieve this  goal:  1. I will take my medications as prescribed.  2. I will increase my physical activity as able  3. I will reach out if I am struggling.                          Problem: Inability to perform ADLs       Goal: Maintain ability to perform ADLs without difficulty       This Visit's Progress: 90% Recent Progress: 90%                            Intervention/Education provided during outreach: Continue current meds as prescribed.  Keep us updated on Otezla RX - currently taking 1 tablet per day - 30mg daily.  He is keeping in contact with the specialty pharmacy in regards to dosing and side effects.       Outreach Frequency: 3 months, more frequently as needed        Plan:   No change.  Care Coordinator will follow up in 12 weeks, sooner if he has concerns.    Yoli Kowalski RN-BSN, [facility] Care Coordinator  [phone]

## 2024-07-02 ENCOUNTER — TELEPHONE (OUTPATIENT)
Dept: FAMILY MEDICINE | Facility: OTHER | Age: 49
End: 2024-07-02

## 2024-07-02 NOTE — TELEPHONE ENCOUNTER
Spoke with patient to see if he would be willing to see [facility] due to [facility] being out of network. Patient states he needs to speak with his significant other and will call back if he wants a referral to [facility].

## 2024-07-03 ENCOUNTER — TELEPHONE (OUTPATIENT)
Dept: FAMILY MEDICINE | Facility: OTHER | Age: 49
End: 2024-07-03

## 2024-07-03 NOTE — TELEPHONE ENCOUNTER
Received PA APPROVAL from [employer] for DILAUDID 4MG. Scanned into EPIC,  dates 07/03/24-07/02/2025

## 2024-07-07 ENCOUNTER — HEALTH MAINTENANCE LETTER (OUTPATIENT)
Age: 49
End: 2024-07-07

## 2024-07-16 ENCOUNTER — PATIENT OUTREACH (OUTPATIENT)
Dept: CARE COORDINATION | Facility: OTHER | Age: 49
End: 2024-07-16

## 2024-07-16 NOTE — PROGRESS NOTES
Clinic Care Coordination Contact  Care Team Conversations    RN CC sent Ronaldo a message this date reminding him of his appointment with Dr. Quevedo tomorrow and to arrive at 2:15PM.    Yoli Kowalski RN-BSN, [facility] Care Coordinator  [phone]

## 2024-07-17 ENCOUNTER — PREP FOR PROCEDURE (OUTPATIENT)
Dept: SURGERY | Facility: OTHER | Age: 49
End: 2024-07-17

## 2024-07-17 ENCOUNTER — HOSPITAL ENCOUNTER (OUTPATIENT)
Facility: HOSPITAL | Age: 49
End: 2024-07-17
Attending: SURGERY | Admitting: SURGERY
Payer: COMMERCIAL

## 2024-07-17 ENCOUNTER — OFFICE VISIT (OUTPATIENT)
Dept: SURGERY | Facility: OTHER | Age: 49
End: 2024-07-17
Attending: SURGERY
Payer: COMMERCIAL

## 2024-07-17 VITALS
WEIGHT: 185 LBS | DIASTOLIC BLOOD PRESSURE: 60 MMHG | OXYGEN SATURATION: 96 % | RESPIRATION RATE: 16 BRPM | SYSTOLIC BLOOD PRESSURE: 110 MMHG | HEART RATE: 94 BPM | HEIGHT: 71 IN | BODY MASS INDEX: 25.9 KG/M2

## 2024-07-17 DIAGNOSIS — R13.10 DYSPHAGIA: Primary | ICD-10-CM

## 2024-07-17 DIAGNOSIS — R13.19 ESOPHAGEAL DYSPHAGIA: Primary | ICD-10-CM

## 2024-07-17 PROCEDURE — 99203 OFFICE O/P NEW LOW 30 MIN: CPT | Performed by: SURGERY

## 2024-07-17 PROCEDURE — G0463 HOSPITAL OUTPT CLINIC VISIT: HCPCS

## 2024-07-17 ASSESSMENT — PAIN SCALES - GENERAL: PAINLEVEL: SEVERE PAIN (7)

## 2024-07-19 NOTE — PROGRESS NOTES
[facility] Consultation    CC:  Dysphagia     HPI:  This 49 year old year old male is seen at the request of Florence Sneed for evaluation of dysphagia. He reports several episodes of feeling like food is getting stuck, also reports episode of choking on a pill to the point of giving himself the heimlich. He reports frequent vomiting. He only moves his bowels once a week. He is on suboxone He has had his teeth removed making chewing an issue. 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      Past Medical History:   Diagnosis Date    Cervical disc disease     Uncomplicated asthma        Past Surgical History:   Procedure Laterality Date    BACK SURGERY      fusion of C5 and C6    IR TRANSLAMINAR EPIDURAL LUMBAR INJ INCL IMAGING  12/2012    OTHER SURGICAL HISTORY  03/2013    Cervical Fusion    wisdom teeth extracted         Allergies   Allergen Reactions    Benzodiazepines      On Suboxone - contraindicated.    Cranberry     Strawberry Extract     Doxycycline Nausea and Vomiting    Zithromax [Azithromycin Dihydrate] Rash       Current Outpatient Medications   Medication Sig Dispense Refill    albuterol (PROAIR HFA/PROVENTIL HFA/VENTOLIN HFA) 108 (90 Base) MCG/ACT inhaler Inhale 2 puffs into the lungs every 6 hours as needed for shortness of breath / dyspnea 1 Inhaler 1    apremilast (OTEZLA) 30 MG tablet Take 1 tablet (30 mg) by mouth daily 30 tablet 3    cyclobenzaprine (FLEXERIL) 10 MG tablet TAKE 1 TABLET BY MOUTH 3 TIMES A DAYAS NEEDED 90 tablet 3    HYDROmorphone (DILAUDID) 4 MG tablet Take 1 tablet (4 mg) by mouth every 6 hours as needed for severe pain 10 tablet 0    ketoconazole (NIZORAL) 2 % external shampoo Apply topically daily as needed for itching or irritation 240 mL 3    omeprazole (PRILOSEC) 40 MG DR capsule Take 1 capsule (40 mg) by mouth daily 90 capsule 3    SUBOXONE 12-3 MG FILM per film Place 1 Film under the tongue daily PM 30 each 3    SUBOXONE 8-2 MG per film Place 1 Film  "under the tongue daily AM 30 each 3    triamcinolone (KENALOG) 0.1 % external cream Apply topically 2 times daily 453 g 4    triamcinolone (KENALOG) 0.1 % external ointment Apply topically 2 times daily 30 g 1     No current facility-administered medications for this visit.       HABITS:    Social History     Tobacco Use    Smoking status: Former     Current packs/day: 0.00     Average packs/day: 1 pack/day for 35.5 years (35.5 ttl pk-yrs)     Types: Cigarettes     Start date: 1988     Quit date: 2023     Years since quittin.0     Passive exposure: Current    Smokeless tobacco: Never   Vaping Use    Vaping status: Every Day    Substances: THC, CBD, Flavoring    Devices: Disposable   Substance Use Topics    Alcohol use: No    Drug use: Not Currently       Family History   Problem Relation Age of Onset    Unknown/Adopted Father     Cancer Maternal Uncle         throat ca    Cancer Maternal Uncle         lung ca    Cardiovascular Maternal Grandfather     Cardiovascular Paternal Grandfather        REVIEW OF SYSTEMS:  Ten point review of systems negative except those mentioned in the HPI.     OBJECTIVE:    /60 (BP Location: Right arm, Cuff Size: Adult Regular)   Pulse 94   Resp 16   Ht 1.797 m (5' 10.75\")   Wt 83.9 kg (185 lb)   SpO2 96%   BMI 25.98 kg/m      GENERAL: Generally appears well, in no distress with appropriate affect.  HEENT:   Sclerae anicteric - normocephalic atraumatic   Respiratory:  No acute distress, no splinting,   Cardiovascular:  Regular Rate and Rhythm      IMPRESSION:    49 y.o. male with chronic back and neck pain, prior history of substance abuse, presents with dysphagia. He has had his teeth pulled and likely contributing to his issues with dysphagia. Do think he would benefit from upper endoscopy to rule out a stricture as well as take biopsies for eosinophilic esophagitis. Did offer to do a colonoscopy as well as he is due for screening and he did decline. Discussed " likely need for SLP if nothing obviously concerning on upper endoscopy.     PLAN:    Upper endoscopy.     Teddy Quevedo MD,     7/19/2024  11:45 AM

## 2024-07-22 ENCOUNTER — PATIENT OUTREACH (OUTPATIENT)
Dept: CARE COORDINATION | Facility: OTHER | Age: 49
End: 2024-07-22

## 2024-07-22 DIAGNOSIS — L40.50 PSORIASIS WITH ARTHROPATHY (H): Primary | ICD-10-CM

## 2024-07-22 NOTE — PROGRESS NOTES
Clinic Care Coordination Contact  Care Team Conversations    Ronaldo would like to stop taking the Otezla due to side effects.  Reports since starting medication, has had a increase in joint pain - to where it is interfering with his daily life.  Did not have this type of pain until he started taking the Otezla.  RN CC did reach out to Job Yan RPH, in regards to discontinuing medication - does it need to be tapered?  Per Job Yan RPH, medication can typically just be stopped - may have rebound in psoriasis symptoms.    Are you OK with Ronaldo stopping the Otezla?      Would also like new referral to Rheumatology at [facility], which is pended in this encounter.  Please review and sign if appropriate.      Yoli Kowalski RN-BSN, [facility] Care Coordinator  [phone]

## 2024-07-25 DIAGNOSIS — G89.29 CHRONIC MIDLINE LOW BACK PAIN WITH BILATERAL SCIATICA: ICD-10-CM

## 2024-07-25 DIAGNOSIS — M54.42 CHRONIC MIDLINE LOW BACK PAIN WITH BILATERAL SCIATICA: ICD-10-CM

## 2024-07-25 DIAGNOSIS — M54.41 CHRONIC MIDLINE LOW BACK PAIN WITH BILATERAL SCIATICA: ICD-10-CM

## 2024-07-25 RX ORDER — HYDROMORPHONE HYDROCHLORIDE 4 MG/1
4 TABLET ORAL EVERY 6 HOURS PRN
Qty: 10 TABLET | Refills: 0 | Status: SHIPPED | OUTPATIENT
Start: 2024-07-25 | End: 2024-07-31

## 2024-07-25 NOTE — TELEPHONE ENCOUNTER
Dilaudid     Since starting Otezla (which he has not stopped) he has had significant joint pain, last night he was in tears due to the pain  - asking for a refill of this, as this is the only thing that seems to help with the pain.    Last Written Prescription Date:  7.3.24  Last Fill Quantity: #10,   # refills: 0  Last Office Visit: 7.1.24  Future Office visit:    Next 5 appointments (look out 90 days)      Sep 25, 2024 10:15 AM  (Arrive by 10:00 AM)  Provider Visit with ELODIA Tomlin  [facility] ([facility] [place] ) [address]  [place] [address]  [phone]             Routing refill request to provider for review/approval because:  Drug not on the [facility], P or  Health refill protocol or controlled substance

## 2024-07-31 DIAGNOSIS — M54.42 CHRONIC MIDLINE LOW BACK PAIN WITH BILATERAL SCIATICA: ICD-10-CM

## 2024-07-31 DIAGNOSIS — M54.41 CHRONIC MIDLINE LOW BACK PAIN WITH BILATERAL SCIATICA: ICD-10-CM

## 2024-07-31 DIAGNOSIS — G89.29 CHRONIC MIDLINE LOW BACK PAIN WITH BILATERAL SCIATICA: ICD-10-CM

## 2024-07-31 RX ORDER — HYDROMORPHONE HYDROCHLORIDE 4 MG/1
4 TABLET ORAL EVERY 6 HOURS PRN
Qty: 10 TABLET | Refills: 0 | Status: SHIPPED | OUTPATIENT
Start: 2024-07-31 | End: 2024-08-08

## 2024-07-31 NOTE — TELEPHONE ENCOUNTER
Dilaudid    - bilateral knee pain - coming in Monday to discuss options.  Started after taking Otezla, which he has discontinued now   Last Written Prescription Date:  7.25.24  Last Fill Quantity: #10,   # refills: 0  Last Office Visit: 7.1.24  Future Office visit:    Next 5 appointments (look out 90 days)      Aug 05, 2024 1:30 PM  (Arrive by 1:15 PM)  Provider Visit with ELODIA Tomlin  [facility] ([facility] ) [address]  [place] [address]  [phone]     Sep 25, 2024 10:15 AM  (Arrive by 10:00 AM)  Provider Visit with ELODIA Tomlin  [facility] ([facility] ) [address] [facility] [facility] [address]  [phone]             Routing refill request to provider for review/approval because:  Drug not on the G, P or [facility] refill protocol or controlled substance

## 2024-08-07 ENCOUNTER — PATIENT OUTREACH (OUTPATIENT)
Dept: CARE COORDINATION | Facility: OTHER | Age: 49
End: 2024-08-07

## 2024-08-07 NOTE — PROGRESS NOTES
Clinic Care Coordination Contact  Care Team Conversations    RN CC sent Ronaldo a message this date reminding him of his appointment tomorrow and to arrive at 9:00AM.    Yoli Kowalski RN-BSN, [facility] Care Coordinator  [phone]

## 2024-08-08 ENCOUNTER — OFFICE VISIT (OUTPATIENT)
Dept: FAMILY MEDICINE | Facility: OTHER | Age: 49
End: 2024-08-08
Attending: PHYSICIAN ASSISTANT
Payer: COMMERCIAL

## 2024-08-08 ENCOUNTER — PATIENT OUTREACH (OUTPATIENT)
Dept: CARE COORDINATION | Facility: OTHER | Age: 49
End: 2024-08-08

## 2024-08-08 VITALS
DIASTOLIC BLOOD PRESSURE: 83 MMHG | OXYGEN SATURATION: 97 % | TEMPERATURE: 96.7 F | HEIGHT: 71 IN | SYSTOLIC BLOOD PRESSURE: 126 MMHG | HEART RATE: 123 BPM | BODY MASS INDEX: 25.42 KG/M2 | RESPIRATION RATE: 16 BRPM | WEIGHT: 181.6 LBS

## 2024-08-08 DIAGNOSIS — J40 BRONCHITIS: Primary | ICD-10-CM

## 2024-08-08 DIAGNOSIS — M25.561 CHRONIC PAIN OF BOTH KNEES: ICD-10-CM

## 2024-08-08 DIAGNOSIS — L40.9 PSORIASIS: ICD-10-CM

## 2024-08-08 DIAGNOSIS — G89.29 CHRONIC MIDLINE LOW BACK PAIN WITH BILATERAL SCIATICA: ICD-10-CM

## 2024-08-08 DIAGNOSIS — M54.42 CHRONIC MIDLINE LOW BACK PAIN WITH BILATERAL SCIATICA: ICD-10-CM

## 2024-08-08 DIAGNOSIS — Z01.818 PREOP GENERAL PHYSICAL EXAM: ICD-10-CM

## 2024-08-08 DIAGNOSIS — M54.41 CHRONIC MIDLINE LOW BACK PAIN WITH BILATERAL SCIATICA: ICD-10-CM

## 2024-08-08 DIAGNOSIS — M11.20 CHONDROCALCINOSIS: ICD-10-CM

## 2024-08-08 DIAGNOSIS — G89.29 CHRONIC PAIN OF BOTH KNEES: ICD-10-CM

## 2024-08-08 DIAGNOSIS — M25.562 CHRONIC PAIN OF BOTH KNEES: ICD-10-CM

## 2024-08-08 PROCEDURE — G0463 HOSPITAL OUTPT CLINIC VISIT: HCPCS

## 2024-08-08 PROCEDURE — 99214 OFFICE O/P EST MOD 30 MIN: CPT | Performed by: PHYSICIAN ASSISTANT

## 2024-08-08 RX ORDER — HYDROMORPHONE HYDROCHLORIDE 4 MG/1
4 TABLET ORAL EVERY 6 HOURS PRN
Qty: 10 TABLET | Refills: 0 | Status: SHIPPED | OUTPATIENT
Start: 2024-08-08 | End: 2024-08-09

## 2024-08-08 RX ORDER — AMOXICILLIN 875 MG
875 TABLET ORAL 2 TIMES DAILY
Qty: 20 TABLET | Refills: 0 | Status: SHIPPED | OUTPATIENT
Start: 2024-08-08

## 2024-08-08 NOTE — PROGRESS NOTES
Clinic Care Coordination Contact  Follow Up Progress Note      Assessment: RN CC attended office visit with Ronaldo, Huong, and Florence Sneed this date.  Ronaldo comes in today for bilateral knee pain and a preop.  Preop apparently is not needed, as he is going to postpone his EGD surgery - due to having bronchitis today.  Huong will call Alyse, Surgery RN CC, to arrange new surgery date - (9/27/24). In regards to knee pain - pain started steadily increasing after he started Otezla, which he has now stopped.  Pain is interfering with his daily life and quality of life.  He is unable to sleep due to pain.  He is using icy hot, OTC pain relievers, etc with no relief.   The only thing that provides him any sort of relief is the PRN Dilaudid he is prescribed for flares of his neck/back pain - he has been taking 1 po BID.  He does have an appointment with Rheumatology on 9/9/24 at [facility] in [place].  Florence Sneed did agree to send in Dilaudid 4mg po BID #60 - will send in 8/9/24, as #10 tabs sent in this date.  Will most likely need override from insurance.  Will complete tomorrow, once new RX sent in.      Care Gaps:    Health Maintenance Due   Topic Date Due    YEARLY PREVENTIVE VISIT  Never done    COVID-19 Vaccine (1) Never done    Pneumococcal Vaccine: Pediatrics (0 to 5 Years) and At-Risk Patients (6 to 64 Years) (1 of 2 - PCV) Never done    COLORECTAL CANCER SCREENING  Never done    ZOSTER IMMUNIZATION (1 of 2) Never done    HEPATITIS A IMMUNIZATION (1 of 2 - Risk 2-dose series) Never done    HEPATITIS B IMMUNIZATION (1 of 3 - 19+ 3-dose series) Never done    CONTROLLED SUBSTANCE AGREEMENT FOR CHRONIC PAIN MANAGEMENT  02/24/2021    DTAP/TDAP/TD IMMUNIZATION (6 - Td or Tdap) 12/30/2021       Will continue to work on these    Care Plans  Care Plan: Sleep       Problem: Impaired Sleep Pattern       Goal: Improve Sleep Habits       This Visit's Progress: 60% Recent Progress: 50%    Note:     Due to neck pain and  LBP.  Dilaudid helpful for acute pain attacks                             Care Plan: Chronic Pain       Problem: Chronic Pain is not self-managed       Goal: Patient will maintain consistent outpatient follow up to manage Chronic Pain and avoid hospitalizations for the next 6 months       Recent Progress: On track    Note:     Barriers: literacy barrier  Strengths: committed, open to change  Patient expressed understanding of goal: yes  Action steps to achieve this goal:  1. I will take my medications as prescribed.  2. I will increase my physical activity as able  3. I will reach out if I am struggling.                          Problem: Inability to perform ADLs       Goal: Maintain ability to perform ADLs without difficulty       This Visit's Progress: 90% Recent Progress: 90%                            Care Coordinator will follow up in 8 weeks, sooner if he has concerns.    Yoli Kowalski RN-BSN, [facility] Care Coordinator  [phone]

## 2024-08-08 NOTE — PROGRESS NOTES
Preoperative Evaluation  [facility] [place]  [address]  [place] [address]  Phone: [phone]  Primary Provider: ELODIA Barrow  Pre-op Performing Provider: ELODIA Barrow  Aug 8, 2024             8/8/2024   Surgical Information   What procedure is being done? EGD   Facility or Hospital where procedure/surgery will be performed: [facility]   Who is doing the procedure / surgery? Dr Quevedo   Date of surgery / procedure: August 16th   Time of surgery / procedure: not available yet   Where do you plan to recover after surgery? at home with family        Fax number for surgical facility: Note does not need to be faxed, will be available electronically in Epic.    Assessment & Plan     The proposed surgical procedure is considered LOW risk.    Preop general physical exam  Pt is on hol has URI and Bronchitis.         Possible Sleep Apnea: no           - No identified additional risk factors other than previously addressed         Recommendation  Approval given to proceed with proposed procedure, without further diagnostic evaluation.    Moraima Obrien is a 49 year old, presenting for the following:  Pre-Op Exam and Knee Pain          8/8/2024     9:04 AM   Additional Questions   Roomed by nikita ocampo   Accompanied by spouse         8/8/2024     9:04 AM   Patient Reported Additional Medications   Patient reports taking the following new medications none     HPI related to upcoming procedure: needing Upper GI scope due to anemia.  Has been losing weight and has other multiple issues right now with his body. Recent diagnosis of Psoriasis .         8/8/2024   Pre-Op Questionnaire   Have you ever had a heart attack or stroke? No   Have you ever had surgery on your heart or blood vessels, such as a stent placement, a coronary artery bypass, or surgery on an artery in your head, neck, heart, or legs? No   Do you have chest pain with activity? No   Do you have a history of heart  failure? No   Do you currently have a cold, bronchitis or symptoms of other infection? (!) UNKNOWN    Do you have a cough, shortness of breath, or wheezing? (!) YES    Do you or anyone in your family have previous history of blood clots? (!) YES    Do you or does anyone in your family have a serious bleeding problem such as prolonged bleeding following surgeries or cuts? No   Have you ever had problems with anemia or been told to take iron pills? (!) UNKNOWN    Have you had any abnormal blood loss such as black, tarry or bloody stools? No   Have you ever had a blood transfusion? No   Are you willing to have a blood transfusion if it is medically needed before, during, or after your surgery? Yes   Have you or any of your relatives ever had problems with anesthesia? No   Do you have sleep apnea, excessive snoring or daytime drowsiness? (!) YES   Do you have a CPAP machine? (!) NO    Do you have any artifical heart valves or other implanted medical devices like a pacemaker, defibrillator, or continuous glucose monitor? No   Do you have artificial joints? No   Are you allergic to latex? No        Health Care Directive  Patient does not have a Health Care Directive or Living Will: Discussed advance care planning with patient; however, patient declined at this time.    Preoperative Review of    reviewed - controlled substances reflected in medication list.      Status of Chronic Conditions:  Many are stable and both are active with his pain in his knee and his RA.     Patient Active Problem List    Diagnosis Date Noted    Continuous opioid dependence (H) 06/03/2024     Priority: Medium    Chronic midline low back pain with bilateral sciatica 04/03/2024     Priority: Medium    Biliary colic 12/04/2020     Priority: Medium     Added automatically from request for surgery [id number]      Hiatal hernia 11/12/2020     Priority: Medium    Choking, sequela 11/12/2020     Priority: Medium    Opioid use disorder 04/03/2020      Priority: Medium    ACP (advance care planning) 01/17/2017     Priority: Medium     Advance Care Planning 1/17/2017: ACP Review of Chart / Resources Provided:  Reviewed chart for advance care plan.  Micah Rosenthal has no plan or code status on file. Discussed available resources and provided with information. Confirmed code status reflects current choices pending further ACP discussions.  Confirmed/documented legally designated decision makers.  Added by Thania Suárez            Positive urine drug screen 10/31/2016     Priority: Medium     Amphetamine and Cannabis      Pain management contract signed 04/14/2016     Priority: Medium     Updated 09/25/17      Cervical disc disease      Priority: Medium      Past Medical History:   Diagnosis Date    Cervical disc disease     Uncomplicated asthma      Past Surgical History:   Procedure Laterality Date    BACK SURGERY      fusion of C5 and C6    IR TRANSLAMINAR EPIDURAL LUMBAR INJ INCL IMAGING  12/2012    OTHER SURGICAL HISTORY  03/2013    Cervical Fusion    wisdom teeth extracted       Current Outpatient Medications   Medication Sig Dispense Refill    albuterol (PROAIR HFA/PROVENTIL HFA/VENTOLIN HFA) 108 (90 Base) MCG/ACT inhaler Inhale 2 puffs into the lungs every 6 hours as needed for shortness of breath / dyspnea 1 Inhaler 1    apremilast (OTEZLA) 30 MG tablet Take 1 tablet (30 mg) by mouth daily 30 tablet 3    cyclobenzaprine (FLEXERIL) 10 MG tablet TAKE 1 TABLET BY MOUTH 3 TIMES A DAYAS NEEDED 90 tablet 3    HYDROmorphone (DILAUDID) 4 MG tablet Take 1 tablet (4 mg) by mouth every 6 hours as needed for severe pain 10 tablet 0    ketoconazole (NIZORAL) 2 % external shampoo Apply topically daily as needed for itching or irritation 240 mL 3    omeprazole (PRILOSEC) 40 MG DR capsule Take 1 capsule (40 mg) by mouth daily 90 capsule 3    SUBOXONE 12-3 MG FILM per film Place 1 Film under the tongue daily PM 30 each 3    SUBOXONE 8-2 MG per film Place 1 Film under the  "tongue daily AM 30 each 3    triamcinolone (KENALOG) 0.1 % external cream Apply topically 2 times daily 453 g 4    triamcinolone (KENALOG) 0.1 % external ointment Apply topically 2 times daily 30 g 1       Allergies   Allergen Reactions    Benzodiazepines      On Suboxone - contraindicated.    Cranberry     Strawberry Extract     Doxycycline Nausea and Vomiting    Zithromax [Azithromycin Dihydrate] Rash        Social History     Tobacco Use    Smoking status: Former     Current packs/day: 0.00     Average packs/day: 1 pack/day for 35.5 years (35.5 ttl pk-yrs)     Types: Cigarettes     Start date: 1988     Quit date: 2023     Years since quittin.1     Passive exposure: Current    Smokeless tobacco: Never   Substance Use Topics    Alcohol use: No     Family History   Problem Relation Age of Onset    Unknown/Adopted Father     Cancer Maternal Uncle         throat ca    Cancer Maternal Uncle         lung ca    Cardiovascular Maternal Grandfather     Cardiovascular Paternal Grandfather      History   Drug Use Unknown             Review of Systems  Constitutional, HEENT, cardiovascular, pulmonary, gi and gu systems are negative, except as otherwise noted.    Objective    /83 (BP Location: Right arm, Patient Position: Sitting, Cuff Size: Adult Regular)   Pulse (!) 123   Temp (!) 96.7  F (35.9  C) (Tympanic)   Resp 16   Ht 1.797 m (5' 10.75\")   Wt 82.4 kg (181 lb 9.6 oz)   SpO2 97%   BMI 25.51 kg/m     Estimated body mass index is 25.51 kg/m  as calculated from the following:    Height as of this encounter: 1.797 m (5' 10.75\").    Weight as of this encounter: 82.4 kg (181 lb 9.6 oz).  Physical Exam  GENERAL: alert and no distress  NECK: no adenopathy, no asymmetry, masses, or scars  RESP: lungs clear to auscultation - harsh lungs sounds with out wheezing or Rhonchi.   CV: regular rate and rhythm, normal S1 S2, no S3 or S4, no murmur, click or rub, no peripheral edema  ABDOMEN: soft, nontender, no " hepatosplenomegaly, no masses and bowel sounds normal  MS: no gross musculoskeletal defects noted, no edema    Recent Labs   Lab Test 06/03/24  1159   HGB 13.1*         POTASSIUM 3.4   CR 0.63*        Diagnostics  No results found for this or any previous visit (from the past 24 hour(s)).   No EKG    Revised Cardiac Risk Index (RCRI)  The patient has the following serious cardiovascular risks for perioperative complications:   - No serious cardiac risks = 0 points     RCRI Interpretation: 1 point: Class II (low risk - 0.9% complication rate)         Signed Electronically by: ELODIA Barrow  A copy of this evaluation report is provided to the requesting physician.

## 2024-08-08 NOTE — PROGRESS NOTES
Assessment & Plan     Preop general physical exam  He has bronchitis. We will be seeing him back when rescheduled.     Chronic midline low back pain with bilateral sciatica  Refill is given we are having him see Rheumatology. We had him on Otezela  and he did not tolerate for psoriasis and arthritis.  This was stopped a month ago and now his knees are swollen and his Chondrocalcinosis is an issue. We need to get his knees injecte since we are not doing any surgery next week.   - Basic metabolic panel; Future  - CBC with platelets and differential; Future  - HYDROmorphone (DILAUDID) 4 MG tablet; Take 1 tablet (4 mg) by mouth every 6 hours as needed for severe pain    Bronchitis  He can't have surgery. Treated with Amoxil. See us back once rescheduled and after Rheumatology appointment. Pt will call Dr. Quevedo office.   - amoxicillin (AMOXIL) 875 MG tablet; Take 1 tablet (875 mg) by mouth 2 times daily    Chondrocalcinosis  Left knee markedly swollen. right is still stiff and painful.   - Orthopedic  Referral; Future    Psoriasis  Pain and welling .      Chronic pain of both knees  He is gong to be sent for injections.   - Orthopedic  Referral; Future            See Patient Instructions    No follow-ups on file.    Moraima Obrien is a 49 year old, presenting for the following health issues:  Pre-Op Exam and Knee Pain      8/8/2024     9:04 AM   Additional Questions   Roomed by nikita ocampo   Accompanied by spouse         8/8/2024     9:04 AM   Patient Reported Additional Medications   Patient reports taking the following new medications none     HPI     Joint Pain  Onset: 2 weeks  Description:   Location: left knee and right knee  Character: Gnawing and Burning  Intensity: severe  Progression of Symptoms: same  Accompanying Signs & Symptoms:  Other symptoms: radiation of pain up and down the legs, numbness, tingling, weakness of legs, warmth, swelling, and redness  History:   Previous similar  "pain: no     Precipitating factors:   Trauma or overuse: no   Alleviating factors:  Improved by: deep heating rub and dilaudid    Therapies Tried and outcome: icy hot and dilaudid           Review of Systems  Constitutional, HEENT, cardiovascular, pulmonary, gi and gu systems are negative, except as otherwise noted.      Objective    /83 (BP Location: Right arm, Patient Position: Sitting, Cuff Size: Adult Regular)   Pulse (!) 123   Temp (!) 96.7  F (35.9  C) (Tympanic)   Resp 16   Ht 1.797 m (5' 10.75\")   Wt 82.4 kg (181 lb 9.6 oz)   SpO2 97%   BMI 25.51 kg/m    Body mass index is 25.51 kg/m .  Physical Exam   GENERAL: alert and no distress  NECK: no adenopathy, no asymmetry, masses, or scars  RESP: lungs clear to auscultation - right lung has some harsh breathe sounds with out wheezing or Rhonchi.   CV: regular rate and rhythm, normal S1 S2, no S3 or S4, no murmur, click or rub, no peripheral edema  ABDOMEN: soft, nontender, no hepatosplenomegaly, no masses and bowel sounds normal  MS: left knee swollen right is painful and stiff. Movement today is very difficult.   SKIN: the plaque that ha clear is now back on legs. Upper back is still improve. Nails are short and fingers ar stiff.no swelling. No pitting.   NEURO: Normal strength and tone, mentation intact and speech normal  PSYCH: mentation appears normal and affect normal/bright    No results found for any visits on 08/08/24.        Signed Electronically by: ELODIA Barrow    "

## 2024-08-09 DIAGNOSIS — M11.20 CHONDROCALCINOSIS: Primary | ICD-10-CM

## 2024-08-09 DIAGNOSIS — M54.42 CHRONIC MIDLINE LOW BACK PAIN WITH BILATERAL SCIATICA: ICD-10-CM

## 2024-08-09 DIAGNOSIS — G89.29 CHRONIC MIDLINE LOW BACK PAIN WITH BILATERAL SCIATICA: ICD-10-CM

## 2024-08-09 DIAGNOSIS — M54.41 CHRONIC MIDLINE LOW BACK PAIN WITH BILATERAL SCIATICA: ICD-10-CM

## 2024-08-09 RX ORDER — HYDROMORPHONE HYDROCHLORIDE 4 MG/1
4 TABLET ORAL 2 TIMES DAILY PRN
Qty: 60 TABLET | Refills: 0 | Status: SHIPPED | OUTPATIENT
Start: 2024-08-09 | End: 2024-09-06

## 2024-08-09 NOTE — TELEPHONE ENCOUNTER
Florence  - Dilaudid 4mg po BID prn #60 pended per office visit yesterday.  Once signed, will send override request form to [facility].

## 2024-08-09 NOTE — TELEPHONE ENCOUNTER
RN CC faxed Override Request Form to [facility] this date - fax number [phone].  Form sent down to scanning.    Yoli Kowalski RN-BSN, [facility] Coordinator  [phone]

## 2024-08-27 ENCOUNTER — TRANSFERRED RECORDS (OUTPATIENT)
Dept: HEALTH INFORMATION MANAGEMENT | Facility: CLINIC | Age: 49
End: 2024-08-27

## 2024-09-06 DIAGNOSIS — M11.20 CHONDROCALCINOSIS: ICD-10-CM

## 2024-09-06 DIAGNOSIS — G89.29 CHRONIC MIDLINE LOW BACK PAIN WITH BILATERAL SCIATICA: ICD-10-CM

## 2024-09-06 DIAGNOSIS — M54.42 CHRONIC MIDLINE LOW BACK PAIN WITH BILATERAL SCIATICA: ICD-10-CM

## 2024-09-06 DIAGNOSIS — M54.41 CHRONIC MIDLINE LOW BACK PAIN WITH BILATERAL SCIATICA: ICD-10-CM

## 2024-09-06 RX ORDER — HYDROMORPHONE HYDROCHLORIDE 4 MG/1
4 TABLET ORAL 2 TIMES DAILY PRN
Qty: 60 TABLET | Refills: 0 | OUTPATIENT
Start: 2024-09-06

## 2024-09-06 RX ORDER — HYDROMORPHONE HYDROCHLORIDE 4 MG/1
4 TABLET ORAL 2 TIMES DAILY PRN
Qty: 60 TABLET | Refills: 0 | Status: SHIPPED | OUTPATIENT
Start: 2024-09-06 | End: 2024-10-02

## 2024-09-06 NOTE — TELEPHONE ENCOUNTER
Dilaudid      Last Written Prescription Date:  8.12.24  Last Fill Quantity: #60,   # refills: 0  Last Office Visit: 8.8.24  Future Office visit:    Next 5 appointments (look out 90 days)      Sep 23, 2024 1:30 PM  (Arrive by 1:15 PM)  Pre-Operative Physical with ELODIA Tomlin  [facility] [place] ([facility] [place] ) [address]  [place] [address]  [phone]             Routing refill request to provider for review/approval because:  Drug not on the [facility], P or [facility] refill protocol or controlled substance

## 2024-09-19 ENCOUNTER — ANESTHESIA EVENT (OUTPATIENT)
Dept: SURGERY | Facility: HOSPITAL | Age: 49
End: 2024-09-19

## 2024-09-19 RX ORDER — ONDANSETRON 4 MG/1
4 TABLET, ORALLY DISINTEGRATING ORAL EVERY 30 MIN PRN
Status: CANCELLED | OUTPATIENT
Start: 2024-09-19

## 2024-09-19 RX ORDER — DEXAMETHASONE SODIUM PHOSPHATE 10 MG/ML
4 INJECTION, SOLUTION INTRAMUSCULAR; INTRAVENOUS
Status: CANCELLED | OUTPATIENT
Start: 2024-09-19

## 2024-09-19 RX ORDER — SODIUM CHLORIDE, SODIUM LACTATE, POTASSIUM CHLORIDE, CALCIUM CHLORIDE 600; 310; 30; 20 MG/100ML; MG/100ML; MG/100ML; MG/100ML
INJECTION, SOLUTION INTRAVENOUS CONTINUOUS
Status: CANCELLED | OUTPATIENT
Start: 2024-09-19

## 2024-09-19 RX ORDER — NALOXONE HYDROCHLORIDE 0.4 MG/ML
0.1 INJECTION, SOLUTION INTRAMUSCULAR; INTRAVENOUS; SUBCUTANEOUS
Status: CANCELLED | OUTPATIENT
Start: 2024-09-19

## 2024-09-19 RX ORDER — LIDOCAINE 40 MG/G
CREAM TOPICAL
Status: CANCELLED | OUTPATIENT
Start: 2024-09-19

## 2024-09-19 RX ORDER — ONDANSETRON 2 MG/ML
4 INJECTION INTRAMUSCULAR; INTRAVENOUS EVERY 30 MIN PRN
Status: CANCELLED | OUTPATIENT
Start: 2024-09-19

## 2024-09-19 ASSESSMENT — LIFESTYLE VARIABLES: TOBACCO_USE: 1

## 2024-09-19 NOTE — ANESTHESIA PREPROCEDURE EVALUATION
Anesthesia Pre-Procedure Evaluation    Patient: Micah Rosenthal   MRN: [id number] : 1975        Procedure : Procedure(s):  ESOPHAGOGASTRODUODENOSCOPY with possible dilation          Past Medical History:   Diagnosis Date    Cervical disc disease     Uncomplicated asthma       Past Surgical History:   Procedure Laterality Date    BACK SURGERY      fusion of C5 and C6    IR TRANSLAMINAR EPIDURAL LUMBAR INJ INCL IMAGING  2012    OTHER SURGICAL HISTORY  2013    Cervical Fusion    wisdom teeth extracted        Allergies   Allergen Reactions    Cranberry     Strawberry Extract     Doxycycline Nausea and Vomiting    Zithromax [Azithromycin Dihydrate] Rash      Social History     Tobacco Use    Smoking status: Former     Current packs/day: 0.00     Average packs/day: 1 pack/day for 35.5 years (35.5 ttl pk-yrs)     Types: Cigarettes     Start date: 1988     Quit date: 2023     Years since quittin.2     Passive exposure: Current    Smokeless tobacco: Never   Substance Use Topics    Alcohol use: No      Wt Readings from Last 1 Encounters:   24 82.4 kg (181 lb 9.6 oz)        Anesthesia Evaluation   Pt has had prior anesthetic.         ROS/MED HX  ENT/Pulmonary:     (+)     GREGOR risk factors, snores loudly,   daytime somnolence,       tobacco use, Past use,     asthma  Treatment: Inhaler prn,       recent URI (on hold 24 due to bronchitis),          Neurologic:     (+)      migraines,                          Cardiovascular: Comment:  at [place] visit 24  H/o SVT  Zio patch    (+)  - -   -  - -                          Irregular Heartbeat/Palpitations,       Previous cardiac testing   Echo: Date: Results:    Stress Test:  Date: Results:    ECG Reviewed:  Date: 10/28/24 Results:  ST with PVCs , nonspecific T wave abnormality  Cath:  Date: Results:      METS/Exercise Tolerance:     Hematologic:     (+)      anemia,          Musculoskeletal: Comment: Rheumatology  Cervicalgia  Chronic midline  "low back pain  S/p cervical fusion C5C6  (+)  arthritis,             GI/Hepatic: Comment: Losing weight    (+) GERD,     hiatal hernia,  bowel prep,            Renal/Genitourinary:       Endo: Comment: Psoriasis-sees rheumatology      Psychiatric/Substance Use: Comment: On suboxone    (+)    H/O chronic opiod use . Recreational drug usage: Cannabis (amphetamine).    Infectious Disease:       Malignancy:       Other:      (+)  , H/O Chronic Pain,            OUTSIDE LABS:  CBC:   Lab Results   Component Value Date    WBC 6.8 06/03/2024    WBC 8.2 11/29/2021    HGB 13.1 (L) 06/03/2024    HGB 15.4 11/29/2021    HCT 37.6 (L) 06/03/2024    HCT 44.7 11/29/2021     06/03/2024     11/29/2021     BMP:   Lab Results   Component Value Date     06/03/2024     11/29/2021    POTASSIUM 3.4 06/03/2024    POTASSIUM 3.6 11/29/2021    CHLORIDE 97 (L) 06/03/2024    CHLORIDE 101 11/29/2021    CO2 31 (H) 06/03/2024    CO2 34 (H) 11/29/2021    BUN 3.7 (L) 06/03/2024    BUN 5 (L) 11/29/2021    CR 0.63 (L) 06/03/2024    CR 0.76 11/29/2021     (H) 06/03/2024    GLC 91 11/29/2021     COAGS:   Lab Results   Component Value Date    PTT 42 (H) 02/09/2015    INR 1.07 02/09/2015     POC: No results found for: \"BGM\", \"HCG\", \"HCGS\"  HEPATIC:   Lab Results   Component Value Date    ALBUMIN 4.0 06/03/2024    PROTTOTAL 6.8 06/03/2024    ALT 12 06/03/2024    AST 33 06/03/2024    ALKPHOS 169 (H) 06/03/2024    BILITOTAL 0.6 06/03/2024     OTHER:   Lab Results   Component Value Date    LACT 1.0 03/26/2021    A1C 5.1 09/17/2020    KALIE 8.9 06/03/2024    TSH 1.98 09/17/2020    CRP 11.6 (H) 11/29/2021    SED 8 11/29/2021       Anesthesia Plan          Anesthesia Type: MAC.              Consents            Postoperative Care            Comments:    Other Comments: HP 10/28/24  Had bronchitis 8/8/24  Waiting on zio patch results           MATHEW Melo CRNA    I have reviewed the pertinent notes and labs in the chart from " the past 30 days and (re)examined the patient.  Any updates or changes from those notes are reflected in this note.

## 2024-09-20 ENCOUNTER — TELEPHONE (OUTPATIENT)
Dept: FAMILY MEDICINE | Facility: OTHER | Age: 49
End: 2024-09-20

## 2024-09-20 NOTE — TELEPHONE ENCOUNTER
September 20, 2024  2:12 PM    Reason for Call: OVERBOOK    Patient is having Upper endoscopy with Dr Quevedo 11/22/24.    The patient is requesting an appointment for pre-op with Florence Sneed.    Preferred method for responding to this message: Telephone Call  What is your phone number? [phone]    If we cannot reach you directly, may we leave a detailed response at the number you provided? Yes    -Ellen Awan on 9/20/2024 at 2:12 PM

## 2024-09-20 NOTE — OR NURSING
During phone call patient reported that he was not feeling well.  That he has been having cold symptoms; including cough and possible fever at night.  Patient was scheduled for a preop on Monday.  Patient informed that since he was sick that we would need to rescheduled.  Patient updated that he needed to be symptom free x 2 weeks.  Patient verbalized understanding.  Messages sent to surgery scheduler and surgery pool at the clinic.

## 2024-09-23 DIAGNOSIS — F11.90 OPIOID USE DISORDER: ICD-10-CM

## 2024-09-23 RX ORDER — BUPRENORPHINE HYDROCHLORIDE, NALOXONE HYDROCHLORIDE 12; 3 MG/1; MG/1
1 FILM, SOLUBLE BUCCAL; SUBLINGUAL DAILY
Qty: 30 EACH | Refills: 1 | Status: SHIPPED | OUTPATIENT
Start: 2024-09-23

## 2024-09-23 RX ORDER — BUPRENORPHINE HYDROCHLORIDE, NALOXONE HYDROCHLORIDE 8; 2 MG/1; MG/1
1 FILM, SOLUBLE BUCCAL; SUBLINGUAL DAILY
Qty: 30 EACH | Refills: 1 | Status: SHIPPED | OUTPATIENT
Start: 2024-09-23

## 2024-09-23 NOTE — TELEPHONE ENCOUNTER
Suboxone      Last Written Prescription Date:  8.28.24  Last Fill Quantity: #30, #30,   # refills: 0  Last Office Visit: 8.8.24  Future Office visit:    Next 5 appointments (look out 90 days)      Oct 28, 2024 9:45 AM  (Arrive by 9:30 AM)  Pre-Operative Physical with ELODIA Tomlin  [facility] [place] ([facility] ) [address]  [place] [address]  [phone]             Routing refill request to provider for review/approval because:  Drug not on the [facility], P or [facility] refill protocol or controlled substance

## 2024-09-27 ENCOUNTER — ANESTHESIA (OUTPATIENT)
Dept: SURGERY | Facility: HOSPITAL | Age: 49
End: 2024-09-27

## 2024-10-02 DIAGNOSIS — G89.29 CHRONIC MIDLINE LOW BACK PAIN WITH BILATERAL SCIATICA: ICD-10-CM

## 2024-10-02 DIAGNOSIS — M54.41 CHRONIC MIDLINE LOW BACK PAIN WITH BILATERAL SCIATICA: ICD-10-CM

## 2024-10-02 DIAGNOSIS — M54.42 CHRONIC MIDLINE LOW BACK PAIN WITH BILATERAL SCIATICA: ICD-10-CM

## 2024-10-02 DIAGNOSIS — M11.20 CHONDROCALCINOSIS: ICD-10-CM

## 2024-10-02 NOTE — TELEPHONE ENCOUNTER
Dilaudid      Last Written Prescription Date:  9.7.24  Last Fill Quantity: #60,   # refills: 0  Last Office Visit: 8.8.24  Future Office visit:    Next 5 appointments (look out 90 days)      Oct 28, 2024 9:45 AM  (Arrive by 9:30 AM)  Pre-Operative Physical with ELODIA Tomlin  [facility] [place] ([facility] [place] ) [address]  [place] [address]  [phone]             Routing refill request to provider for review/approval because:  Drug not on the [facility], P or [facility] refill protocol or controlled substance

## 2024-10-03 RX ORDER — HYDROMORPHONE HYDROCHLORIDE 4 MG/1
4 TABLET ORAL 2 TIMES DAILY PRN
Qty: 60 TABLET | Refills: 0 | Status: SHIPPED | OUTPATIENT
Start: 2024-10-03 | End: 2024-10-28

## 2024-10-25 ENCOUNTER — PATIENT OUTREACH (OUTPATIENT)
Dept: CARE COORDINATION | Facility: OTHER | Age: 49
End: 2024-10-25

## 2024-10-25 NOTE — PROGRESS NOTES
Clinic Care Coordination Contact  Care Team Conversations    RN CC sent message to Ronaldo this date reminding him of his preop appointment on Monday, October 28, and to arrive at 9:30AM.    Yoli Kowalski RN-BSN, [facility] Care Coordinator  [phone]

## 2024-10-28 ENCOUNTER — PATIENT OUTREACH (OUTPATIENT)
Dept: CARE COORDINATION | Facility: OTHER | Age: 49
End: 2024-10-28

## 2024-10-28 ENCOUNTER — OFFICE VISIT (OUTPATIENT)
Dept: FAMILY MEDICINE | Facility: OTHER | Age: 49
End: 2024-10-28
Attending: PHYSICIAN ASSISTANT
Payer: COMMERCIAL

## 2024-10-28 VITALS
HEART RATE: 74 BPM | DIASTOLIC BLOOD PRESSURE: 70 MMHG | RESPIRATION RATE: 16 BRPM | SYSTOLIC BLOOD PRESSURE: 112 MMHG | TEMPERATURE: 98 F | OXYGEN SATURATION: 96 % | BODY MASS INDEX: 27.57 KG/M2 | WEIGHT: 196.3 LBS

## 2024-10-28 DIAGNOSIS — I47.10 SVT (SUPRAVENTRICULAR TACHYCARDIA) (H): ICD-10-CM

## 2024-10-28 DIAGNOSIS — M11.20 CHONDROCALCINOSIS: ICD-10-CM

## 2024-10-28 DIAGNOSIS — M54.42 CHRONIC MIDLINE LOW BACK PAIN WITH BILATERAL SCIATICA: ICD-10-CM

## 2024-10-28 DIAGNOSIS — Z01.818 PREOP GENERAL PHYSICAL EXAM: Primary | ICD-10-CM

## 2024-10-28 DIAGNOSIS — M54.41 CHRONIC MIDLINE LOW BACK PAIN WITH BILATERAL SCIATICA: ICD-10-CM

## 2024-10-28 DIAGNOSIS — F11.90 OPIOID USE DISORDER: ICD-10-CM

## 2024-10-28 DIAGNOSIS — M50.90 CERVICAL DISC DISEASE: ICD-10-CM

## 2024-10-28 DIAGNOSIS — G89.29 CHRONIC MIDLINE LOW BACK PAIN WITH BILATERAL SCIATICA: ICD-10-CM

## 2024-10-28 LAB
ALBUMIN SERPL BCG-MCNC: 4.5 G/DL (ref 3.5–5.2)
ALP SERPL-CCNC: 110 U/L (ref 40–150)
ALT SERPL W P-5'-P-CCNC: 21 U/L (ref 0–70)
ANION GAP SERPL CALCULATED.3IONS-SCNC: 12 MMOL/L (ref 7–15)
AST SERPL W P-5'-P-CCNC: 35 U/L (ref 0–45)
BASOPHILS # BLD AUTO: 0 10E3/UL (ref 0–0.2)
BASOPHILS NFR BLD AUTO: 1 %
BILIRUB SERPL-MCNC: 0.4 MG/DL
BUN SERPL-MCNC: 5.8 MG/DL (ref 6–20)
CALCIUM SERPL-MCNC: 9.4 MG/DL (ref 8.8–10.4)
CHLORIDE SERPL-SCNC: 98 MMOL/L (ref 98–107)
CREAT SERPL-MCNC: 0.57 MG/DL (ref 0.67–1.17)
EGFRCR SERPLBLD CKD-EPI 2021: >90 ML/MIN/1.73M2
EOSINOPHIL # BLD AUTO: 0.3 10E3/UL (ref 0–0.7)
EOSINOPHIL NFR BLD AUTO: 4 %
ERYTHROCYTE [DISTWIDTH] IN BLOOD BY AUTOMATED COUNT: 14.3 % (ref 10–15)
GLUCOSE SERPL-MCNC: 101 MG/DL (ref 70–99)
HCO3 SERPL-SCNC: 26 MMOL/L (ref 22–29)
HCT VFR BLD AUTO: 42.1 % (ref 40–53)
HGB BLD-MCNC: 14.7 G/DL (ref 13.3–17.7)
IMM GRANULOCYTES # BLD: 0 10E3/UL
IMM GRANULOCYTES NFR BLD: 0 %
LYMPHOCYTES # BLD AUTO: 3 10E3/UL (ref 0.8–5.3)
LYMPHOCYTES NFR BLD AUTO: 36 %
MCH RBC QN AUTO: 31.6 PG (ref 26.5–33)
MCHC RBC AUTO-ENTMCNC: 34.9 G/DL (ref 31.5–36.5)
MCV RBC AUTO: 91 FL (ref 78–100)
MONOCYTES # BLD AUTO: 0.9 10E3/UL (ref 0–1.3)
MONOCYTES NFR BLD AUTO: 11 %
NEUTROPHILS # BLD AUTO: 4 10E3/UL (ref 1.6–8.3)
NEUTROPHILS NFR BLD AUTO: 49 %
NRBC # BLD AUTO: 0 10E3/UL
NRBC BLD AUTO-RTO: 0 /100
PLATELET # BLD AUTO: 305 10E3/UL (ref 150–450)
POTASSIUM SERPL-SCNC: 3.6 MMOL/L (ref 3.4–5.3)
PROT SERPL-MCNC: 7.1 G/DL (ref 6.4–8.3)
RBC # BLD AUTO: 4.65 10E6/UL (ref 4.4–5.9)
SODIUM SERPL-SCNC: 136 MMOL/L (ref 135–145)
WBC # BLD AUTO: 8.3 10E3/UL (ref 4–11)

## 2024-10-28 PROCEDURE — 82947 ASSAY GLUCOSE BLOOD QUANT: CPT | Mod: ZL | Performed by: PHYSICIAN ASSISTANT

## 2024-10-28 PROCEDURE — 85014 HEMATOCRIT: CPT | Mod: ZL | Performed by: PHYSICIAN ASSISTANT

## 2024-10-28 PROCEDURE — 36415 COLL VENOUS BLD VENIPUNCTURE: CPT | Mod: ZL | Performed by: PHYSICIAN ASSISTANT

## 2024-10-28 PROCEDURE — 93010 ELECTROCARDIOGRAM REPORT: CPT | Performed by: INTERNAL MEDICINE

## 2024-10-28 PROCEDURE — G0463 HOSPITAL OUTPT CLINIC VISIT: HCPCS

## 2024-10-28 PROCEDURE — 93005 ELECTROCARDIOGRAM TRACING: CPT | Performed by: PHYSICIAN ASSISTANT

## 2024-10-28 PROCEDURE — 99214 OFFICE O/P EST MOD 30 MIN: CPT | Performed by: PHYSICIAN ASSISTANT

## 2024-10-28 PROCEDURE — 85004 AUTOMATED DIFF WBC COUNT: CPT | Mod: ZL | Performed by: PHYSICIAN ASSISTANT

## 2024-10-28 RX ORDER — CYCLOBENZAPRINE HCL 10 MG
TABLET ORAL
Qty: 90 TABLET | Refills: 3 | Status: SHIPPED | OUTPATIENT
Start: 2024-10-28

## 2024-10-28 RX ORDER — HYDROMORPHONE HYDROCHLORIDE 4 MG/1
4 TABLET ORAL 2 TIMES DAILY PRN
Qty: 60 TABLET | Refills: 0 | Status: SHIPPED | OUTPATIENT
Start: 2024-10-28

## 2024-10-28 RX ORDER — BUPRENORPHINE HYDROCHLORIDE, NALOXONE HYDROCHLORIDE 8; 2 MG/1; MG/1
1 FILM, SOLUBLE BUCCAL; SUBLINGUAL DAILY
Qty: 30 EACH | Refills: 2 | Status: SHIPPED | OUTPATIENT
Start: 2024-10-28

## 2024-10-28 RX ORDER — BUPRENORPHINE HYDROCHLORIDE, NALOXONE HYDROCHLORIDE 12; 3 MG/1; MG/1
1 FILM, SOLUBLE BUCCAL; SUBLINGUAL DAILY
Qty: 30 EACH | Refills: 2 | Status: SHIPPED | OUTPATIENT
Start: 2024-10-28

## 2024-10-28 ASSESSMENT — ACTIVITIES OF DAILY LIVING (ADL): DEPENDENT_IADLS:: INDEPENDENT

## 2024-10-28 ASSESSMENT — ASTHMA QUESTIONNAIRES
QUESTION_2 LAST FOUR WEEKS HOW OFTEN HAVE YOU HAD SHORTNESS OF BREATH: ONCE OR TWICE A WEEK
ACT_TOTALSCORE: 22
ACUTE_EXACERBATION_TODAY: NO
ACT_TOTALSCORE: 22
QUESTION_5 LAST FOUR WEEKS HOW WOULD YOU RATE YOUR ASTHMA CONTROL: COMPLETELY CONTROLLED
QUESTION_4 LAST FOUR WEEKS HOW OFTEN HAVE YOU USED YOUR RESCUE INHALER OR NEBULIZER MEDICATION (SUCH AS ALBUTEROL): NOT AT ALL
QUESTION_3 LAST FOUR WEEKS HOW OFTEN DID YOUR ASTHMA SYMPTOMS (WHEEZING, COUGHING, SHORTNESS OF BREATH, CHEST TIGHTNESS OR PAIN) WAKE YOU UP AT NIGHT OR EARLIER THAN USUAL IN THE MORNING: ONCE OR TWICE
QUESTION_1 LAST FOUR WEEKS HOW MUCH OF THE TIME DID YOUR ASTHMA KEEP YOU FROM GETTING AS MUCH DONE AT WORK, SCHOOL OR AT HOME: A LITTLE OF THE TIME

## 2024-10-28 ASSESSMENT — PATIENT HEALTH QUESTIONNAIRE - PHQ9
SUM OF ALL RESPONSES TO PHQ QUESTIONS 1-9: 4
10. IF YOU CHECKED OFF ANY PROBLEMS, HOW DIFFICULT HAVE THESE PROBLEMS MADE IT FOR YOU TO DO YOUR WORK, TAKE CARE OF THINGS AT HOME, OR GET ALONG WITH OTHER PEOPLE: SOMEWHAT DIFFICULT
SUM OF ALL RESPONSES TO PHQ QUESTIONS 1-9: 4

## 2024-10-28 ASSESSMENT — PAIN SCALES - GENERAL: PAINLEVEL_OUTOF10: SEVERE PAIN (7)

## 2024-10-28 NOTE — PROGRESS NOTES
Preoperative Evaluation  [facility] [place]  [address]  [place] [address]  Phone: [phone]  Primary Provider: ELODIA Barrow  Pre-op Performing Provider: ELODIA Barrow  Oct 28, 2024             10/28/2024   Surgical Information   What procedure is being done? EGD    Facility or Hospital where procedure/surgery will be performed: [facility]    Who is doing the procedure / surgery? Dr. Quevedo    Date of surgery / procedure: 11/22    Time of surgery / procedure: unsure    Where do you plan to recover after surgery? at home with family        Patient-reported     Fax number for surgical facility: Note does not need to be faxed, will be available electronically in Epic.    Assessment & Plan     The proposed surgical procedure is considered LOW risk.    Preop general physical exam  We are going to get a Zio patch. His PVC and couplets as well as every 4th beat. He consumes a high amount of Caffeine.   - EKG 12-lead complete w/read - (Clinic Performed)  - CBC with platelets and differential; Future  - CBC with platelets and differential    Opioid use disorder  He is going to be given Suboxone. Has been working to keep him sober.   - SUBOXONE 12-3 MG FILM per film; Place 1 Film under the tongue daily. PM  - SUBOXONE 8-2 MG per film; Place 1 Film under the tongue daily. AM    Chronic midline low back pain with bilateral sciatica  We will be giving him a recheck on sodium and potassium.    - Basic metabolic panel    SVT (supraventricular tachycardia) (H)   Zio patch and review  - Comprehensive metabolic panel (BMP + Alb, Alk Phos, ALT, AST, Total. Bili, TP); Future  - ZIO PATCH - RANGE - MAIL OUT; Future      Possible Sleep Apnea: unsure        No data to display                   Risks and Recommendations  The patient has the following additional risks and recommendations for perioperative complications:  Social and Substance:    - Patient is taking medications for chronic pain   -  is in Suboxone program and sober now 4 years.          Recommendation  We will be getting a zio patch for his ectopy.  We will also be making lifestyle changes and we will go ahead and look at his EKG or Zio patch results.     Moraima Obrien is a 49 year old, presenting for the following:  Pre-Op Exam          10/28/2024     9:35 AM   Additional Questions   Accompanied by spouse     HPI related to upcoming procedure: he will be having an endoscopy by Dr. Quevedo on November 22, 2024.          10/28/2024   Pre-Op Questionnaire   Have you ever had a heart attack or stroke? No    Have you ever had surgery on your heart or blood vessels, such as a stent placement, a coronary artery bypass, or surgery on an artery in your head, neck, heart, or legs? No    Do you have chest pain with activity? No    Do you have a history of heart failure? No    Do you currently have a cold, bronchitis or symptoms of other infection? No    Do you have a cough, shortness of breath, or wheezing? (!) YES wheezing     Do you or anyone in your family have previous history of blood clots? No    Do you or does anyone in your family have a serious bleeding problem such as prolonged bleeding following surgeries or cuts? No    Have you ever had problems with anemia or been told to take iron pills? No    Have you had any abnormal blood loss such as black, tarry or bloody stools? No    Have you ever had a blood transfusion? No    Are you willing to have a blood transfusion if it is medically needed before, during, or after your surgery? Yes    Have you or any of your relatives ever had problems with anesthesia? No    Do you have sleep apnea, excessive snoring or daytime drowsiness? (!) YES    Do you have a CPAP machine? (!) NO daytime drowsiness due to pain    Do you have any artifical heart valves or other implanted medical devices like a pacemaker, defibrillator, or continuous glucose monitor? No    Do you have artificial joints? No    Are you  allergic to latex? No        Patient-reported     Health Care Directive  Patient does not have a Health Care Directive: Discussed advance care planning with patient; however, patient declined at this time.    Preoperative Review of    reviewed - no record of controlled substances prescribed.      Opioid Use Disorder Follow-up    He is currently taking 20/5 mg of buprenorphine daily.   Last fill 9.25.24 #30 of each.     Status Since Last Visit:  Have you used any opioids since your last visit?: Is prescribed Dilaudid BID at this time until his psoriatic arthritis is better managed  Do you feel that your dose of suboxone is too high or too low? Adequate  Have there been cravings for opioids? No   Any withdrawal symptoms?  None     Any side effects from the medication?  None  Any alcohol use? None  Any other recreational drug use? THC    Precipitating Factors:  Triggers have been: mild pain  Other Supports:  Do you attend counseling or meet with a therapist? No  Do you attend NA or AA meetings? No  Do you have/meet with a sponsor? No  Family and support systems have been: Helpful  What other goals have you been working on (job, family, relationships, etc)? Recently est with Rheum in [place] - started Humira           7/27/2023     1:32 PM 3/11/2024    10:08 AM 10/28/2024     9:30 AM   PHQ-9 SCORE   PHQ-9 Total Score MyChart   4 (Minimal depression)   PHQ-9 Total Score 0 0 4        Patient-reported           11/17/2022     1:08 PM 1/12/2023     1:33 PM 3/11/2024    10:08 AM   YURI-7 SCORE   Total Score 4 0 0     PDMP Review         Value Time User    State PDMP site checked  Yes 7/27/2023  1:47 PM Florence Sneed PA                  Status of Chronic Conditions:  Pt is on Humera and just started this for his Psoriasis     Patient Active Problem List    Diagnosis Date Noted    Chondrocalcinosis 08/08/2024     Priority: Medium    Bronchitis 08/08/2024     Priority: Medium    Psoriasis 08/08/2024     Priority:  Medium    Continuous opioid dependence (H) 06/03/2024     Priority: Medium    Chronic midline low back pain with bilateral sciatica 04/03/2024     Priority: Medium    Biliary colic 12/04/2020     Priority: Medium     Added automatically from request for surgery [id number]      Hiatal hernia 11/12/2020     Priority: Medium    Choking, sequela 11/12/2020     Priority: Medium    Opioid use disorder 04/03/2020     Priority: Medium    ACP (advance care planning) 01/17/2017     Priority: Medium     Advance Care Planning 1/17/2017: ACP Review of Chart / Resources Provided:  Reviewed chart for advance care plan.  Micah Rosenthal has no plan or code status on file. Discussed available resources and provided with information. Confirmed code status reflects current choices pending further ACP discussions.  Confirmed/documented legally designated decision makers.  Added by Thania Suárez            Positive urine drug screen 10/31/2016     Priority: Medium     Amphetamine and Cannabis      Pain management contract signed 04/14/2016     Priority: Medium     Updated 09/25/17      Cervical disc disease      Priority: Medium    Cervicalgia 06/20/2012     Priority: Medium    Cannabis abuse, continuous use 05/24/2012     Priority: Medium     shoulder 01/04/2011     Priority: Medium     IMO Update 10/11      Carpal tunnel syndrome 10/05/2010     Priority: Medium    Chronic pain syndrome 08/18/2010     Priority: Medium     Opioid Drug Agreement Form Opioid Agreement Date: 8/18/10  Last UDT (Urine Drug Test) on date:  12/14/10  Pain Dx: migraine and cervical spine pain  Last Pain Visit: 1/4/11  Preferred Pharmacy: [facility]  Comments: contract was broken 10/12/10      Asthma 03/09/2010     Priority: Medium     IMO Update 10 2016      Migraine without intractable migraine 03/09/2010     Priority: Medium     IMO Update 10/11      Headache 05/13/2009     Priority: Medium     IMO Update 10 2016      Lumbago 03/14/2007      Priority: Medium     IMO Update 10/11        Past Medical History:   Diagnosis Date    Cervical disc disease     Uncomplicated asthma      Past Surgical History:   Procedure Laterality Date    BACK SURGERY      fusion of C5 and C6    IR TRANSLAMINAR EPIDURAL LUMBAR INJ INCL IMAGING  12/2012    OTHER SURGICAL HISTORY  03/2013    Cervical Fusion    wisdom teeth extracted       Current Outpatient Medications   Medication Sig Dispense Refill    adalimumab (HUMIRA) 40 MG/0.8ML pen kit Inject 40 mg subcutaneously.      albuterol (PROAIR HFA/PROVENTIL HFA/VENTOLIN HFA) 108 (90 Base) MCG/ACT inhaler Inhale 2 puffs into the lungs every 6 hours as needed for shortness of breath / dyspnea 1 Inhaler 1    amoxicillin (AMOXIL) 875 MG tablet Take 1 tablet (875 mg) by mouth 2 times daily 20 tablet 0    cyclobenzaprine (FLEXERIL) 10 MG tablet TAKE 1 TABLET BY MOUTH 3 TIMES A DAYAS NEEDED 90 tablet 3    HYDROmorphone (DILAUDID) 4 MG tablet Take 1 tablet (4 mg) by mouth 2 times daily as needed for severe pain. 60 tablet 0    ketoconazole (NIZORAL) 2 % external shampoo Apply topically daily as needed for itching or irritation 240 mL 3    medical cannabis (Patient's own supply) Mar Message Only. Per patient report      omeprazole (PRILOSEC) 40 MG DR capsule Take 1 capsule (40 mg) by mouth daily 90 capsule 3    SUBOXONE 12-3 MG FILM per film Place 1 Film under the tongue daily. PM 30 each 1    SUBOXONE 8-2 MG per film Place 1 Film under the tongue daily. AM 30 each 1    triamcinolone (KENALOG) 0.1 % external cream Apply topically 2 times daily 453 g 4    triamcinolone (KENALOG) 0.1 % external ointment Apply topically 2 times daily 30 g 1    apremilast (OTEZLA) 30 MG tablet Take 1 tablet (30 mg) by mouth daily (Patient not taking: Reported on 10/28/2024) 30 tablet 3       Allergies   Allergen Reactions    Cephalexin Diarrhea    Cranberry     Strawberry Extract     Doxycycline Nausea and Vomiting    Zithromax [Azithromycin Dihydrate] Rash  "       Social History     Tobacco Use    Smoking status: Former     Current packs/day: 0.00     Average packs/day: 1 pack/day for 35.5 years (35.5 ttl pk-yrs)     Types: Cigarettes     Start date: 1988     Quit date: 2023     Years since quittin.3     Passive exposure: Current    Smokeless tobacco: Never   Substance Use Topics    Alcohol use: No     Family History   Problem Relation Age of Onset    Unknown/Adopted Father     Cancer Maternal Uncle         throat ca    Cancer Maternal Uncle         lung ca    Cardiovascular Maternal Grandfather     Cardiovascular Paternal Grandfather      History   Drug Use Unknown             Review of Systems  Constitutional, neuro, ENT, endocrine, pulmonary, cardiac, gastrointestinal, genitourinary, musculoskeletal, integument and psychiatric systems are negative, except as otherwise noted.    Objective    /70 (BP Location: Left arm, Patient Position: Sitting, Cuff Size: Adult Regular)   Pulse 74   Temp 98  F (36.7  C)   Resp 16   Wt 89 kg (196 lb 4.8 oz)   SpO2 96%   BMI 27.57 kg/m     Estimated body mass index is 27.57 kg/m  as calculated from the following:    Height as of 24: 1.797 m (5' 10.75\").    Weight as of this encounter: 89 kg (196 lb 4.8 oz).  Physical Exam  GENERAL: alert and no distress  EYES: Eyes grossly normal to inspection, PERRL and conjunctivae and sclerae normal  HENT: ear canals and TM's normal, nose and mouth without ulcers or lesions  NECK: no adenopathy, no asymmetry, masses, or scars  RESP: lungs clear to auscultation - no rales, rhonchi or wheezes  CV: has irregular heart beat without murmur.  He is showing multiple multifocal PVC's. No sign of any heart disease.    ABDOMEN: soft, nontender, no hepatosplenomegaly, no masses and bowel sounds normal  MS: no gross musculoskeletal defects noted, no edema  PSYCH: mentation appears normal, affect normal/bright    Recent Labs   Lab Test 24  1159   HGB 13.1*       "   POTASSIUM 3.4   CR 0.63*      Results for orders placed or performed in visit on 10/28/24   CBC with platelets and differential     Status: None   Result Value Ref Range    WBC Count 8.3 4.0 - 11.0 10e3/uL    RBC Count 4.65 4.40 - 5.90 10e6/uL    Hemoglobin 14.7 13.3 - 17.7 g/dL    Hematocrit 42.1 40.0 - 53.0 %    MCV 91 78 - 100 fL    MCH 31.6 26.5 - 33.0 pg    MCHC 34.9 31.5 - 36.5 g/dL    RDW 14.3 10.0 - 15.0 %    Platelet Count 305 150 - 450 10e3/uL    % Neutrophils 49 %    % Lymphocytes 36 %    % Monocytes 11 %    % Eosinophils 4 %    % Basophils 1 %    % Immature Granulocytes 0 %    NRBCs per 100 WBC 0 <1 /100    Absolute Neutrophils 4.0 1.6 - 8.3 10e3/uL    Absolute Lymphocytes 3.0 0.8 - 5.3 10e3/uL    Absolute Monocytes 0.9 0.0 - 1.3 10e3/uL    Absolute Eosinophils 0.3 0.0 - 0.7 10e3/uL    Absolute Basophils 0.0 0.0 - 0.2 10e3/uL    Absolute Immature Granulocytes 0.0 <=0.4 10e3/uL    Absolute NRBCs 0.0 10e3/uL   EKG 12-lead complete w/read - (Clinic Performed)     Status: None (Preliminary result)   Result Value Ref Range    Systolic Blood Pressure  mmHg    Diastolic Blood Pressure  mmHg    Ventricular Rate 120 BPM    Atrial Rate 120 BPM    MA Interval 142 ms    QRS Duration 94 ms     ms    QTc 469 ms    P Axis 73 degrees    R AXIS 53 degrees    T Axis 71 degrees    Interpretation ECG       Sinus tachycardia with Premature supraventricular complexes and with frequent , and consecutive Premature ventricular complexes  Nonspecific T wave abnormality  Abnormal ECG  No previous ECGs available     CBC with platelets and differential     Status: None    Narrative    The following orders were created for panel order CBC with platelets and differential.  Procedure                               Abnormality         Status                     ---------                               -----------         ------                     CBC with platelets and d...[[id number]]                      Final result                  Please view results for these tests on the individual orders.       Diagnostics  Recent Results (from the past 24 hours)   EKG 12-lead complete w/read - (Clinic Performed)    Collection Time: 10/28/24 10:20 AM   Result Value Ref Range    Systolic Blood Pressure  mmHg    Diastolic Blood Pressure  mmHg    Ventricular Rate 120 BPM    Atrial Rate 120 BPM    ME Interval 142 ms    QRS Duration 94 ms     ms    QTc 469 ms    P Axis 73 degrees    R AXIS 53 degrees    T Axis 71 degrees    Interpretation ECG       Sinus tachycardia with Premature supraventricular complexes and with frequent , and consecutive Premature ventricular complexes  Nonspecific T wave abnormality  Abnormal ECG  No previous ECGs available     CBC with platelets and differential    Collection Time: 10/28/24 10:32 AM   Result Value Ref Range    WBC Count 8.3 4.0 - 11.0 10e3/uL    RBC Count 4.65 4.40 - 5.90 10e6/uL    Hemoglobin 14.7 13.3 - 17.7 g/dL    Hematocrit 42.1 40.0 - 53.0 %    MCV 91 78 - 100 fL    MCH 31.6 26.5 - 33.0 pg    MCHC 34.9 31.5 - 36.5 g/dL    RDW 14.3 10.0 - 15.0 %    Platelet Count 305 150 - 450 10e3/uL    % Neutrophils 49 %    % Lymphocytes 36 %    % Monocytes 11 %    % Eosinophils 4 %    % Basophils 1 %    % Immature Granulocytes 0 %    NRBCs per 100 WBC 0 <1 /100    Absolute Neutrophils 4.0 1.6 - 8.3 10e3/uL    Absolute Lymphocytes 3.0 0.8 - 5.3 10e3/uL    Absolute Monocytes 0.9 0.0 - 1.3 10e3/uL    Absolute Eosinophils 0.3 0.0 - 0.7 10e3/uL    Absolute Basophils 0.0 0.0 - 0.2 10e3/uL    Absolute Immature Granulocytes 0.0 <=0.4 10e3/uL    Absolute NRBCs 0.0 10e3/uL      EKG required for irregular heart beat.  and not completed in the last 90 days.     Revised Cardiac Risk Index (RCRI)  The patient has the following serious cardiovascular risks for perioperative complications:   - No serious cardiac risks = 0 points     RCRI Interpretation: 0 points: Class I (very low risk - 0.4% complication rate)         Signed  Electronically by: ELODIA Barrow  A copy of this evaluation report is provided to the requesting physician.         Answers submitted by the patient for this visit:  Patient Health Questionnaire (Submitted on 10/28/2024)  If you checked off any problems, how difficult have these problems made it for you to do your work, take care of things at home, or get along with other people?: Somewhat difficult  PHQ9 TOTAL SCORE: 4

## 2024-10-28 NOTE — PROGRESS NOTES
Clinic Care Coordination Contact  Follow Up Progress Note      Assessment: RN CC attended office visit with Huong Higgins, and Florence Sneed this date.  Ronaldo comes in today for a preop appointment, as well as a follow up for his MOUD.  He is having an EGD on 11.22.24.  The EGD will have to be postponed at this time, due to EKG result - multiple PVCs.  Florence is ordering a zio patch this date.  Once results from that are back - will update Ronaldo and preop if ok to proceed.  Does admit to consuming large amounts of caffeine throughout the day - drinks over 6 bottles of Mt. Dew - 16 ounce bottles.  Discussed alternatives to regular Mt. Dew.  He will work on decreasing use.  Recently est with Rheum at [facility] in [place].  Was prescribed Humira for Psoriatic Arthritis/Psoriasis.  Has follow up appointment scheduled 12/19/2024.      Care Gaps:    Health Maintenance Due   Topic Date Due    YEARLY PREVENTIVE VISIT  Never done    ASTHMA ACTION PLAN  Never done    COVID-19 Vaccine (1) Never done    Pneumococcal Vaccine: Pediatrics (0 to 5 Years) and At-Risk Patients (6 to 64 Years) (1 of 2 - PCV) Never done    COLORECTAL CANCER SCREENING  Never done    ZOSTER IMMUNIZATION (1 of 2) Never done    HEPATITIS A IMMUNIZATION (1 of 2 - Risk 2-dose series) Never done    HEPATITIS B IMMUNIZATION (1 of 3 - 19+ 3-dose series) Never done    CONTROLLED SUBSTANCE AGREEMENT FOR CHRONIC PAIN MANAGEMENT  02/24/2021    DTAP/TDAP/TD IMMUNIZATION (6 - Td or Tdap) 12/30/2021    INFLUENZA VACCINE (1) Never done       Will continue to work on these    Care Plans  Care Plan: Sleep       Problem: Impaired Sleep Pattern       Goal: Improve Sleep Habits       This Visit's Progress: 70% Recent Progress: 60%    Note:     Due to neck pain and LBP.  Dilaudid helpful for acute pain attacks                             Care Plan: Chronic Pain       Problem: Chronic Pain is not self-managed       Goal: Patient will maintain consistent outpatient follow up to  manage Chronic Pain and avoid hospitalizations for the next 6 months       This Visit's Progress: On track Recent Progress: On track    Note:     Barriers: literacy barrier  Strengths: committed, open to change  Patient expressed understanding of goal: yes  Action steps to achieve this goal:  1. I will take my medications as prescribed.  2. I will increase my physical activity as able  3. I will reach out if I am struggling.  4. I will continue to follow with Rheumatology as scheduled                           Problem: Inability to perform ADLs       Goal: Maintain ability to perform ADLs without difficulty       This Visit's Progress: 90% Recent Progress: 90%                            Intervention/Education provided during outreach: Continue current meds as prescribed.  Follow up with Rheum as scheduled.  Work on decreasing Dilaudid use once you feel like your Psoriatic Arthritis is under better control.   He is agreeable with this. Work on decreasing caffeine consumption - alternatives discussed.      Outreach Frequency: 3 months, more frequently as needed        Plan:   No change in treatment plan at this time.     Care Coordinator will follow up in 12 weeks, sooner if he has concerns.    Yoli Kowalski RN-BSN, [facility] Care Coordinator  [phone]

## 2024-10-29 LAB
ATRIAL RATE - MUSE: 120 BPM
DIASTOLIC BLOOD PRESSURE - MUSE: NORMAL MMHG
INTERPRETATION ECG - MUSE: NORMAL
P AXIS - MUSE: 73 DEGREES
PR INTERVAL - MUSE: 142 MS
QRS DURATION - MUSE: 94 MS
QT - MUSE: 332 MS
QTC - MUSE: 469 MS
R AXIS - MUSE: 53 DEGREES
SYSTOLIC BLOOD PRESSURE - MUSE: NORMAL MMHG
T AXIS - MUSE: 71 DEGREES
VENTRICULAR RATE- MUSE: 120 BPM

## 2024-11-01 DIAGNOSIS — M54.50 LOW BACK PAIN RADIATING TO LOWER EXTREMITY: ICD-10-CM

## 2024-11-01 DIAGNOSIS — M79.606 LOW BACK PAIN RADIATING TO LOWER EXTREMITY: ICD-10-CM

## 2024-11-01 DIAGNOSIS — M50.30 DDD (DEGENERATIVE DISC DISEASE), CERVICAL: Primary | ICD-10-CM

## 2024-11-01 DIAGNOSIS — L40.50 PSORIATIC ARTHRITIS (H): ICD-10-CM

## 2024-11-01 RX ORDER — HYDROMORPHONE HYDROCHLORIDE 8 MG/1
4 TABLET ORAL 2 TIMES DAILY PRN
Qty: 30 TABLET | Refills: 0 | Status: SHIPPED | OUTPATIENT
Start: 2024-11-01

## 2024-11-01 NOTE — TELEPHONE ENCOUNTER
[facility] is out of 4mg tabs.  Suggesting send in RX for 8mg tabs - take 1/2 po BID.  New RX pended.

## 2024-11-04 ENCOUNTER — TELEPHONE (OUTPATIENT)
Dept: FAMILY MEDICINE | Facility: OTHER | Age: 49
End: 2024-11-04

## 2024-11-04 DIAGNOSIS — I47.10 SVT (SUPRAVENTRICULAR TACHYCARDIA) (H): Primary | ICD-10-CM

## 2024-11-18 ENCOUNTER — TELEPHONE (OUTPATIENT)
Dept: SURGERY | Facility: OTHER | Age: 49
End: 2024-11-18

## 2024-11-18 NOTE — TELEPHONE ENCOUNTER
Huong called from [facility]. She said the procedure with Dr Quevedo on Friday should be cancelled.

## 2024-11-18 NOTE — TELEPHONE ENCOUNTER
Called patient and he states he needs to cancel EGD with possible dilation scheduled with aSe on 11-22-24 due to heart issues. He just had testing done. He will contact clinic when he is able to reschedule

## 2024-11-21 DIAGNOSIS — R06.09 DOE (DYSPNEA ON EXERTION): Primary | ICD-10-CM

## 2024-11-21 DIAGNOSIS — I47.29 PAROXYSMAL VENTRICULAR TACHYCARDIA (H): Primary | ICD-10-CM

## 2024-11-22 PROBLEM — I49.3 PVC'S (PREMATURE VENTRICULAR CONTRACTIONS): Status: ACTIVE | Noted: 2024-11-22

## 2024-11-22 PROBLEM — I45.9 HEART BLOCK: Status: ACTIVE | Noted: 2024-11-22

## 2024-11-22 PROBLEM — I49.1 PAC (PREMATURE ATRIAL CONTRACTION): Status: ACTIVE | Noted: 2024-11-22

## 2024-11-25 ENCOUNTER — TELEPHONE (OUTPATIENT)
Dept: CARE COORDINATION | Facility: OTHER | Age: 49
End: 2024-11-25

## 2024-11-25 DIAGNOSIS — M54.42 CHRONIC MIDLINE LOW BACK PAIN WITH BILATERAL SCIATICA: ICD-10-CM

## 2024-11-25 DIAGNOSIS — M11.20 CHONDROCALCINOSIS: ICD-10-CM

## 2024-11-25 DIAGNOSIS — G89.29 CHRONIC MIDLINE LOW BACK PAIN WITH BILATERAL SCIATICA: ICD-10-CM

## 2024-11-25 DIAGNOSIS — M54.41 CHRONIC MIDLINE LOW BACK PAIN WITH BILATERAL SCIATICA: ICD-10-CM

## 2024-11-25 RX ORDER — HYDROMORPHONE HYDROCHLORIDE 4 MG/1
4 TABLET ORAL 2 TIMES DAILY PRN
Qty: 60 TABLET | Refills: 0 | Status: SHIPPED | OUTPATIENT
Start: 2024-11-25

## 2024-11-25 NOTE — TELEPHONE ENCOUNTER
Good morning Silke. Can you call me at [phone] or *[phone] I am Micah Rosenthal's [birth year] significant other and the one who schedules his appts. Can you call me in regards to scheduling that telemed appt. Thank you.

## 2024-11-25 NOTE — TELEPHONE ENCOUNTER
Dilaudid      Last Written Prescription Date:  11.1.24  Last Fill Quantity: #60,   # refills: 0  Last Office Visit: 10.28.24  Future Office visit:       Routing refill request to provider for review/approval because:  Drug not on the [facility], P or [facility] refill protocol or controlled substance

## 2024-11-25 NOTE — TELEPHONE ENCOUNTER
"Consent to communicate verified in chart.    Patient has chosen a telemed visit with Dr. Sainz to be scheduled for 12/9/24 at 8:00 with an arrival time of  7:40.    HUC\"s please place them on the providers schedule for the above mentioned date/time.    Patient significant other agreeable to plan.    Steven MCCARTY   "

## 2024-11-27 ENCOUNTER — TELEPHONE (OUTPATIENT)
Dept: FAMILY MEDICINE | Facility: OTHER | Age: 49
End: 2024-11-27

## 2024-11-27 NOTE — TELEPHONE ENCOUNTER
Received PA request from 's regarding HYDROmorphone (DILAUDID) 4 MG tablet. Submitted via CMM, awaiting response

## 2024-12-02 ENCOUNTER — TELEPHONE (OUTPATIENT)
Dept: INTERVENTIONAL RADIOLOGY/VASCULAR | Facility: HOSPITAL | Age: 49
End: 2024-12-02

## 2024-12-02 RX ORDER — NITROGLYCERIN 0.4 MG/1
0.4 TABLET SUBLINGUAL
Status: CANCELLED | OUTPATIENT
Start: 2024-12-02

## 2024-12-02 RX ORDER — METOPROLOL TARTRATE 1 MG/ML
5-15 INJECTION, SOLUTION INTRAVENOUS
Status: CANCELLED | OUTPATIENT
Start: 2024-12-02

## 2024-12-02 RX ORDER — METOPROLOL TARTRATE 25 MG/1
25-100 TABLET, FILM COATED ORAL
Status: CANCELLED | OUTPATIENT
Start: 2024-12-02

## 2024-12-02 RX ORDER — LIDOCAINE 40 MG/G
CREAM TOPICAL
Status: CANCELLED | OUTPATIENT
Start: 2024-12-02

## 2024-12-02 RX ORDER — ONDANSETRON 2 MG/ML
4 INJECTION INTRAMUSCULAR; INTRAVENOUS
Status: CANCELLED | OUTPATIENT
Start: 2024-12-02

## 2024-12-02 NOTE — PROVIDER NOTIFICATION
Pt's RICHARD Borja called and canceled CTA scheduled for 12/03/2024. S.O. stated sick child at home and that pt wanted to discuss with Dr Michelle next week before rescheduling.

## 2024-12-02 NOTE — PROVIDER NOTIFICATION
Pt's S.O. Huong called. Discussed with Huong about metoprolol times, NPO after midnight except water with pills, albuterol inhaler to be held, no caffeine after 2000 tonight. Huong verbalized understanding. Huong stated pt is very anxious about needles/IV's.

## 2024-12-03 ENCOUNTER — HOSPITAL ENCOUNTER (OUTPATIENT)
Dept: CARDIOLOGY | Facility: HOSPITAL | Age: 49
Discharge: HOME | End: 2024-12-03
Attending: INTERNAL MEDICINE
Payer: COMMERCIAL

## 2024-12-03 ENCOUNTER — HOSPITAL ENCOUNTER (OUTPATIENT)
Facility: HOSPITAL | Age: 49
Discharge: HOME | End: 2024-12-03
Attending: RADIOLOGY | Admitting: STUDENT IN AN ORGANIZED HEALTH CARE EDUCATION/TRAINING PROGRAM
Payer: COMMERCIAL

## 2024-12-03 DIAGNOSIS — R06.09 DOE (DYSPNEA ON EXERTION): ICD-10-CM

## 2024-12-03 LAB — LVEF ECHO: NORMAL

## 2024-12-03 PROCEDURE — 93306 TTE W/DOPPLER COMPLETE: CPT

## 2024-12-04 ENCOUNTER — OFFICE VISIT (OUTPATIENT)
Dept: CARDIOLOGY | Facility: OTHER | Age: 49
End: 2024-12-04
Attending: PHYSICIAN ASSISTANT
Payer: COMMERCIAL

## 2024-12-04 VITALS
TEMPERATURE: 97.3 F | SYSTOLIC BLOOD PRESSURE: 98 MMHG | DIASTOLIC BLOOD PRESSURE: 80 MMHG | BODY MASS INDEX: 27.71 KG/M2 | HEIGHT: 71 IN | HEART RATE: 87 BPM | WEIGHT: 197.9 LBS | OXYGEN SATURATION: 95 % | RESPIRATION RATE: 16 BRPM

## 2024-12-04 DIAGNOSIS — I50.22 CHRONIC SYSTOLIC CONGESTIVE HEART FAILURE (H): Primary | ICD-10-CM

## 2024-12-04 DIAGNOSIS — R60.0 PERIPHERAL EDEMA: ICD-10-CM

## 2024-12-04 DIAGNOSIS — R06.09 DOE (DYSPNEA ON EXERTION): ICD-10-CM

## 2024-12-04 DIAGNOSIS — I49.1 PAC (PREMATURE ATRIAL CONTRACTION): ICD-10-CM

## 2024-12-04 DIAGNOSIS — I49.3 PVC'S (PREMATURE VENTRICULAR CONTRACTIONS): ICD-10-CM

## 2024-12-04 PROCEDURE — G0463 HOSPITAL OUTPT CLINIC VISIT: HCPCS | Mod: 25

## 2024-12-04 RX ORDER — SACUBITRIL AND VALSARTAN 24; 26 MG/1; MG/1
1 TABLET, FILM COATED ORAL 2 TIMES DAILY
Qty: 180 TABLET | Refills: 3 | Status: SHIPPED | OUTPATIENT
Start: 2024-12-04

## 2024-12-04 RX ORDER — FUROSEMIDE 20 MG/1
20 TABLET ORAL DAILY
Qty: 90 TABLET | Refills: 1 | Status: SHIPPED | OUTPATIENT
Start: 2024-12-04

## 2024-12-04 RX ORDER — METOPROLOL SUCCINATE 25 MG/1
25 TABLET, EXTENDED RELEASE ORAL DAILY
Qty: 90 TABLET | Refills: 3 | Status: SHIPPED | OUTPATIENT
Start: 2024-12-04

## 2024-12-04 ASSESSMENT — PAIN SCALES - GENERAL: PAINLEVEL_OUTOF10: SEVERE PAIN (7)

## 2024-12-04 NOTE — PATIENT INSTRUCTIONS
Thank you for allowing Dr ESME Michelle and our  team to participate in your care. Please call our office at [phone] with scheduling questions or if you need to cancel or change your appointment. With any other questions or concerns you may call cardiology nurse at  [phone].       If you experience chest pain, chest pressure, chest tightness, shortness of breath, fainting, lightheadedness, nausea, vomiting, or other concerning symptoms, please report to the Emergency Department or call 911. These symptoms may be emergent, and best treated in the Emergency Department.

## 2024-12-04 NOTE — TELEPHONE ENCOUNTER
"Ronaldo Borjaemy's significant other called and left a VM asking if they still needed to keep their EP telemed appt on Monday,or if they could cancel.  She said  it was an \"if needed appt\"  but with all that was talked about during his visit today, they forgot to address the telemed visit.  Please advise.   "

## 2024-12-04 NOTE — PROGRESS NOTES
[facility] HEART CARE   CARDIOLOGY PROGRESS NOTE     Chief Complaint   Patient presents with    Consult          Diagnosis:  NSVT.  -x241 up to 5 beats on 10/28/2024.  SVT.  -x103 up to 9 beats 10/28/2024.  PVC's.  -9.2% on 10/28/24.  4.  PAC's.   -3.6% on 10/28/24.  5.  STEEN.  6.  Hital hernnia.   7.  MJ abuse.  8.  CHF.   -45-50% on 12/3/24.  9.  Sinus tachycardia.  10.  History of methamphetamine.  11.  History of tobacco abuse.      Assessment/Plan:    1.  Sinus tachycardia: We reviewed the differential.  Likely from dehydration.  Essentially, only drinks 12, 12 ounce cans of Mountain Dew daily.  Drinks no water as does not like the taste of water.  Is also complicated by chronic neck pain, anxiety, poor sleep/sleep deprivation, and heart failure.  Have encouraged him to stop drinking Mountain Dew and consume water.  However, he has to be cautious with how much water he drinks because of heart failure.  Start metoprolol 25 mg XL daily.  2.  NSVT/PVCs: Up to 5 beats with PVCs.  Patient is asymptomatic.  Unless he is having symptoms of heart failure, does not require treatment.  Will start on metoprolol 25 mg XL daily for tachycardia and heart failure.  3.  SVT: x103 episodes up to 9 beats.  Again asymptomatic, does not require treatment.  As mentioned above, starting metoprolol for heart failure, tachycardia, and ectopy.  4.  CHF: Systolic with an EF of 45-50%.  Has moderate peripheral edema.  Consumes 12, 12 ounce cans of Mountain Dew a day.  Discussed activity, salt restriction, fluid restriction, and daily weights.  Will start on Lasix 20 mg daily, metoprolol 25 mg XL daily, Entresto 24/26 mg twice a day, and Jardiance 10 mg daily.  Had ordered CTA to rule out ischemia as a cause of his reduced EF.  Patient canceled the planned CTA in 12/3/2024.  Patient has a phobia of needles.  Wants wife to be available but she works.  Not able to do this test until January.  Does have a history of methamphetamine abuse.  5.   Marijuana abuse: No longer smokes tobacco but smokes marijuana.  Encouraged to quit.  6.  Follow-up in 2 to 3 months..  Started on Lasix 20 mg daily, Jardiance 10 mg daily, metoprolol 25 mg XL daily, and Entresto 24/26 mg twice a day.        Interval history:  See above.      Relevant testing:  Echo on 11/21/2024:  Mild left ventricular dilation is present. Left ventricular function is  decreased. The ejection fraction is 45-50% (mildly reduced).  Right ventricular function, chamber size, wall motion, and thickness are  normal.  Mild aortic insufficiency is present.  The inferior vena cava was normal in size with preserved respiratory  variability.  No pericardial effusion is present.    Zio patch on 10/28/2024:  Worn for 7 days and 12 hr's.  After removing artifact, total time was 6 days and 12 hr's. Placed on 11/5/24 at 6:05 PM and completed on 11/13/24 at 6:02 AM.  Underlying rhythm was sinus with Second Degree AV Block-Mobitz I present.  Hrt rate ranged from 46 bpm, maximum heart rate of 240 bpm, averaging 100 bpm.  No significant bradycardia, pauses, Mobitz type II or 3rd degree heart block.  No atrial fibrillation on this study.  x2 triggered events and x0 diary entries.  These corresponded to NSR, SVEs and VEs.  x241 runs of VT longest lasting 5 beats with a maximum heart rate of 240 bpm.  x103 runs of SVT longest lasting 9 beats with a maximum heart rate of 187 bpm.  PACs were occasional at 3.6%.  PVCs were frequent at 9.2%.  Ventricular couplets were frequent at 7.2%.  Ventricular triplets were occasional at 2%.  + episodes of ventricular bigeminy lasting up to 3.8 sec's.  + episodes of ventricular trigeminy lasting up to 48.7 sec's.    RUCHI in 6/1/2023:  Right lower extremity RUCHI: 1.11  Left lower extremity RUCHI: 1.08        ICD-10-CM    1. STEEN (dyspnea on exertion)  R06.09 EKG 12-lead complete w/read - (Clinic Performed)          Past Medical History:   Diagnosis Date    Cervical disc disease      Uncomplicated asthma        Past Surgical History:   Procedure Laterality Date    BACK SURGERY      fusion of C5 and C6    IR TRANSLAMINAR EPIDURAL LUMBAR INJ INCL IMAGING  12/2012    OTHER SURGICAL HISTORY  03/2013    Cervical Fusion    wisdom teeth extracted         Allergies   Allergen Reactions    Cephalexin Diarrhea    Cranberry     Strawberry Extract     Doxycycline Nausea and Vomiting    Zithromax [Azithromycin Dihydrate] Rash       Current Outpatient Medications   Medication Sig Dispense Refill    adalimumab (HUMIRA) 40 MG/0.8ML pen kit Inject 40 mg subcutaneously.      albuterol (PROAIR HFA/PROVENTIL HFA/VENTOLIN HFA) 108 (90 Base) MCG/ACT inhaler Inhale 2 puffs into the lungs every 6 hours as needed for shortness of breath / dyspnea 1 Inhaler 1    amoxicillin (AMOXIL) 875 MG tablet Take 1 tablet (875 mg) by mouth 2 times daily 20 tablet 0    cyclobenzaprine (FLEXERIL) 10 MG tablet TAKE 1 TABLET BY MOUTH 3 TIMES A DAYAS NEEDED 90 tablet 3    HYDROmorphone (DILAUDID) 4 MG tablet Take 1 tablet (4 mg) by mouth 2 times daily as needed for severe pain. 60 tablet 0    HYDROmorphone (DILAUDID) 8 MG tablet Take 0.5 tablets (4 mg) by mouth 2 times daily as needed for severe pain. 30 tablet 0    ketoconazole (NIZORAL) 2 % external shampoo Apply topically daily as needed for itching or irritation 240 mL 3    medical cannabis (Patient's own supply) Mar Message Only. Per patient report      metoprolol tartrate (LOPRESSOR) 25 MG tablet To take 25 mg the night before and 25 mg the morning of the CTA of the coronaries to reduce heart rate. 2 tablet 1    omeprazole (PRILOSEC) 40 MG DR capsule Take 1 capsule (40 mg) by mouth daily 90 capsule 3    SUBOXONE 12-3 MG FILM per film Place 1 Film under the tongue daily. PM 30 each 2    SUBOXONE 8-2 MG per film Place 1 Film under the tongue daily. AM 30 each 2    triamcinolone (KENALOG) 0.1 % external cream Apply topically 2 times daily 453 g 4    triamcinolone (KENALOG) 0.1 %  external ointment Apply topically 2 times daily 30 g 1       Social History     Socioeconomic History    Marital status:      Spouse name: Not on file    Number of children: Not on file    Years of education: Not on file    Highest education level: Not on file   Occupational History    Occupation:      Comment: Full-time    Tobacco Use    Smoking status: Former     Current packs/day: 0.00     Average packs/day: 1 pack/day for 35.5 years (35.5 ttl pk-yrs)     Types: Cigarettes     Start date: 1988     Quit date: 2023     Years since quittin.4     Passive exposure: Current    Smokeless tobacco: Never   Vaping Use    Vaping status: Every Day    Substances: THC, CBD, Flavoring    Devices: Disposable   Substance and Sexual Activity    Alcohol use: No    Drug use: Not Currently    Sexual activity: Yes     Partners: Female   Other Topics Concern     Service No    Blood Transfusions Yes    Caffeine Concern Yes     Comment: Soda, up to 5 cans per day     Occupational Exposure No    Hobby Hazards No    Sleep Concern Yes     Comment: Sleeps only 3-4 hours every night    Stress Concern No    Weight Concern No    Special Diet No    Back Care Yes     Comment: [facility]    Exercise Not Asked    Bike Helmet Not Asked    Seat Belt Yes    Self-Exams Not Asked    Parent/sibling w/ CABG, MI or angioplasty before 65F 55M? No   Social History Narrative    Not on file     Social Drivers of Health     Financial Resource Strain: Medium Risk (9/15/2022)    Overall Financial Resource Strain (CARDIA)     Difficulty of Paying Living Expenses: Somewhat hard   Food Insecurity: No Food Insecurity (9/15/2022)    Hunger Vital Sign     Worried About Running Out of Food in the Last Year: Never true     Ran Out of Food in the Last Year: Never true   Transportation Needs: No Transportation Needs (9/15/2022)    PRAPARE - Transportation     Lack of Transportation (Medical): No     Lack of Transportation  (Non-Medical): No   Physical Activity: Not on file   Stress: Not on file   Social Connections: Not on file   Interpersonal Safety: Low Risk  (12/18/2023)    Interpersonal Safety     Do you feel physically and emotionally safe where you currently live?: Yes     Within the past 12 months, have you been hit, slapped, kicked or otherwise physically hurt by someone?: No     Within the past 12 months, have you been humiliated or emotionally abused in other ways by your partner or ex-partner?: No   Housing Stability: Low Risk  (9/15/2022)    Housing Stability Vital Sign     Unable to Pay for Housing in the Last Year: No     Number of Places Lived in the Last Year: 1     Unstable Housing in the Last Year: No       LAB RESULTS:   Orders Only (auto-released) on 10/28/2024   Component Date Value Ref Range Status    Zio Prelim Results 11/18/2024    Preliminary                    Value:Patient had a min HR of 46 bpm, max HR of 240 bpm, and avg HR of 100 bpm. Predominant underlying rhythm was Sinus Rhythm. 241 Ventricular Tachycardia runs occurred, the run with the fastest interval lasting 5 beats with a max rate of 240 bpm, the longest lasting 5 beats with an avg rate of 157 bpm. 103 Supraventricular Tachycardia runs occurred, the run with the fastest interval lasting 5 beats with a max rate of 187 bpm, the longest lasting 9 beats with an avg rate of 161 bpm. Second Degree AV Block-Mobitz I (Wenckebach) was present. Isolated SVEs were occasional (3.6%, 14733), SVE Couplets were rare (<1.0%, 4867), and SVE Triplets were rare (<1.0%, 475). Isolated VEs were frequent (9.2%, 35415), VE Couplets were frequent (7.2%, 82200), and VE Triplets were occasional (2.0%, 6405). Ventricular Bigeminy and Trigeminy were present.      Office Visit on 10/28/2024   Component Date Value Ref Range Status    Ventricular Rate 10/28/2024 120  BPM Final    Atrial Rate 10/28/2024 120  BPM Final    VT Interval 10/28/2024 142  ms Final    QRS Duration  10/28/2024 94  ms Final    QT 10/28/2024 332  ms Final    QTc 10/28/2024 469  ms Final    P Axis 10/28/2024 73  degrees Final    R AXIS 10/28/2024 53  degrees Final    T Axis 10/28/2024 71  degrees Final    Interpretation ECG 10/28/2024    Final                    Value:Sinus tachycardia with Premature supraventricular complexes and with frequent , and consecutive Premature ventricular complexes  Nonspecific T wave abnormality  Abnormal ECG  No previous ECGs available  Confirmed by MD Schumacher Anthony ([id number]) on 10/29/2024 9:31:33 AM      WBC Count 10/28/2024 8.3  4.0 - 11.0 10e3/uL Final    RBC Count 10/28/2024 4.65  4.40 - 5.90 10e6/uL Final    Hemoglobin 10/28/2024 14.7  13.3 - 17.7 g/dL Final    Hematocrit 10/28/2024 42.1  40.0 - 53.0 % Final    MCV 10/28/2024 91  78 - 100 fL Final    MCH 10/28/2024 31.6  26.5 - 33.0 pg Final    MCHC 10/28/2024 34.9  31.5 - 36.5 g/dL Final    RDW 10/28/2024 14.3  10.0 - 15.0 % Final    Platelet Count 10/28/2024 305  150 - 450 10e3/uL Final    % Neutrophils 10/28/2024 49  % Final    % Lymphocytes 10/28/2024 36  % Final    % Monocytes 10/28/2024 11  % Final    % Eosinophils 10/28/2024 4  % Final    % Basophils 10/28/2024 1  % Final    % Immature Granulocytes 10/28/2024 0  % Final    NRBCs per 100 WBC 10/28/2024 0  <1 /100 Final    Absolute Neutrophils 10/28/2024 4.0  1.6 - 8.3 10e3/uL Final    Absolute Lymphocytes 10/28/2024 3.0  0.8 - 5.3 10e3/uL Final    Absolute Monocytes 10/28/2024 0.9  0.0 - 1.3 10e3/uL Final    Absolute Eosinophils 10/28/2024 0.3  0.0 - 0.7 10e3/uL Final    Absolute Basophils 10/28/2024 0.0  0.0 - 0.2 10e3/uL Final    Absolute Immature Granulocytes 10/28/2024 0.0  <=0.4 10e3/uL Final    Absolute NRBCs 10/28/2024 0.0  10e3/uL Final    Sodium 10/28/2024 136  135 - 145 mmol/L Final    Potassium 10/28/2024 3.6  3.4 - 5.3 mmol/L Final    Carbon Dioxide (CO2) 10/28/2024 26  22 - 29 mmol/L Final    Anion Gap 10/28/2024 12  7 - 15 mmol/L Final    Urea Nitrogen  "10/28/2024 5.8 (L)  6.0 - 20.0 mg/dL Final    Creatinine 10/28/2024 0.57 (L)  0.67 - 1.17 mg/dL Final    GFR Estimate 10/28/2024 >90  >60 mL/min/1.73m2 Final    Calcium 10/28/2024 9.4  8.8 - 10.4 mg/dL Final    Chloride 10/28/2024 98  98 - 107 mmol/L Final    Glucose 10/28/2024 101 (H)  70 - 99 mg/dL Final    Alkaline Phosphatase 10/28/2024 110  40 - 150 U/L Final    AST 10/28/2024 35  0 - 45 U/L Final    ALT 10/28/2024 21  0 - 70 U/L Final    Protein Total 10/28/2024 7.1  6.4 - 8.3 g/dL Final    Albumin 10/28/2024 4.5  3.5 - 5.2 g/dL Final    Bilirubin Total 10/28/2024 0.4  <=1.2 mg/dL Final        Review of systems: Negative except that which was noted in the HPI.    Physical examination:  BP 98/80 (BP Location: Left arm, Patient Position: Sitting, Cuff Size: Adult Regular)   Pulse 87   Temp 97.3  F (36.3  C) (Tympanic)   Resp 16   Ht 1.797 m (5' 10.75\")   Wt 89.8 kg (197 lb 14.4 oz)   SpO2 95%   BMI 27.80 kg/m      GENERAL APPEARANCE: healthy, alert and no distress  CHEST: lungs clear to auscultation.  CARDIOVASCULAR: regular rhythm, normal S1 with physiologic split S2, no S3 or S4 and no murmur, click or rub.  Tachycardic with ectopy on auscultation.  EXTREMITIES: no clubbing, cyanosis with moderate edema.    Total time spent on day of visit, including review of tests, obtaining/reviewing separately obtained history, ordering medications/tests/procedures, communicating with PCP/consultants, and documenting in electronic medical record: 60 minutes.              Thank you for allowing me to participate in the care of your patient. Please do not hesitate to contact me if you have any questions.     Jose L Michelle, DO          "

## 2024-12-05 ENCOUNTER — TELEPHONE (OUTPATIENT)
Dept: CARDIOLOGY | Facility: OTHER | Age: 49
End: 2024-12-05

## 2024-12-05 LAB
ATRIAL RATE - MUSE: 85 BPM
DIASTOLIC BLOOD PRESSURE - MUSE: NORMAL MMHG
INTERPRETATION ECG - MUSE: NORMAL
P AXIS - MUSE: 62 DEGREES
PR INTERVAL - MUSE: 144 MS
QRS DURATION - MUSE: 92 MS
QT - MUSE: 374 MS
QTC - MUSE: 503 MS
R AXIS - MUSE: 50 DEGREES
SYSTOLIC BLOOD PRESSURE - MUSE: NORMAL MMHG
T AXIS - MUSE: 87 DEGREES
VENTRICULAR RATE- MUSE: 109 BPM

## 2024-12-05 NOTE — TELEPHONE ENCOUNTER
Received PA request from [facility] regarding empagliflozin (JARDIANCE) 10 MG TABS tablet. Submitted via CMM, awaiting response

## 2024-12-05 NOTE — TELEPHONE ENCOUNTER
Received APPROVAL from [facility] regarding empagliflozin (JARDIANCE) 10 MG TABS effective 12/5/24-12/5/25

## 2024-12-16 ENCOUNTER — TELEPHONE (OUTPATIENT)
Dept: INTERVENTIONAL RADIOLOGY/VASCULAR | Facility: HOSPITAL | Age: 49
End: 2024-12-16

## 2024-12-16 NOTE — PROVIDER NOTIFICATION
Clled and talked with pt's RICHARD Borja. Huong stated pt does want to reschedule CTA angiogram but would like to wait until January. Currently dealing with a sick child.

## 2024-12-26 DIAGNOSIS — K29.00 ACUTE SUPERFICIAL GASTRITIS WITHOUT HEMORRHAGE: ICD-10-CM

## 2024-12-26 RX ORDER — OMEPRAZOLE 40 MG/1
40 CAPSULE, DELAYED RELEASE ORAL DAILY
Qty: 90 CAPSULE | Refills: 3 | Status: SHIPPED | OUTPATIENT
Start: 2024-12-26

## 2025-01-20 ENCOUNTER — PATIENT OUTREACH (OUTPATIENT)
Dept: CARE COORDINATION | Facility: OTHER | Age: 50
End: 2025-01-20

## 2025-01-20 ENCOUNTER — APPOINTMENT (OUTPATIENT)
Dept: LAB | Facility: OTHER | Age: 50
End: 2025-01-20
Attending: PHYSICIAN ASSISTANT
Payer: COMMERCIAL

## 2025-01-20 DIAGNOSIS — M54.41 CHRONIC MIDLINE LOW BACK PAIN WITH BILATERAL SCIATICA: ICD-10-CM

## 2025-01-20 DIAGNOSIS — M50.90 CERVICAL DISC DISEASE: ICD-10-CM

## 2025-01-20 DIAGNOSIS — M54.42 CHRONIC MIDLINE LOW BACK PAIN WITH BILATERAL SCIATICA: ICD-10-CM

## 2025-01-20 DIAGNOSIS — G89.29 CHRONIC MIDLINE LOW BACK PAIN WITH BILATERAL SCIATICA: ICD-10-CM

## 2025-01-20 DIAGNOSIS — M11.20 CHONDROCALCINOSIS: ICD-10-CM

## 2025-01-20 DIAGNOSIS — F11.90 OPIOID USE DISORDER: ICD-10-CM

## 2025-01-20 RX ORDER — BUPRENORPHINE HYDROCHLORIDE, NALOXONE HYDROCHLORIDE 12; 3 MG/1; MG/1
1 FILM, SOLUBLE BUCCAL; SUBLINGUAL DAILY
Qty: 30 EACH | Refills: 2 | Status: SHIPPED | OUTPATIENT
Start: 2025-01-20

## 2025-01-20 RX ORDER — BUPRENORPHINE HYDROCHLORIDE, NALOXONE HYDROCHLORIDE 8; 2 MG/1; MG/1
1 FILM, SOLUBLE BUCCAL; SUBLINGUAL DAILY
Qty: 30 EACH | Refills: 2 | Status: SHIPPED | OUTPATIENT
Start: 2025-01-20

## 2025-01-20 RX ORDER — CYCLOBENZAPRINE HCL 10 MG
TABLET ORAL
Qty: 90 TABLET | Refills: 3 | Status: SHIPPED | OUTPATIENT
Start: 2025-01-20

## 2025-01-20 RX ORDER — HYDROMORPHONE HYDROCHLORIDE 4 MG/1
4 TABLET ORAL 2 TIMES DAILY PRN
Qty: 60 TABLET | Refills: 0 | Status: SHIPPED | OUTPATIENT
Start: 2025-01-20

## 2025-01-20 NOTE — TELEPHONE ENCOUNTER
Dilaudid, Flexeril, Suboxone      Last Written Prescription Date:  12.24.24, 10.28.24, 12.26.24  Last Fill Quantity: #60,#90, #30, #30 ,   # refills: 0  Last Office Visit: 9.25.24  Future Office visit:   1/24/25       Routing refill request to provider for review/approval because:  Drug not on the [facility] or [facility] refill protocol or controlled substance

## 2025-01-20 NOTE — PROGRESS NOTES
Clinic Care Coordination Contact  Care Team Conversations    Ronaldo arrived on time for his appointment with Florence Sneed this date.  Provider was running behind - RN CC did let Ronaldo leave without being seen.  Refills sent to Florence in refill encounter.  Appointment was rescheduled for Friday, January 24, 2025, arrive at 12:45PM.  Ronaldo was happy and agreeable with this.      Yoli Kowalski, RN-BSN, [facility] Care Coordinator  [phone]

## 2025-01-23 ENCOUNTER — PATIENT OUTREACH (OUTPATIENT)
Dept: CARE COORDINATION | Facility: OTHER | Age: 50
End: 2025-01-23

## 2025-01-23 NOTE — PROGRESS NOTES
"  Assessment & Plan     Opioid use disorder  He is doing well. Dilaudid occasionally.  Significant Psoriatic Arthritis and psoriasis with significant leg swelling.   - Urine Drug Screen Buprenorphine Urine Qualitative [id number], Ethanol Urine Qualitative [id number], Methadone Urine Qualitative [id number], Oxycodone Urine Qualitative [id number], Creatinine Urine Random [id number]          BMI  Estimated body mass index is 29.16 kg/m  as calculated from the following:    Height as of this encounter: 1.797 m (5' 10.75\").    Weight as of this encounter: 94.2 kg (207 lb 9.6 oz).   Weight management plan: Discussed healthy diet and exercise guidelines      See Patient Instructions    No follow-ups on file.    Moraima Obrien is a 49 year old, presenting for the following health issues:  No chief complaint on file.    HPI           He is currently taking 20/5 mg of buprenorphine daily.     Status Since Last Visit:  Have you used any opioids since your last visit?: no use since last visit  Do you feel that your dose of suboxone is too high or too low? Adequate  Have there been cravings for opioids? No   Any withdrawal symptoms? None     Any side effects from the medication? None   Any alcohol use? no  Any other recreational drug use? no    Precipitating Factors:  Triggers have been: non-existent no desire  Other Supports:  Do you attend counseling or meet with a therapist? No  Do you attend NA or AA meetings? No  Do you have/meet with a sponsor? No  Family and support systems have been: Very supportive  What other goals have you been working on (job, family, relationships, etc)? Getting his health back in order.         PDMP Review         Value Time User    State PDMP site checked  Yes 7/27/2023  1:47 PM Florence Sneed PA                  Review of Systems  Constitutional, HEENT, cardiovascular, pulmonary, gi and gu systems are negative, except as otherwise noted.      Objective    There were no vitals taken for this visit.  There " is no height or weight on file to calculate BMI.  Physical Exam   GENERAL: alert and no distress  NECK: no adenopathy, no asymmetry, masses, or scars  RESP: lungs clear to auscultation - no rales, rhonchi or wheezes  CV: regular rate and rhythm, normal S1 S2, no S3 or S4, no murmur, click or rub, no peripheral edema  ABDOMEN: soft, nontender, no hepatosplenomegaly, no masses and bowel sounds normal  MS: no gross musculoskeletal defects noted, no edema  SKIN: lower legs are better. Skin is improved. Still has plaques. Swelling still present and no open weeping areas now.   No results found for this or any previous visit (from the past 24 hours).        Signed Electronically by: ELODIA Barrow

## 2025-01-23 NOTE — PROGRESS NOTES
Clinic Care Coordination Contact  Care Team Conversations    FRANSICO PANIAGUA sent Ronaldo a message this date reminding him of his appointment with Florence Sneed and to arrive at 12:45PM.  RN CC will not be here at that time, which Ronaldo is aware of.  Did encourage him to talk with Florence Sneed about establishing care with a new provider.  RN CC will gladly assist with this process.    Yoli Kowalski RN-BSN, [facility] Care Coordinator  [phone]

## 2025-01-24 ENCOUNTER — OFFICE VISIT (OUTPATIENT)
Dept: FAMILY MEDICINE | Facility: OTHER | Age: 50
End: 2025-01-24
Attending: PHYSICIAN ASSISTANT
Payer: COMMERCIAL

## 2025-01-24 ENCOUNTER — APPOINTMENT (OUTPATIENT)
Dept: LAB | Facility: OTHER | Age: 50
End: 2025-01-24
Attending: PHYSICIAN ASSISTANT
Payer: COMMERCIAL

## 2025-01-24 VITALS
SYSTOLIC BLOOD PRESSURE: 112 MMHG | OXYGEN SATURATION: 94 % | RESPIRATION RATE: 16 BRPM | BODY MASS INDEX: 29.06 KG/M2 | WEIGHT: 207.6 LBS | HEART RATE: 86 BPM | TEMPERATURE: 98.7 F | DIASTOLIC BLOOD PRESSURE: 68 MMHG | HEIGHT: 71 IN

## 2025-01-24 DIAGNOSIS — F11.90 OPIOID USE DISORDER: Primary | ICD-10-CM

## 2025-01-24 LAB
AMPHETAMINES UR QL SCN: ABNORMAL
BARBITURATES UR QL SCN: ABNORMAL
BENZODIAZ UR QL SCN: ABNORMAL
BUPRENORPHINE UR QL: ABNORMAL
BZE UR QL SCN: ABNORMAL
CANNABINOIDS UR QL SCN: ABNORMAL
CREAT UR-MCNC: 27.2 MG/DL
ETHANOL UR QL SCN: NORMAL
FENTANYL UR QL: ABNORMAL
METHADONE UR QL SCN: NORMAL
OPIATES UR QL SCN: ABNORMAL
OXYCODONE UR QL: NORMAL
PCP QUAL URINE (ROCHE): ABNORMAL

## 2025-01-24 PROCEDURE — 99213 OFFICE O/P EST LOW 20 MIN: CPT | Performed by: PHYSICIAN ASSISTANT

## 2025-01-24 PROCEDURE — 80307 DRUG TEST PRSMV CHEM ANLYZR: CPT | Mod: ZL | Performed by: PHYSICIAN ASSISTANT

## 2025-01-24 PROCEDURE — 82570 ASSAY OF URINE CREATININE: CPT | Mod: ZL | Performed by: PHYSICIAN ASSISTANT

## 2025-01-24 PROCEDURE — G0463 HOSPITAL OUTPT CLINIC VISIT: HCPCS

## 2025-01-24 ASSESSMENT — PAIN SCALES - GENERAL: PAINLEVEL_OUTOF10: SEVERE PAIN (7)

## 2025-03-11 DIAGNOSIS — M11.20 CHONDROCALCINOSIS: ICD-10-CM

## 2025-03-11 DIAGNOSIS — M54.41 CHRONIC MIDLINE LOW BACK PAIN WITH BILATERAL SCIATICA: ICD-10-CM

## 2025-03-11 DIAGNOSIS — M54.42 CHRONIC MIDLINE LOW BACK PAIN WITH BILATERAL SCIATICA: ICD-10-CM

## 2025-03-11 DIAGNOSIS — G89.29 CHRONIC MIDLINE LOW BACK PAIN WITH BILATERAL SCIATICA: ICD-10-CM

## 2025-03-11 RX ORDER — HYDROMORPHONE HYDROCHLORIDE 4 MG/1
4 TABLET ORAL 2 TIMES DAILY PRN
Qty: 60 TABLET | Refills: 0 | Status: SHIPPED | OUTPATIENT
Start: 2025-03-11

## 2025-03-11 NOTE — TELEPHONE ENCOUNTER
Dilaudid      Last Written Prescription Date:  2.15.25  Last Fill Quantity: #60,   # refills: 0  Last Office Visit: 1.24.25  Future Office visit:       Routing refill request to provider for review/approval because:  Drug not on the [facility], [facility] or [facility] refill protocol or controlled substance

## 2025-04-09 DIAGNOSIS — M54.42 CHRONIC MIDLINE LOW BACK PAIN WITH BILATERAL SCIATICA: ICD-10-CM

## 2025-04-09 DIAGNOSIS — M54.41 CHRONIC MIDLINE LOW BACK PAIN WITH BILATERAL SCIATICA: ICD-10-CM

## 2025-04-09 DIAGNOSIS — M11.20 CHONDROCALCINOSIS: ICD-10-CM

## 2025-04-09 DIAGNOSIS — G89.29 CHRONIC MIDLINE LOW BACK PAIN WITH BILATERAL SCIATICA: ICD-10-CM

## 2025-04-09 NOTE — TELEPHONE ENCOUNTER
Dilaudid    on Dilaudid and Suboxone at this time - trying to get his psoriatic arthritis pain under control - following with Rheum at this time as well  Last Written Prescription Date:  3.14.25  Last Fill Quantity: #60,   # refills: 0  Last Office Visit: 1.24.25  Future Office visit:   5/29/2025     Routing refill request to provider for review/approval because:  Drug not on the [facility] or [facility] refill protocol or controlled substance

## 2025-04-10 RX ORDER — HYDROMORPHONE HYDROCHLORIDE 4 MG/1
4 TABLET ORAL 2 TIMES DAILY PRN
Qty: 60 TABLET | Refills: 0 | Status: SHIPPED | OUTPATIENT
Start: 2025-04-10

## 2025-04-24 DIAGNOSIS — F11.90 OPIOID USE DISORDER: ICD-10-CM

## 2025-04-24 RX ORDER — BUPRENORPHINE HYDROCHLORIDE, NALOXONE HYDROCHLORIDE 12; 3 MG/1; MG/1
FILM, SOLUBLE BUCCAL; SUBLINGUAL
Qty: 30 FILM | Refills: 0 | Status: SHIPPED | OUTPATIENT
Start: 2025-04-24

## 2025-04-24 RX ORDER — BUPRENORPHINE HYDROCHLORIDE, NALOXONE HYDROCHLORIDE 8; 2 MG/1; MG/1
FILM, SOLUBLE BUCCAL; SUBLINGUAL
Qty: 30 FILM | Refills: 0 | Status: SHIPPED | OUTPATIENT
Start: 2025-04-24

## 2025-04-24 NOTE — TELEPHONE ENCOUNTER
SUBOXONE 8-2 MG per film         Routing refill request to provider for review/approval because:  Drug not on the [facility], [facility] or [facility] refill protocol or controlled substance      SUBOXONE 12-3 MG FILM per film           Routing refill request to provider for review/approval because:  Drug not on the [facility], [facility] or [facility] refill protocol or controlled substance

## 2025-04-24 NOTE — TELEPHONE ENCOUNTER
Suboxone 12-3  Last Written Prescription Date: 1/20/25  Last Fill Quantity: 30 # of Refills: 2  Last Office Visit: 1/24/25    Suboxone 8-2  Last Written Prescription Date: 1/20/25  Last Fill Quantity: 30 # of Refills: 2  Last Office Visit: 1/24/25

## 2025-05-06 DIAGNOSIS — M54.41 CHRONIC MIDLINE LOW BACK PAIN WITH BILATERAL SCIATICA: ICD-10-CM

## 2025-05-06 DIAGNOSIS — M54.42 CHRONIC MIDLINE LOW BACK PAIN WITH BILATERAL SCIATICA: ICD-10-CM

## 2025-05-06 DIAGNOSIS — G89.29 CHRONIC MIDLINE LOW BACK PAIN WITH BILATERAL SCIATICA: ICD-10-CM

## 2025-05-06 DIAGNOSIS — M11.20 CHONDROCALCINOSIS: ICD-10-CM

## 2025-05-06 RX ORDER — HYDROMORPHONE HYDROCHLORIDE 4 MG/1
4 TABLET ORAL 2 TIMES DAILY PRN
Qty: 60 TABLET | Refills: 0 | Status: SHIPPED | OUTPATIENT
Start: 2025-05-09

## 2025-05-06 NOTE — TELEPHONE ENCOUNTER
Dilaudid    on Dilaudid and Suboxone at this time - trying to get his psoriatic arthritis pain under control - following with Rheum at this time as well   Last Written Prescription Date:  4.11.25  Last Fill Quantity: #60,   # refills: 0  Last Office Visit: 1.24.25  Future Office visit:   5/29/2025    Routing refill request to provider for review/approval because:  Drug not on the [facility] or [facility] refill protocol or controlled substance

## 2025-05-27 DIAGNOSIS — F11.90 OPIOID USE DISORDER: ICD-10-CM

## 2025-05-27 RX ORDER — BUPRENORPHINE HYDROCHLORIDE, NALOXONE HYDROCHLORIDE 8; 2 MG/1; MG/1
FILM, SOLUBLE BUCCAL; SUBLINGUAL
Qty: 30 FILM | Refills: 0 | Status: SHIPPED | OUTPATIENT
Start: 2025-05-27

## 2025-05-27 RX ORDER — BUPRENORPHINE HYDROCHLORIDE, NALOXONE HYDROCHLORIDE 12; 3 MG/1; MG/1
FILM, SOLUBLE BUCCAL; SUBLINGUAL
Qty: 30 FILM | Refills: 0 | Status: SHIPPED | OUTPATIENT
Start: 2025-05-27

## 2025-05-27 NOTE — TELEPHONE ENCOUNTER
Suboxone      Last Written Prescription Date:  4.24.25  Last Fill Quantity: #30, #30,   # refills: 0  Last Office Visit: 1.24.25  Future Office visit:   5/29/2025    Routing refill request to provider for review/approval because:  Drug not on the [facility], [facility] or [facility] refill protocol or controlled substance

## 2025-05-28 ENCOUNTER — PATIENT OUTREACH (OUTPATIENT)
Dept: CARE COORDINATION | Facility: OTHER | Age: 50
End: 2025-05-28

## 2025-05-28 ASSESSMENT — PATIENT HEALTH QUESTIONNAIRE - PHQ9
10. IF YOU CHECKED OFF ANY PROBLEMS, HOW DIFFICULT HAVE THESE PROBLEMS MADE IT FOR YOU TO DO YOUR WORK, TAKE CARE OF THINGS AT HOME, OR GET ALONG WITH OTHER PEOPLE: SOMEWHAT DIFFICULT
SUM OF ALL RESPONSES TO PHQ QUESTIONS 1-9: 7
SUM OF ALL RESPONSES TO PHQ QUESTIONS 1-9: 7

## 2025-05-28 NOTE — PROGRESS NOTES
Clinic Care Coordination Contact  Care Team Conversations    RN CC sent Ronaldo a message this date reminding him of his appointment tomorrow and to arrive at 9:45AM.    Yoli Kowalski RN-BSN, [facility] Care Coordinator  [phone]

## 2025-05-29 ENCOUNTER — PATIENT OUTREACH (OUTPATIENT)
Dept: CARE COORDINATION | Facility: OTHER | Age: 50
End: 2025-05-29

## 2025-05-29 ENCOUNTER — APPOINTMENT (OUTPATIENT)
Dept: LAB | Facility: OTHER | Age: 50
End: 2025-05-29
Attending: STUDENT IN AN ORGANIZED HEALTH CARE EDUCATION/TRAINING PROGRAM
Payer: COMMERCIAL

## 2025-05-29 ENCOUNTER — OFFICE VISIT (OUTPATIENT)
Dept: FAMILY MEDICINE | Facility: OTHER | Age: 50
End: 2025-05-29
Attending: STUDENT IN AN ORGANIZED HEALTH CARE EDUCATION/TRAINING PROGRAM
Payer: COMMERCIAL

## 2025-05-29 VITALS
TEMPERATURE: 97.6 F | SYSTOLIC BLOOD PRESSURE: 124 MMHG | BODY MASS INDEX: 32.03 KG/M2 | RESPIRATION RATE: 20 BRPM | HEIGHT: 71 IN | DIASTOLIC BLOOD PRESSURE: 64 MMHG | WEIGHT: 228.8 LBS | OXYGEN SATURATION: 98 % | HEART RATE: 100 BPM

## 2025-05-29 DIAGNOSIS — G89.4 CHRONIC PAIN SYNDROME: Chronic | ICD-10-CM

## 2025-05-29 DIAGNOSIS — R06.09 DOE (DYSPNEA ON EXERTION): ICD-10-CM

## 2025-05-29 DIAGNOSIS — F11.20 OPIOID USE DISORDER, SEVERE, ON MAINTENANCE THERAPY (H): ICD-10-CM

## 2025-05-29 DIAGNOSIS — E66.811 CLASS 1 OBESITY DUE TO EXCESS CALORIES WITH SERIOUS COMORBIDITY AND BODY MASS INDEX (BMI) OF 32.0 TO 32.9 IN ADULT: ICD-10-CM

## 2025-05-29 DIAGNOSIS — I35.1 MILD AORTIC INSUFFICIENCY: ICD-10-CM

## 2025-05-29 DIAGNOSIS — I50.22 CHRONIC SYSTOLIC CONGESTIVE HEART FAILURE (H): Chronic | ICD-10-CM

## 2025-05-29 DIAGNOSIS — Z79.899 MEDICAL MARIJUANA USE: ICD-10-CM

## 2025-05-29 DIAGNOSIS — Z00.00 LABORATORY EXAMINATION ORDERED AS PART OF A ROUTINE GENERAL MEDICAL EXAMINATION: ICD-10-CM

## 2025-05-29 DIAGNOSIS — E66.09 CLASS 1 OBESITY DUE TO EXCESS CALORIES WITH SERIOUS COMORBIDITY AND BODY MASS INDEX (BMI) OF 32.0 TO 32.9 IN ADULT: ICD-10-CM

## 2025-05-29 DIAGNOSIS — Z76.89 ENCOUNTER TO ESTABLISH CARE: Primary | ICD-10-CM

## 2025-05-29 DIAGNOSIS — F15.91 HISTORY OF METHAMPHETAMINE USE: ICD-10-CM

## 2025-05-29 DIAGNOSIS — L40.50 PSORIATIC ARTHRITIS (H): ICD-10-CM

## 2025-05-29 DIAGNOSIS — K21.9 GASTROESOPHAGEAL REFLUX DISEASE WITHOUT ESOPHAGITIS: ICD-10-CM

## 2025-05-29 PROBLEM — M54.42 CHRONIC MIDLINE LOW BACK PAIN WITH BILATERAL SCIATICA: Chronic | Status: ACTIVE | Noted: 2024-04-03

## 2025-05-29 PROBLEM — G89.29 CHRONIC MIDLINE LOW BACK PAIN WITH BILATERAL SCIATICA: Chronic | Status: ACTIVE | Noted: 2024-04-03

## 2025-05-29 PROBLEM — K80.50 BILIARY COLIC: Status: RESOLVED | Noted: 2020-12-04 | Resolved: 2025-05-29

## 2025-05-29 PROBLEM — L40.9 PSORIASIS: Chronic | Status: ACTIVE | Noted: 2024-08-08

## 2025-05-29 PROBLEM — M54.41 CHRONIC MIDLINE LOW BACK PAIN WITH BILATERAL SCIATICA: Chronic | Status: ACTIVE | Noted: 2024-04-03

## 2025-05-29 LAB
AMPHETAMINES UR QL SCN: ABNORMAL
BARBITURATES UR QL SCN: ABNORMAL
BENZODIAZ UR QL SCN: ABNORMAL
BUPRENORPHINE UR QL: ABNORMAL
BZE UR QL SCN: ABNORMAL
CANNABINOIDS UR QL SCN: ABNORMAL
CREAT UR-MCNC: 50.6 MG/DL
ETHANOL UR QL SCN: NORMAL
FENTANYL UR QL: ABNORMAL
METHADONE UR QL SCN: NORMAL
OPIATES UR QL SCN: ABNORMAL
OXYCODONE UR QL: NORMAL
PCP QUAL URINE (ROCHE): ABNORMAL

## 2025-05-29 PROCEDURE — G0463 HOSPITAL OUTPT CLINIC VISIT: HCPCS

## 2025-05-29 PROCEDURE — 80307 DRUG TEST PRSMV CHEM ANLYZR: CPT | Mod: ZL | Performed by: STUDENT IN AN ORGANIZED HEALTH CARE EDUCATION/TRAINING PROGRAM

## 2025-05-29 PROCEDURE — 82570 ASSAY OF URINE CREATININE: CPT | Mod: ZL | Performed by: STUDENT IN AN ORGANIZED HEALTH CARE EDUCATION/TRAINING PROGRAM

## 2025-05-29 RX ORDER — FUROSEMIDE 20 MG/1
20 TABLET ORAL DAILY
Qty: 90 TABLET | Refills: 1 | Status: SHIPPED | OUTPATIENT
Start: 2025-05-29

## 2025-05-29 RX ORDER — SECUKINUMAB 150 MG/ML
150 INJECTION SUBCUTANEOUS
COMMUNITY
Start: 2025-05-27

## 2025-05-29 ASSESSMENT — PAIN SCALES - GENERAL: PAINLEVEL_OUTOF10: SEVERE PAIN (7)

## 2025-05-29 NOTE — PROGRESS NOTES
Assessment & Plan     Encounter to establish care  Reviewed medical histories.  Reviewed notes from rheumatology at [facility].    Opioid use disorder, severe, on maintenance therapy (H)  History of opioid dependence and abuse with prescription medication.  Transition to Suboxone in early 2020.  This has been very beneficial.  No relapses or concerns since he has been on this medication.  Does help manage his daily pain.    Urine appropriate today.    Pain contract updated today.  Continue current Suboxone 20 mg daily.  - Urine Drug Screen Buprenorphine Urine Qualitative [id number], Ethanol Urine Qualitative [id number], Methadone Urine Qualitative [id number], Oxycodone Urine Qualitative [id number], Creatinine Urine Random [id number]    Chronic pain syndrome  Significant chronic pain related to cervical disc disease, lumbar disc disease, and psoriatic arthritis, although arthritis pain is improving with new biologic.  Has been on Dilaudid 4 mg up to twice daily from past PCP.  This is not an ideal regimen in addition to his Suboxone.  I would definitely not increase his Dilaudid.  Goal would be to wean Dilaudid over time.  Ideally we will get him follow-up with neurosurgery, they did recommend cervical procedures in the past.  Would also like to go through detailed nonnarcotic medication options including gabapentin, Lyrica.  Will avoid ibuprofens with heart failure.    Medical marijuana use  Uses for chronic pain.    Psoriatic arthritis (H)  Follows with rheumatology.  Is improving on Cosentyx.    Chronic systolic congestive heart failure (H)  Bilateral edema, pitting, bilaterally.  Reviewed past echocardiogram.  Needs to schedule CT angio chest.  Will refill Jardiance and Lasix but needs updated BMP.  Will likely need to increase Lasix at follow-up.  Needs intensive diet changes.  Drinks a lot of sugar Mountain Dew and 2% milk.  He plans to work on this.  - empagliflozin (JARDIANCE) 10 MG TABS tablet; Take 1 tablet (10 mg)  by mouth daily.  - furosemide (LASIX) 20 MG tablet; Take 1 tablet (20 mg) by mouth daily.  - semaglutide-weight management (WEGOVY) 0.25 MG/0.5ML pen; Inject 0.5 mLs (0.25 mg) subcutaneously once a week.    STEEN (dyspnea on exertion)  - empagliflozin (JARDIANCE) 10 MG TABS tablet; Take 1 tablet (10 mg) by mouth daily.  - furosemide (LASIX) 20 MG tablet; Take 1 tablet (20 mg) by mouth daily.    Mild aortic insufficiency  Noted on echocardiogram.  Monitor again 2 to 3 years.    Class 1 obesity due to excess calories with serious comorbidity and body mass index (BMI) of 32.0 to 32.9 in adult  BMI 32.  With heart failure and obesity, would benefit from Wegovy.  No contraindications (history of Men2 syndrome, medullary thyroid cancer, gallbladder dz that is active or pancreatitis).    We will have a follow-up appointment at 1 month, and after that if tolerating, will need to send Ga[employer]oom message stating they are doing fine on the current dose with no side effects and would like to increase to the next dose.  I will then send in the next dose to the pharmacy.  Discussed benefits including weight loss, slowed gastric emptying to feel full longer, lower blood sugar.    Discussed side effects of heartburn, nausea, indigestion, bloating, diarrhea, pancreatitis, gallbladder inflammation and constipation, and possible risk of thyroid cancer in the future, although this has not been proven or noted on recent 5 to 10-year studies in humans. Reviewed link with anterior ischemic optic neuropathy, although very low risk and not true correlation yet, could cause blindness.   Med is recommended to be long term, for life. Patients gain 2/3 weight back after stopping and med can be less effective with each restart.   Reviewed the best benefit comes from combining with exercise changes and a healthy diet. Need high protein diet.   -semaglutide-weight management (WEGOVY) 0.25 MG/0.5ML pen; Inject 0.5 mLs (0.25 mg) subcutaneously once a  week.    History of methamphetamine use  In remission. Can't do phentermine.   - semaglutide-weight management (WEGOVY) 0.25 MG/0.5ML pen; Inject 0.5 mLs (0.25 mg) subcutaneously once a week.    Laboratory examination ordered as part of a routine general medical examination  - Lipid Profile (Chol, Trig, HDL, LDL calc); Future    Gastroesophageal reflux disease without esophagitis  Stable with PPI daily. Should wean soda.   Consider EGD with colonoscopy with long standing reflux.         The longitudinal plan of care for the diagnosis(es)/condition(s) as documented were addressed during this visit. Due to the added complexity in care, I will continue to support Micah in the subsequent management and with ongoing continuity of care.      I spent a total of 41 minutes on the day of the visit.   Time spent by me today doing chart review, history and exam, documentation and further activities per the note    Subjective   Micah is a 50 year old, presenting for the following health issues:  Establish Care and Heart Failure        5/29/2025     9:57 AM   Additional Questions   Roomed by Laly MARINO   Accompanied by significant other Huong         5/29/2025     9:57 AM   Patient Reported Additional Medications   Patient reports taking the following new medications none     History of Present Illness       Reason for visit:  Establish Care   He is taking medications regularly.        Heart Failure Follow-up   Are you experiencing any shortness of breath? No  Are you experiencing any swelling in your legs or feet?  Stable  Are you using more pillows than usual? No  Do you cough at night?  Yes  Do you check your weight daily?  No  Have you had a weight change recently?  Weight increase  Are you having any of the following side effects from your medications? (Select all that apply)  The patient does not report symptoms of dizziness, fatigue, cough, swelling, or slow heart beat.  Since your last visit, how many times have you  gone to the cardiologist, urgent care, emergency room, or hospital because of your heart failure?   None  Last Echo:   Echo result w/o MOPS: Interpretation SummaryMild left ventricular dilation is present. Left ventricular function isdecreased. The ejection fraction is 45-50% (mildly reduced).Right ventricular function, chamber size, wall motion, and thickness arenormal.Mild aortic insufficiency is present.The inferior vena cava was normal in size with preserved respiratoryvariability.No pericardial effusion is present.    Hasn't done CT angio  Sleeps in recliner with feet down. Painful for back to sleep in bed.   Fearful of labs  Lasix helped somewhat     Cosentyx is working for psoriasis. Following with rheumatology.     Has gained weight.   Drinking a lot of MT Dew and 2% milk   Eats a lot of soup broth   Not active  Unsure on GLP    He is currently taking 20 mg of buprenorphine daily. This is taken in 12mg and 8mg dose(s) daily.     Five years sober. History of opioid abuse.     Status Since Last Visit:  Have you used any opioids since your last visit?: no use since last visit  Do you feel that your dose of suboxone is too high or too low? Adequate  Have there been cravings for opioids? No   Any withdrawal symptoms? none    Any side effects from the medication? no  Any alcohol use? no  Any other recreational drug use? Medical THC     Precipitating Factors:  Triggers have been: non-existent   Other Supports:  Do you attend counseling or meet with a therapist? No  Do you attend NA or AA meetings? No  Do you have/meet with a sponsor? No  Family and support systems have been: great  What other goals have you been working on (job, family, relationships, etc)?     Has dilaudid from past PCP.   Uses dilaudid for neck and low back pain.   No hiding of pills - he is honest.   Dilaudid does help.   Neck is surgical - hasn't had surgery yet. Had to wait on psoriasis control.  Prior neck surgery 2013. Needs redone plus  "more.   Scared of surgery. Feels like he is maintaining for pain and can do what he wants for the most part.  Unable to golf.     PDMP Review         Value Time User    State PDMP site checked  Yes 5/29/2025 10:15 AM Nichelle Munoz MD                Objective    /64 (BP Location: Right arm, Patient Position: Sitting, Cuff Size: Adult Large)   Pulse 100   Temp 97.6  F (36.4  C) (Tympanic)   Resp 20   Ht 1.797 m (5' 10.75\")   Wt 103.8 kg (228 lb 12.8 oz)   SpO2 98%   BMI 32.14 kg/m    Body mass index is 32.14 kg/m .    Physical Exam  Constitutional:       General: He is not in acute distress.     Appearance: Normal appearance. He is not ill-appearing.   Cardiovascular:      Rate and Rhythm: Normal rate and regular rhythm.      Pulses: Normal pulses.      Heart sounds: No murmur heard.  Pulmonary:      Effort: Pulmonary effort is normal.      Breath sounds: Normal breath sounds.   Musculoskeletal:      Right lower leg: Edema present.      Left lower leg: Edema present.      Comments: 2+ bilateral to mid calf   Neurological:      Mental Status: He is alert.   Psychiatric:         Mood and Affect: Mood normal.         Behavior: Behavior normal.            Results for orders placed or performed in visit on 05/29/25   Urine Drug Screen Panel     Status: Abnormal   Result Value Ref Range    Amphetamines Urine Screen Negative Screen Negative    Barbituates Urine Screen Negative Screen Negative    Benzodiazepine Urine Screen Negative Screen Negative    Cannabinoids Urine Screen Positive (A) Screen Negative    Cocaine Urine Screen Negative Screen Negative    Fentanyl Qual Urine Screen Negative Screen Negative    Opiates Urine Screen Negative Screen Negative    PCP Urine Screen Negative Screen Negative   Buprenorphine Urine, Qualitative     Status: Abnormal   Result Value Ref Range    Buprenorphine Qual Urine Screen Positive (A) Screen Negative   Ethanol urine     Status: Normal   Result Value Ref Range    " Ethanol Urine Screen Negative Screen Negative   Methadone Qual Urine     Status: Normal   Result Value Ref Range    Methadone Urine Screen Negative Screen Negative   Oxycodone Urine, Qualitative     Status: Normal   Result Value Ref Range    Oxycodone Urine Screen Negative Screen Negative   Creatinine random urine     Status: None   Result Value Ref Range    Creatinine Urine mg/dL 50.6 mg/dL   Urine Drug Screen Buprenorphine Urine Qualitative [id number], Ethanol Urine Qualitative [id number], Methadone Urine Qualitative [id number], Oxycodone Urine Qualitative [id number], Creatinine Urine Random [id number]     Status: Abnormal    Narrative    The following orders were created for panel order Urine Drug Screen Buprenorphine Urine Qualitative [id number], Ethanol Urine Qualitative [id number], Methadone Urine Qualitative [id number], Oxycodone Urine Qualitative [id number], Creatinine Urine Random [id number].  Procedure                               Abnormality         Status                     ---------                               -----------         ------                     Urine Drug Screen Panel[[id number]]     Abnormal            Final result               Buprenorphine Urine, Qu...[[id number]]  Abnormal            Final result               Ethanol urine[[id number]]               Normal              Final result               Methadone Qual Urine[[id number]]        Normal              Final result               Oxycodone Urine, Qualit...[[id number]]  Normal              Final result               Creatinine random urine[[id number]]                         Final result                 Please view results for these tests on the individual orders.             Signed Electronically by: Nichelle Munoz MD

## 2025-05-29 NOTE — PROGRESS NOTES
Clinic Care Coordination Contact  Follow Up Progress Note      Assessment: RN CC attended office visit with Ronaldo, Huong, and Dr. Munoz this date.   Ronaldo comes in to establish care with Dr. Munoz, as his previous provider has retired.  He also comes in today for an MOUD follow up.  Ronaldo has been on MOUD for over 5 years now - and has remained solid in sustained recovery.  Is prescribed Dilaudid PRN at this time for his chronic pain issues - neck, back, multiple joint pain, etc. Does admit to taking more than 2 tabs per day on some days due to the severity of the pain. In regards to his neck - at his last Dr. Martinez appointment it was stated that there is possibly a surgical procedure to help his neck pain, but at that time, Ronaldo had derm issues (which he was later diagnosed with psoriatic arthritis) and Dr. Martinez would not perform surgery until his derm issue was resolved.  Ronaldo is currently following with Rheumatology in [place] for his psoriatic arthritis and is on Cosentyx, which he is finding beneficial.  Sounds like at this time he could pursue surgical intervention, but is hesitant at this time - admits to being afraid of going through another neck surgery.  He was tearful during visit.  Empathy provided.  Will continue to work on this with him on subsequent office visits- as today's visit was an [facility] care visit.      Care Gaps:    Health Maintenance Due   Topic Date Due    HF ACTION PLAN  Never done    ASTHMA ACTION PLAN  Never done    YEARLY PREVENTIVE VISIT  Never done    COVID-19 VACCINE (1) Never done    COLORECTAL CANCER SCREENING  Never done    PNEUMOCOCCAL VACCINE 50+ YEARS (1 of 2 - PCV) Never done    ZOSTER VACCINE (1 of 2) Never done    HEPATITIS A VACCINE (1 of 2 - Risk 2-dose series) Never done    HEPATITIS B VACCINE (1 of 3 - 19+ 3-dose series) Never done    DTAP/TDAP/TD VACCINE (6 - Td or Tdap) 12/30/2021    YURI ASSESSMENT  09/11/2024    BMP  04/28/2025    LIPID  06/03/2025       Will continue to  work on these     Care Plans  Care Plan: Chronic Pain       Problem: Chronic Pain is not self-managed       Goal: Patient will maintain consistent outpatient follow up to manage Chronic Pain and avoid hospitalizations for the next 6 months       This Visit's Progress: On track Recent Progress: On track    Note:     Barriers: literacy barrier  Strengths: committed, open to change  Patient expressed understanding of goal: yes  Action steps to achieve this goal:  1. I will take my medications as prescribed.  2. I will increase my physical activity as able  3. I will reach out if I am struggling.  4. I will continue to follow with Rheumatology as scheduled                               Care Plan: Nutrition       Problem: Diet management       Goal: Demonstrate improved diet management       This Visit's Progress: 10%    Note:     Barriers: literacy barrier, knowledge deficit  Strengths: honest, family support  Patient expressed understanding of goal: yes  Action steps to achieve this goal:  1. I will work on decreasing my Mt. Dew intake - try and reduce by 1 can/bottle per day  2. I will discuss starting GLP1 with my SO - RX was sent today - which will require a PA.  If he does decide to try Wegovy - it will be at the pharmacy once PA approved  3. I will reach out if I am struggling                                 Intervention/Education provided during outreach: Stage of Change: Maintenance  Reviewed information and resources for treatment and ongoing sobriety    Facilitated understanding the importance of awareness of factors that contribute to relapse , Assisted patient in identifying personal vulnerabilities, thoughts, emotions, and situations that may lead to relapse , Coached on skills to manage factors that contribute to relapse, and Facilitated understanding of effective coping skills in response to triggers for substance use        Suboxone treatment agreement renewed and signed by Ronaldo and Dr. Munoz this  date.  Signed agreement sent down to scanning.      Outreach Frequency: 3 months, more frequently as needed        Plan:   No change in MOUD treatment plan at this time.     Care Coordinator will follow up in 6 weeks, sooner if he has concerns.    Yoli Kowalski RN-BSN, [facility] Care Coordinator  [phone]       yes

## 2025-05-29 NOTE — LETTER
Micah CARTER Galo  [id number]         May 29, 2025    Suboxone (Buprenorphine/Naloxone) Treatment Agreement    This is an agreement between you and your provider about the safe and appropriate use of Suboxone (Buprenorphine/Naloxone) prescribed by your care team.    We are committed to collaborating with you in your efforts to get better. To support you in this work, we will help you schedule regular office appointments for medicine refills. If we must cancel or change your appointment for any reason, we will make sure you have enough medicine to last until your next appointment.     As a Provider, I will:  Listen carefully to your concerns and treat you with respect.   Recommend a treatment plan that I believe is in your best interest. This plan may involve therapies other than Suboxone (Buprenorphine/Naloxone).   Talk with you often about the possible benefits, and the risk of harm of any medicine that we prescribe for you.   Provide a plan on how to taper (discontinue or go off) using this medicine if the decision is made to stop its use.    As a Patient, I understand that Suboxone (Buprenorphine/Naloxone):   Is a controlled substance prescribed by my care team to help me function or work and manage my condition(s).   Needs to be taken exactly as prescribed. Combining Suboxone (Buprenorphine/Naloxone) with certain medicines or chemicals (such as illegal drugs, sedatives, sleeping pills, and benzodiazepines) can be dangerous or even fatal. If I stop Suboxone (Buprenorphine/Naloxone) suddenly, I may have severe withdrawal symptoms.    The risks, benefits and side effects of these medicine(s) were explained to me. I agree that:  I will take part in other treatments as advised by my care team. This may be psychiatry or counseling, physical therapy, behavioral therapy, group treatment or a referral to a specialist.     I will keep all my appointments. I understand that this is part of the monitoring of Suboxone  (Buprenorphine/Naloxone). My care team may require an office visit for EVERY refill. If I miss appointments or do not follow instructions, my care team may stop my medicine.    I will be respectful and considerate to all staff and providers at the clinic. Rude or aggressive behavior to staff including providers, nursing staff, , and any others will not be tolerated.    I will be honest with my care team.     I will take my medicines as prescribed. I will not change the dose or schedule unless my care team tells me to. There will be no refills if I run out early.     I may be asked to come to the clinic and complete a urine drug test or complete a film/pill count at any time. If I do not give a urine sample or participate in a film/pill count, my care team may stop my medicine.    It is up to me to make sure that I do not run out of my medicines on weekends or holidays. If my care team is willing to refill my Suboxone (Buprenorphine/Naloxone) prescription without a visit, I must request refills only during office hours. Refills may take up to three business days to process. I will use one pharmacy to fill all my Suboxone (Buprenorphine/Naloxone) and other controlled substance prescriptions. I will notify the clinic about any changes to my insurance or medication availability.    I am responsible for my prescriptions. If the medicine/prescription is lost, stolen, or destroyed, it will not be replaced. I will store my medication in a secure location away from children. I also agree not to share controlled substance medicines with anyone.    I am aware I should not use any illegal or recreational drugs. I agree not to drink alcohol unless my care team says I can.     I will tell my care team right away if I become pregnant, have a new medical problem treated outside of my regular clinic, or have a change in my medications.    I understand that this medicine can affect my thinking, judgment, and reaction time.  Alcohol and drugs affect the brain and body, which can affect the safety of my driving. Being under the influence of alcohol or drugs can affect my decision-making, behaviors, personal safety, and the safety of others. Driving while impaired (DWI) can occur if a person is driving, operating, or in physical control of a car, motorcycle, boat, snowmobile, ATV, motorbike, off-road vehicle, or any other motor vehicle ([place] Statute 169A.20). I understand the risk if I choose to drive or operate any vehicle or machinery.    I understand that if I do not follow any of the conditions above, my prescriptions or treatment may be stopped or changed.    I agree that my provider, clinic care team, and pharmacy may work with any city, state or federal law enforcement agency that investigates the misuse, sale, or other diversion of my controlled medicine. I will allow my provider to discuss my care with, or share a copy of, this agreement with any other treating provider, pharmacy, or emergency room where I receive care.    This agreement will remain in effect for duration of therapy and updated as appropriate, and/or  annually.    I have read this agreement and have asked questions about anything I did not understand.    __________________________________            ____________________________________  Patient     Date          Nichelle Munoz MD                     Date

## 2025-05-30 ENCOUNTER — TELEPHONE (OUTPATIENT)
Dept: FAMILY MEDICINE | Facility: OTHER | Age: 50
End: 2025-05-30

## 2025-05-30 NOTE — TELEPHONE ENCOUNTER
PA request received from Richard for semaglutide-weight management (WEGOVY) 0.25 MG/0.5ML pen. This was submitted via [facility] and is pending determination from [employer].

## 2025-06-02 NOTE — TELEPHONE ENCOUNTER
PA Determination was received for semaglutide-weight management (WEGOVY) 0.25 MG/0.5ML pen. This has been APPROVED 5/30/25-11/25/25. See below:

## 2025-06-04 DIAGNOSIS — M11.20 CHONDROCALCINOSIS: ICD-10-CM

## 2025-06-04 DIAGNOSIS — M54.41 CHRONIC MIDLINE LOW BACK PAIN WITH BILATERAL SCIATICA: ICD-10-CM

## 2025-06-04 DIAGNOSIS — M54.42 CHRONIC MIDLINE LOW BACK PAIN WITH BILATERAL SCIATICA: ICD-10-CM

## 2025-06-04 DIAGNOSIS — G89.29 CHRONIC MIDLINE LOW BACK PAIN WITH BILATERAL SCIATICA: ICD-10-CM

## 2025-06-04 NOTE — TELEPHONE ENCOUNTER
Dilaudid      Last Written Prescription Date:  5.9.25  Last Fill Quantity: #60,   # refills: 0  Last Office Visit: 5.29.25  Future Office visit:    Next 5 appointments (look out 90 days)      Jul 11, 2025 9:30 AM  (Arrive by 9:15 AM)  Provider Visit with Nichelle Munoz MD  [facility] [place] ([facility] [place] ) [address]  [place] [address]  [phone]             Routing refill request to provider for review/approval because:  Drug not on the [facility], [facility] or [facility] refill protocol or controlled substance

## 2025-06-05 RX ORDER — HYDROMORPHONE HYDROCHLORIDE 4 MG/1
4 TABLET ORAL 2 TIMES DAILY PRN
Qty: 60 TABLET | Refills: 0 | Status: SHIPPED | OUTPATIENT
Start: 2025-06-05

## 2025-06-09 ENCOUNTER — OFFICE VISIT (OUTPATIENT)
Dept: BEHAVIORAL HEALTH | Facility: OTHER | Age: 50
End: 2025-06-09
Attending: STUDENT IN AN ORGANIZED HEALTH CARE EDUCATION/TRAINING PROGRAM
Payer: COMMERCIAL

## 2025-06-09 DIAGNOSIS — R69 DIAGNOSIS DEFERRED: Primary | ICD-10-CM

## 2025-06-10 NOTE — CONFIDENTIAL NOTE
Met with patient today in clinic with significant other, Huong.   Patient planning to apply for social security on their own this time, they wanted help going through paperwork. Assisted with paperwork and Huong plans to apply online with information given. They will reach out if any other assistance is needed.       Huong Connelly [facility]  Social Work Care Coordinator  [facility] [place]   [phone]

## 2025-06-24 DIAGNOSIS — M11.20 CHONDROCALCINOSIS: ICD-10-CM

## 2025-06-24 DIAGNOSIS — M54.42 CHRONIC MIDLINE LOW BACK PAIN WITH BILATERAL SCIATICA: ICD-10-CM

## 2025-06-24 DIAGNOSIS — G89.29 CHRONIC MIDLINE LOW BACK PAIN WITH BILATERAL SCIATICA: ICD-10-CM

## 2025-06-24 DIAGNOSIS — F11.90 OPIOID USE DISORDER: ICD-10-CM

## 2025-06-24 DIAGNOSIS — M54.41 CHRONIC MIDLINE LOW BACK PAIN WITH BILATERAL SCIATICA: ICD-10-CM

## 2025-06-24 RX ORDER — BUPRENORPHINE HYDROCHLORIDE, NALOXONE HYDROCHLORIDE 8; 2 MG/1; MG/1
FILM, SOLUBLE BUCCAL; SUBLINGUAL
Qty: 30 FILM | Refills: 0 | Status: SHIPPED | OUTPATIENT
Start: 2025-06-24

## 2025-06-24 RX ORDER — HYDROMORPHONE HYDROCHLORIDE 4 MG/1
4 TABLET ORAL 2 TIMES DAILY PRN
Qty: 60 TABLET | Refills: 0 | Status: SHIPPED | OUTPATIENT
Start: 2025-06-24

## 2025-06-24 RX ORDER — BUPRENORPHINE HYDROCHLORIDE, NALOXONE HYDROCHLORIDE 12; 3 MG/1; MG/1
FILM, SOLUBLE BUCCAL; SUBLINGUAL
Qty: 30 FILM | Refills: 0 | Status: SHIPPED | OUTPATIENT
Start: 2025-06-24

## 2025-06-24 NOTE — TELEPHONE ENCOUNTER
Dilaudid, Suboxone, Suboxone - requesting Dilaudid now - not due until next week, but PCP will be out of office - pharmacy will not fill until due     Last Written Prescription Date:  6.5.25, 5.27.25, 5.27.25  Last Fill Quantity: #60, #30, #30,   # refills: 0  Last Office Visit: 5.29.25  Future Office visit:    Next 5 appointments (look out 90 days)      Jul 11, 2025 9:30 AM  (Arrive by 9:15 AM)  Provider Visit with Nichelle Munoz MD  [facility] [place] ([facility] [place] ) [address]  [place] [address]  [phone]             Routing refill request to provider for review/approval because:  Drug not on the [facility], [facility] or [facility] refill protocol or controlled substance

## 2025-07-10 ENCOUNTER — PATIENT OUTREACH (OUTPATIENT)
Dept: CARE COORDINATION | Facility: OTHER | Age: 50
End: 2025-07-10

## 2025-07-10 NOTE — PROGRESS NOTES
Clinic Care Coordination Contact  Care Team Conversations    RN CC sent Ronaldo a message this date reminding him of his appointment tomorrow and to come fasting for his labs- arrive at 9:15AM.      Yoli Kowalski RN-BSN, [facility] Care Coordinator  [phone]

## 2025-07-11 ENCOUNTER — LAB (OUTPATIENT)
Dept: LAB | Facility: OTHER | Age: 50
End: 2025-07-11
Payer: COMMERCIAL

## 2025-07-11 DIAGNOSIS — I50.22 CHRONIC SYSTOLIC CONGESTIVE HEART FAILURE (H): ICD-10-CM

## 2025-07-11 DIAGNOSIS — Z00.00 LABORATORY EXAMINATION ORDERED AS PART OF A ROUTINE GENERAL MEDICAL EXAMINATION: ICD-10-CM

## 2025-07-11 LAB
ALBUMIN SERPL BCG-MCNC: 4.1 G/DL (ref 3.5–5.2)
ALP SERPL-CCNC: 175 U/L (ref 40–150)
ALT SERPL W P-5'-P-CCNC: 46 U/L (ref 0–70)
ANION GAP SERPL CALCULATED.3IONS-SCNC: 11 MMOL/L (ref 7–15)
AST SERPL W P-5'-P-CCNC: 62 U/L (ref 0–45)
BILIRUB SERPL-MCNC: 0.4 MG/DL
BUN SERPL-MCNC: 5.4 MG/DL (ref 6–20)
CALCIUM SERPL-MCNC: 8.9 MG/DL (ref 8.8–10.4)
CHLORIDE SERPL-SCNC: 96 MMOL/L (ref 98–107)
CHOLEST SERPL-MCNC: 151 MG/DL
CREAT SERPL-MCNC: 0.74 MG/DL (ref 0.67–1.17)
EGFRCR SERPLBLD CKD-EPI 2021: >90 ML/MIN/1.73M2
FASTING STATUS PATIENT QL REPORTED: YES
FASTING STATUS PATIENT QL REPORTED: YES
GLUCOSE SERPL-MCNC: 110 MG/DL (ref 70–99)
HCO3 SERPL-SCNC: 28 MMOL/L (ref 22–29)
HDLC SERPL-MCNC: 32 MG/DL
LDLC SERPL CALC-MCNC: 72 MG/DL
NONHDLC SERPL-MCNC: 119 MG/DL
POTASSIUM SERPL-SCNC: 3.8 MMOL/L (ref 3.4–5.3)
PROT SERPL-MCNC: 7.2 G/DL (ref 6.4–8.3)
SODIUM SERPL-SCNC: 135 MMOL/L (ref 135–145)
TRIGL SERPL-MCNC: 234 MG/DL

## 2025-07-11 PROCEDURE — 80061 LIPID PANEL: CPT | Mod: ZL

## 2025-07-11 PROCEDURE — 84155 ASSAY OF PROTEIN SERUM: CPT | Mod: ZL

## 2025-07-11 PROCEDURE — 36415 COLL VENOUS BLD VENIPUNCTURE: CPT | Mod: ZL

## 2025-07-13 ENCOUNTER — HEALTH MAINTENANCE LETTER (OUTPATIENT)
Age: 50
End: 2025-07-13

## 2025-07-24 ENCOUNTER — PATIENT OUTREACH (OUTPATIENT)
Dept: CARE COORDINATION | Facility: OTHER | Age: 50
End: 2025-07-24

## 2025-07-24 NOTE — PROGRESS NOTES
Clinic Care Coordination Contact  Care Team Conversations    RN CC sent Ronaldo a message this date reminding him of his lab only appointment tomorrow at 11:00AM.    Yoli Kowalski RN-BSN, [facility] Care Coordinator  [phone]

## 2025-07-25 ENCOUNTER — LAB (OUTPATIENT)
Dept: LAB | Facility: OTHER | Age: 50
End: 2025-07-25
Payer: COMMERCIAL

## 2025-07-25 DIAGNOSIS — R06.09 DOE (DYSPNEA ON EXERTION): ICD-10-CM

## 2025-07-25 DIAGNOSIS — I50.22 CHRONIC SYSTOLIC CONGESTIVE HEART FAILURE (H): ICD-10-CM

## 2025-07-25 DIAGNOSIS — R60.0 PERIPHERAL EDEMA: ICD-10-CM

## 2025-07-25 LAB
ANION GAP SERPL CALCULATED.3IONS-SCNC: 13 MMOL/L (ref 7–15)
BUN SERPL-MCNC: 6.1 MG/DL (ref 6–20)
CALCIUM SERPL-MCNC: 9.3 MG/DL (ref 8.8–10.4)
CHLORIDE SERPL-SCNC: 94 MMOL/L (ref 98–107)
CREAT SERPL-MCNC: 0.72 MG/DL (ref 0.67–1.17)
EGFRCR SERPLBLD CKD-EPI 2021: >90 ML/MIN/1.73M2
GLUCOSE SERPL-MCNC: 125 MG/DL (ref 70–99)
HCO3 SERPL-SCNC: 28 MMOL/L (ref 22–29)
POTASSIUM SERPL-SCNC: 3.4 MMOL/L (ref 3.4–5.3)
SODIUM SERPL-SCNC: 135 MMOL/L (ref 135–145)

## 2025-07-25 PROCEDURE — 36415 COLL VENOUS BLD VENIPUNCTURE: CPT | Mod: ZL

## 2025-07-25 PROCEDURE — 80048 BASIC METABOLIC PNL TOTAL CA: CPT | Mod: ZL

## 2025-07-28 DIAGNOSIS — G89.29 CHRONIC MIDLINE LOW BACK PAIN WITH BILATERAL SCIATICA: ICD-10-CM

## 2025-07-28 DIAGNOSIS — M54.41 CHRONIC MIDLINE LOW BACK PAIN WITH BILATERAL SCIATICA: ICD-10-CM

## 2025-07-28 DIAGNOSIS — M11.20 CHONDROCALCINOSIS: ICD-10-CM

## 2025-07-28 DIAGNOSIS — M54.42 CHRONIC MIDLINE LOW BACK PAIN WITH BILATERAL SCIATICA: ICD-10-CM

## 2025-07-28 RX ORDER — HYDROMORPHONE HYDROCHLORIDE 4 MG/1
4 TABLET ORAL 2 TIMES DAILY PRN
Qty: 60 TABLET | Refills: 0 | Status: SHIPPED | OUTPATIENT
Start: 2025-07-28

## 2025-07-28 NOTE — TELEPHONE ENCOUNTER
Dilaudid      Last Written Prescription Date:  7.1.25  Last Fill Quantity: #60,   # refills: 0  Last Office Visit: 7.11.25  Future Office visit:    Next 5 appointments (look out 90 days)      Aug 26, 2025 9:30 AM  (Arrive by 9:15 AM)  Provider Visit with Nichelle Munoz MD  [facility] [place] ([facility] [place] ) [address]  [place] [address]  [phone]             Routing refill request to provider for review/approval because:  Drug not on the [facility], [facility] or [facility] refill protocol or controlled substance

## 2025-07-29 NOTE — TELEPHONE ENCOUNTER
[facility] is in need of annual override form to be completed.  Dr. Steward - are you OK with RN CC filling form out and faxing back to [facility]?  Last one is scanned under media tab - 7/15/2024.

## 2025-07-29 NOTE — TELEPHONE ENCOUNTER
Override form completed and faxed to [facility] this date.  Form sent down to scanning.    Yoli Kowalski RN-BSN, [facility] Care Coordinator  [phone]

## 2025-07-30 NOTE — TELEPHONE ENCOUNTER
RN CC spoke with Nicole at [facility] this date.  All needed information received to approve [facility] will be faxing approval letters to Dr. Steward and RN CC this date.  Will have scanned in once received.    Yoli Kowalski RN-BSN, [facility] Care Coordinator  [phone]

## 2025-08-25 ENCOUNTER — PATIENT OUTREACH (OUTPATIENT)
Dept: CARE COORDINATION | Facility: OTHER | Age: 50
End: 2025-08-25

## 2025-08-26 ENCOUNTER — PATIENT OUTREACH (OUTPATIENT)
Dept: CARE COORDINATION | Facility: OTHER | Age: 50
End: 2025-08-26

## 2025-08-26 ENCOUNTER — OFFICE VISIT (OUTPATIENT)
Dept: FAMILY MEDICINE | Facility: OTHER | Age: 50
End: 2025-08-26
Attending: STUDENT IN AN ORGANIZED HEALTH CARE EDUCATION/TRAINING PROGRAM
Payer: COMMERCIAL

## 2025-08-26 VITALS
HEART RATE: 98 BPM | WEIGHT: 244.3 LBS | HEIGHT: 71 IN | BODY MASS INDEX: 34.2 KG/M2 | DIASTOLIC BLOOD PRESSURE: 78 MMHG | SYSTOLIC BLOOD PRESSURE: 122 MMHG | TEMPERATURE: 97.6 F | OXYGEN SATURATION: 96 %

## 2025-08-26 DIAGNOSIS — K29.00 ACUTE SUPERFICIAL GASTRITIS WITHOUT HEMORRHAGE: ICD-10-CM

## 2025-08-26 DIAGNOSIS — R06.09 DOE (DYSPNEA ON EXERTION): ICD-10-CM

## 2025-08-26 DIAGNOSIS — E66.09 CLASS 1 OBESITY DUE TO EXCESS CALORIES WITH SERIOUS COMORBIDITY AND BODY MASS INDEX (BMI) OF 32.0 TO 32.9 IN ADULT: ICD-10-CM

## 2025-08-26 DIAGNOSIS — R60.0 PERIPHERAL EDEMA: ICD-10-CM

## 2025-08-26 DIAGNOSIS — M54.41 CHRONIC MIDLINE LOW BACK PAIN WITH BILATERAL SCIATICA: ICD-10-CM

## 2025-08-26 DIAGNOSIS — M11.20 CHONDROCALCINOSIS: ICD-10-CM

## 2025-08-26 DIAGNOSIS — I50.22 CHRONIC SYSTOLIC CONGESTIVE HEART FAILURE (H): ICD-10-CM

## 2025-08-26 DIAGNOSIS — G89.4 CHRONIC PAIN SYNDROME: ICD-10-CM

## 2025-08-26 DIAGNOSIS — M54.42 CHRONIC MIDLINE LOW BACK PAIN WITH BILATERAL SCIATICA: ICD-10-CM

## 2025-08-26 DIAGNOSIS — G89.29 CHRONIC MIDLINE LOW BACK PAIN WITH BILATERAL SCIATICA: ICD-10-CM

## 2025-08-26 DIAGNOSIS — F11.20 OPIOID USE DISORDER, SEVERE, ON MAINTENANCE THERAPY (H): Primary | ICD-10-CM

## 2025-08-26 DIAGNOSIS — E66.811 CLASS 1 OBESITY DUE TO EXCESS CALORIES WITH SERIOUS COMORBIDITY AND BODY MASS INDEX (BMI) OF 32.0 TO 32.9 IN ADULT: ICD-10-CM

## 2025-08-26 PROCEDURE — G0463 HOSPITAL OUTPT CLINIC VISIT: HCPCS

## 2025-08-26 RX ORDER — HYDROMORPHONE HYDROCHLORIDE 4 MG/1
4 TABLET ORAL 2 TIMES DAILY PRN
Qty: 60 TABLET | Refills: 0 | Status: SHIPPED | OUTPATIENT
Start: 2025-08-26

## 2025-08-26 RX ORDER — OMEPRAZOLE 40 MG/1
40 CAPSULE, DELAYED RELEASE ORAL DAILY
Qty: 90 CAPSULE | Refills: 3 | Status: SHIPPED | OUTPATIENT
Start: 2025-08-26

## 2025-08-26 RX ORDER — METOPROLOL SUCCINATE 25 MG/1
25 TABLET, EXTENDED RELEASE ORAL DAILY
Qty: 90 TABLET | Refills: 3 | Status: SHIPPED | OUTPATIENT
Start: 2025-08-26

## 2025-08-26 RX ORDER — SACUBITRIL AND VALSARTAN 24; 26 MG/1; MG/1
1 TABLET ORAL 2 TIMES DAILY
Qty: 180 TABLET | Refills: 3 | Status: SHIPPED | OUTPATIENT
Start: 2025-08-26

## 2025-08-26 RX ORDER — TORSEMIDE 20 MG/1
TABLET ORAL
Qty: 63 TABLET | Refills: 0 | Status: SHIPPED | OUTPATIENT
Start: 2025-08-26 | End: 2025-09-30

## 2025-08-26 ASSESSMENT — PAIN SCALES - GENERAL: PAINLEVEL_OUTOF10: SEVERE PAIN (7)

## 2025-09-04 ENCOUNTER — LAB (OUTPATIENT)
Dept: LAB | Facility: OTHER | Age: 50
End: 2025-09-04
Payer: COMMERCIAL

## 2025-09-04 DIAGNOSIS — R06.09 DOE (DYSPNEA ON EXERTION): ICD-10-CM

## 2025-09-04 DIAGNOSIS — R60.0 PERIPHERAL EDEMA: ICD-10-CM

## 2025-09-04 DIAGNOSIS — I50.22 CHRONIC SYSTOLIC CONGESTIVE HEART FAILURE (H): ICD-10-CM

## 2025-09-04 LAB
ANION GAP SERPL CALCULATED.3IONS-SCNC: 14 MMOL/L (ref 7–15)
BUN SERPL-MCNC: 7.5 MG/DL (ref 6–20)
CALCIUM SERPL-MCNC: 9 MG/DL (ref 8.8–10.4)
CHLORIDE SERPL-SCNC: 92 MMOL/L (ref 98–107)
CREAT SERPL-MCNC: 0.81 MG/DL (ref 0.67–1.17)
EGFRCR SERPLBLD CKD-EPI 2021: >90 ML/MIN/1.73M2
GLUCOSE SERPL-MCNC: 141 MG/DL (ref 70–99)
HCO3 SERPL-SCNC: 31 MMOL/L (ref 22–29)
POTASSIUM SERPL-SCNC: 3.5 MMOL/L (ref 3.4–5.3)
SODIUM SERPL-SCNC: 137 MMOL/L (ref 135–145)

## 2025-09-04 PROCEDURE — 36415 COLL VENOUS BLD VENIPUNCTURE: CPT | Mod: ZL

## 2025-09-04 PROCEDURE — 80048 BASIC METABOLIC PNL TOTAL CA: CPT | Mod: ZL
